# Patient Record
Sex: MALE | Race: WHITE | NOT HISPANIC OR LATINO | Employment: OTHER | URBAN - METROPOLITAN AREA
[De-identification: names, ages, dates, MRNs, and addresses within clinical notes are randomized per-mention and may not be internally consistent; named-entity substitution may affect disease eponyms.]

---

## 2020-02-14 ENCOUNTER — APPOINTMENT (OUTPATIENT)
Dept: GENERAL RADIOLOGY | Facility: HOSPITAL | Age: 45
End: 2020-02-14

## 2020-02-14 ENCOUNTER — HOSPITAL ENCOUNTER (EMERGENCY)
Facility: HOSPITAL | Age: 45
Discharge: HOME | End: 2020-02-14
Admitting: EMERGENCY MEDICINE

## 2020-02-14 ENCOUNTER — APPOINTMENT (OUTPATIENT)
Dept: MRI IMAGING | Facility: HOSPITAL | Age: 45
End: 2020-02-14

## 2020-02-14 VITALS
BODY MASS INDEX: 28.57 KG/M2 | HEIGHT: 68 IN | OXYGEN SATURATION: 95 % | WEIGHT: 188.49 LBS | TEMPERATURE: 98.3 F | DIASTOLIC BLOOD PRESSURE: 90 MMHG | SYSTOLIC BLOOD PRESSURE: 146 MMHG | RESPIRATION RATE: 16 BRPM | HEART RATE: 60 BPM

## 2020-02-14 DIAGNOSIS — S30.0XXA LUMBAR CONTUSION, INITIAL ENCOUNTER: ICD-10-CM
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
## 2020-02-14 PROCEDURE — 72148 MRI LUMBAR SPINE W/O DYE: CPT

## 2020-02-14 PROCEDURE — 25010000002 DEXAMETHASONE SODIUM PHOSPHATE 10 MG/ML SOLUTION: Performed by: NURSE PRACTITIONER

## 2020-02-14 PROCEDURE — 96372 THER/PROPH/DIAG INJ SC/IM: CPT

## 2020-02-14 PROCEDURE — 25010000002 HYDROMORPHONE PER 4 MG: Performed by: NURSE PRACTITIONER

## 2020-02-14 PROCEDURE — 72170 X-RAY EXAM OF PELVIS: CPT

## 2020-02-14 PROCEDURE — 99283 EMERGENCY DEPT VISIT LOW MDM: CPT

## 2020-02-14 PROCEDURE — 72072 X-RAY EXAM THORAC SPINE 3VWS: CPT

## 2020-02-14 PROCEDURE — 72110 X-RAY EXAM L-2 SPINE 4/>VWS: CPT

## 2020-02-14 PROCEDURE — 63710000001 ONDANSETRON ODT 4 MG TABLET DISPERSIBLE: Performed by: NURSE PRACTITIONER

## 2020-02-14 RX ORDER — ONDANSETRON 4 MG/1
4 TABLET, ORALLY DISINTEGRATING ORAL ONCE
Status: COMPLETED | OUTPATIENT
Start: 2020-02-14 | End: 2020-02-14

## 2020-02-14 RX ORDER — IBUPROFEN 800 MG/1
800 TABLET ORAL EVERY 6 HOURS PRN
Qty: 9 TABLET | Refills: 0 | Status: ON HOLD | OUTPATIENT
Start: 2020-02-14 | End: 2022-04-26

## 2020-02-14 RX ORDER — HYDROMORPHONE HCL 110MG/55ML
0.5 PATIENT CONTROLLED ANALGESIA SYRINGE INTRAVENOUS ONCE
Status: COMPLETED | OUTPATIENT
Start: 2020-02-14 | End: 2020-02-14

## 2020-02-14 RX ORDER — CYCLOBENZAPRINE HCL 10 MG
10 TABLET ORAL 3 TIMES DAILY PRN
Qty: 15 TABLET | Refills: 0 | Status: ON HOLD | OUTPATIENT
Start: 2020-02-14 | End: 2021-12-28

## 2020-02-14 RX ORDER — DEXAMETHASONE SODIUM PHOSPHATE 10 MG/ML
10 INJECTION, SOLUTION INTRAMUSCULAR; INTRAVENOUS ONCE
Status: COMPLETED | OUTPATIENT
Start: 2020-02-14 | End: 2020-02-14

## 2020-02-14 RX ADMIN — ONDANSETRON 4 MG: 4 TABLET, ORALLY DISINTEGRATING ORAL at 14:36

## 2020-02-14 RX ADMIN — HYDROMORPHONE HYDROCHLORIDE 0.5 MG: 2 INJECTION, SOLUTION INTRAMUSCULAR; INTRAVENOUS; SUBCUTANEOUS at 14:36

## 2020-02-14 RX ADMIN — DEXAMETHASONE SODIUM PHOSPHATE 10 MG: 10 INJECTION, SOLUTION INTRAMUSCULAR; INTRAVENOUS at 13:11

## 2020-02-14 NOTE — ED PROVIDER NOTES
Subjective   Chief complaint: back pain      Context: Patient is a 55-year-old male who comes in ambulatory by private vehicle with c/o back pain after he fell flat on his back from standing position. Denies any saddle anesthesia, bowel or bladder incontinence or retention weakness parasthesias or numbness.  He states he has had prior back fractures approximately 10 years ago that he wore a turtle shell splint for but did not have surgery.  He does not currently follow with the spine center or pain management.  He states he has not had any problems until he fell approximately 5 days ago.  He has been taking Aleve and Tylenol with little relief    Duration: x5 days    Timing: Waxes and wanes    Severity: Moderate    Associated symptoms: Worse with range of motion          PCP: none          Review of Systems   Constitutional: Negative.    HENT: Negative.    Eyes: Negative.    Respiratory: Negative.    Cardiovascular: Negative.    Gastrointestinal: Negative.    Genitourinary: Negative.    Musculoskeletal: Positive for back pain and myalgias.   Skin: Negative.    Neurological: Negative.    Hematological: Does not bruise/bleed easily.       No past medical history on file.    No Known Allergies    No past surgical history on file.    No family history on file.    Social History     Socioeconomic History   • Marital status: Single     Spouse name: Not on file   • Number of children: Not on file   • Years of education: Not on file   • Highest education level: Not on file           Objective   Physical Exam     Vital signs and traige nurse note reviewed.  Constitutional:  Awake, alert; well developed and well nourished.  Uncomfortable, the patient is examined in hospital gown.  HEENT:  Normocephalic, atraumatic; pupils are PERRL with intact EOM; oropharynx is pink and moist without edema or erythema.  Neck: Supple, full range of motion without pain; no cervical lymphadenopathy.  Cardiovascular: Regular rate and rhythm,  normal S1-S2. .  Pulmonary: Respiratory effort regular, nonlabored; breath sounds CTA without wheezing or rhonchi.  Abdomen: Soft, nontender, nondistended with normoactive bowel sounds; no rebound or guarding.    Musculoskeletal: Independent range of motion of all extremities, no palpable tenderness or edema.   Back:  Spine is midline and without midline tenderness on palpation of cervical; some midline tenderness without bony step-off or crepitus noted over the thoracic, and lumbar spine-there are no obvious signs of trauma; paraspinal musculature is tender over the bilateral lumbar paraspinal musculature and without soft tissue swelling, overlying ecchymosis or erythema. ROM is without pain,  Distal muscle strength is 5/5.  Neuro: Alert oriented x3, speech is clear and appropriate. DTRs are symmetrical bilateral lower extremity, negative Babinski BLE, negative straight leg raise BLE, strong and equal dorsiflexion of bilateral great toes L4, L5, S1 motor sensory intact.        Procedures           ED Course      Labs Reviewed - No data to display  Medications   dexamethasone sodium phosphate injection 10 mg (10 mg Intramuscular Given 2/14/20 1311)   HYDROmorphone (DILAUDID) injection 0.5 mg (0.5 mg Subcutaneous Given 2/14/20 1436)   ondansetron ODT (ZOFRAN-ODT) disintegrating tablet 4 mg (4 mg Oral Given 2/14/20 1436)     Xr Spine Thoracic 3 View    Result Date: 2/14/2020   1. There is no compression fracture or subluxation. 2. There is mild multilevel degenerative disc disease and degenerative changes noted  Electronically Signed By-Dinh Blevins On:2/14/2020 1:36 PM This report was finalized on [id number] by  Dinh Blevins, .    Mri Lumbar Spine Without Contrast    Result Date: 2/14/2020   1. Old compression deformities are noted at multiple levels. No new compression deformity is noted. 2. Mild retrolisthesis of L2 on 3 is noted. 3. Multilevel degenerative disc disease and degenerative changes noted with  varying degrees of neural foraminal encroachment. Otherwise there is no significant focal protruding disc or significant stenosis noted at any level.   Electronically Signed ByLoly Blevins On:2/14/2020 3:16 PM This report was finalized on [id number] by  Dinh Blevins, .    Xr Pelvis 1 Or 2 View    Result Date: 2/14/2020  Negative pelvis radiograph.   Electronically Signed ByLoly Blevins On:2/14/2020 1:37 PM This report was finalized on [id number] by  Dinh Blevins, .    Xr Spine Lumbar Complete 4+vw    Result Date: 2/14/2020   1. Multilevel compression deformities are noted from L2 through L4 which are age indeterminate. Follow-up and correlation with MRI evaluation may be helpful to assess the acuteness of these findings.  Electronically Signed ByLoly Blevins On:2/14/2020 1:37 PM This report was finalized on [id number] by  Dinh Blevins, .                                         MDM  Number of Diagnoses or Management Options  Fall, initial encounter:   Lumbar contusion, initial encounter:   Diagnosis management comments: Medical decision  Comorbidities:  has no past medical history on file.  Differentials: Fracture contusion-: Epidural abscess spinal cord impingement felt unlikely based on HPI and physical exam  Radiology interpretation:  X-rays reviewed by me and interpreted by radiologist,   Xr Spine Thoracic 3 View    Result Date: 2/14/2020   1. There is no compression fracture or subluxation. 2. There is mild multilevel degenerative disc disease and degenerative changes noted  Electronically Signed ByLoly Blevins On:2/14/2020 1:36 PM This report was finalized on [id number] by  Dinh Blevins, .    Mri Lumbar Spine Without Contrast    Result Date: 2/14/2020   1. Old compression deformities are noted at multiple levels. No new compression deformity is noted. 2. Mild retrolisthesis of L2 on 3 is noted. 3. Multilevel degenerative disc disease and degenerative changes noted  with varying degrees of neural foraminal encroachment. Otherwise there is no significant focal protruding disc or significant stenosis noted at any level.   Electronically Signed By-Dinh Blevins On:2/14/2020 3:16 PM This report was finalized on [id number] by  Dinh Blevins, .    Xr Pelvis 1 Or 2 View    Result Date: 2/14/2020  Negative pelvis radiograph.   Electronically Signed By-Christnorman Blevins On:2/14/2020 1:37 PM This report was finalized on [id number] by  Dinh Blevins, .    Xr Spine Lumbar Complete 4+vw    Result Date: 2/14/2020   1. Multilevel compression deformities are noted from L2 through L4 which are age indeterminate. Follow-up and correlation with MRI evaluation may be helpful to assess the acuteness of these findings.  Electronically Signed By-Dinh Blevins On:2/14/2020 1:37 PM This report was finalized on [id number] by  Dinh Blevins, .    Lab interpretation: Agrees not warranted    I discussed findings of x-rays with Dr. ch and MRI was ordered  he was given steroids Zofran and Dilaudid subcu  Patient is ambulatory    i discussed findings with patient who voices understanding of discharge instructions, signs and symptoms requiring return to ED; discharged improved and in stable condition with follow up for re-evaluation.  Inspect queried.  Patient was discharged home with ibuprofen and Flexeril    Patient Progress  Patient progress: stable      Final diagnoses:   Fall, initial encounter   Lumbar contusion, initial encounter            Rashida Rojas, APRN  02/14/20 1559

## 2020-02-14 NOTE — ED NOTES
Pt c/o lower back pain. Had back surgery in '09 fell 20feet and fractured his back. Pt reports he fell Monday and they pain has increased since then. Pt reports he could not even get out of bed this morning     Desmond Alvarado, LPN  02/14/20 5363

## 2020-02-14 NOTE — DISCHARGE INSTRUCTIONS
Medications as directed for pain, inflammation, muscle relaxation; light massage and range of motion exercises and improve the discomfort; back exercises 2-3 times daily; no heavy lifting, repeated bending or stooping for 3-5 days, gradually increase activity as tolerated by pain; return for numbness to the inner thigh, genitals or rectum, loss of control of your bowels or bladder, inability to urinate or worsening pain or symptoms. Follow up with primary care physician if no improvement in 2-3 days

## 2020-06-10 ENCOUNTER — HOSPITAL ENCOUNTER (EMERGENCY)
Facility: HOSPITAL | Age: 45
Discharge: HOME | End: 2020-06-10
Attending: EMERGENCY MEDICINE | Admitting: EMERGENCY MEDICINE

## 2020-06-10 VITALS
DIASTOLIC BLOOD PRESSURE: 130 MMHG | WEIGHT: 197.97 LBS | BODY MASS INDEX: 30 KG/M2 | OXYGEN SATURATION: 96 % | RESPIRATION RATE: 18 BRPM | SYSTOLIC BLOOD PRESSURE: 182 MMHG | HEART RATE: 73 BPM | HEIGHT: 68 IN | TEMPERATURE: 98.4 F

## 2020-06-10 DIAGNOSIS — T15.01XA FOREIGN BODY OF RIGHT CORNEA, INITIAL ENCOUNTER: Primary | ICD-10-CM

## 2020-06-10 PROCEDURE — 99283 EMERGENCY DEPT VISIT LOW MDM: CPT

## 2020-06-10 RX ORDER — PROPARACAINE HYDROCHLORIDE 5 MG/ML
SOLUTION/ DROPS OPHTHALMIC
Status: COMPLETED
Start: 2020-06-10 | End: 2020-06-10

## 2020-06-10 RX ORDER — BACITRACIN ZINC AND POLYMYXIN B SULFATE 500; 10000 [USP'U]/G; [USP'U]/G
OINTMENT OPHTHALMIC EVERY 6 HOURS SCHEDULED
Status: DISCONTINUED | OUTPATIENT
Start: 2020-06-10 | End: 2020-06-10 | Stop reason: HOSPADM

## 2020-06-10 RX ADMIN — PROPARACAINE HYDROCHLORIDE: 5 SOLUTION/ DROPS OPHTHALMIC at 08:15

## 2020-06-10 RX ADMIN — FLUORESCEIN SODIUM: 1 STRIP OPHTHALMIC at 08:15

## 2020-06-10 NOTE — ED NOTES
Pt was not Polysporin gtts at the time of d/c.  Contact was attempted, and ointment will be called to pharmacy.      Lona Gonzalez RN  06/10/20 0834

## 2020-06-10 NOTE — ED PROVIDER NOTES
Subjective   History of Present Illness  45-year-old male presents with complaints of right eye pain.  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  He states he was driving and felt like something blew into his eye.  He is not having any visual difficulty at this time.  He has no complaints of other injury.  Review of Systems  Negative for visual complaints  No past medical history on file.  Negative  No Known Allergies    No past surgical history on file.    No family history on file.    Social History     Socioeconomic History   • Marital status: Single     Spouse name: Not on file   • Number of children: Not on file   • Years of education: Not on file   • Highest education level: Not on file     No routine medications      Objective   Physical Exam  45-year-old male awake alert.  He is noted to have some mild edema to the lids of right eye.  Slit-lamp exam was performed he is noted to have foreign body at 3:00 on cornea.  Cornea anterior chamber are otherwise clear.  Visual acuity 20/30 right eye 20/40 left eye  Procedures     Patient eye was anesthetized with proparacaine.  Following this a non-rotating ophthalmic ora was used to lift foreign body and this was removed with a cotton swab using slit-lamp.  Patient does have small area of rust but he cannot cooperate well enough to remove this.  Fluorescein was placed which confirms abrasion at site of foreign body but no other uptake is noted.  Benny's test negative      ED Course                                           MDM  Patient findings were discussed with him.  He was discharged placed on Polysporin ophthalmic ointment.  Advised to follow-up with Dr. De La Rosa for repeat evaluation return new or worsening symptoms.  Patient's blood pressure was still elevated on repeat although he reports being very anxious.  He was advised of the importance of having this rechecked in the next few days.  He was given  follow-up for this.  Final diagnoses:   Foreign body of right cornea, initial encounter            Luis Angel Hylton MD  06/10/20 0808

## 2020-06-29 ENCOUNTER — APPOINTMENT (OUTPATIENT)
Dept: GENERAL RADIOLOGY | Facility: HOSPITAL | Age: 45
End: 2020-06-29

## 2020-06-29 ENCOUNTER — HOSPITAL ENCOUNTER (EMERGENCY)
Facility: HOSPITAL | Age: 45
Discharge: HOME | End: 2020-06-29
Attending: EMERGENCY MEDICINE | Admitting: EMERGENCY MEDICINE

## 2020-06-29 VITALS
WEIGHT: 193.78 LBS | OXYGEN SATURATION: 96 % | TEMPERATURE: 98.1 F | HEART RATE: 88 BPM | RESPIRATION RATE: 18 BRPM | DIASTOLIC BLOOD PRESSURE: 98 MMHG | HEIGHT: 68 IN | BODY MASS INDEX: 29.37 KG/M2 | SYSTOLIC BLOOD PRESSURE: 142 MMHG

## 2020-06-29 DIAGNOSIS — R06.00 DYSPNEA, UNSPECIFIED TYPE: Primary | ICD-10-CM

## 2020-06-29 DIAGNOSIS — J40 BRONCHITIS: ICD-10-CM

## 2020-06-29 DIAGNOSIS — J98.01 BRONCHOSPASM: ICD-10-CM

## 2020-06-29 LAB — SARS-COV-2 RNA PNL SPEC NAA+PROBE: NOT DETECTED

## 2020-06-29 PROCEDURE — 71045 X-RAY EXAM CHEST 1 VIEW: CPT

## 2020-06-29 PROCEDURE — 87635 SARS-COV-2 COVID-19 AMP PRB: CPT | Performed by: EMERGENCY MEDICINE

## 2020-06-29 PROCEDURE — 25010000002 METHYLPREDNISOLONE PER 125 MG: Performed by: EMERGENCY MEDICINE

## 2020-06-29 PROCEDURE — 94640 AIRWAY INHALATION TREATMENT: CPT

## 2020-06-29 PROCEDURE — 99283 EMERGENCY DEPT VISIT LOW MDM: CPT

## 2020-06-29 PROCEDURE — 96372 THER/PROPH/DIAG INJ SC/IM: CPT

## 2020-06-29 RX ORDER — METHYLPREDNISOLONE SODIUM SUCCINATE 125 MG/2ML
80 INJECTION, POWDER, LYOPHILIZED, FOR SOLUTION INTRAMUSCULAR; INTRAVENOUS ONCE
Status: COMPLETED | OUTPATIENT
Start: 2020-06-29 | End: 2020-06-29

## 2020-06-29 RX ORDER — IPRATROPIUM BROMIDE AND ALBUTEROL SULFATE 2.5; .5 MG/3ML; MG/3ML
3 SOLUTION RESPIRATORY (INHALATION) ONCE
Status: DISCONTINUED | OUTPATIENT
Start: 2020-06-29 | End: 2020-06-29

## 2020-06-29 RX ORDER — ALBUTEROL SULFATE 90 UG/1
2 AEROSOL, METERED RESPIRATORY (INHALATION) ONCE
Status: COMPLETED | OUTPATIENT
Start: 2020-06-29 | End: 2020-06-29

## 2020-06-29 RX ORDER — ALBUTEROL SULFATE 90 UG/1
2 AEROSOL, METERED RESPIRATORY (INHALATION)
Qty: 8 G | Refills: 0 | Status: ON HOLD | OUTPATIENT
Start: 2020-06-29 | End: 2022-04-26

## 2020-06-29 RX ORDER — PREDNISONE 20 MG/1
20 TABLET ORAL DAILY
Qty: 5 TABLET | Refills: 0 | Status: ON HOLD | OUTPATIENT
Start: 2020-06-29 | End: 2021-12-28

## 2020-06-29 RX ORDER — AZITHROMYCIN 250 MG/1
TABLET, FILM COATED ORAL
Qty: 6 TABLET | Refills: 0 | Status: ON HOLD | OUTPATIENT
Start: 2020-06-29 | End: 2021-12-28

## 2020-06-29 RX ADMIN — ALBUTEROL SULFATE 2 PUFF: 90 AEROSOL, METERED RESPIRATORY (INHALATION) at 12:43

## 2020-06-29 RX ADMIN — METHYLPREDNISOLONE SODIUM SUCCINATE 80 MG: 125 INJECTION, POWDER, FOR SOLUTION INTRAMUSCULAR; INTRAVENOUS at 12:42

## 2020-06-30 ENCOUNTER — EPISODE CHANGES (OUTPATIENT)
Dept: CASE MANAGEMENT | Facility: OTHER | Age: 45
End: 2020-06-30

## 2020-07-01 ENCOUNTER — PATIENT OUTREACH (OUTPATIENT)
Dept: CASE MANAGEMENT | Facility: OTHER | Age: 45
End: 2020-07-01

## 2020-07-01 NOTE — OUTREACH NOTE
"ED Potential Covid Discharge Follow-u  RN-ACM outreach call made to pt, who was discharged [facility] ED on 6/26/20 w/ DX dyspnea, bronchospasm, bronchitis.  COVID 19 testing performed, NEG result recorded.  Pt states has been notified re neg test result.  Pt reports sx started azithromax and prednison - sx of cough and congestion a little improved today: Denies new sx. Denies F/C, worsening SOA, N/V, D/C, change in taste perception. Confirms he is eating/drinking well.  Good hydration and no smoking encouraged. Patient states lives alone. but does not have support from family/friends if needed.  Does/Does not verb need for a work excuse.  Reviewed ED DC recommendations and ED AVS with pt - pt verb understanding. He denies food, medication or transportation insecurities.  He also however states he did not fill albuterol RX due to cost.  Discussed Medicaid insurance coverage status. States he needs to renew his insurance.  RN-ACM gave pt Medicaid IN general #[phone] and the member services # on his card [phone], advised him call ASAP to ensure continued coverage - he verb understanding and intent to comply.  Discussed importance of close PCP f/u, telehealth/video appts. Offered to assist pt with scheduling a new PCP appointment.  Pt declined scheduling assistance & [facility] number, states he has a family doctor, states \"I don't go to the doctor unless I'm dying\" and couldn't recall her name, but states she is in [place].  Patient voiced understanding & compliance re social distancing requirements. Provided pt with 24/7 Nurse Triage Line, [phone]; Covid-19 Hotline#, [phone]. Pt voices no other questions or concerns at this time.    Advised pt to call PCP or RN-ACM with any needs & pt verb understanding.      Esther Goodson RN  Ambulatory     7/1/2020, 15:17      "

## 2021-12-27 ENCOUNTER — APPOINTMENT (OUTPATIENT)
Dept: GENERAL RADIOLOGY | Facility: HOSPITAL | Age: 46
End: 2021-12-27

## 2021-12-27 ENCOUNTER — HOSPITAL ENCOUNTER (OUTPATIENT)
Facility: HOSPITAL | Age: 46
Setting detail: OBSERVATION
Discharge: HOME | End: 2021-12-29
Attending: EMERGENCY MEDICINE | Admitting: INTERNAL MEDICINE

## 2021-12-27 ENCOUNTER — APPOINTMENT (OUTPATIENT)
Dept: CT IMAGING | Facility: HOSPITAL | Age: 46
End: 2021-12-27

## 2021-12-27 DIAGNOSIS — R07.9 CHEST PAIN, UNSPECIFIED TYPE: Primary | ICD-10-CM

## 2021-12-27 DIAGNOSIS — B34.8 RHINOVIRUS: ICD-10-CM

## 2021-12-27 DIAGNOSIS — U07.1 COVID-19: ICD-10-CM

## 2021-12-27 LAB
ALBUMIN SERPL-MCNC: 3.8 G/DL (ref 3.5–5.2)
ALBUMIN/GLOB SERPL: 1 G/DL
ALP SERPL-CCNC: 47 U/L (ref 39–117)
ALT SERPL W P-5'-P-CCNC: 24 U/L (ref 1–41)
ANION GAP SERPL CALCULATED.3IONS-SCNC: 12 MMOL/L (ref 5–15)
APTT PPP: 30.8 SECONDS (ref 24–31)
AST SERPL-CCNC: 26 U/L (ref 1–40)
B PARAPERT DNA SPEC QL NAA+PROBE: NOT DETECTED
B PERT DNA SPEC QL NAA+PROBE: NOT DETECTED
BACTERIA UR QL AUTO: ABNORMAL /HPF
BASOPHILS # BLD AUTO: 0.1 10*3/MM3 (ref 0–0.2)
BASOPHILS NFR BLD AUTO: 1.2 % (ref 0–1.5)
BILIRUB SERPL-MCNC: 0.4 MG/DL (ref 0–1.2)
BILIRUB UR QL STRIP: ABNORMAL
BUN SERPL-MCNC: 20 MG/DL (ref 6–20)
BUN/CREAT SERPL: 16.4 (ref 7–25)
C PNEUM DNA NPH QL NAA+NON-PROBE: NOT DETECTED
CALCIUM SPEC-SCNC: 9.1 MG/DL (ref 8.6–10.5)
CHLORIDE SERPL-SCNC: 100 MMOL/L (ref 98–107)
CLARITY UR: CLEAR
CO2 SERPL-SCNC: 25 MMOL/L (ref 22–29)
COLOR UR: YELLOW
CREAT SERPL-MCNC: 1.22 MG/DL (ref 0.76–1.27)
D DIMER PPP FEU-MCNC: 0.54 MG/L (FEU) (ref 0–0.59)
DEPRECATED RDW RBC AUTO: 40.7 FL (ref 37–54)
EOSINOPHIL # BLD AUTO: 0 10*3/MM3 (ref 0–0.4)
EOSINOPHIL NFR BLD AUTO: 0.4 % (ref 0.3–6.2)
ERYTHROCYTE [DISTWIDTH] IN BLOOD BY AUTOMATED COUNT: 13.2 % (ref 12.3–15.4)
FLUAV SUBTYP SPEC NAA+PROBE: NOT DETECTED
FLUBV RNA ISLT QL NAA+PROBE: NOT DETECTED
GFR SERPL CREATININE-BSD FRML MDRD: 65 ML/MIN/1.73
GLOBULIN UR ELPH-MCNC: 3.8 GM/DL
GLUCOSE SERPL-MCNC: 82 MG/DL (ref 65–99)
GLUCOSE UR STRIP-MCNC: NEGATIVE MG/DL
HADV DNA SPEC NAA+PROBE: NOT DETECTED
HCOV 229E RNA SPEC QL NAA+PROBE: NOT DETECTED
HCOV HKU1 RNA SPEC QL NAA+PROBE: NOT DETECTED
HCOV NL63 RNA SPEC QL NAA+PROBE: NOT DETECTED
HCOV OC43 RNA SPEC QL NAA+PROBE: NOT DETECTED
HCT VFR BLD AUTO: 43.6 % (ref 37.5–51)
HGB BLD-MCNC: 15 G/DL (ref 13–17.7)
HGB UR QL STRIP.AUTO: ABNORMAL
HMPV RNA NPH QL NAA+NON-PROBE: NOT DETECTED
HPIV1 RNA ISLT QL NAA+PROBE: NOT DETECTED
HPIV2 RNA SPEC QL NAA+PROBE: NOT DETECTED
HPIV3 RNA NPH QL NAA+PROBE: NOT DETECTED
HPIV4 P GENE NPH QL NAA+PROBE: NOT DETECTED
HYALINE CASTS UR QL AUTO: ABNORMAL /LPF
INR PPP: 0.96 (ref 0.93–1.1)
KETONES UR QL STRIP: NEGATIVE
LEUKOCYTE ESTERASE UR QL STRIP.AUTO: NEGATIVE
LYMPHOCYTES # BLD AUTO: 1.7 10*3/MM3 (ref 0.7–3.1)
LYMPHOCYTES NFR BLD AUTO: 26.1 % (ref 19.6–45.3)
M PNEUMO IGG SER IA-ACNC: NOT DETECTED
MCH RBC QN AUTO: 30.2 PG (ref 26.6–33)
MCHC RBC AUTO-ENTMCNC: 34.5 G/DL (ref 31.5–35.7)
MCV RBC AUTO: 87.7 FL (ref 79–97)
MONOCYTES # BLD AUTO: 1 10*3/MM3 (ref 0.1–0.9)
MONOCYTES NFR BLD AUTO: 16.3 % (ref 5–12)
NEUTROPHILS NFR BLD AUTO: 3.6 10*3/MM3 (ref 1.7–7)
NEUTROPHILS NFR BLD AUTO: 56 % (ref 42.7–76)
NITRITE UR QL STRIP: NEGATIVE
NRBC BLD AUTO-RTO: 0.2 /100 WBC (ref 0–0.2)
NT-PROBNP SERPL-MCNC: 597.7 PG/ML (ref 0–450)
PH UR STRIP.AUTO: 5.5 [PH] (ref 5–8)
PLATELET # BLD AUTO: 191 10*3/MM3 (ref 140–450)
PMV BLD AUTO: 8.4 FL (ref 6–12)
POTASSIUM SERPL-SCNC: 3.9 MMOL/L (ref 3.5–5.2)
PROT SERPL-MCNC: 7.6 G/DL (ref 6–8.5)
PROT UR QL STRIP: ABNORMAL
PROTHROMBIN TIME: 10.7 SECONDS (ref 9.6–11.7)
RBC # BLD AUTO: 4.98 10*6/MM3 (ref 4.14–5.8)
RBC # UR STRIP: ABNORMAL /HPF
REF LAB TEST METHOD: ABNORMAL
RHINOVIRUS RNA SPEC NAA+PROBE: DETECTED
RSV RNA NPH QL NAA+NON-PROBE: NOT DETECTED
SARS-COV-2 RNA NPH QL NAA+NON-PROBE: DETECTED
SODIUM SERPL-SCNC: 137 MMOL/L (ref 136–145)
SP GR UR STRIP: 1.03 (ref 1–1.03)
SQUAMOUS #/AREA URNS HPF: ABNORMAL /HPF
TROPONIN T SERPL-MCNC: <0.01 NG/ML (ref 0–0.03)
UROBILINOGEN UR QL STRIP: ABNORMAL
WAXY CASTS #/AREA URNS LPF: ABNORMAL /LPF
WBC # UR STRIP: ABNORMAL /HPF
WBC NRBC COR # BLD: 6.3 10*3/MM3 (ref 3.4–10.8)

## 2021-12-27 PROCEDURE — 71275 CT ANGIOGRAPHY CHEST: CPT

## 2021-12-27 PROCEDURE — G0378 HOSPITAL OBSERVATION PER HR: HCPCS

## 2021-12-27 PROCEDURE — 85379 FIBRIN DEGRADATION QUANT: CPT | Performed by: EMERGENCY MEDICINE

## 2021-12-27 PROCEDURE — 0 IOPAMIDOL PER 1 ML: Performed by: EMERGENCY MEDICINE

## 2021-12-27 PROCEDURE — 85610 PROTHROMBIN TIME: CPT | Performed by: EMERGENCY MEDICINE

## 2021-12-27 PROCEDURE — 81001 URINALYSIS AUTO W/SCOPE: CPT | Performed by: EMERGENCY MEDICINE

## 2021-12-27 PROCEDURE — 99285 EMERGENCY DEPT VISIT HI MDM: CPT

## 2021-12-27 PROCEDURE — 99219 PR INITIAL OBSERVATION CARE/DAY 50 MINUTES: CPT | Performed by: NURSE PRACTITIONER

## 2021-12-27 PROCEDURE — 71045 X-RAY EXAM CHEST 1 VIEW: CPT

## 2021-12-27 PROCEDURE — 96374 THER/PROPH/DIAG INJ IV PUSH: CPT

## 2021-12-27 PROCEDURE — 80053 COMPREHEN METABOLIC PANEL: CPT | Performed by: EMERGENCY MEDICINE

## 2021-12-27 PROCEDURE — 85730 THROMBOPLASTIN TIME PARTIAL: CPT | Performed by: EMERGENCY MEDICINE

## 2021-12-27 PROCEDURE — 84484 ASSAY OF TROPONIN QUANT: CPT | Performed by: EMERGENCY MEDICINE

## 2021-12-27 PROCEDURE — 93005 ELECTROCARDIOGRAM TRACING: CPT | Performed by: EMERGENCY MEDICINE

## 2021-12-27 PROCEDURE — 0202U NFCT DS 22 TRGT SARS-COV-2: CPT | Performed by: EMERGENCY MEDICINE

## 2021-12-27 PROCEDURE — 25010000002 KETOROLAC TROMETHAMINE PER 15 MG: Performed by: EMERGENCY MEDICINE

## 2021-12-27 PROCEDURE — 83880 ASSAY OF NATRIURETIC PEPTIDE: CPT | Performed by: EMERGENCY MEDICINE

## 2021-12-27 PROCEDURE — 73502 X-RAY EXAM HIP UNI 2-3 VIEWS: CPT

## 2021-12-27 PROCEDURE — 74176 CT ABD & PELVIS W/O CONTRAST: CPT

## 2021-12-27 PROCEDURE — 85025 COMPLETE CBC W/AUTO DIFF WBC: CPT | Performed by: EMERGENCY MEDICINE

## 2021-12-27 PROCEDURE — 36415 COLL VENOUS BLD VENIPUNCTURE: CPT

## 2021-12-27 RX ORDER — SODIUM CHLORIDE 0.9 % (FLUSH) 0.9 %
10 SYRINGE (ML) INJECTION AS NEEDED
Status: DISCONTINUED | OUTPATIENT
Start: 2021-12-27 | End: 2021-12-29 | Stop reason: HOSPADM

## 2021-12-27 RX ORDER — ACETAMINOPHEN 500 MG
1000 TABLET ORAL ONCE
Status: COMPLETED | OUTPATIENT
Start: 2021-12-27 | End: 2021-12-27

## 2021-12-27 RX ORDER — KETOROLAC TROMETHAMINE 15 MG/ML
15 INJECTION, SOLUTION INTRAMUSCULAR; INTRAVENOUS ONCE
Status: COMPLETED | OUTPATIENT
Start: 2021-12-27 | End: 2021-12-27

## 2021-12-27 RX ORDER — ASPIRIN 81 MG/1
324 TABLET, CHEWABLE ORAL ONCE
Status: COMPLETED | OUTPATIENT
Start: 2021-12-27 | End: 2021-12-27

## 2021-12-27 RX ADMIN — ACETAMINOPHEN 1000 MG: 500 TABLET, FILM COATED ORAL at 23:17

## 2021-12-27 RX ADMIN — IOPAMIDOL 100 ML: 755 INJECTION, SOLUTION INTRAVENOUS at 21:25

## 2021-12-27 RX ADMIN — KETOROLAC TROMETHAMINE 15 MG: 15 INJECTION, SOLUTION INTRAMUSCULAR; INTRAVENOUS at 20:09

## 2021-12-27 RX ADMIN — ASPIRIN 81 MG CHEWABLE TABLET 324 MG: 81 TABLET CHEWABLE at 23:19

## 2021-12-28 PROBLEM — R07.9 CHEST PAIN: Status: ACTIVE | Noted: 2021-12-28

## 2021-12-28 PROBLEM — B34.8 RHINOVIRUS: Status: ACTIVE | Noted: 2021-12-28

## 2021-12-28 PROBLEM — U07.1 COVID-19 VIRUS DETECTED: Status: ACTIVE | Noted: 2021-12-28

## 2021-12-28 LAB
CHOLEST SERPL-MCNC: 173 MG/DL (ref 0–200)
CRP SERPL-MCNC: 2.42 MG/DL (ref 0–0.5)
FERRITIN SERPL-MCNC: 192.2 NG/ML (ref 30–400)
HBA1C MFR BLD: 5.4 % (ref 3.5–5.6)
HDLC SERPL-MCNC: 52 MG/DL (ref 40–60)
LDH SERPL-CCNC: 218 U/L (ref 135–225)
LDLC SERPL CALC-MCNC: 109 MG/DL (ref 0–100)
LDLC/HDLC SERPL: 2.09 {RATIO}
PROCALCITONIN SERPL-MCNC: 0.05 NG/ML (ref 0–0.25)
TRIGL SERPL-MCNC: 62 MG/DL (ref 0–150)
TROPONIN T SERPL-MCNC: <0.01 NG/ML (ref 0–0.03)
VLDLC SERPL-MCNC: 12 MG/DL (ref 5–40)

## 2021-12-28 PROCEDURE — 83036 HEMOGLOBIN GLYCOSYLATED A1C: CPT | Performed by: NURSE PRACTITIONER

## 2021-12-28 PROCEDURE — G0378 HOSPITAL OBSERVATION PER HR: HCPCS

## 2021-12-28 PROCEDURE — 94799 UNLISTED PULMONARY SVC/PX: CPT

## 2021-12-28 PROCEDURE — 25010000002 DEXAMETHASONE PER 1 MG: Performed by: NURSE PRACTITIONER

## 2021-12-28 PROCEDURE — 94640 AIRWAY INHALATION TREATMENT: CPT

## 2021-12-28 PROCEDURE — 96372 THER/PROPH/DIAG INJ SC/IM: CPT

## 2021-12-28 PROCEDURE — 99225 PR SBSQ OBSERVATION CARE/DAY 25 MINUTES: CPT | Performed by: INTERNAL MEDICINE

## 2021-12-28 PROCEDURE — 82728 ASSAY OF FERRITIN: CPT | Performed by: NURSE PRACTITIONER

## 2021-12-28 PROCEDURE — 36415 COLL VENOUS BLD VENIPUNCTURE: CPT | Performed by: NURSE PRACTITIONER

## 2021-12-28 PROCEDURE — 25010000002 ENOXAPARIN PER 10 MG: Performed by: NURSE PRACTITIONER

## 2021-12-28 PROCEDURE — 86140 C-REACTIVE PROTEIN: CPT | Performed by: NURSE PRACTITIONER

## 2021-12-28 PROCEDURE — 96375 TX/PRO/DX INJ NEW DRUG ADDON: CPT

## 2021-12-28 PROCEDURE — 84145 PROCALCITONIN (PCT): CPT | Performed by: NURSE PRACTITIONER

## 2021-12-28 PROCEDURE — 83615 LACTATE (LD) (LDH) ENZYME: CPT | Performed by: NURSE PRACTITIONER

## 2021-12-28 PROCEDURE — 84484 ASSAY OF TROPONIN QUANT: CPT | Performed by: NURSE PRACTITIONER

## 2021-12-28 PROCEDURE — 80061 LIPID PANEL: CPT | Performed by: NURSE PRACTITIONER

## 2021-12-28 RX ORDER — NICOTINE 21 MG/24HR
1 PATCH, TRANSDERMAL 24 HOURS TRANSDERMAL
Status: DISCONTINUED | OUTPATIENT
Start: 2021-12-28 | End: 2021-12-29 | Stop reason: HOSPADM

## 2021-12-28 RX ORDER — MAGNESIUM SULFATE HEPTAHYDRATE 40 MG/ML
2 INJECTION, SOLUTION INTRAVENOUS AS NEEDED
Status: DISCONTINUED | OUTPATIENT
Start: 2021-12-28 | End: 2021-12-29 | Stop reason: HOSPADM

## 2021-12-28 RX ORDER — BENZONATATE 100 MG/1
100 CAPSULE ORAL 3 TIMES DAILY PRN
Status: DISCONTINUED | OUTPATIENT
Start: 2021-12-28 | End: 2021-12-29 | Stop reason: HOSPADM

## 2021-12-28 RX ORDER — CHOLECALCIFEROL (VITAMIN D3) 125 MCG
5 CAPSULE ORAL NIGHTLY PRN
Status: DISCONTINUED | OUTPATIENT
Start: 2021-12-28 | End: 2021-12-29 | Stop reason: HOSPADM

## 2021-12-28 RX ORDER — POTASSIUM CHLORIDE 1.5 G/1.77G
40 POWDER, FOR SOLUTION ORAL AS NEEDED
Status: DISCONTINUED | OUTPATIENT
Start: 2021-12-28 | End: 2021-12-29 | Stop reason: HOSPADM

## 2021-12-28 RX ORDER — SODIUM CHLORIDE 0.9 % (FLUSH) 0.9 %
10 SYRINGE (ML) INJECTION EVERY 12 HOURS SCHEDULED
Status: DISCONTINUED | OUTPATIENT
Start: 2021-12-28 | End: 2021-12-29 | Stop reason: HOSPADM

## 2021-12-28 RX ORDER — ALBUTEROL SULFATE 90 UG/1
2 AEROSOL, METERED RESPIRATORY (INHALATION) EVERY 6 HOURS PRN
Status: DISCONTINUED | OUTPATIENT
Start: 2021-12-28 | End: 2021-12-29 | Stop reason: HOSPADM

## 2021-12-28 RX ORDER — POTASSIUM CHLORIDE 20 MEQ/1
40 TABLET, EXTENDED RELEASE ORAL AS NEEDED
Status: DISCONTINUED | OUTPATIENT
Start: 2021-12-28 | End: 2021-12-29 | Stop reason: HOSPADM

## 2021-12-28 RX ORDER — ONDANSETRON 2 MG/ML
4 INJECTION INTRAMUSCULAR; INTRAVENOUS EVERY 6 HOURS PRN
Status: DISCONTINUED | OUTPATIENT
Start: 2021-12-28 | End: 2021-12-29 | Stop reason: HOSPADM

## 2021-12-28 RX ORDER — CALCIUM CARBONATE 200(500)MG
2 TABLET,CHEWABLE ORAL 2 TIMES DAILY PRN
Status: DISCONTINUED | OUTPATIENT
Start: 2021-12-28 | End: 2021-12-29 | Stop reason: HOSPADM

## 2021-12-28 RX ORDER — IBUPROFEN 400 MG/1
800 TABLET ORAL EVERY 6 HOURS PRN
Status: DISCONTINUED | OUTPATIENT
Start: 2021-12-28 | End: 2021-12-29 | Stop reason: HOSPADM

## 2021-12-28 RX ORDER — SODIUM CHLORIDE 0.9 % (FLUSH) 0.9 %
10 SYRINGE (ML) INJECTION AS NEEDED
Status: DISCONTINUED | OUTPATIENT
Start: 2021-12-28 | End: 2021-12-29 | Stop reason: HOSPADM

## 2021-12-28 RX ORDER — DEXAMETHASONE 6 MG/1
6 TABLET ORAL DAILY
Status: DISCONTINUED | OUTPATIENT
Start: 2021-12-28 | End: 2021-12-29 | Stop reason: HOSPADM

## 2021-12-28 RX ORDER — ONDANSETRON 4 MG/1
4 TABLET, FILM COATED ORAL EVERY 6 HOURS PRN
Status: DISCONTINUED | OUTPATIENT
Start: 2021-12-28 | End: 2021-12-29 | Stop reason: HOSPADM

## 2021-12-28 RX ORDER — NITROGLYCERIN 0.4 MG/1
0.4 TABLET SUBLINGUAL
Status: DISCONTINUED | OUTPATIENT
Start: 2021-12-28 | End: 2021-12-29 | Stop reason: HOSPADM

## 2021-12-28 RX ORDER — MAGNESIUM SULFATE 1 G/100ML
1 INJECTION INTRAVENOUS AS NEEDED
Status: DISCONTINUED | OUTPATIENT
Start: 2021-12-28 | End: 2021-12-29 | Stop reason: HOSPADM

## 2021-12-28 RX ORDER — ALUMINA, MAGNESIA, AND SIMETHICONE 2400; 2400; 240 MG/30ML; MG/30ML; MG/30ML
15 SUSPENSION ORAL EVERY 6 HOURS PRN
Status: DISCONTINUED | OUTPATIENT
Start: 2021-12-28 | End: 2021-12-29 | Stop reason: HOSPADM

## 2021-12-28 RX ORDER — DEXAMETHASONE SODIUM PHOSPHATE 4 MG/ML
6 INJECTION, SOLUTION INTRA-ARTICULAR; INTRALESIONAL; INTRAMUSCULAR; INTRAVENOUS; SOFT TISSUE DAILY
Status: DISCONTINUED | OUTPATIENT
Start: 2021-12-28 | End: 2021-12-29

## 2021-12-28 RX ORDER — CLONIDINE HYDROCHLORIDE 0.1 MG/1
0.1 TABLET ORAL EVERY 6 HOURS PRN
Status: DISCONTINUED | OUTPATIENT
Start: 2021-12-28 | End: 2021-12-29 | Stop reason: HOSPADM

## 2021-12-28 RX ORDER — ALBUTEROL SULFATE 90 UG/1
2 AEROSOL, METERED RESPIRATORY (INHALATION)
Status: DISCONTINUED | OUTPATIENT
Start: 2021-12-28 | End: 2021-12-29 | Stop reason: HOSPADM

## 2021-12-28 RX ORDER — LISINOPRIL 5 MG/1
5 TABLET ORAL
Status: DISCONTINUED | OUTPATIENT
Start: 2021-12-28 | End: 2021-12-29 | Stop reason: HOSPADM

## 2021-12-28 RX ORDER — ACETAMINOPHEN 160 MG/5ML
650 SOLUTION ORAL EVERY 4 HOURS PRN
Status: DISCONTINUED | OUTPATIENT
Start: 2021-12-28 | End: 2021-12-29 | Stop reason: HOSPADM

## 2021-12-28 RX ORDER — ACETAMINOPHEN 325 MG/1
650 TABLET ORAL EVERY 4 HOURS PRN
Status: DISCONTINUED | OUTPATIENT
Start: 2021-12-28 | End: 2021-12-29 | Stop reason: HOSPADM

## 2021-12-28 RX ORDER — ACETAMINOPHEN 650 MG/1
650 SUPPOSITORY RECTAL EVERY 4 HOURS PRN
Status: DISCONTINUED | OUTPATIENT
Start: 2021-12-28 | End: 2021-12-29 | Stop reason: HOSPADM

## 2021-12-28 RX ORDER — CYCLOBENZAPRINE HCL 10 MG
10 TABLET ORAL 3 TIMES DAILY PRN
Status: DISCONTINUED | OUTPATIENT
Start: 2021-12-28 | End: 2021-12-29 | Stop reason: HOSPADM

## 2021-12-28 RX ORDER — GUAIFENESIN/DEXTROMETHORPHAN 100-10MG/5
10 SYRUP ORAL EVERY 4 HOURS PRN
Status: DISCONTINUED | OUTPATIENT
Start: 2021-12-28 | End: 2021-12-29 | Stop reason: HOSPADM

## 2021-12-28 RX ADMIN — NICOTINE 1 PATCH: 14 PATCH, EXTENDED RELEASE TRANSDERMAL at 19:52

## 2021-12-28 RX ADMIN — ALBUTEROL SULFATE 2 PUFF: 108 INHALANT RESPIRATORY (INHALATION) at 15:09

## 2021-12-28 RX ADMIN — ENOXAPARIN SODIUM 40 MG: 40 INJECTION SUBCUTANEOUS at 16:54

## 2021-12-28 RX ADMIN — ALBUTEROL SULFATE 2 PUFF: 108 INHALANT RESPIRATORY (INHALATION) at 21:30

## 2021-12-28 RX ADMIN — SODIUM CHLORIDE, PRESERVATIVE FREE 10 ML: 5 INJECTION INTRAVENOUS at 19:52

## 2021-12-28 RX ADMIN — DEXAMETHASONE SODIUM PHOSPHATE 6 MG: 4 INJECTION, SOLUTION INTRAMUSCULAR; INTRAVENOUS at 08:27

## 2021-12-28 RX ADMIN — SODIUM CHLORIDE, PRESERVATIVE FREE 10 ML: 5 INJECTION INTRAVENOUS at 08:27

## 2021-12-28 RX ADMIN — CLONIDINE HYDROCHLORIDE 0.1 MG: 0.1 TABLET ORAL at 16:55

## 2021-12-28 RX ADMIN — LISINOPRIL 5 MG: 5 TABLET ORAL at 11:09

## 2021-12-29 ENCOUNTER — READMISSION MANAGEMENT (OUTPATIENT)
Dept: CALL CENTER | Facility: HOSPITAL | Age: 46
End: 2021-12-29

## 2021-12-29 VITALS
HEART RATE: 77 BPM | BODY MASS INDEX: 26.4 KG/M2 | DIASTOLIC BLOOD PRESSURE: 90 MMHG | OXYGEN SATURATION: 94 % | TEMPERATURE: 98.4 F | SYSTOLIC BLOOD PRESSURE: 145 MMHG | RESPIRATION RATE: 16 BRPM | WEIGHT: 174.16 LBS | HEIGHT: 68 IN

## 2021-12-29 PROBLEM — D89.831 CYTOKINE RELEASE SYNDROME, GRADE 1: Status: ACTIVE | Noted: 2021-12-29

## 2021-12-29 LAB
ALBUMIN SERPL-MCNC: 3.5 G/DL (ref 3.5–5.2)
ALBUMIN/GLOB SERPL: 1 G/DL
ALP SERPL-CCNC: 41 U/L (ref 39–117)
ALT SERPL W P-5'-P-CCNC: 19 U/L (ref 1–41)
ANION GAP SERPL CALCULATED.3IONS-SCNC: 11 MMOL/L (ref 5–15)
AST SERPL-CCNC: 21 U/L (ref 1–40)
BASOPHILS # BLD AUTO: 0 10*3/MM3 (ref 0–0.2)
BASOPHILS NFR BLD AUTO: 0.2 % (ref 0–1.5)
BILIRUB SERPL-MCNC: 0.2 MG/DL (ref 0–1.2)
BUN SERPL-MCNC: 23 MG/DL (ref 6–20)
BUN/CREAT SERPL: 23.5 (ref 7–25)
CALCIUM SPEC-SCNC: 9 MG/DL (ref 8.6–10.5)
CHLORIDE SERPL-SCNC: 103 MMOL/L (ref 98–107)
CO2 SERPL-SCNC: 26 MMOL/L (ref 22–29)
CREAT SERPL-MCNC: 0.98 MG/DL (ref 0.76–1.27)
CRP SERPL-MCNC: 1.21 MG/DL (ref 0–0.5)
DEPRECATED RDW RBC AUTO: 38.9 FL (ref 37–54)
EOSINOPHIL # BLD AUTO: 0 10*3/MM3 (ref 0–0.4)
EOSINOPHIL NFR BLD AUTO: 0 % (ref 0.3–6.2)
ERYTHROCYTE [DISTWIDTH] IN BLOOD BY AUTOMATED COUNT: 12.9 % (ref 12.3–15.4)
ERYTHROCYTE [SEDIMENTATION RATE] IN BLOOD: 36 MM/HR (ref 0–15)
FERRITIN SERPL-MCNC: 200.7 NG/ML (ref 30–400)
GFR SERPL CREATININE-BSD FRML MDRD: 83 ML/MIN/1.73
GLOBULIN UR ELPH-MCNC: 3.5 GM/DL
GLUCOSE SERPL-MCNC: 105 MG/DL (ref 65–99)
HCT VFR BLD AUTO: 40.8 % (ref 37.5–51)
HGB BLD-MCNC: 14.4 G/DL (ref 13–17.7)
LYMPHOCYTES # BLD AUTO: 2 10*3/MM3 (ref 0.7–3.1)
LYMPHOCYTES NFR BLD AUTO: 29.8 % (ref 19.6–45.3)
MAGNESIUM SERPL-MCNC: 2 MG/DL (ref 1.6–2.6)
MCH RBC QN AUTO: 30.7 PG (ref 26.6–33)
MCHC RBC AUTO-ENTMCNC: 35.2 G/DL (ref 31.5–35.7)
MCV RBC AUTO: 87.3 FL (ref 79–97)
MONOCYTES # BLD AUTO: 1.1 10*3/MM3 (ref 0.1–0.9)
MONOCYTES NFR BLD AUTO: 15.8 % (ref 5–12)
NEUTROPHILS NFR BLD AUTO: 3.7 10*3/MM3 (ref 1.7–7)
NEUTROPHILS NFR BLD AUTO: 54.2 % (ref 42.7–76)
NRBC BLD AUTO-RTO: 0 /100 WBC (ref 0–0.2)
NT-PROBNP SERPL-MCNC: 213 PG/ML (ref 0–450)
PLATELET # BLD AUTO: 181 10*3/MM3 (ref 140–450)
PMV BLD AUTO: 9.1 FL (ref 6–12)
POTASSIUM SERPL-SCNC: 3.8 MMOL/L (ref 3.5–5.2)
PROT SERPL-MCNC: 7 G/DL (ref 6–8.5)
QT INTERVAL: 438 MS
RBC # BLD AUTO: 4.67 10*6/MM3 (ref 4.14–5.8)
SODIUM SERPL-SCNC: 140 MMOL/L (ref 136–145)
WBC NRBC COR # BLD: 6.8 10*3/MM3 (ref 3.4–10.8)

## 2021-12-29 PROCEDURE — 36415 COLL VENOUS BLD VENIPUNCTURE: CPT | Performed by: INTERNAL MEDICINE

## 2021-12-29 PROCEDURE — 99217 PR OBSERVATION CARE DISCHARGE MANAGEMENT: CPT | Performed by: INTERNAL MEDICINE

## 2021-12-29 PROCEDURE — G0378 HOSPITAL OBSERVATION PER HR: HCPCS

## 2021-12-29 PROCEDURE — 86140 C-REACTIVE PROTEIN: CPT | Performed by: INTERNAL MEDICINE

## 2021-12-29 PROCEDURE — 82728 ASSAY OF FERRITIN: CPT | Performed by: INTERNAL MEDICINE

## 2021-12-29 PROCEDURE — 94799 UNLISTED PULMONARY SVC/PX: CPT

## 2021-12-29 PROCEDURE — 83735 ASSAY OF MAGNESIUM: CPT | Performed by: NURSE PRACTITIONER

## 2021-12-29 PROCEDURE — 83880 ASSAY OF NATRIURETIC PEPTIDE: CPT | Performed by: NURSE PRACTITIONER

## 2021-12-29 PROCEDURE — 80053 COMPREHEN METABOLIC PANEL: CPT | Performed by: NURSE PRACTITIONER

## 2021-12-29 PROCEDURE — 85652 RBC SED RATE AUTOMATED: CPT | Performed by: INTERNAL MEDICINE

## 2021-12-29 PROCEDURE — 85025 COMPLETE CBC W/AUTO DIFF WBC: CPT | Performed by: NURSE PRACTITIONER

## 2021-12-29 RX ORDER — NICOTINE 21 MG/24HR
1 PATCH, TRANSDERMAL 24 HOURS TRANSDERMAL
Qty: 30 PATCH | Refills: 0 | Status: ON HOLD | OUTPATIENT
Start: 2021-12-29 | End: 2022-04-26

## 2021-12-29 RX ORDER — DEXAMETHASONE 6 MG/1
6 TABLET ORAL DAILY
Qty: 8 TABLET | Refills: 0 | Status: SHIPPED | OUTPATIENT
Start: 2021-12-30 | End: 2022-01-07

## 2021-12-29 RX ORDER — NITROGLYCERIN 0.4 MG/1
0.4 TABLET SUBLINGUAL
Qty: 25 TABLET | Refills: 0 | Status: ON HOLD | OUTPATIENT
Start: 2021-12-29 | End: 2022-04-26

## 2021-12-29 RX ORDER — LISINOPRIL 5 MG/1
5 TABLET ORAL
Qty: 30 TABLET | Refills: 0 | Status: ON HOLD | OUTPATIENT
Start: 2021-12-30 | End: 2022-04-26

## 2021-12-29 RX ORDER — CLONIDINE 0.1 MG/24H
1 PATCH, EXTENDED RELEASE TRANSDERMAL WEEKLY
Qty: 4 PATCH | Refills: 0 | Status: ON HOLD | OUTPATIENT
Start: 2021-12-29 | End: 2022-04-26

## 2021-12-29 RX ORDER — BENZONATATE 100 MG/1
100 CAPSULE ORAL 3 TIMES DAILY PRN
Qty: 30 CAPSULE | Refills: 0 | Status: ON HOLD | OUTPATIENT
Start: 2021-12-29 | End: 2022-04-26

## 2021-12-29 RX ORDER — GUAIFENESIN/DEXTROMETHORPHAN 100-10MG/5
10 SYRUP ORAL EVERY 4 HOURS PRN
Qty: 150 ML | Refills: 0 | Status: ON HOLD | OUTPATIENT
Start: 2021-12-29 | End: 2022-04-26

## 2021-12-29 RX ADMIN — SODIUM CHLORIDE, PRESERVATIVE FREE 10 ML: 5 INJECTION INTRAVENOUS at 08:50

## 2021-12-29 RX ADMIN — ALBUTEROL SULFATE 2 PUFF: 108 INHALANT RESPIRATORY (INHALATION) at 07:38

## 2021-12-29 RX ADMIN — LISINOPRIL 5 MG: 5 TABLET ORAL at 08:50

## 2021-12-29 RX ADMIN — ALBUTEROL SULFATE 2 PUFF: 108 INHALANT RESPIRATORY (INHALATION) at 11:20

## 2021-12-29 RX ADMIN — DEXAMETHASONE 6 MG: 6 TABLET ORAL at 08:50

## 2021-12-30 ENCOUNTER — READMISSION MANAGEMENT (OUTPATIENT)
Dept: CALL CENTER | Facility: HOSPITAL | Age: 46
End: 2021-12-30

## 2021-12-30 NOTE — OUTREACH NOTE
Prep Survey      Responses   [facility] patient discharged from? [facility]   Is LACE score < 7 ? Yes   Emergency Room discharge w/ pulse ox? No   Eligibility Readm Mgmt   Discharge diagnosis COVID-19 virus detected   Does the patient have one of the following disease processes/diagnoses(primary or secondary)? COVID-19   Does the patient have Home health ordered? No   Is there a DME ordered? No   Prep survey completed? Yes          Deidra Jimenez RN

## 2021-12-30 NOTE — OUTREACH NOTE
COVID-19 Week 1 Survey      Responses   [facility] patient discharged from? [facility]   Does the patient have one of the following disease processes/diagnoses(primary or secondary)? COVID-19   COVID-19 underlying condition? None   Call Number Call 1   Week 1 Call successful? Yes   Call start time [phone]   Call end time 0902   Discharge diagnosis COVID-19 virus detected   Person spoke with today (if not patient) and relationship Ann Marie-sister   Meds reviewed with patient/caregiver? Yes   Is the patient having any side effects they believe may be caused by any medication additions or changes? No   Does the patient have all medications ordered at discharge? No   What is keeping the patient from filling the prescriptions? --  [Sister stated pt wanted to change where is RX's went r/t financial]   Nursing Interventions Nurse advised patient to call provider  [Enc sister/pt to call pharmacy]   Is the patient taking all medications as directed (includes completed medication regime)? Yes   Does the patient have a primary care provider?  No   PCP Nursing Intervention Provided number to obtain PCP   Does the patient have an appointment with their PCP or specialist within 7 days of discharge? No   What is preventing the patient from scheduling follow up appointments within 7 days of discharge? Unsure of when or with whom   Nursing Interventions Educated patient on importance of making appointment   Has the patient kept scheduled appointments due by today? N/A   Has home health visited the patient within 72 hours of discharge? N/A   Psychosocial issues? No   Did the patient receive a copy of their discharge instructions? Yes   Did the patient receive a copy of COVID-19 specific instructions? Yes   Nursing interventions Reviewed instructions with patient   What is the patient's perception of their health status since discharge? Same   Pulse Ox monitoring None   COVID-19 call completed? Yes   Wrap up additional comments Sister  stated pt is non compliant. Sister stated pt is going to do what he wants to do. No questions at this time          YASHIRA BUSTILLO RN

## 2021-12-31 ENCOUNTER — READMISSION MANAGEMENT (OUTPATIENT)
Dept: CALL CENTER | Facility: HOSPITAL | Age: 46
End: 2021-12-31

## 2021-12-31 ENCOUNTER — HOSPITAL ENCOUNTER (EMERGENCY)
Facility: HOSPITAL | Age: 46
Discharge: HOME | End: 2021-12-31

## 2021-12-31 PROCEDURE — 99211 OFF/OP EST MAY X REQ PHY/QHP: CPT

## 2021-12-31 NOTE — OUTREACH NOTE
COVID-19 Week 1 Survey      Responses   [facility] patient discharged from? [facility]   Does the patient have one of the following disease processes/diagnoses(primary or secondary)? COVID-19   COVID-19 underlying condition? None   Call Number Call 2   Week 1 Call successful? Yes   Call start time [phone]   Call end time 0850   Discharge diagnosis COVID-19 virus detected   Is patient permission given to speak with other caregiver? Yes   List who call center can speak with Ann Marie reyna   Person spoke with today (if not patient) and relationship sisterAnn Marie   What is the patient's perception of their health status since discharge? Worsening  [Sister reports that patient texted her this am if she could take him back to the hospital and she is trying to reach him back. She does not know where patient is. Patient can only be reached on his phone by text or messenger per sister. ]   COVID-19 call completed? Yes   Wrap up additional comments Brief call with sister. She reports that if patient recontacts her, she will return him to hospital with worsening. I informed sister that patient should be able to dial 911 out of cell phone even without service.           Claudia Soler RN

## 2022-01-01 ENCOUNTER — READMISSION MANAGEMENT (OUTPATIENT)
Dept: CALL CENTER | Facility: HOSPITAL | Age: 47
End: 2022-01-01

## 2022-01-01 NOTE — OUTREACH NOTE
COVID-19 Week 1 Survey      Responses   [facility] patient discharged from? [facility]   Does the patient have one of the following disease processes/diagnoses(primary or secondary)? COVID-19   COVID-19 underlying condition? None   Call Number Call 3   Week 1 Call successful? No   Discharge diagnosis COVID-19 virus detected          Abi Osullivan RN

## 2022-01-03 ENCOUNTER — READMISSION MANAGEMENT (OUTPATIENT)
Dept: CALL CENTER | Facility: HOSPITAL | Age: 47
End: 2022-01-03

## 2022-01-03 NOTE — OUTREACH NOTE
COVID-19 Week 2 Survey      Responses   [facility] patient discharged from? [facility]   Does the patient have one of the following disease processes/diagnoses(primary or secondary)? COVID-19   COVID-19 underlying condition? None   Call Number Call 1   COVID-19 Week 2: Call 1 attempt successful? No   Discharge diagnosis COVID-19 virus detected          Ann Pillai RN

## 2022-01-06 ENCOUNTER — READMISSION MANAGEMENT (OUTPATIENT)
Dept: CALL CENTER | Facility: HOSPITAL | Age: 47
End: 2022-01-06

## 2022-01-06 NOTE — OUTREACH NOTE
COVID-19 Week 2 Survey      Responses   [facility] patient discharged from? [facility]   Does the patient have one of the following disease processes/diagnoses(primary or secondary)? COVID-19   COVID-19 underlying condition? None   Call Number Call 2   COVID-19 Week 2: Call 1 attempt successful? No   Revoke Phone number issues  [Phone number disconnected]   Discharge diagnosis COVID-19 virus detected          Ousmane Bennett RN

## 2022-01-06 NOTE — DISCHARGE SUMMARY
[facility] Medicine Services  DISCHARGE SUMMARY    Patient Name: Scott Gaytan  : 1975  MRN: [id number]    Date of Admission: 2021  Date of Discharge: 2021  Primary Care Physician: Provider, No Known      Presenting Problem:   Rhinovirus [B34.8]  Chest pain, unspecified type [R07.9]  COVID-19 [U07.1]    Active and Resolved Hospital Problems:  Active Hospital Problems    Diagnosis POA   • **COVID-19 virus detected [U07.1] Yes     Priority: High   • Hypertension [I10] Unknown     Priority: High   • Chest pain [R07.9] Yes     Priority: Medium   • Rhinovirus [B34.8] Yes     Priority: Medium   • Cytokine release syndrome, grade 1 [D89.831] No      Resolved Hospital Problems   No resolved problems to display.         Hospital Course     Hospital Course:  Scott Gaytan is a 46 y.o. male who presented with rhinovirus and COVID-19.  The patient had atypical chest pain described as sharp awakening him from sleep and bilateral.  The patient had also some right hip pain nausea and intermittent atypical chest pain with some shortness of breath.  The patient smoked approximately 1 pack/day.  The patient was admitted and ruled out for myocardial injury.  Patient was not requiring oxygen and therefore did not qualify for remdesivir treatment.  The patient had some hypertension and was supposed to be on lisinopril but had not taken it for 6 months prior to presentation.  The patient was encouraged to be more compliant with medication and to discontinue his smoking addiction.  The patient continued to have some myalgias that were improving by the time of discharge.    The patient was a full code at the time of discharge.  The patient was discharged on a cardiac diet.  The patient had no pending studies at the time of discharge.  The patient should follow-up with his primary care provider in 1 to 2 weeks.  The patient's medications were as listed in that section of this report.   The patient was discharged in satisfactory condition.        DISCHARGE Follow Up Recommendations for labs and diagnostics:       Reasons For Change In Medications and Indications for New Medications:      Day of Discharge     Vital Signs:       Physical Exam:  Physical Exam  Vitals and nursing note reviewed.   Constitutional:       General: He is not in acute distress.     Appearance: Normal appearance. He is well-developed. He is not ill-appearing, toxic-appearing or diaphoretic.   HENT:      Head: Normocephalic and atraumatic.      Right Ear: Ear canal and external ear normal.      Left Ear: Ear canal and external ear normal.      Nose: Nose normal. No congestion or rhinorrhea.      Mouth/Throat:      Mouth: Mucous membranes are moist.      Pharynx: No oropharyngeal exudate.   Eyes:      General: No scleral icterus.        Right eye: No discharge.         Left eye: No discharge.      Extraocular Movements: Extraocular movements intact.      Conjunctiva/sclera: Conjunctivae normal.      Pupils: Pupils are equal, round, and reactive to light.   Neck:      Thyroid: No thyromegaly.      Vascular: No carotid bruit or JVD.      Trachea: No tracheal deviation.   Cardiovascular:      Rate and Rhythm: Normal rate and regular rhythm.      Pulses: Normal pulses.      Heart sounds: Normal heart sounds. No murmur heard.  No friction rub. No gallop.    Pulmonary:      Effort: Pulmonary effort is normal. No respiratory distress.      Breath sounds: Normal breath sounds. No stridor. No wheezing, rhonchi or rales.   Chest:      Chest wall: No tenderness.   Abdominal:      General: Bowel sounds are normal. There is no distension.      Palpations: Abdomen is soft. There is no mass.      Tenderness: There is no abdominal tenderness. There is no guarding or rebound.      Hernia: No hernia is present.   Musculoskeletal:         General: No swelling, tenderness, deformity or signs of injury. Normal range of motion.      Cervical back:  Normal range of motion and neck supple. No rigidity. No muscular tenderness.      Right lower leg: No edema.      Left lower leg: No edema.   Lymphadenopathy:      Cervical: No cervical adenopathy.   Skin:     General: Skin is warm and dry.      Coloration: Skin is not jaundiced or pale.      Findings: No bruising, erythema or rash.   Neurological:      General: No focal deficit present.      Mental Status: He is alert and oriented to person, place, and time. Mental status is at baseline.      Cranial Nerves: No cranial nerve deficit.      Sensory: No sensory deficit.      Motor: No weakness or abnormal muscle tone.      Coordination: Coordination normal.   Psychiatric:         Mood and Affect: Mood normal.         Behavior: Behavior normal.         Thought Content: Thought content normal.         Judgment: Judgment normal.            Pertinent  and/or Most Recent Results     LAB RESULTS:                              Brief Urine Lab Results  (Last result in the past 365 days)      Color   Clarity   Blood   Leuk Est   Nitrite   Protein   CREAT   Urine HCG        12/27/21 1908 Yellow   Clear   Trace   Negative   Negative   >=300 mg/dL (3+)               Microbiology Results (last 10 days)     Procedure Component Value - Date/Time    Respiratory Panel PCR w/COVID-19(SARS-CoV-2) RAMILA/ALONSO/GM/PAD/COR/MAD/WILLI In-House, NP Swab in UTM/VTM, 3-4 HR TAT - Swab, Nasopharynx [[id number]]  (Abnormal) Collected: 12/27/21 1857    Lab Status: Final result Specimen: Swab from Nasopharynx Updated: 12/27/21 1952     ADENOVIRUS, PCR Not Detected     Coronavirus 229E Not Detected     Coronavirus HKU1 Not Detected     Coronavirus NL63 Not Detected     Coronavirus OC43 Not Detected     COVID19 Detected     Human Metapneumovirus Not Detected     Human Rhinovirus/Enterovirus Detected     Influenza A PCR Not Detected     Influenza B PCR Not Detected     Parainfluenza Virus 1 Not Detected     Parainfluenza Virus 2 Not Detected     Parainfluenza  Virus 3 Not Detected     Parainfluenza Virus 4 Not Detected     RSV, PCR Not Detected     Bordetella pertussis pcr Not Detected     Bordetella parapertussis PCR Not Detected     Chlamydophila pneumoniae PCR Not Detected     Mycoplasma pneumo by PCR Not Detected    Narrative:      In the setting of a positive respiratory panel with a viral infection PLUS a negative procalcitonin without other underlying concern for bacterial infection, consider observing off antibiotics or discontinuation of antibiotics and continue supportive care. If the respiratory panel is positive for atypical bacterial infection (Bordetella pertussis, Chlamydophila pneumoniae, or Mycoplasma pneumoniae), consider antibiotic de-escalation to target atypical bacterial infection.          CT Abdomen Pelvis Without Contrast    Result Date: 12/27/2021  Impression:  1. No acute disease in the abdomen or pelvis  Electronically Signed By-Miller Ryder MD On:12/27/2021 9:38 PM This report was finalized on [id number] by  Miller Ryder MD.    XR Chest 1 View    Result Date: 12/27/2021  Impression: 1 no acute disease in the chest and no significant change since previous study  Electronically Signed By-Miller Ryder MD On:12/27/2021 7:25 PM This report was finalized on [id number] by  Miller Ryder MD.    CT Chest Pulmonary Embolism    Result Date: 12/27/2021  Impression:  1. No acute disease in the chest. 2. No evidence of thrombus or embolus in the right or left pulmonary artery branches  Electronically Signed By-Miller Ryder MD On:12/27/2021 9:41 PM This report was finalized on [id number] by  Miller Ryder MD.    XR Hip With or Without Pelvis 2 - 3 View Right    Result Date: 12/27/2021  Impression:  1. Moderate scoliosis and arthritis in the inferior lumbar spine causes the right hip joint to be slightly larger the left hip joint. 2. The right left hip joints are otherwise normal  Electronically Signed  By-Miller Ryder MD On:12/27/2021 6:09 PM This report was finalized on [id number] by  Miller Ryder MD.                  Labs Pending at Discharge:      Procedures Performed           Consults:   Consults     No orders found from 11/28/2021 to 12/28/2021.            Discharge Details        Discharge Medications      New Medications      Instructions Start Date   benzonatate 100 MG capsule  Commonly known as: TESSALON   100 mg, Oral, 3 Times Daily PRN      cloNIDine 0.1 MG/24HR patch  Commonly known as: Catapres-TTS-1   1 patch, Transdermal, Weekly      dexamethasone 6 MG tablet  Commonly known as: DECADRON   6 mg, Oral, Daily      guaiFENesin-dextromethorphan 100-10 MG/5ML syrup  Commonly known as: ROBITUSSIN DM   10 mL, Oral, Every 4 Hours PRN      nicotine 14 MG/24HR patch  Commonly known as: NICODERM CQ   1 patch, Transdermal, Every 24 Hours Scheduled      nitroglycerin 0.4 MG SL tablet  Commonly known as: NITROSTAT   0.4 mg, Sublingual, Every 5 Minutes PRN, Take no more than 3 doses in 15 minutes.         Changes to Medications      Instructions Start Date   lisinopril 5 MG tablet  Commonly known as: PRINIVIL,ZESTRIL  What changed:   · medication strength  · how much to take  · when to take this  · additional instructions   5 mg, Oral, Every 24 Hours Scheduled         Continue These Medications      Instructions Start Date   albuterol sulfate  (90 Base) MCG/ACT inhaler  Commonly known as: PROVENTIL HFA;VENTOLIN HFA;PROAIR HFA   2 puffs, Inhalation, 4 Times Daily - RT      ibuprofen 800 MG tablet  Commonly known as: ADVIL,MOTRIN   800 mg, Oral, Every 6 Hours PRN             No Known Allergies      Discharge Disposition:   Home or Self Care    Diet:  Hospital:No active diet order        Discharge Activity:   Activity Instructions     Activity as Tolerated              CODE STATUS:  Code Status and Medical Interventions:   Ordered at: 12/28/21 0011     Code Status (Patient has no pulse and  is not breathing):    CPR (Attempt to Resuscitate)     Medical Interventions (Patient has pulse or is breathing):    Full Support         No future appointments.    Additional Instructions for the Follow-ups that You Need to Schedule     Discharge Follow-up with PCP   As directed       Currently Documented PCP:    Provider, No Known    PCP Phone Number:    None     Follow Up Details: in one week               Time spent on Discharge including face to face service:  20 minutes    This patient has been examined wearing appropriate Personal Protective Equipment and discussed with hospital infection control department. 01/06/22      Signature: Electronically signed by Cass Castro MD, 01/06/22, 6:40 PM EST.

## 2022-04-24 ENCOUNTER — HOSPITAL ENCOUNTER (EMERGENCY)
Facility: HOSPITAL | Age: 47
Discharge: LEFT BEFORE DISCHARGE | End: 2022-04-24

## 2022-04-24 VITALS
OXYGEN SATURATION: 95 % | HEART RATE: 86 BPM | WEIGHT: 194.22 LBS | SYSTOLIC BLOOD PRESSURE: 169 MMHG | TEMPERATURE: 98.6 F | RESPIRATION RATE: 18 BRPM | BODY MASS INDEX: 29.44 KG/M2 | DIASTOLIC BLOOD PRESSURE: 122 MMHG | HEIGHT: 68 IN

## 2022-04-24 PROCEDURE — 99211 OFF/OP EST MAY X REQ PHY/QHP: CPT

## 2022-04-25 ENCOUNTER — APPOINTMENT (OUTPATIENT)
Dept: GENERAL RADIOLOGY | Facility: HOSPITAL | Age: 47
End: 2022-04-25

## 2022-04-25 ENCOUNTER — HOSPITAL ENCOUNTER (INPATIENT)
Facility: HOSPITAL | Age: 47
LOS: 2 days | Discharge: LEFT BEFORE DISCHARGE | End: 2022-04-28
Attending: EMERGENCY MEDICINE | Admitting: STUDENT IN AN ORGANIZED HEALTH CARE EDUCATION/TRAINING PROGRAM

## 2022-04-25 DIAGNOSIS — I16.1 HYPERTENSIVE EMERGENCY: Primary | ICD-10-CM

## 2022-04-25 DIAGNOSIS — I20.0 UNSTABLE ANGINA PECTORIS: ICD-10-CM

## 2022-04-25 DIAGNOSIS — E87.6 HYPOKALEMIA: ICD-10-CM

## 2022-04-25 DIAGNOSIS — I50.9 ACUTE CONGESTIVE HEART FAILURE, UNSPECIFIED HEART FAILURE TYPE: ICD-10-CM
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
## 2022-04-25 DIAGNOSIS — R77.8 ELEVATED TROPONIN: ICD-10-CM

## 2022-04-25 LAB
ALBUMIN SERPL-MCNC: 3.6 G/DL (ref 3.5–5.2)
ALBUMIN/GLOB SERPL: 1.1 G/DL
ALP SERPL-CCNC: 53 U/L (ref 39–117)
ALT SERPL W P-5'-P-CCNC: 24 U/L (ref 1–41)
ANION GAP SERPL CALCULATED.3IONS-SCNC: 12 MMOL/L (ref 5–15)
AST SERPL-CCNC: 30 U/L (ref 1–40)
BASOPHILS # BLD AUTO: 0.1 10*3/MM3 (ref 0–0.2)
BASOPHILS NFR BLD AUTO: 0.7 % (ref 0–1.5)
BILIRUB SERPL-MCNC: 0.3 MG/DL (ref 0–1.2)
BUN SERPL-MCNC: 27 MG/DL (ref 6–20)
BUN/CREAT SERPL: 20.9 (ref 7–25)
CALCIUM SPEC-SCNC: 8.8 MG/DL (ref 8.6–10.5)
CHLORIDE SERPL-SCNC: 102 MMOL/L (ref 98–107)
CO2 SERPL-SCNC: 25 MMOL/L (ref 22–29)
CREAT SERPL-MCNC: 1.29 MG/DL (ref 0.76–1.27)
DEPRECATED RDW RBC AUTO: 41.1 FL (ref 37–54)
EGFRCR SERPLBLD CKD-EPI 2021: 69.7 ML/MIN/1.73
EOSINOPHIL # BLD AUTO: 0.2 10*3/MM3 (ref 0–0.4)
EOSINOPHIL NFR BLD AUTO: 2.2 % (ref 0.3–6.2)
ERYTHROCYTE [DISTWIDTH] IN BLOOD BY AUTOMATED COUNT: 13.5 % (ref 12.3–15.4)
GLOBULIN UR ELPH-MCNC: 3.3 GM/DL
GLUCOSE SERPL-MCNC: 118 MG/DL (ref 65–99)
HCT VFR BLD AUTO: 39.1 % (ref 37.5–51)
HGB BLD-MCNC: 13.2 G/DL (ref 13–17.7)
LYMPHOCYTES # BLD AUTO: 2.6 10*3/MM3 (ref 0.7–3.1)
LYMPHOCYTES NFR BLD AUTO: 27.4 % (ref 19.6–45.3)
MAGNESIUM SERPL-MCNC: 2.1 MG/DL (ref 1.6–2.6)
MCH RBC QN AUTO: 29.3 PG (ref 26.6–33)
MCHC RBC AUTO-ENTMCNC: 33.8 G/DL (ref 31.5–35.7)
MCV RBC AUTO: 86.6 FL (ref 79–97)
MONOCYTES # BLD AUTO: 0.7 10*3/MM3 (ref 0.1–0.9)
MONOCYTES NFR BLD AUTO: 7.9 % (ref 5–12)
NEUTROPHILS NFR BLD AUTO: 5.8 10*3/MM3 (ref 1.7–7)
NEUTROPHILS NFR BLD AUTO: 61.8 % (ref 42.7–76)
NRBC BLD AUTO-RTO: 0.1 /100 WBC (ref 0–0.2)
NT-PROBNP SERPL-MCNC: 5838 PG/ML (ref 0–450)
PLATELET # BLD AUTO: 234 10*3/MM3 (ref 140–450)
PMV BLD AUTO: 8.9 FL (ref 6–12)
POTASSIUM SERPL-SCNC: 3 MMOL/L (ref 3.5–5.2)
PROT SERPL-MCNC: 6.9 G/DL (ref 6–8.5)
RBC # BLD AUTO: 4.52 10*6/MM3 (ref 4.14–5.8)
SODIUM SERPL-SCNC: 139 MMOL/L (ref 136–145)
TROPONIN T SERPL-MCNC: 0.02 NG/ML (ref 0–0.03)
WBC NRBC COR # BLD: 9.4 10*3/MM3 (ref 3.4–10.8)

## 2022-04-25 PROCEDURE — 71045 X-RAY EXAM CHEST 1 VIEW: CPT

## 2022-04-25 PROCEDURE — 80307 DRUG TEST PRSMV CHEM ANLYZR: CPT | Performed by: EMERGENCY MEDICINE

## 2022-04-25 PROCEDURE — 86666 EHRLICHIA ANTIBODY: CPT | Performed by: EMERGENCY MEDICINE

## 2022-04-25 PROCEDURE — 84100 ASSAY OF PHOSPHORUS: CPT | Performed by: NURSE PRACTITIONER

## 2022-04-25 PROCEDURE — 80050 GENERAL HEALTH PANEL: CPT | Performed by: EMERGENCY MEDICINE

## 2022-04-25 PROCEDURE — 99284 EMERGENCY DEPT VISIT MOD MDM: CPT

## 2022-04-25 PROCEDURE — 83735 ASSAY OF MAGNESIUM: CPT | Performed by: EMERGENCY MEDICINE

## 2022-04-25 PROCEDURE — 86622 BRUCELLA ANTIBODY: CPT | Performed by: EMERGENCY MEDICINE

## 2022-04-25 PROCEDURE — 81001 URINALYSIS AUTO W/SCOPE: CPT | Performed by: NURSE PRACTITIONER

## 2022-04-25 PROCEDURE — 84484 ASSAY OF TROPONIN QUANT: CPT | Performed by: EMERGENCY MEDICINE

## 2022-04-25 PROCEDURE — 84300 ASSAY OF URINE SODIUM: CPT | Performed by: NURSE PRACTITIONER

## 2022-04-25 PROCEDURE — 83880 ASSAY OF NATRIURETIC PEPTIDE: CPT | Performed by: EMERGENCY MEDICINE

## 2022-04-25 PROCEDURE — 86757 RICKETTSIA ANTIBODY: CPT | Performed by: EMERGENCY MEDICINE

## 2022-04-25 PROCEDURE — 93005 ELECTROCARDIOGRAM TRACING: CPT

## 2022-04-25 PROCEDURE — 93005 ELECTROCARDIOGRAM TRACING: CPT | Performed by: EMERGENCY MEDICINE

## 2022-04-25 PROCEDURE — 86618 LYME DISEASE ANTIBODY: CPT | Performed by: EMERGENCY MEDICINE

## 2022-04-25 RX ORDER — NITROGLYCERIN 20 MG/100ML
5-200 INJECTION INTRAVENOUS
Status: DISCONTINUED | OUTPATIENT
Start: 2022-04-25 | End: 2022-04-27

## 2022-04-25 RX ORDER — POTASSIUM CHLORIDE 20 MEQ/1
40 TABLET, EXTENDED RELEASE ORAL ONCE
Status: COMPLETED | OUTPATIENT
Start: 2022-04-25 | End: 2022-04-25

## 2022-04-25 RX ORDER — ASPIRIN 325 MG
325 TABLET ORAL ONCE
Status: COMPLETED | OUTPATIENT
Start: 2022-04-25 | End: 2022-04-25

## 2022-04-25 RX ORDER — SODIUM CHLORIDE 0.9 % (FLUSH) 0.9 %
10 SYRINGE (ML) INJECTION AS NEEDED
Status: DISCONTINUED | OUTPATIENT
Start: 2022-04-25 | End: 2022-04-28 | Stop reason: HOSPADM

## 2022-04-25 RX ORDER — LABETALOL HYDROCHLORIDE 5 MG/ML
10 INJECTION, SOLUTION INTRAVENOUS ONCE
Status: COMPLETED | OUTPATIENT
Start: 2022-04-25 | End: 2022-04-25

## 2022-04-25 RX ADMIN — NITROGLYCERIN 5 MCG/MIN: 20 INJECTION INTRAVENOUS at 23:39

## 2022-04-25 RX ADMIN — POTASSIUM CHLORIDE 40 MEQ: 1500 TABLET, EXTENDED RELEASE ORAL at 23:39

## 2022-04-25 RX ADMIN — LABETALOL HYDROCHLORIDE 10 MG: 5 INJECTION INTRAVENOUS at 22:58

## 2022-04-25 RX ADMIN — Medication 10 ML: at 22:58

## 2022-04-25 RX ADMIN — ASPIRIN 325 MG ORAL TABLET 325 MG: 325 PILL ORAL at 23:59

## 2022-04-26 ENCOUNTER — APPOINTMENT (OUTPATIENT)
Dept: CARDIOLOGY | Facility: HOSPITAL | Age: 47
End: 2022-04-26

## 2022-04-26 PROBLEM — Z72.0 TOBACCO USE: Chronic | Status: ACTIVE | Noted: 2022-04-26

## 2022-04-26 PROBLEM — E87.6 HYPOKALEMIA: Status: ACTIVE | Noted: 2022-04-26

## 2022-04-26 PROBLEM — I16.1 HYPERTENSIVE EMERGENCY: Status: ACTIVE | Noted: 2022-04-26

## 2022-04-26 PROBLEM — S30.861A TICK BITE OF ABDOMEN: Status: ACTIVE | Noted: 2022-04-26

## 2022-04-26 PROBLEM — I50.9 NEW ONSET OF CONGESTIVE HEART FAILURE (HCC): Status: ACTIVE | Noted: 2022-04-26

## 2022-04-26 PROBLEM — N17.9 AKI (ACUTE KIDNEY INJURY): Status: ACTIVE | Noted: 2022-04-26
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
## 2022-04-26 PROBLEM — F15.10 METHAMPHETAMINE ABUSE: Chronic | Status: ACTIVE | Noted: 2022-04-26

## 2022-04-26 PROBLEM — R79.89 ELEVATED TROPONIN: Status: ACTIVE | Noted: 2022-04-26

## 2022-04-26 PROBLEM — R77.8 ELEVATED TROPONIN: Status: ACTIVE | Noted: 2022-04-26

## 2022-04-26 LAB
AMPHET+METHAMPHET UR QL: POSITIVE
ANION GAP SERPL CALCULATED.3IONS-SCNC: 11 MMOL/L (ref 5–15)
ANION GAP SERPL CALCULATED.3IONS-SCNC: 13 MMOL/L (ref 5–15)
APTT PPP: 27.2 SECONDS (ref 24–31)
BACTERIA UR QL AUTO: ABNORMAL /HPF
BARBITURATES UR QL SCN: NEGATIVE
BASOPHILS # BLD AUTO: 0.1 10*3/MM3 (ref 0–0.2)
BASOPHILS NFR BLD AUTO: 0.7 % (ref 0–1.5)
BENZODIAZ UR QL SCN: NEGATIVE
BH CV ECHO MEAS - ACS: 2.5 CM
BH CV ECHO MEAS - AO MAX PG: 6 MMHG
BH CV ECHO MEAS - AO MEAN PG: 3.2 MMHG
BH CV ECHO MEAS - AO ROOT DIAM: 3.4 CM
BH CV ECHO MEAS - AO V2 MAX: 122.9 CM/SEC
BH CV ECHO MEAS - AO V2 VTI: 20.3 CM
BH CV ECHO MEAS - AVA(I,D): 2.06 CM2
BH CV ECHO MEAS - EDV(CUBED): 117.8 ML
BH CV ECHO MEAS - EDV(MOD-SP4): 112.1 ML
BH CV ECHO MEAS - EF(MOD-BP): 49 %
BH CV ECHO MEAS - EF(MOD-SP4): 43.1 %
BH CV ECHO MEAS - ESV(CUBED): 62.8 ML
BH CV ECHO MEAS - ESV(MOD-SP4): 63.7 ML
BH CV ECHO MEAS - FS: 18.9 %
BH CV ECHO MEAS - IVS/LVPW: 1 CM
BH CV ECHO MEAS - IVSD: 1.22 CM
BH CV ECHO MEAS - LA DIMENSION(2D): 4.3 CM
BH CV ECHO MEAS - LV DIASTOLIC VOL/BSA (35-75): 58.3 CM2
BH CV ECHO MEAS - LV MASS(C)D: 231.3 GRAMS
BH CV ECHO MEAS - LV MAX PG: 2.11 MMHG
BH CV ECHO MEAS - LV MEAN PG: 1.19 MMHG
BH CV ECHO MEAS - LV SYSTOLIC VOL/BSA (12-30): 33.2 CM2
BH CV ECHO MEAS - LV V1 MAX: 72.7 CM/SEC
BH CV ECHO MEAS - LV V1 VTI: 12.1 CM
BH CV ECHO MEAS - LVIDD: 4.9 CM
BH CV ECHO MEAS - LVIDS: 4 CM
BH CV ECHO MEAS - LVOT AREA: 3.5 CM2
BH CV ECHO MEAS - LVOT DIAM: 2.1 CM
BH CV ECHO MEAS - LVPWD: 1.22 CM
BH CV ECHO MEAS - MR MAX PG: 135.9 MMHG
BH CV ECHO MEAS - MR MAX VEL: 582.7 CM/SEC
BH CV ECHO MEAS - MV A MAX VEL: 48.7 CM/SEC
BH CV ECHO MEAS - MV DEC SLOPE: 1194 CM/SEC2
BH CV ECHO MEAS - MV DEC TIME: 0.11 MSEC
BH CV ECHO MEAS - MV E MAX VEL: 130.8 CM/SEC
BH CV ECHO MEAS - MV E/A: 2.7
BH CV ECHO MEAS - MV MAX PG: 8.3 MMHG
BH CV ECHO MEAS - MV MEAN PG: 2.7 MMHG
BH CV ECHO MEAS - MV V2 VTI: 21.3 CM
BH CV ECHO MEAS - MVA(VTI): 1.97 CM2
BH CV ECHO MEAS - PA V2 MAX: 80.1 CM/SEC
BH CV ECHO MEAS - RAP SYSTOLE: 3 MMHG
BH CV ECHO MEAS - RV MAX PG: 1.07 MMHG
BH CV ECHO MEAS - RV V1 MAX: 51.8 CM/SEC
BH CV ECHO MEAS - RV V1 VTI: 10.6 CM
BH CV ECHO MEAS - RVDD: 2.8 CM
BH CV ECHO MEAS - RVSP: 25.8 MMHG
BH CV ECHO MEAS - SI(MOD-SP4): 25.2 ML/M2
BH CV ECHO MEAS - SV(LVOT): 42 ML
BH CV ECHO MEAS - SV(MOD-SP4): 48.3 ML
BH CV ECHO MEAS - TR MAX PG: 22.8 MMHG
BH CV ECHO MEAS - TR MAX VEL: 236.3 CM/SEC
BILIRUB UR QL STRIP: NEGATIVE
BUN SERPL-MCNC: 22 MG/DL (ref 6–20)
BUN SERPL-MCNC: 25 MG/DL (ref 6–20)
BUN/CREAT SERPL: 18 (ref 7–25)
BUN/CREAT SERPL: 19.2 (ref 7–25)
CALCIUM SPEC-SCNC: 8.5 MG/DL (ref 8.6–10.5)
CALCIUM SPEC-SCNC: 8.6 MG/DL (ref 8.6–10.5)
CANNABINOIDS SERPL QL: NEGATIVE
CHLORIDE SERPL-SCNC: 102 MMOL/L (ref 98–107)
CHLORIDE SERPL-SCNC: 103 MMOL/L (ref 98–107)
CLARITY UR: CLEAR
CO2 SERPL-SCNC: 25 MMOL/L (ref 22–29)
CO2 SERPL-SCNC: 26 MMOL/L (ref 22–29)
COCAINE UR QL: NEGATIVE
COLOR UR: YELLOW
CREAT SERPL-MCNC: 1.22 MG/DL (ref 0.76–1.27)
CREAT SERPL-MCNC: 1.3 MG/DL (ref 0.76–1.27)
D DIMER PPP FEU-MCNC: 1.07 MG/L (FEU) (ref 0–0.59)
DEPRECATED RDW RBC AUTO: 38.9 FL (ref 37–54)
EGFRCR SERPLBLD CKD-EPI 2021: 69 ML/MIN/1.73
EGFRCR SERPLBLD CKD-EPI 2021: 74.5 ML/MIN/1.73
EOSINOPHIL # BLD AUTO: 0.3 10*3/MM3 (ref 0–0.4)
EOSINOPHIL NFR BLD AUTO: 3.5 % (ref 0.3–6.2)
ERYTHROCYTE [DISTWIDTH] IN BLOOD BY AUTOMATED COUNT: 13.1 % (ref 12.3–15.4)
GLUCOSE SERPL-MCNC: 100 MG/DL (ref 65–99)
GLUCOSE SERPL-MCNC: 114 MG/DL (ref 65–99)
GLUCOSE UR STRIP-MCNC: NEGATIVE MG/DL
HCT VFR BLD AUTO: 36.8 % (ref 37.5–51)
HGB BLD-MCNC: 12.9 G/DL (ref 13–17.7)
HGB UR QL STRIP.AUTO: NEGATIVE
HYALINE CASTS UR QL AUTO: ABNORMAL /LPF
INR PPP: 1 (ref 0.93–1.1)
KETONES UR QL STRIP: NEGATIVE
LEUKOCYTE ESTERASE UR QL STRIP.AUTO: NEGATIVE
LV EF 2D ECHO EST: 50 %
LYMPHOCYTES # BLD AUTO: 2.8 10*3/MM3 (ref 0.7–3.1)
LYMPHOCYTES NFR BLD AUTO: 32 % (ref 19.6–45.3)
MAGNESIUM SERPL-MCNC: 2 MG/DL (ref 1.6–2.6)
MAXIMAL PREDICTED HEART RATE: 175 BPM
MCH RBC QN AUTO: 29.8 PG (ref 26.6–33)
MCHC RBC AUTO-ENTMCNC: 35.2 G/DL (ref 31.5–35.7)
MCV RBC AUTO: 84.8 FL (ref 79–97)
METHADONE UR QL SCN: NEGATIVE
MONOCYTES # BLD AUTO: 0.8 10*3/MM3 (ref 0.1–0.9)
MONOCYTES NFR BLD AUTO: 9.6 % (ref 5–12)
NEUTROPHILS NFR BLD AUTO: 4.7 10*3/MM3 (ref 1.7–7)
NEUTROPHILS NFR BLD AUTO: 54.2 % (ref 42.7–76)
NITRITE UR QL STRIP: NEGATIVE
NRBC BLD AUTO-RTO: 0 /100 WBC (ref 0–0.2)
OPIATES UR QL: NEGATIVE
OXYCODONE UR QL SCN: NEGATIVE
PH UR STRIP.AUTO: 6.5 [PH] (ref 5–8)
PHOSPHATE SERPL-MCNC: 4 MG/DL (ref 2.5–4.5)
PLATELET # BLD AUTO: 234 10*3/MM3 (ref 140–450)
PMV BLD AUTO: 9 FL (ref 6–12)
POTASSIUM SERPL-SCNC: 3.1 MMOL/L (ref 3.5–5.2)
POTASSIUM SERPL-SCNC: 3.3 MMOL/L (ref 3.5–5.2)
PROT UR QL STRIP: ABNORMAL
PROTHROMBIN TIME: 10.3 SECONDS (ref 9.6–11.7)
RBC # BLD AUTO: 4.34 10*6/MM3 (ref 4.14–5.8)
RBC # UR STRIP: ABNORMAL /HPF
REF LAB TEST METHOD: ABNORMAL
SARS-COV-2 RNA RESP QL NAA+PROBE: NOT DETECTED
SODIUM SERPL-SCNC: 139 MMOL/L (ref 136–145)
SODIUM SERPL-SCNC: 141 MMOL/L (ref 136–145)
SODIUM UR-SCNC: 91 MMOL/L
SP GR UR STRIP: 1.01 (ref 1–1.03)
SQUAMOUS #/AREA URNS HPF: ABNORMAL /HPF
STRESS TARGET HR: 149 BPM
TROPONIN T SERPL-MCNC: 0.02 NG/ML (ref 0–0.03)
TROPONIN T SERPL-MCNC: 0.02 NG/ML (ref 0–0.03)
TROPONIN T SERPL-MCNC: 0.03 NG/ML (ref 0–0.03)
TSH SERPL DL<=0.05 MIU/L-ACNC: 1.38 UIU/ML (ref 0.27–4.2)
UROBILINOGEN UR QL STRIP: ABNORMAL
WBC # UR STRIP: ABNORMAL /HPF
WBC NRBC COR # BLD: 8.7 10*3/MM3 (ref 3.4–10.8)

## 2022-04-26 PROCEDURE — 83735 ASSAY OF MAGNESIUM: CPT | Performed by: NURSE PRACTITIONER

## 2022-04-26 PROCEDURE — 93306 TTE W/DOPPLER COMPLETE: CPT | Performed by: INTERNAL MEDICINE

## 2022-04-26 PROCEDURE — 94799 UNLISTED PULMONARY SVC/PX: CPT

## 2022-04-26 PROCEDURE — U0003 INFECTIOUS AGENT DETECTION BY NUCLEIC ACID (DNA OR RNA); SEVERE ACUTE RESPIRATORY SYNDROME CORONAVIRUS 2 (SARS-COV-2) (CORONAVIRUS DISEASE [COVID-19]), AMPLIFIED PROBE TECHNIQUE, MAKING USE OF HIGH THROUGHPUT TECHNOLOGIES AS DESCRIBED BY CMS-2020-01-R: HCPCS | Performed by: INTERNAL MEDICINE

## 2022-04-26 PROCEDURE — 85730 THROMBOPLASTIN TIME PARTIAL: CPT | Performed by: NURSE PRACTITIONER

## 2022-04-26 PROCEDURE — 85379 FIBRIN DEGRADATION QUANT: CPT | Performed by: NURSE PRACTITIONER

## 2022-04-26 PROCEDURE — 25010000002 KETOROLAC TROMETHAMINE PER 15 MG: Performed by: NURSE PRACTITIONER

## 2022-04-26 PROCEDURE — 36415 COLL VENOUS BLD VENIPUNCTURE: CPT | Performed by: NURSE PRACTITIONER

## 2022-04-26 PROCEDURE — 94640 AIRWAY INHALATION TREATMENT: CPT

## 2022-04-26 PROCEDURE — 25010000002 FUROSEMIDE PER 20 MG: Performed by: EMERGENCY MEDICINE

## 2022-04-26 PROCEDURE — 99233 SBSQ HOSP IP/OBS HIGH 50: CPT | Performed by: STUDENT IN AN ORGANIZED HEALTH CARE EDUCATION/TRAINING PROGRAM

## 2022-04-26 PROCEDURE — 80048 BASIC METABOLIC PNL TOTAL CA: CPT | Performed by: NURSE PRACTITIONER

## 2022-04-26 PROCEDURE — 25010000002 FUROSEMIDE PER 20 MG: Performed by: NURSE PRACTITIONER

## 2022-04-26 PROCEDURE — 25010000002 FUROSEMIDE PER 20 MG: Performed by: INTERNAL MEDICINE

## 2022-04-26 PROCEDURE — 84484 ASSAY OF TROPONIN QUANT: CPT | Performed by: NURSE PRACTITIONER

## 2022-04-26 PROCEDURE — 93306 TTE W/DOPPLER COMPLETE: CPT

## 2022-04-26 PROCEDURE — 99222 1ST HOSP IP/OBS MODERATE 55: CPT | Performed by: INTERNAL MEDICINE

## 2022-04-26 PROCEDURE — 25010000002 LORAZEPAM PER 2 MG: Performed by: NURSE PRACTITIONER

## 2022-04-26 PROCEDURE — 85025 COMPLETE CBC W/AUTO DIFF WBC: CPT | Performed by: NURSE PRACTITIONER

## 2022-04-26 PROCEDURE — 85610 PROTHROMBIN TIME: CPT | Performed by: NURSE PRACTITIONER

## 2022-04-26 RX ORDER — FUROSEMIDE 10 MG/ML
40 INJECTION INTRAMUSCULAR; INTRAVENOUS
Status: DISCONTINUED | OUTPATIENT
Start: 2022-04-26 | End: 2022-04-27

## 2022-04-26 RX ORDER — MAGNESIUM SULFATE HEPTAHYDRATE 40 MG/ML
2 INJECTION, SOLUTION INTRAVENOUS AS NEEDED
Status: DISCONTINUED | OUTPATIENT
Start: 2022-04-26 | End: 2022-04-28 | Stop reason: HOSPADM

## 2022-04-26 RX ORDER — FUROSEMIDE 10 MG/ML
40 INJECTION INTRAMUSCULAR; INTRAVENOUS EVERY 8 HOURS
Status: DISCONTINUED | OUTPATIENT
Start: 2022-04-26 | End: 2022-04-26

## 2022-04-26 RX ORDER — CALCIUM GLUCONATE 20 MG/ML
2 INJECTION, SOLUTION INTRAVENOUS AS NEEDED
Status: DISCONTINUED | OUTPATIENT
Start: 2022-04-26 | End: 2022-04-28 | Stop reason: HOSPADM

## 2022-04-26 RX ORDER — SODIUM CHLORIDE 0.9 % (FLUSH) 0.9 %
10 SYRINGE (ML) INJECTION EVERY 12 HOURS SCHEDULED
Status: DISCONTINUED | OUTPATIENT
Start: 2022-04-26 | End: 2022-04-28 | Stop reason: HOSPADM

## 2022-04-26 RX ORDER — KETOROLAC TROMETHAMINE 15 MG/ML
15 INJECTION, SOLUTION INTRAMUSCULAR; INTRAVENOUS EVERY 6 HOURS PRN
Status: DISPENSED | OUTPATIENT
Start: 2022-04-26 | End: 2022-04-28

## 2022-04-26 RX ORDER — ATORVASTATIN CALCIUM 40 MG/1
80 TABLET, FILM COATED ORAL NIGHTLY
Status: DISCONTINUED | OUTPATIENT
Start: 2022-04-26 | End: 2022-04-28 | Stop reason: HOSPADM

## 2022-04-26 RX ORDER — ASPIRIN 81 MG/1
81 TABLET ORAL DAILY
Status: DISCONTINUED | OUTPATIENT
Start: 2022-04-26 | End: 2022-04-28 | Stop reason: HOSPADM

## 2022-04-26 RX ORDER — DIPHENOXYLATE HYDROCHLORIDE AND ATROPINE SULFATE 2.5; .025 MG/1; MG/1
1 TABLET ORAL DAILY
Status: DISCONTINUED | OUTPATIENT
Start: 2022-04-26 | End: 2022-04-28 | Stop reason: HOSPADM

## 2022-04-26 RX ORDER — SODIUM CHLORIDE 0.9 % (FLUSH) 0.9 %
10 SYRINGE (ML) INJECTION EVERY 12 HOURS SCHEDULED
Status: DISCONTINUED | OUTPATIENT
Start: 2022-04-27 | End: 2022-04-27

## 2022-04-26 RX ORDER — NITROGLYCERIN 0.4 MG/1
0.4 TABLET SUBLINGUAL
Status: DISCONTINUED | OUTPATIENT
Start: 2022-04-26 | End: 2022-04-28 | Stop reason: HOSPADM

## 2022-04-26 RX ORDER — CHOLECALCIFEROL (VITAMIN D3) 125 MCG
5 CAPSULE ORAL NIGHTLY PRN
Status: DISCONTINUED | OUTPATIENT
Start: 2022-04-26 | End: 2022-04-28 | Stop reason: HOSPADM

## 2022-04-26 RX ORDER — POTASSIUM CHLORIDE 20 MEQ/1
40 TABLET, EXTENDED RELEASE ORAL AS NEEDED
Status: DISCONTINUED | OUTPATIENT
Start: 2022-04-26 | End: 2022-04-28 | Stop reason: HOSPADM

## 2022-04-26 RX ORDER — POTASSIUM CHLORIDE 1.5 G/1.77G
40 POWDER, FOR SOLUTION ORAL AS NEEDED
Status: DISCONTINUED | OUTPATIENT
Start: 2022-04-26 | End: 2022-04-28 | Stop reason: HOSPADM

## 2022-04-26 RX ORDER — ACETAMINOPHEN 650 MG/1
650 SUPPOSITORY RECTAL EVERY 4 HOURS PRN
Status: DISCONTINUED | OUTPATIENT
Start: 2022-04-26 | End: 2022-04-28 | Stop reason: HOSPADM

## 2022-04-26 RX ORDER — CARVEDILOL 6.25 MG/1
6.25 TABLET ORAL 2 TIMES DAILY WITH MEALS
Status: DISCONTINUED | OUTPATIENT
Start: 2022-04-26 | End: 2022-04-28 | Stop reason: HOSPADM

## 2022-04-26 RX ORDER — SODIUM CHLORIDE 0.9 % (FLUSH) 0.9 %
10 SYRINGE (ML) INJECTION AS NEEDED
Status: DISCONTINUED | OUTPATIENT
Start: 2022-04-26 | End: 2022-04-28 | Stop reason: HOSPADM

## 2022-04-26 RX ORDER — AMLODIPINE BESYLATE 5 MG/1
5 TABLET ORAL
Status: DISCONTINUED | OUTPATIENT
Start: 2022-04-26 | End: 2022-04-28

## 2022-04-26 RX ORDER — ONDANSETRON 2 MG/ML
4 INJECTION INTRAMUSCULAR; INTRAVENOUS EVERY 6 HOURS PRN
Status: DISCONTINUED | OUTPATIENT
Start: 2022-04-26 | End: 2022-04-28 | Stop reason: HOSPADM

## 2022-04-26 RX ORDER — ONDANSETRON 4 MG/1
4 TABLET, FILM COATED ORAL EVERY 6 HOURS PRN
Status: DISCONTINUED | OUTPATIENT
Start: 2022-04-26 | End: 2022-04-28 | Stop reason: HOSPADM

## 2022-04-26 RX ORDER — LORAZEPAM 2 MG/ML
1 INJECTION INTRAMUSCULAR ONCE
Status: COMPLETED | OUTPATIENT
Start: 2022-04-26 | End: 2022-04-26

## 2022-04-26 RX ORDER — ACETAMINOPHEN 160 MG/5ML
650 SOLUTION ORAL EVERY 4 HOURS PRN
Status: DISCONTINUED | OUTPATIENT
Start: 2022-04-26 | End: 2022-04-28 | Stop reason: HOSPADM

## 2022-04-26 RX ORDER — POTASSIUM CHLORIDE 20 MEQ/1
40 TABLET, EXTENDED RELEASE ORAL ONCE
Status: COMPLETED | OUTPATIENT
Start: 2022-04-26 | End: 2022-04-26

## 2022-04-26 RX ORDER — IPRATROPIUM BROMIDE AND ALBUTEROL SULFATE 2.5; .5 MG/3ML; MG/3ML
3 SOLUTION RESPIRATORY (INHALATION)
Status: DISCONTINUED | OUTPATIENT
Start: 2022-04-26 | End: 2022-04-27

## 2022-04-26 RX ORDER — KETOROLAC TROMETHAMINE 30 MG/ML
30 INJECTION, SOLUTION INTRAMUSCULAR; INTRAVENOUS EVERY 6 HOURS PRN
Status: CANCELLED | OUTPATIENT
Start: 2022-04-26 | End: 2022-05-01

## 2022-04-26 RX ORDER — ALUMINA, MAGNESIA, AND SIMETHICONE 2400; 2400; 240 MG/30ML; MG/30ML; MG/30ML
15 SUSPENSION ORAL EVERY 6 HOURS PRN
Status: DISCONTINUED | OUTPATIENT
Start: 2022-04-26 | End: 2022-04-28 | Stop reason: HOSPADM

## 2022-04-26 RX ORDER — SODIUM CHLORIDE 0.9 % (FLUSH) 0.9 %
10 SYRINGE (ML) INJECTION AS NEEDED
Status: DISCONTINUED | OUTPATIENT
Start: 2022-04-26 | End: 2022-04-27

## 2022-04-26 RX ORDER — ACETAMINOPHEN 325 MG/1
650 TABLET ORAL EVERY 4 HOURS PRN
Status: DISCONTINUED | OUTPATIENT
Start: 2022-04-26 | End: 2022-04-28 | Stop reason: HOSPADM

## 2022-04-26 RX ORDER — POTASSIUM CHLORIDE 20 MEQ/1
40 TABLET, EXTENDED RELEASE ORAL ONCE
Status: DISCONTINUED | OUTPATIENT
Start: 2022-04-26 | End: 2022-04-28 | Stop reason: HOSPADM

## 2022-04-26 RX ORDER — FUROSEMIDE 10 MG/ML
80 INJECTION INTRAMUSCULAR; INTRAVENOUS ONCE
Status: COMPLETED | OUTPATIENT
Start: 2022-04-26 | End: 2022-04-26

## 2022-04-26 RX ORDER — CALCIUM GLUCONATE 20 MG/ML
1 INJECTION, SOLUTION INTRAVENOUS AS NEEDED
Status: DISCONTINUED | OUTPATIENT
Start: 2022-04-26 | End: 2022-04-28 | Stop reason: HOSPADM

## 2022-04-26 RX ORDER — MAGNESIUM SULFATE HEPTAHYDRATE 40 MG/ML
4 INJECTION, SOLUTION INTRAVENOUS AS NEEDED
Status: DISCONTINUED | OUTPATIENT
Start: 2022-04-26 | End: 2022-04-28 | Stop reason: HOSPADM

## 2022-04-26 RX ADMIN — POTASSIUM CHLORIDE 40 MEQ: 1500 TABLET, EXTENDED RELEASE ORAL at 18:25

## 2022-04-26 RX ADMIN — Medication 10 ML: at 05:04

## 2022-04-26 RX ADMIN — KETOROLAC TROMETHAMINE 15 MG: 15 INJECTION, SOLUTION INTRAMUSCULAR; INTRAVENOUS at 06:02

## 2022-04-26 RX ADMIN — ASPIRIN 81 MG: 81 TABLET, COATED ORAL at 08:42

## 2022-04-26 RX ADMIN — ATORVASTATIN CALCIUM 80 MG: 40 TABLET, FILM COATED ORAL at 20:49

## 2022-04-26 RX ADMIN — POTASSIUM CHLORIDE 40 MEQ: 1500 TABLET, EXTENDED RELEASE ORAL at 08:42

## 2022-04-26 RX ADMIN — IPRATROPIUM BROMIDE AND ALBUTEROL SULFATE 3 ML: .5; 3 SOLUTION RESPIRATORY (INHALATION) at 11:01

## 2022-04-26 RX ADMIN — Medication 10 ML: at 20:52

## 2022-04-26 RX ADMIN — LORAZEPAM 1 MG: 2 INJECTION INTRAMUSCULAR; INTRAVENOUS at 06:01

## 2022-04-26 RX ADMIN — CARVEDILOL 6.25 MG: 6.25 TABLET, FILM COATED ORAL at 09:22

## 2022-04-26 RX ADMIN — POTASSIUM CHLORIDE 40 MEQ: 1500 TABLET, EXTENDED RELEASE ORAL at 14:28

## 2022-04-26 RX ADMIN — KETOROLAC TROMETHAMINE 15 MG: 15 INJECTION, SOLUTION INTRAMUSCULAR; INTRAVENOUS at 20:50

## 2022-04-26 RX ADMIN — Medication 10 ML: at 08:42

## 2022-04-26 RX ADMIN — CARVEDILOL 6.25 MG: 6.25 TABLET, FILM COATED ORAL at 18:08

## 2022-04-26 RX ADMIN — ACETAMINOPHEN 650 MG: 325 TABLET ORAL at 14:34

## 2022-04-26 RX ADMIN — NITROGLYCERIN 35 MCG/MIN: 20 INJECTION INTRAVENOUS at 18:12

## 2022-04-26 RX ADMIN — AMLODIPINE BESYLATE 5 MG: 5 TABLET ORAL at 09:22

## 2022-04-26 RX ADMIN — FUROSEMIDE 40 MG: 10 INJECTION INTRAMUSCULAR; INTRAVENOUS at 08:42

## 2022-04-26 RX ADMIN — FUROSEMIDE 40 MG: 10 INJECTION, SOLUTION INTRAMUSCULAR; INTRAVENOUS at 18:08

## 2022-04-26 RX ADMIN — FUROSEMIDE 80 MG: 10 INJECTION INTRAMUSCULAR; INTRAVENOUS at 00:12

## 2022-04-26 RX ADMIN — THERA TABS 1 TABLET: TAB at 08:42

## 2022-04-27 ENCOUNTER — APPOINTMENT (OUTPATIENT)
Dept: ULTRASOUND IMAGING | Facility: HOSPITAL | Age: 47
End: 2022-04-27

## 2022-04-27 LAB
ANION GAP SERPL CALCULATED.3IONS-SCNC: 11 MMOL/L (ref 5–15)
ANION GAP SERPL CALCULATED.3IONS-SCNC: 12 MMOL/L (ref 5–15)
B BURGDOR IGG SER QL: NEGATIVE
BUN SERPL-MCNC: 24 MG/DL (ref 6–20)
BUN SERPL-MCNC: 28 MG/DL (ref 6–20)
BUN/CREAT SERPL: 18.3 (ref 7–25)
BUN/CREAT SERPL: 20.3 (ref 7–25)
CALCIUM SPEC-SCNC: 8.5 MG/DL (ref 8.6–10.5)
CALCIUM SPEC-SCNC: 8.6 MG/DL (ref 8.6–10.5)
CHLORIDE SERPL-SCNC: 100 MMOL/L (ref 98–107)
CHLORIDE SERPL-SCNC: 100 MMOL/L (ref 98–107)
CO2 SERPL-SCNC: 24 MMOL/L (ref 22–29)
CO2 SERPL-SCNC: 25 MMOL/L (ref 22–29)
CREAT SERPL-MCNC: 1.31 MG/DL (ref 0.76–1.27)
CREAT SERPL-MCNC: 1.38 MG/DL (ref 0.76–1.27)
DEPRECATED RDW RBC AUTO: 39.4 FL (ref 37–54)
DEPRECATED RDW RBC AUTO: 40.3 FL (ref 37–54)
EGFRCR SERPLBLD CKD-EPI 2021: 64.3 ML/MIN/1.73
EGFRCR SERPLBLD CKD-EPI 2021: 68.4 ML/MIN/1.73
ERYTHROCYTE [DISTWIDTH] IN BLOOD BY AUTOMATED COUNT: 13.1 % (ref 12.3–15.4)
ERYTHROCYTE [DISTWIDTH] IN BLOOD BY AUTOMATED COUNT: 13.3 % (ref 12.3–15.4)
GLUCOSE SERPL-MCNC: 106 MG/DL (ref 65–99)
GLUCOSE SERPL-MCNC: 109 MG/DL (ref 65–99)
HCT VFR BLD AUTO: 38.1 % (ref 37.5–51)
HCT VFR BLD AUTO: 38.5 % (ref 37.5–51)
HGB BLD-MCNC: 12.7 G/DL (ref 13–17.7)
HGB BLD-MCNC: 13 G/DL (ref 13–17.7)
MAGNESIUM SERPL-MCNC: 2 MG/DL (ref 1.6–2.6)
MCH RBC QN AUTO: 28.8 PG (ref 26.6–33)
MCH RBC QN AUTO: 29.4 PG (ref 26.6–33)
MCHC RBC AUTO-ENTMCNC: 32.9 G/DL (ref 31.5–35.7)
MCHC RBC AUTO-ENTMCNC: 34.2 G/DL (ref 31.5–35.7)
MCV RBC AUTO: 86 FL (ref 79–97)
MCV RBC AUTO: 87.4 FL (ref 79–97)
PLATELET # BLD AUTO: 228 10*3/MM3 (ref 140–450)
PLATELET # BLD AUTO: 239 10*3/MM3 (ref 140–450)
PMV BLD AUTO: 8.9 FL (ref 6–12)
PMV BLD AUTO: 9 FL (ref 6–12)
POTASSIUM SERPL-SCNC: 3.6 MMOL/L (ref 3.5–5.2)
POTASSIUM SERPL-SCNC: 4 MMOL/L (ref 3.5–5.2)
QT INTERVAL: 398 MS
RBC # BLD AUTO: 4.4 10*6/MM3 (ref 4.14–5.8)
RBC # BLD AUTO: 4.42 10*6/MM3 (ref 4.14–5.8)
SODIUM SERPL-SCNC: 135 MMOL/L (ref 136–145)
SODIUM SERPL-SCNC: 137 MMOL/L (ref 136–145)
WBC NRBC COR # BLD: 10.7 10*3/MM3 (ref 3.4–10.8)
WBC NRBC COR # BLD: 11.5 10*3/MM3 (ref 3.4–10.8)

## 2022-04-27 PROCEDURE — B2111ZZ FLUOROSCOPY OF MULTIPLE CORONARY ARTERIES USING LOW OSMOLAR CONTRAST: ICD-10-PCS | Performed by: INTERNAL MEDICINE

## 2022-04-27 PROCEDURE — 0 IOPAMIDOL PER 1 ML: Performed by: INTERNAL MEDICINE

## 2022-04-27 PROCEDURE — C1769 GUIDE WIRE: HCPCS | Performed by: INTERNAL MEDICINE

## 2022-04-27 PROCEDURE — 80048 BASIC METABOLIC PNL TOTAL CA: CPT | Performed by: STUDENT IN AN ORGANIZED HEALTH CARE EDUCATION/TRAINING PROGRAM

## 2022-04-27 PROCEDURE — 25010000002 KETOROLAC TROMETHAMINE PER 15 MG: Performed by: INTERNAL MEDICINE

## 2022-04-27 PROCEDURE — 76775 US EXAM ABDO BACK WALL LIM: CPT

## 2022-04-27 PROCEDURE — 85027 COMPLETE CBC AUTOMATED: CPT | Performed by: NURSE PRACTITIONER

## 2022-04-27 PROCEDURE — 85027 COMPLETE CBC AUTOMATED: CPT | Performed by: STUDENT IN AN ORGANIZED HEALTH CARE EDUCATION/TRAINING PROGRAM

## 2022-04-27 PROCEDURE — 94799 UNLISTED PULMONARY SVC/PX: CPT

## 2022-04-27 PROCEDURE — 80048 BASIC METABOLIC PNL TOTAL CA: CPT | Performed by: NURSE PRACTITIONER

## 2022-04-27 PROCEDURE — 36415 COLL VENOUS BLD VENIPUNCTURE: CPT | Performed by: STUDENT IN AN ORGANIZED HEALTH CARE EDUCATION/TRAINING PROGRAM

## 2022-04-27 PROCEDURE — 99232 SBSQ HOSP IP/OBS MODERATE 35: CPT | Performed by: STUDENT IN AN ORGANIZED HEALTH CARE EDUCATION/TRAINING PROGRAM

## 2022-04-27 PROCEDURE — C1894 INTRO/SHEATH, NON-LASER: HCPCS | Performed by: INTERNAL MEDICINE

## 2022-04-27 PROCEDURE — B2151ZZ FLUOROSCOPY OF LEFT HEART USING LOW OSMOLAR CONTRAST: ICD-10-PCS | Performed by: INTERNAL MEDICINE

## 2022-04-27 PROCEDURE — 25010000002 FENTANYL CITRATE (PF) 100 MCG/2ML SOLUTION: Performed by: INTERNAL MEDICINE

## 2022-04-27 PROCEDURE — 93458 L HRT ARTERY/VENTRICLE ANGIO: CPT | Performed by: INTERNAL MEDICINE

## 2022-04-27 PROCEDURE — 4A023N7 MEASUREMENT OF CARDIAC SAMPLING AND PRESSURE, LEFT HEART, PERCUTANEOUS APPROACH: ICD-10-PCS | Performed by: INTERNAL MEDICINE

## 2022-04-27 PROCEDURE — 25010000002 MIDAZOLAM PER 1 MG: Performed by: INTERNAL MEDICINE

## 2022-04-27 PROCEDURE — 99152 MOD SED SAME PHYS/QHP 5/>YRS: CPT | Performed by: INTERNAL MEDICINE

## 2022-04-27 PROCEDURE — 83735 ASSAY OF MAGNESIUM: CPT | Performed by: NURSE PRACTITIONER

## 2022-04-27 PROCEDURE — 94618 PULMONARY STRESS TESTING: CPT

## 2022-04-27 PROCEDURE — 25010000002 KETOROLAC TROMETHAMINE PER 15 MG: Performed by: NURSE PRACTITIONER

## 2022-04-27 PROCEDURE — 99233 SBSQ HOSP IP/OBS HIGH 50: CPT | Performed by: INTERNAL MEDICINE

## 2022-04-27 PROCEDURE — C1760 CLOSURE DEV, VASC: HCPCS | Performed by: INTERNAL MEDICINE

## 2022-04-27 RX ORDER — ACETAMINOPHEN 325 MG/1
650 TABLET ORAL EVERY 4 HOURS PRN
Status: DISCONTINUED | OUTPATIENT
Start: 2022-04-27 | End: 2022-04-27

## 2022-04-27 RX ORDER — FUROSEMIDE 40 MG/1
40 TABLET ORAL DAILY
Status: DISCONTINUED | OUTPATIENT
Start: 2022-04-28 | End: 2022-04-27

## 2022-04-27 RX ORDER — SODIUM CHLORIDE 9 MG/ML
INJECTION, SOLUTION INTRAVENOUS CONTINUOUS PRN
Status: COMPLETED | OUTPATIENT
Start: 2022-04-27 | End: 2022-04-27

## 2022-04-27 RX ORDER — FUROSEMIDE 20 MG/1
20 TABLET ORAL DAILY
Status: DISCONTINUED | OUTPATIENT
Start: 2022-04-28 | End: 2022-04-27

## 2022-04-27 RX ORDER — HYDRALAZINE HYDROCHLORIDE 25 MG/1
50 TABLET, FILM COATED ORAL EVERY 8 HOURS SCHEDULED
Status: DISCONTINUED | OUTPATIENT
Start: 2022-04-27 | End: 2022-04-27

## 2022-04-27 RX ORDER — IPRATROPIUM BROMIDE AND ALBUTEROL SULFATE 2.5; .5 MG/3ML; MG/3ML
3 SOLUTION RESPIRATORY (INHALATION) EVERY 4 HOURS PRN
Status: DISCONTINUED | OUTPATIENT
Start: 2022-04-27 | End: 2022-04-28

## 2022-04-27 RX ORDER — HYDRALAZINE HYDROCHLORIDE 25 MG/1
25 TABLET, FILM COATED ORAL EVERY 8 HOURS SCHEDULED
Status: DISCONTINUED | OUTPATIENT
Start: 2022-04-27 | End: 2022-04-27

## 2022-04-27 RX ORDER — MIDAZOLAM HYDROCHLORIDE 1 MG/ML
INJECTION INTRAMUSCULAR; INTRAVENOUS AS NEEDED
Status: DISCONTINUED | OUTPATIENT
Start: 2022-04-27 | End: 2022-04-27 | Stop reason: HOSPADM

## 2022-04-27 RX ORDER — LIDOCAINE HYDROCHLORIDE 20 MG/ML
INJECTION, SOLUTION INFILTRATION; PERINEURAL AS NEEDED
Status: DISCONTINUED | OUTPATIENT
Start: 2022-04-27 | End: 2022-04-27 | Stop reason: HOSPADM

## 2022-04-27 RX ORDER — POTASSIUM CHLORIDE 20 MEQ/1
40 TABLET, EXTENDED RELEASE ORAL ONCE
Status: COMPLETED | OUTPATIENT
Start: 2022-04-27 | End: 2022-04-27

## 2022-04-27 RX ORDER — HYDRALAZINE HYDROCHLORIDE 25 MG/1
50 TABLET, FILM COATED ORAL EVERY 8 HOURS SCHEDULED
Status: DISCONTINUED | OUTPATIENT
Start: 2022-04-27 | End: 2022-04-28

## 2022-04-27 RX ORDER — LOSARTAN POTASSIUM 25 MG/1
25 TABLET ORAL
Status: DISCONTINUED | OUTPATIENT
Start: 2022-04-28 | End: 2022-04-27

## 2022-04-27 RX ORDER — LOSARTAN POTASSIUM 25 MG/1
25 TABLET ORAL
Status: DISCONTINUED | OUTPATIENT
Start: 2022-04-27 | End: 2022-04-28

## 2022-04-27 RX ORDER — HYDRALAZINE HYDROCHLORIDE 25 MG/1
25 TABLET, FILM COATED ORAL ONCE
Status: COMPLETED | OUTPATIENT
Start: 2022-04-27 | End: 2022-04-27

## 2022-04-27 RX ORDER — FENTANYL CITRATE 50 UG/ML
INJECTION, SOLUTION INTRAMUSCULAR; INTRAVENOUS AS NEEDED
Status: DISCONTINUED | OUTPATIENT
Start: 2022-04-27 | End: 2022-04-27 | Stop reason: HOSPADM

## 2022-04-27 RX ADMIN — HYDRALAZINE HYDROCHLORIDE 50 MG: 25 TABLET, FILM COATED ORAL at 21:04

## 2022-04-27 RX ADMIN — Medication 10 ML: at 21:05

## 2022-04-27 RX ADMIN — Medication 600 MG: at 21:04

## 2022-04-27 RX ADMIN — KETOROLAC TROMETHAMINE 15 MG: 15 INJECTION, SOLUTION INTRAMUSCULAR; INTRAVENOUS at 04:26

## 2022-04-27 RX ADMIN — ATORVASTATIN CALCIUM 80 MG: 40 TABLET, FILM COATED ORAL at 21:04

## 2022-04-27 RX ADMIN — KETOROLAC TROMETHAMINE 15 MG: 15 INJECTION, SOLUTION INTRAMUSCULAR; INTRAVENOUS at 10:32

## 2022-04-27 RX ADMIN — KETOROLAC TROMETHAMINE 15 MG: 15 INJECTION, SOLUTION INTRAMUSCULAR; INTRAVENOUS at 21:17

## 2022-04-27 RX ADMIN — Medication 600 MG: at 11:56

## 2022-04-27 RX ADMIN — ACETAMINOPHEN 650 MG: 325 TABLET ORAL at 10:32

## 2022-04-27 RX ADMIN — CARVEDILOL 6.25 MG: 6.25 TABLET, FILM COATED ORAL at 07:39

## 2022-04-27 RX ADMIN — ASPIRIN 81 MG: 81 TABLET, COATED ORAL at 07:39

## 2022-04-27 RX ADMIN — CARVEDILOL 6.25 MG: 6.25 TABLET, FILM COATED ORAL at 17:51

## 2022-04-27 RX ADMIN — THERA TABS 1 TABLET: TAB at 10:18

## 2022-04-27 RX ADMIN — AMLODIPINE BESYLATE 5 MG: 5 TABLET ORAL at 07:40

## 2022-04-27 RX ADMIN — HYDRALAZINE HYDROCHLORIDE 25 MG: 25 TABLET, FILM COATED ORAL at 15:40

## 2022-04-27 RX ADMIN — Medication 10 ML: at 10:25

## 2022-04-27 RX ADMIN — POTASSIUM CHLORIDE 40 MEQ: 1500 TABLET, EXTENDED RELEASE ORAL at 10:18

## 2022-04-27 RX ADMIN — LOSARTAN POTASSIUM 25 MG: 25 TABLET, FILM COATED ORAL at 10:18

## 2022-04-27 RX ADMIN — IPRATROPIUM BROMIDE AND ALBUTEROL SULFATE 3 ML: .5; 3 SOLUTION RESPIRATORY (INHALATION) at 11:30

## 2022-04-27 RX ADMIN — HYDRALAZINE HYDROCHLORIDE 25 MG: 25 TABLET, FILM COATED ORAL at 11:56

## 2022-04-28 VITALS
TEMPERATURE: 97.5 F | HEIGHT: 68 IN | OXYGEN SATURATION: 98 % | WEIGHT: 179.01 LBS | HEART RATE: 83 BPM | DIASTOLIC BLOOD PRESSURE: 105 MMHG | RESPIRATION RATE: 18 BRPM | SYSTOLIC BLOOD PRESSURE: 157 MMHG | BODY MASS INDEX: 27.13 KG/M2

## 2022-04-28 LAB
A PHAGOCYTOPH IGG TITR SER IF: NEGATIVE {TITER}
A PHAGOCYTOPH IGM TITR SER IF: NEGATIVE {TITER}
ANION GAP SERPL CALCULATED.3IONS-SCNC: 10 MMOL/L (ref 5–15)
BRUCELLA IGG SER QL IA: NEGATIVE
BRUCELLA IGM SER QL IA: NEGATIVE
BUN SERPL-MCNC: 24 MG/DL (ref 6–20)
BUN/CREAT SERPL: 16.9 (ref 7–25)
CALCIUM SPEC-SCNC: 8.7 MG/DL (ref 8.6–10.5)
CHLORIDE SERPL-SCNC: 105 MMOL/L (ref 98–107)
CO2 SERPL-SCNC: 23 MMOL/L (ref 22–29)
CREAT SERPL-MCNC: 1.42 MG/DL (ref 0.76–1.27)
DEPRECATED RDW RBC AUTO: 41.6 FL (ref 37–54)
E CHAFFEENSIS IGG TITR SER IF: NEGATIVE {TITER}
E CHAFFEENSIS IGM TITR SER IF: NEGATIVE {TITER}
EGFRCR SERPLBLD CKD-EPI 2021: 62.1 ML/MIN/1.73
ERYTHROCYTE [DISTWIDTH] IN BLOOD BY AUTOMATED COUNT: 13.6 % (ref 12.3–15.4)
GLUCOSE SERPL-MCNC: 122 MG/DL (ref 65–99)
HCT VFR BLD AUTO: 40.2 % (ref 37.5–51)
HGB BLD-MCNC: 13.2 G/DL (ref 13–17.7)
MAGNESIUM SERPL-MCNC: 2 MG/DL (ref 1.6–2.6)
MCH RBC QN AUTO: 28.7 PG (ref 26.6–33)
MCHC RBC AUTO-ENTMCNC: 32.9 G/DL (ref 31.5–35.7)
MCV RBC AUTO: 87.2 FL (ref 79–97)
PLATELET # BLD AUTO: 245 10*3/MM3 (ref 140–450)
PMV BLD AUTO: 9.4 FL (ref 6–12)
POTASSIUM SERPL-SCNC: 4 MMOL/L (ref 3.5–5.2)
RBC # BLD AUTO: 4.61 10*6/MM3 (ref 4.14–5.8)
RICK SF IGG TITR SER IF: NORMAL {TITER}
RICK SF IGM TITR SER IF: NORMAL {TITER}
RICK TYPHUS IGG TITR SER IF: NORMAL {TITER}
RICK TYPHUS IGM TITR SER IF: NORMAL {TITER}
SODIUM SERPL-SCNC: 138 MMOL/L (ref 136–145)
SODIUM UR-SCNC: 33 MMOL/L
WBC NRBC COR # BLD: 10.7 10*3/MM3 (ref 3.4–10.8)

## 2022-04-28 PROCEDURE — 80048 BASIC METABOLIC PNL TOTAL CA: CPT | Performed by: INTERNAL MEDICINE

## 2022-04-28 PROCEDURE — 84300 ASSAY OF URINE SODIUM: CPT | Performed by: INTERNAL MEDICINE

## 2022-04-28 PROCEDURE — 83735 ASSAY OF MAGNESIUM: CPT | Performed by: INTERNAL MEDICINE

## 2022-04-28 PROCEDURE — 99239 HOSP IP/OBS DSCHRG MGMT >30: CPT | Performed by: STUDENT IN AN ORGANIZED HEALTH CARE EDUCATION/TRAINING PROGRAM

## 2022-04-28 PROCEDURE — 85027 COMPLETE CBC AUTOMATED: CPT | Performed by: INTERNAL MEDICINE

## 2022-04-28 PROCEDURE — 99233 SBSQ HOSP IP/OBS HIGH 50: CPT | Performed by: INTERNAL MEDICINE

## 2022-04-28 RX ORDER — AMLODIPINE BESYLATE 5 MG/1
10 TABLET ORAL
Status: DISCONTINUED | OUTPATIENT
Start: 2022-04-29 | End: 2022-04-28 | Stop reason: HOSPADM

## 2022-04-28 RX ORDER — ISOSORBIDE MONONITRATE 30 MG/1
30 TABLET, EXTENDED RELEASE ORAL
Status: DISCONTINUED | OUTPATIENT
Start: 2022-04-28 | End: 2022-04-28 | Stop reason: HOSPADM

## 2022-04-28 RX ORDER — LOSARTAN POTASSIUM 25 MG/1
25 TABLET ORAL ONCE
Status: COMPLETED | OUTPATIENT
Start: 2022-04-28 | End: 2022-04-28

## 2022-04-28 RX ORDER — AMLODIPINE BESYLATE 5 MG/1
5 TABLET ORAL ONCE
Status: COMPLETED | OUTPATIENT
Start: 2022-04-28 | End: 2022-04-28

## 2022-04-28 RX ORDER — HYDROXYZINE HYDROCHLORIDE 25 MG/1
50 TABLET, FILM COATED ORAL 3 TIMES DAILY PRN
Status: DISCONTINUED | OUTPATIENT
Start: 2022-04-28 | End: 2022-04-28 | Stop reason: HOSPADM

## 2022-04-28 RX ORDER — LOSARTAN POTASSIUM 50 MG/1
50 TABLET ORAL
Status: DISCONTINUED | OUTPATIENT
Start: 2022-04-29 | End: 2022-04-28 | Stop reason: HOSPADM

## 2022-04-28 RX ORDER — HYDRALAZINE HYDROCHLORIDE 25 MG/1
50 TABLET, FILM COATED ORAL EVERY 12 HOURS SCHEDULED
Status: DISCONTINUED | OUTPATIENT
Start: 2022-04-28 | End: 2022-04-28 | Stop reason: HOSPADM

## 2022-04-28 RX ORDER — IPRATROPIUM BROMIDE AND ALBUTEROL SULFATE 2.5; .5 MG/3ML; MG/3ML
3 SOLUTION RESPIRATORY (INHALATION)
Status: DISCONTINUED | OUTPATIENT
Start: 2022-04-28 | End: 2022-04-28 | Stop reason: HOSPADM

## 2022-04-28 RX ADMIN — AMLODIPINE BESYLATE 5 MG: 5 TABLET ORAL at 10:19

## 2022-04-28 RX ADMIN — LOSARTAN POTASSIUM 25 MG: 25 TABLET, FILM COATED ORAL at 08:15

## 2022-04-28 RX ADMIN — LOSARTAN POTASSIUM 25 MG: 25 TABLET, FILM COATED ORAL at 10:20

## 2022-04-28 RX ADMIN — HYDROXYZINE HYDROCHLORIDE 50 MG: 25 TABLET ORAL at 10:20

## 2022-04-28 RX ADMIN — AMLODIPINE BESYLATE 5 MG: 5 TABLET ORAL at 08:15

## 2022-04-28 RX ADMIN — Medication 600 MG: at 08:15

## 2022-04-28 RX ADMIN — HYDRALAZINE HYDROCHLORIDE 50 MG: 25 TABLET, FILM COATED ORAL at 05:25

## 2022-04-28 RX ADMIN — CARVEDILOL 6.25 MG: 6.25 TABLET, FILM COATED ORAL at 08:15

## 2022-04-28 RX ADMIN — ASPIRIN 81 MG: 81 TABLET, COATED ORAL at 08:15

## 2022-04-28 RX ADMIN — THERA TABS 1 TABLET: TAB at 08:15

## 2022-04-28 RX ADMIN — Medication 10 ML: at 08:16

## 2022-12-06 ENCOUNTER — APPOINTMENT (OUTPATIENT)
Dept: CARDIOLOGY | Facility: HOSPITAL | Age: 47
End: 2022-12-06

## 2022-12-06 ENCOUNTER — HOSPITAL ENCOUNTER (OUTPATIENT)
Facility: HOSPITAL | Age: 47
Setting detail: OBSERVATION
Discharge: HOME | End: 2022-12-07
Attending: EMERGENCY MEDICINE | Admitting: INTERNAL MEDICINE

## 2022-12-06 ENCOUNTER — APPOINTMENT (OUTPATIENT)
Dept: GENERAL RADIOLOGY | Facility: HOSPITAL | Age: 47
End: 2022-12-06

## 2022-12-06 DIAGNOSIS — R42 DIZZINESS: Primary | ICD-10-CM

## 2022-12-06 DIAGNOSIS — I10 HYPERTENSION, UNSPECIFIED TYPE: ICD-10-CM

## 2022-12-06 DIAGNOSIS — F15.10 METHAMPHETAMINE ABUSE: ICD-10-CM

## 2022-12-06 LAB
AMPHET+METHAMPHET UR QL: POSITIVE
ANION GAP SERPL CALCULATED.3IONS-SCNC: 13 MMOL/L (ref 5–15)
BACTERIA UR QL AUTO: ABNORMAL /HPF
BARBITURATES UR QL SCN: NEGATIVE
BASOPHILS # BLD AUTO: 0.1 10*3/MM3 (ref 0–0.2)
BASOPHILS NFR BLD AUTO: 1 % (ref 0–1.5)
BENZODIAZ UR QL SCN: NEGATIVE
BH CV ECHO MEAS - ACS: 2.6 CM
BH CV ECHO MEAS - AI P1/2T: 1415 MSEC
BH CV ECHO MEAS - AO MAX PG: 9.5 MMHG
BH CV ECHO MEAS - AO MEAN PG: 5.5 MMHG
BH CV ECHO MEAS - AO ROOT DIAM: 3.7 CM
BH CV ECHO MEAS - AO V2 MAX: 153.9 CM/SEC
BH CV ECHO MEAS - AO V2 VTI: 30 CM
BH CV ECHO MEAS - AVA(I,D): 2.5 CM2
BH CV ECHO MEAS - EDV(CUBED): 122.6 ML
BH CV ECHO MEAS - EDV(MOD-SP2): 106.9 ML
BH CV ECHO MEAS - EDV(MOD-SP4): 105.7 ML
BH CV ECHO MEAS - EF(MOD-BP): 61 %
BH CV ECHO MEAS - EF(MOD-SP2): 66 %
BH CV ECHO MEAS - EF(MOD-SP4): 55.8 %
BH CV ECHO MEAS - ESV(CUBED): 35.4 ML
BH CV ECHO MEAS - ESV(MOD-SP2): 36.3 ML
BH CV ECHO MEAS - ESV(MOD-SP4): 46.7 ML
BH CV ECHO MEAS - FS: 33.9 %
BH CV ECHO MEAS - IVS/LVPW: 0.98 CM
BH CV ECHO MEAS - IVSD: 1.1 CM
BH CV ECHO MEAS - LA DIMENSION: 3 CM
BH CV ECHO MEAS - LV DIASTOLIC VOL/BSA (35-75): 54.5 CM2
BH CV ECHO MEAS - LV MASS(C)D: 208.4 GRAMS
BH CV ECHO MEAS - LV MAX PG: 5 MMHG
BH CV ECHO MEAS - LV MEAN PG: 2.7 MMHG
BH CV ECHO MEAS - LV SYSTOLIC VOL/BSA (12-30): 24.1 CM2
BH CV ECHO MEAS - LV V1 MAX: 111.9 CM/SEC
BH CV ECHO MEAS - LV V1 VTI: 23.2 CM
BH CV ECHO MEAS - LVIDD: 5 CM
BH CV ECHO MEAS - LVIDS: 3.3 CM
BH CV ECHO MEAS - LVOT AREA: 3.3 CM2
BH CV ECHO MEAS - LVOT DIAM: 2.04 CM
BH CV ECHO MEAS - LVPWD: 1.13 CM
BH CV ECHO MEAS - MR MAX PG: 47.1 MMHG
BH CV ECHO MEAS - MR MAX VEL: 335.6 CM/SEC
BH CV ECHO MEAS - MV A MAX VEL: 81.5 CM/SEC
BH CV ECHO MEAS - MV DEC TIME: 0.18 MSEC
BH CV ECHO MEAS - MV E MAX VEL: 48 CM/SEC
BH CV ECHO MEAS - MV E/A: 0.59
BH CV ECHO MEAS - MV MAX PG: 2.5 MMHG
BH CV ECHO MEAS - MV MEAN PG: 0.94 MMHG
BH CV ECHO MEAS - MV V2 VTI: 22.6 CM
BH CV ECHO MEAS - MVA(VTI): 3.4 CM2
BH CV ECHO MEAS - PA V2 MAX: 100.7 CM/SEC
BH CV ECHO MEAS - RAP SYSTOLE: 3 MMHG
BH CV ECHO MEAS - RV MAX PG: 3.1 MMHG
BH CV ECHO MEAS - RV V1 MAX: 87.8 CM/SEC
BH CV ECHO MEAS - RV V1 VTI: 20.8 CM
BH CV ECHO MEAS - RVSP: 14 MMHG
BH CV ECHO MEAS - SI(MOD-SP2): 36.4 ML/M2
BH CV ECHO MEAS - SI(MOD-SP4): 30.4 ML/M2
BH CV ECHO MEAS - SV(LVOT): 75.9 ML
BH CV ECHO MEAS - SV(MOD-SP2): 70.5 ML
BH CV ECHO MEAS - SV(MOD-SP4): 59 ML
BH CV ECHO MEAS - TR MAX PG: 11.4 MMHG
BH CV ECHO MEAS - TR MAX VEL: 164.9 CM/SEC
BILIRUB UR QL STRIP: NEGATIVE
BUN SERPL-MCNC: 20 MG/DL (ref 6–20)
BUN/CREAT SERPL: 16.5 (ref 7–25)
CALCIUM SPEC-SCNC: 8.6 MG/DL (ref 8.6–10.5)
CANNABINOIDS SERPL QL: NEGATIVE
CHLORIDE SERPL-SCNC: 104 MMOL/L (ref 98–107)
CLARITY UR: CLEAR
CO2 SERPL-SCNC: 20 MMOL/L (ref 22–29)
COCAINE UR QL: NEGATIVE
COLOR UR: YELLOW
CREAT SERPL-MCNC: 1.21 MG/DL (ref 0.76–1.27)
DEPRECATED RDW RBC AUTO: 42.9 FL (ref 37–54)
EGFRCR SERPLBLD CKD-EPI 2021: 75.2 ML/MIN/1.73
EOSINOPHIL # BLD AUTO: 0.2 10*3/MM3 (ref 0–0.4)
EOSINOPHIL NFR BLD AUTO: 2.9 % (ref 0.3–6.2)
ERYTHROCYTE [DISTWIDTH] IN BLOOD BY AUTOMATED COUNT: 13.6 % (ref 12.3–15.4)
GLUCOSE SERPL-MCNC: 111 MG/DL (ref 65–99)
GLUCOSE UR STRIP-MCNC: NEGATIVE MG/DL
HBA1C MFR BLD: 5 % (ref 3.5–5.6)
HCT VFR BLD AUTO: 41.1 % (ref 37.5–51)
HGB BLD-MCNC: 13.7 G/DL (ref 13–17.7)
HGB UR QL STRIP.AUTO: NEGATIVE
HOLD SPECIMEN: NORMAL
HYALINE CASTS UR QL AUTO: ABNORMAL /LPF
KETONES UR QL STRIP: NEGATIVE
LEUKOCYTE ESTERASE UR QL STRIP.AUTO: NEGATIVE
LYMPHOCYTES # BLD AUTO: 1.8 10*3/MM3 (ref 0.7–3.1)
LYMPHOCYTES NFR BLD AUTO: 23.6 % (ref 19.6–45.3)
MAXIMAL PREDICTED HEART RATE: 175 BPM
MCH RBC QN AUTO: 30.1 PG (ref 26.6–33)
MCHC RBC AUTO-ENTMCNC: 33.4 G/DL (ref 31.5–35.7)
MCV RBC AUTO: 90 FL (ref 79–97)
METHADONE UR QL SCN: NEGATIVE
MONOCYTES # BLD AUTO: 1.1 10*3/MM3 (ref 0.1–0.9)
MONOCYTES NFR BLD AUTO: 14 % (ref 5–12)
NEUTROPHILS NFR BLD AUTO: 4.6 10*3/MM3 (ref 1.7–7)
NEUTROPHILS NFR BLD AUTO: 58.5 % (ref 42.7–76)
NITRITE UR QL STRIP: NEGATIVE
NRBC BLD AUTO-RTO: 0 /100 WBC (ref 0–0.2)
OPIATES UR QL: NEGATIVE
OXYCODONE UR QL SCN: NEGATIVE
PH UR STRIP.AUTO: 7 [PH] (ref 5–8)
PLATELET # BLD AUTO: 203 10*3/MM3 (ref 140–450)
PMV BLD AUTO: 9 FL (ref 6–12)
POTASSIUM SERPL-SCNC: 3.8 MMOL/L (ref 3.5–5.2)
PROT UR QL STRIP: ABNORMAL
RBC # BLD AUTO: 4.57 10*6/MM3 (ref 4.14–5.8)
RBC # UR STRIP: ABNORMAL /HPF
REF LAB TEST METHOD: ABNORMAL
SODIUM SERPL-SCNC: 137 MMOL/L (ref 136–145)
SP GR UR STRIP: 1.01 (ref 1–1.03)
SQUAMOUS #/AREA URNS HPF: ABNORMAL /HPF
STRESS TARGET HR: 149 BPM
TROPONIN T SERPL-MCNC: <0.01 NG/ML (ref 0–0.03)
TSH SERPL DL<=0.05 MIU/L-ACNC: 0.83 UIU/ML (ref 0.27–4.2)
UROBILINOGEN UR QL STRIP: ABNORMAL
WBC # UR STRIP: ABNORMAL /HPF
WBC NRBC COR # BLD: 7.8 10*3/MM3 (ref 3.4–10.8)
WHOLE BLOOD HOLD COAG: NORMAL

## 2022-12-06 PROCEDURE — G0378 HOSPITAL OBSERVATION PER HR: HCPCS

## 2022-12-06 PROCEDURE — 71046 X-RAY EXAM CHEST 2 VIEWS: CPT

## 2022-12-06 PROCEDURE — 80307 DRUG TEST PRSMV CHEM ANLYZR: CPT | Performed by: EMERGENCY MEDICINE

## 2022-12-06 PROCEDURE — 83036 HEMOGLOBIN GLYCOSYLATED A1C: CPT | Performed by: PHYSICIAN ASSISTANT

## 2022-12-06 PROCEDURE — 25010000002 HYDRALAZINE PER 20 MG: Performed by: EMERGENCY MEDICINE

## 2022-12-06 PROCEDURE — 80048 BASIC METABOLIC PNL TOTAL CA: CPT | Performed by: EMERGENCY MEDICINE

## 2022-12-06 PROCEDURE — 96374 THER/PROPH/DIAG INJ IV PUSH: CPT

## 2022-12-06 PROCEDURE — 99203 OFFICE O/P NEW LOW 30 MIN: CPT | Performed by: PSYCHIATRY & NEUROLOGY

## 2022-12-06 PROCEDURE — 93005 ELECTROCARDIOGRAM TRACING: CPT | Performed by: EMERGENCY MEDICINE

## 2022-12-06 PROCEDURE — 25010000002 ENOXAPARIN PER 10 MG: Performed by: PHYSICIAN ASSISTANT

## 2022-12-06 PROCEDURE — 93306 TTE W/DOPPLER COMPLETE: CPT

## 2022-12-06 PROCEDURE — 93306 TTE W/DOPPLER COMPLETE: CPT | Performed by: INTERNAL MEDICINE

## 2022-12-06 PROCEDURE — 96375 TX/PRO/DX INJ NEW DRUG ADDON: CPT

## 2022-12-06 PROCEDURE — 81001 URINALYSIS AUTO W/SCOPE: CPT | Performed by: EMERGENCY MEDICINE

## 2022-12-06 PROCEDURE — 99285 EMERGENCY DEPT VISIT HI MDM: CPT

## 2022-12-06 PROCEDURE — 96372 THER/PROPH/DIAG INJ SC/IM: CPT

## 2022-12-06 PROCEDURE — 84484 ASSAY OF TROPONIN QUANT: CPT | Performed by: EMERGENCY MEDICINE

## 2022-12-06 PROCEDURE — 85025 COMPLETE CBC W/AUTO DIFF WBC: CPT | Performed by: EMERGENCY MEDICINE

## 2022-12-06 PROCEDURE — 84443 ASSAY THYROID STIM HORMONE: CPT | Performed by: PHYSICIAN ASSISTANT

## 2022-12-06 RX ORDER — ACETAMINOPHEN 325 MG/1
650 TABLET ORAL EVERY 4 HOURS PRN
Status: DISCONTINUED | OUTPATIENT
Start: 2022-12-06 | End: 2022-12-07 | Stop reason: HOSPADM

## 2022-12-06 RX ORDER — HYDRALAZINE HYDROCHLORIDE 20 MG/ML
10 INJECTION INTRAMUSCULAR; INTRAVENOUS EVERY 6 HOURS PRN
Status: DISCONTINUED | OUTPATIENT
Start: 2022-12-06 | End: 2022-12-07 | Stop reason: HOSPADM

## 2022-12-06 RX ORDER — BISACODYL 10 MG
10 SUPPOSITORY, RECTAL RECTAL DAILY PRN
Status: DISCONTINUED | OUTPATIENT
Start: 2022-12-06 | End: 2022-12-07 | Stop reason: HOSPADM

## 2022-12-06 RX ORDER — CLONIDINE HYDROCHLORIDE 0.1 MG/1
0.1 TABLET ORAL 4 TIMES DAILY PRN
Status: DISCONTINUED | OUTPATIENT
Start: 2022-12-07 | End: 2022-12-07 | Stop reason: HOSPADM

## 2022-12-06 RX ORDER — ALUMINA, MAGNESIA, AND SIMETHICONE 2400; 2400; 240 MG/30ML; MG/30ML; MG/30ML
15 SUSPENSION ORAL EVERY 6 HOURS PRN
Status: DISCONTINUED | OUTPATIENT
Start: 2022-12-06 | End: 2022-12-07 | Stop reason: HOSPADM

## 2022-12-06 RX ORDER — NITROGLYCERIN 0.4 MG/1
0.4 TABLET SUBLINGUAL
Status: DISCONTINUED | OUTPATIENT
Start: 2022-12-06 | End: 2022-12-07 | Stop reason: HOSPADM

## 2022-12-06 RX ORDER — POTASSIUM CHLORIDE 1.5 G/1.77G
40 POWDER, FOR SOLUTION ORAL AS NEEDED
Status: DISCONTINUED | OUTPATIENT
Start: 2022-12-06 | End: 2022-12-07 | Stop reason: HOSPADM

## 2022-12-06 RX ORDER — ACETAMINOPHEN 650 MG/1
650 SUPPOSITORY RECTAL EVERY 4 HOURS PRN
Status: DISCONTINUED | OUTPATIENT
Start: 2022-12-06 | End: 2022-12-07 | Stop reason: HOSPADM

## 2022-12-06 RX ORDER — CLONIDINE HYDROCHLORIDE 0.1 MG/1
0.1 TABLET ORAL 2 TIMES DAILY PRN
Status: DISCONTINUED | OUTPATIENT
Start: 2022-12-09 | End: 2022-12-07 | Stop reason: HOSPADM

## 2022-12-06 RX ORDER — LABETALOL HYDROCHLORIDE 5 MG/ML
10 INJECTION, SOLUTION INTRAVENOUS EVERY 4 HOURS PRN
Status: DISCONTINUED | OUTPATIENT
Start: 2022-12-06 | End: 2022-12-07 | Stop reason: HOSPADM

## 2022-12-06 RX ORDER — ONDANSETRON 4 MG/1
4 TABLET, FILM COATED ORAL EVERY 6 HOURS PRN
Status: DISCONTINUED | OUTPATIENT
Start: 2022-12-06 | End: 2022-12-07 | Stop reason: HOSPADM

## 2022-12-06 RX ORDER — AMLODIPINE BESYLATE 5 MG/1
5 TABLET ORAL
Status: DISCONTINUED | OUTPATIENT
Start: 2022-12-06 | End: 2022-12-07 | Stop reason: HOSPADM

## 2022-12-06 RX ORDER — CLONIDINE HYDROCHLORIDE 0.1 MG/1
0.1 TABLET ORAL 3 TIMES DAILY PRN
Status: DISCONTINUED | OUTPATIENT
Start: 2022-12-08 | End: 2022-12-07 | Stop reason: HOSPADM

## 2022-12-06 RX ORDER — CLONIDINE HYDROCHLORIDE 0.1 MG/1
0.1 TABLET ORAL ONCE AS NEEDED
Status: DISCONTINUED | OUTPATIENT
Start: 2022-12-10 | End: 2022-12-07 | Stop reason: HOSPADM

## 2022-12-06 RX ORDER — MAGNESIUM SULFATE 1 G/100ML
1 INJECTION INTRAVENOUS AS NEEDED
Status: DISCONTINUED | OUTPATIENT
Start: 2022-12-06 | End: 2022-12-07 | Stop reason: HOSPADM

## 2022-12-06 RX ORDER — HYDRALAZINE HYDROCHLORIDE 20 MG/ML
10 INJECTION INTRAMUSCULAR; INTRAVENOUS ONCE
Status: COMPLETED | OUTPATIENT
Start: 2022-12-06 | End: 2022-12-06

## 2022-12-06 RX ORDER — SODIUM CHLORIDE 9 MG/ML
40 INJECTION, SOLUTION INTRAVENOUS AS NEEDED
Status: DISCONTINUED | OUTPATIENT
Start: 2022-12-06 | End: 2022-12-07 | Stop reason: HOSPADM

## 2022-12-06 RX ORDER — BISACODYL 5 MG/1
5 TABLET, DELAYED RELEASE ORAL DAILY PRN
Status: DISCONTINUED | OUTPATIENT
Start: 2022-12-06 | End: 2022-12-07 | Stop reason: HOSPADM

## 2022-12-06 RX ORDER — CHOLECALCIFEROL (VITAMIN D3) 125 MCG
5 CAPSULE ORAL NIGHTLY PRN
Status: DISCONTINUED | OUTPATIENT
Start: 2022-12-06 | End: 2022-12-07 | Stop reason: HOSPADM

## 2022-12-06 RX ORDER — MAGNESIUM SULFATE HEPTAHYDRATE 40 MG/ML
2 INJECTION, SOLUTION INTRAVENOUS AS NEEDED
Status: DISCONTINUED | OUTPATIENT
Start: 2022-12-06 | End: 2022-12-07 | Stop reason: HOSPADM

## 2022-12-06 RX ORDER — POLYETHYLENE GLYCOL 3350 17 G/17G
17 POWDER, FOR SOLUTION ORAL DAILY PRN
Status: DISCONTINUED | OUTPATIENT
Start: 2022-12-06 | End: 2022-12-07 | Stop reason: HOSPADM

## 2022-12-06 RX ORDER — SODIUM CHLORIDE 0.9 % (FLUSH) 0.9 %
10 SYRINGE (ML) INJECTION AS NEEDED
Status: DISCONTINUED | OUTPATIENT
Start: 2022-12-06 | End: 2022-12-07 | Stop reason: HOSPADM

## 2022-12-06 RX ORDER — CLONIDINE HYDROCHLORIDE 0.1 MG/1
0.1 TABLET ORAL ONCE
Status: COMPLETED | OUTPATIENT
Start: 2022-12-06 | End: 2022-12-06

## 2022-12-06 RX ORDER — POTASSIUM CHLORIDE 20 MEQ/1
40 TABLET, EXTENDED RELEASE ORAL AS NEEDED
Status: DISCONTINUED | OUTPATIENT
Start: 2022-12-06 | End: 2022-12-07 | Stop reason: HOSPADM

## 2022-12-06 RX ORDER — CLONIDINE HYDROCHLORIDE 0.1 MG/1
0.1 TABLET ORAL 4 TIMES DAILY PRN
Status: DISPENSED | OUTPATIENT
Start: 2022-12-06 | End: 2022-12-07

## 2022-12-06 RX ORDER — ENOXAPARIN SODIUM 100 MG/ML
40 INJECTION SUBCUTANEOUS DAILY
Status: DISCONTINUED | OUTPATIENT
Start: 2022-12-06 | End: 2022-12-07 | Stop reason: HOSPADM

## 2022-12-06 RX ORDER — ACETAMINOPHEN 160 MG/5ML
650 SOLUTION ORAL EVERY 4 HOURS PRN
Status: DISCONTINUED | OUTPATIENT
Start: 2022-12-06 | End: 2022-12-07 | Stop reason: HOSPADM

## 2022-12-06 RX ORDER — AMOXICILLIN 250 MG
2 CAPSULE ORAL 2 TIMES DAILY
Status: DISCONTINUED | OUTPATIENT
Start: 2022-12-06 | End: 2022-12-07 | Stop reason: HOSPADM

## 2022-12-06 RX ORDER — ONDANSETRON 2 MG/ML
4 INJECTION INTRAMUSCULAR; INTRAVENOUS EVERY 6 HOURS PRN
Status: DISCONTINUED | OUTPATIENT
Start: 2022-12-06 | End: 2022-12-07 | Stop reason: HOSPADM

## 2022-12-06 RX ORDER — SODIUM CHLORIDE 0.9 % (FLUSH) 0.9 %
10 SYRINGE (ML) INJECTION EVERY 12 HOURS SCHEDULED
Status: DISCONTINUED | OUTPATIENT
Start: 2022-12-06 | End: 2022-12-07 | Stop reason: HOSPADM

## 2022-12-06 RX ADMIN — CLONIDINE HYDROCHLORIDE 0.1 MG: 0.1 TABLET ORAL at 06:14

## 2022-12-06 RX ADMIN — ENOXAPARIN SODIUM 40 MG: 100 INJECTION SUBCUTANEOUS at 15:38

## 2022-12-06 RX ADMIN — LABETALOL HYDROCHLORIDE 10 MG: 5 INJECTION, SOLUTION INTRAVENOUS at 09:41

## 2022-12-06 RX ADMIN — Medication 10 ML: at 21:00

## 2022-12-06 RX ADMIN — HYDRALAZINE HYDROCHLORIDE 10 MG: 20 INJECTION INTRAMUSCULAR; INTRAVENOUS at 05:07

## 2022-12-06 RX ADMIN — CLONIDINE HYDROCHLORIDE 0.1 MG: 0.1 TABLET ORAL at 15:38

## 2022-12-06 RX ADMIN — Medication 10 ML: at 09:42

## 2022-12-06 RX ADMIN — AMLODIPINE BESYLATE 5 MG: 5 TABLET ORAL at 09:41

## 2022-12-06 NOTE — PLAN OF CARE
Patient's blood pressure WNL and continuing to trend down. Norvasc, clonidine, and IV labetalol given to control B/P. Patient reports using meth within the last 16 hours and as of now has no signs of withdrawal. Patient has been sleeping for most of the day. Sister at bedside at the beginning of the day. No distress at this time, will continue to monitor.

## 2022-12-06 NOTE — H&P
[facility] Medicine Services      Patient Name: Scott Gaytan  : 1975  MRN: [id number]  Primary Care Physician:  Provider, No Known  Date of admission: 2022      Subjective      Chief Complaint: dizziness    History of Present Illness: Scott Gaytan is a 47 y.o. male with a past medical history to include hypertension, methamphetamine abuse, tobacco abuse, congestive heart failure who presented to [facility] on 2022 complaining of dizziness.  He reports he had 2 episodes this morning 1 at home and 1 while at the gas station where he felt dizzy.  He reports history of hypertension but does not take any medications for it.  He also reports he snorted methamphetamine yesterday.  He states that over the last week he is kind of been feeling off and has been taking his blood pressure at home with systolic readings over 200.  He denies having any syncopal episodes.  He denies any chest pains or palpitations.  He denies any focal neurologic symptoms.  He denies any visual changes.  He states occasionally he will have blurred vision but is unsure if this is related to his high blood pressure.  He denies having any nausea or vomiting.  He denies any fever or chills.  He denies having any abdominal pain, constipation or diarrhea.  Denies lower extremity symptoms.  He states that he was seen in the past for high blood pressure but has not followed up.  We have been asked to admit this patient for further work-up and evaluation.    Emergency department work-up afebrile.  Elevated BP.  98% on room air.  Glucose 111.  Creatinine 1.21.  Potassium 3.8.  Troponin <0.010.  White blood count 7.8.  Hemoglobin 13.7.  Platelets 203.     EKG Sinus rhythm. Probable left atrial enlargement. Left ventricular hypertrophy.Anterior Q waves, possibly due to LVH    In the emergency department he received hydralazine and clonidine.    Review of Systems   Constitutional: Negative.   HENT:  Negative.    Eyes: Negative for blurred vision and visual disturbance.   Cardiovascular: Negative for chest pain, dyspnea on exertion, leg swelling and syncope.   Respiratory: Negative for cough and shortness of breath.    Skin: Negative.    Musculoskeletal: Negative.    Gastrointestinal: Negative.    Genitourinary: Negative.    Neurological: Positive for light-headedness. Negative for brief paralysis, focal weakness and weakness.   Psychiatric/Behavioral: Positive for substance abuse.          Personal History     Past Medical History:   Diagnosis Date   • Hypertension    • Substance abuse (HCC)        Past Surgical History:   Procedure Laterality Date   • CARDIAC CATHETERIZATION N/A 4/27/2022    Procedure: Left Heart Cath possible PCI, atherectomy, hemodynamic support;  Surgeon: Malik Devlin MD;  Location: [facility] CATH INVASIVE LOCATION;  Service: Cardiology;  Laterality: N/A;   • LEG SURGERY Right        Family History: family history is not on file. Otherwise pertinent FHx was reviewed and not pertinent to current issue.    Social History:  reports that he has been smoking. He has been smoking an average of 1 pack per day. He has never used smokeless tobacco. He reports that he does not currently use alcohol. He reports current drug use. Drug: Methamphetamines.    Home Medications:  Prior to Admission Medications     None            Allergies:  No Known Allergies    Objective      Vitals:   Temp:  [98 °F (36.7 °C)-99.8 °F (37.7 °C)] 99.8 °F (37.7 °C)  Heart Rate:  [] 89  Resp:  [20-22] 20  BP: (148-198)/() 177/111    Physical Exam  Constitutional:       General: He is not in acute distress.  HENT:      Head: Normocephalic and atraumatic.   Cardiovascular:      Rate and Rhythm: Normal rate and regular rhythm.      Pulses: Normal pulses.      Heart sounds: Normal heart sounds.   Pulmonary:      Effort: Pulmonary effort is normal. No respiratory distress.   Abdominal:      General: Bowel  sounds are normal.      Palpations: Abdomen is soft.      Tenderness: There is no abdominal tenderness.   Musculoskeletal:         General: Normal range of motion.      Cervical back: Normal range of motion and neck supple.   Skin:     General: Skin is warm and dry.   Neurological:      General: No focal deficit present.      Mental Status: He is alert and oriented to person, place, and time.   Psychiatric:         Mood and Affect: Mood normal.         Result Review    Result Review:  I have personally reviewed the results from the time of this admission to 12/6/2022 07:48 EST and agree with these findings:  [x]  Laboratory  [x]  Microbiology  [x]  Radiology  [x]  EKG/Telemetry   []  Cardiology/Vascular   []  Pathology  []  Old records  []  Other:        Assessment & Plan        Active Hospital Problems:  Active Hospital Problems    Diagnosis    • **Dizziness      Plan:      Hypertension urgency/dizziness  -Current /121 upon arrival  -Patient given hydralazine and clonidine in the emergency department  -EKG Sinus rhythm. Probable left atrial enlargement. Left ventricular hypertrophy.Anterior Q waves, possibly due to LVH  -ECHO 4/26/22 EF=50%  -Echo, pending  -Troponin <0.010  -Continuous cardiac monitoring  -Hydralazine and labetalol as needed  -Will add amlodipine  -Consider cardiology consult after work-up complete      Substance abuse, methamphetamine/Tobacco abuse  -UA drug screen, pending  -Encourage cessation  -Will add withdrawal protocol    CHFrEF  -ECHO 4/26/22 EF=50%  -probnp  -troponin <0.010  -Monitor daily weight, I's and O's  -Chest x-ray    Elevated glucose  -Check A1c    DVT prophylaxis:  No DVT prophylaxis order currently exists.    CODE STATUS:       Admission Status:  I believe this patient meets observation status.    I discussed the patient's findings and my recommendations with patient and family.      Signature: Electronically signed by Carmen Lombardi PA-C, 12/06/22, 9:02 AM EST.

## 2022-12-06 NOTE — CONSULTS
"  Referring Provider: Dr Salmeron   Reason for Consultation: substance abuse       Chief complaint \"I was using methamphetamine  And BP was elevated\"    Subjective .     History of present illness:  The patient is a 47 y.o. male who was admitted secondary to dizziness.  Past medical history significant for hypertension and substance abuse.  Psychiatric consult was requested by Dr. Samleron secondary to substance abuse.  Patient denied any mood problems in the past, alcohol or drug use daily basis, he acknowledged \"occasionally, once or twice per month in order to get more energy \".  The patient was working up to 80 hours and he has 2  Jobs.  The patient denied feeling depressed, hopeless or helpless, the patient denied any perceptual disturbances, he denied suicidal and homicidal ideations.  The patient reported ongoing issues with hypertension, he claims to be compliant with medications.  The patient stated his last use of methamphetamine was 3 or 4 days ago.  The patient denied any withdrawal symptoms, denied having any cravings, he did not have drug-related legal problems.   Past psychiatric history: The patient denied inpatient psychiatric admissions, denied any history of suicide attempts, never been prescribed psychotropic medications    Review of Systems   All systems were reviewed and negative except for:  Constitution:  positive for fatigue    History    Past Medical History:   Diagnosis Date   • Hypertension    • Substance abuse (HCC)         No family history on file.  Family history: Denied history of mental illness in family members  Social History     Tobacco Use   • Smoking status: Every Day     Packs/day: 1.00     Types: Cigarettes   • Smokeless tobacco: Never   Vaping Use   • Vaping Use: Never used   Substance Use Topics   • Alcohol use: Not Currently   • Drug use: Yes     Types: Methamphetamines   The patient stated he unemployed, , described his family as supportive     (Not in a hospital " "admission)       Scheduled Meds:  amLODIPine, 5 mg, Oral, Q24H  enoxaparin, 40 mg, Subcutaneous, Daily  senna-docusate sodium, 2 tablet, Oral, BID  sodium chloride, 10 mL, Intravenous, Q12H         Continuous Infusions:       PRN Meds:  •  acetaminophen **OR** acetaminophen **OR** acetaminophen  •  aluminum-magnesium hydroxide-simethicone  •  senna-docusate sodium **AND** polyethylene glycol **AND** bisacodyl **AND** bisacodyl  •  cloNIDine **FOLLOWED BY** [START ON 12/7/2022] cloNIDine **FOLLOWED BY** [START ON 12/8/2022] cloNIDine **FOLLOWED BY** [START ON 12/9/2022] cloNIDine **FOLLOWED BY** [START ON 12/10/2022] cloNIDine  •  hydrALAZINE  •  labetalol  •  magnesium sulfate **OR** magnesium sulfate in D5W 1g/100mL (PREMIX)  •  melatonin  •  nitroglycerin  •  ondansetron **OR** ondansetron  •  potassium chloride  •  potassium chloride  •  sodium chloride  •  sodium chloride      Allergies:  Patient has no known allergies.      Objective     Vital Signs   /89   Pulse 75   Temp 98.2 °F (36.8 °C) (Oral)   Resp 14   Ht 172.7 cm (68\")   Wt 80.3 kg (177 lb 0.5 oz)   SpO2 97%   BMI 26.92 kg/m²     Physical Exam:    Musculoskeletal:   Muscle strength and tone: Within normal limits  Abnormal Movements: None  Gait: Unable to assess, the patient was in bed      General Appearance:    In NAD       Mental Status Exam:   Hygiene:   good  Cooperation:  Cooperative  Eye Contact:  Good  Behavior and Psychomotor Activity: Appropriate  Speech:  Normal  Mood: tired   Affect:  Appropriate and Blunted  Thought Process:  Goal directed, Linear and Organized  Associations: Intact   Thought Content:  Normal  Language: appropriate   Suicidal Ideations:  None  Homicidal:  None  Hallucinations:  None  Delusion:  None  Orientation:  To person, Place, Time and Situation  Memory:  Intact  Concentration and computation: fair   Attention span: fair   Fund of knowledge: fair  Reliability:  fair  Insight:  Poor  Judgement:  " Impaired  Impulse Control:  Poor      Medications and allergies reviewed     Lab Results   Component Value Date    GLUCOSE 111 (H) 12/06/2022    CALCIUM 8.6 12/06/2022     12/06/2022    K 3.8 12/06/2022    CO2 20.0 (L) 12/06/2022     12/06/2022    BUN 20 12/06/2022    CREATININE 1.21 12/06/2022    EGFRIFNONA 83 12/29/2021    BCR 16.5 12/06/2022    ANIONGAP 13.0 12/06/2022       Last Urine Toxicity     LAST URINE TOXICITY RESULTS Latest Ref Rng & Units 12/6/2022 4/25/2022    BARBITURATES SCREEN Negative Negative Negative    BENZODIAZEPINE SCREEN, URINE Negative Negative Negative    COCAINE SCREEN, URINE Negative Negative Negative    METHADONE SCREEN, URINE Negative Negative Negative          No results found for: PHENYTOIN, PHENOBARB, VALPROATE, CBMZ    Lab Results   Component Value Date     12/06/2022    BUN 20 12/06/2022    CREATININE 1.21 12/06/2022    TSH 0.832 12/06/2022    WBC 7.80 12/06/2022       Brief Urine Lab Results  (Last result in the past 365 days)      Color   Clarity   Blood   Leuk Est   Nitrite   Protein   CREAT   Urine HCG        12/06/22 0817 Yellow   Clear   Negative   Negative   Negative   100 mg/dL (2+)             12/6/22 UDS + methampheatmine/amphetamine     Assessment & Plan       Dizziness            Assessment: Methamphetamine abuse   Treatment Plan: the pt presented with the sxs of methamphetamine abuse, he denied increased tolerance , denied having craving about drug use, denied withdrawal sxs  He described his episodic use as performance enhancement   Referred to CD IOP was offered, he declined, the pt stated he would be able to quit on his own   The patient can be discharged when medically stable  Will follow  as needed  Treatment Plan discussed with: Patient and nursing     I discussed the patients findings and my recommendations with patient and nursing staff    I have reviewed and approved the behavioral health treatment plans and problem list. Yes  Thank you for  the consult   Referring MD has access to consult report and progress notes in EMR     Padma Aguilar MD  12/06/22  15:08 EST

## 2022-12-06 NOTE — ED PROVIDER NOTES
Subjective   History of Present Illness  47-year-old male with history of hypertension and methamphetamine abuse.  Patient states he snorts methamphetamine once a day and last used 12 hours ago.  Patient states he had 2 episodes where he felt lightheaded and thought he may have blacked out.  Patient states he was standing during both episodes and did not fall.  There was no associated headache/chest pain/shortness of air.  Patient dates his blood pressure normally runs 150/100 and he has some left over lisinopril but he will take periodically but has not seen a doctor since he was in the hospital in April at which time he left AMA.        Review of Systems   Neurological: Positive for light-headedness.   Psychiatric/Behavioral:        Methamphetamine abuse   All other systems reviewed and are negative.      Past Medical History:   Diagnosis Date   • Hypertension    • Substance abuse (HCC)        No Known Allergies    Past Surgical History:   Procedure Laterality Date   • CARDIAC CATHETERIZATION N/A 4/27/2022    Procedure: Left Heart Cath possible PCI, atherectomy, hemodynamic support;  Surgeon: Malik Devlin MD;  Location: [facility] CATH INVASIVE LOCATION;  Service: Cardiology;  Laterality: N/A;   • LEG SURGERY Right        No family history on file.    Social History     Socioeconomic History   • Marital status: Single   Tobacco Use   • Smoking status: Every Day     Packs/day: 1.00     Types: Cigarettes   • Smokeless tobacco: Never   Vaping Use   • Vaping Use: Never used   Substance and Sexual Activity   • Alcohol use: Not Currently   • Drug use: Yes     Types: Methamphetamines   • Sexual activity: Defer           Objective   Physical Exam  Constitutional:       Appearance: Normal appearance.   HENT:      Head: Normocephalic and atraumatic.      Mouth/Throat:      Mouth: Mucous membranes are moist.      Pharynx: Oropharynx is clear.   Eyes:      Extraocular Movements: Extraocular movements intact.       Conjunctiva/sclera: Conjunctivae normal.      Pupils: Pupils are equal, round, and reactive to light.   Cardiovascular:      Rate and Rhythm: Normal rate and regular rhythm.   Pulmonary:      Effort: Pulmonary effort is normal.      Breath sounds: Normal breath sounds.   Abdominal:      General: Bowel sounds are normal. There is no distension.      Palpations: Abdomen is soft.      Tenderness: There is no abdominal tenderness.   Musculoskeletal:         General: No swelling or tenderness. Normal range of motion.      Cervical back: Normal range of motion and neck supple.   Skin:     General: Skin is warm and dry.      Capillary Refill: Capillary refill takes less than 2 seconds.   Neurological:      Mental Status: He is alert and oriented to person, place, and time.      Cranial Nerves: No cranial nerve deficit.      Sensory: No sensory deficit.      Motor: No weakness.   Psychiatric:         Mood and Affect: Mood normal.         Behavior: Behavior normal.         Procedures           ED Course  ED Course as of 12/06/22 0720   Tue Dec 06, 2022   0446 EKG interpretation: Normal sinus rhythm, rate 89, LVH,  [JR]      ED Course User Index  [JR] Carlos Manuel Sotomayor MD                                           [facility]  Number of Diagnoses or Management Options  Dizziness  Hypertension, unspecified type  Methamphetamine abuse (HCC)  Diagnosis management comments: Results for orders placed or performed during the hospital encounter of 12/06/22  -Basic Metabolic Panel:   Specimen: Blood       Result                      Value             Ref Range           Glucose                     111 (H)           65 - 99 mg/dL       BUN                         20                6 - 20 mg/dL        Creatinine                  1.21              0.76 - 1.27 *       Sodium                      137               136 - 145 mm*       Potassium                   3.8               3.5 - 5.2 mm*       Chloride                    104                98 - 107 mmo*       CO2                         20.0 (L)          22.0 - 29.0 *       Calcium                     8.6               8.6 - 10.5 m*       BUN/Creatinine Ratio        16.5              7.0 - 25.0          Anion Gap                   13.0              5.0 - 15.0 m*       eGFR                        75.2              >60.0 mL/min*  -Troponin:   Specimen: Blood       Result                      Value             Ref Range           Troponin T                  <0.010            0.000 - 0.03*  -CBC Auto Differential:   Specimen: Blood       Result                      Value             Ref Range           WBC                         7.80              3.40 - 10.80*       RBC                         4.57              4.14 - 5.80 *       Hemoglobin                  13.7              13.0 - 17.7 *       Hematocrit                  41.1              37.5 - 51.0 %       MCV                         90.0              79.0 - 97.0 *       MCH                         30.1              26.6 - 33.0 *       MCHC                        33.4              31.5 - 35.7 *       RDW                         13.6              12.3 - 15.4 %       RDW-SD                      42.9              37.0 - 54.0 *       MPV                         9.0               6.0 - 12.0 fL       Platelets                   203               140 - 450 10*       Neutrophil %                58.5              42.7 - 76.0 %       Lymphocyte %                23.6              19.6 - 45.3 %       Monocyte %                  14.0 (H)          5.0 - 12.0 %        Eosinophil %                2.9               0.3 - 6.2 %         Basophil %                  1.0               0.0 - 1.5 %         Neutrophils, Absolute       4.60              1.70 - 7.00 *       Lymphocytes, Absolute       1.80              0.70 - 3.10 *       Monocytes, Absolute         1.10 (H)          0.10 - 0.90 *       Eosinophils, Absolute       0.20              0.00 - 0.40 *        Basophils, Absolute         0.10              0.00 - 0.20 *       nRBC                        0.0               0.0 - 0.2 /1*  -ECG 12 Lead Syncope:        Result                      Value             Ref Range           QT Interval                 376               ms             -Gold Top - SST:        Result                      Value             Ref Range           Extra Tube                                                    Hold for add-ons.  -Light Blue Top:        Result                      Value             Ref Range           Extra Tube                                                    Hold for add-ons.    Blood pressure improved briefly to 148/92 but has increased again to 162/116.  Will observe in the hospital.  No headache, no dizziness in the emergency department.  Patient understands the critical need to stop using methamphetamine.       Amount and/or Complexity of Data Reviewed  Clinical lab tests: ordered and reviewed  Tests in the medicine section of CPT®: reviewed and ordered  Decide to obtain previous medical records or to obtain history from someone other than the patient: yes  Discuss the patient with other providers: yes        Final diagnoses:   Dizziness   Hypertension, unspecified type   Methamphetamine abuse (HCC)       ED Disposition  ED Disposition     ED Disposition   Decision to Admit    Condition   --    Comment   --             No follow-up provider specified.       Medication List      No changes were made to your prescriptions during this visit.          Carlos Manuel Sotomayor MD  12/06/22 0651       Carlos Manuel Sotomayor MD  12/06/22 0783

## 2022-12-06 NOTE — CASE MANAGEMENT/SOCIAL WORK
Discharge Planning Assessment   Bhavik     Patient Name: Scott Gaytan  MRN: [id number]  Today's Date: 12/6/2022    Admit Date: 12/6/2022    Plan: Return Home with sister   Discharge Needs Assessment     Row Name 12/06/22 1331       Living Environment    People in Home sibling(s)    Name(s) of People in Home SisterYolanda    Current Living Arrangements home    Primary Care Provided by self    Provides Primary Care For no one    Family Caregiver if Needed sibling(s)    Quality of Family Relationships helpful    Able to Return to Prior Arrangements yes       Resource/Environmental Concerns    Transportation Concerns none       Transition Planning    Patient/Family Anticipates Transition to home with family    Transportation Anticipated family or friend will provide  sister       Discharge Needs Assessment    Readmission Within the Last 30 Days no previous admission in last 30 days    Equipment Currently Used at Home none    Concerns to be Addressed discharge planning               Discharge Plan     Row Name 12/06/22 9309       Plan    Plan Return Home with sister    Plan Comments Spoke with patient at bedside, IADL's, Lives with sister who assist with transportation.Confirmed PCP and Pharmacy. Denies needs    Row Name 12/06/22 4044       Plan    Plan Return home with sister.    Plan Comments Spoke with patient at bedside,IADL's,Lives with sister who assist with transportation. Confirmed PCP and currenty not                 Demographic Summary     Row Name 12/06/22 133       General Information    Admission Type observation    Arrived From emergency department    Referral Source emergency department    Reason for Consult discharge planning    Preferred Language English               Functional Status     Row Name 12/06/22 1332       Functional Status    Usual Activity Tolerance good    Current Activity Tolerance good       Functional Status, IADL    Medications independent    Meal Preparation independent     Laundry independent    Shopping assistive person       Mental Status    General Appearance WDL WDL            Met with patient in room wearing PPE: mask, face shield/goggles, gloves, gown.      Maintained distance greater than six feet and spent less than 15 minutes in the room.          Jessica Caal RN

## 2022-12-06 NOTE — ED NOTES
Pt c/o having syncopal episodes x 2 but does not fall per pt he was walking and suddenly he feels like he woke up

## 2022-12-07 ENCOUNTER — READMISSION MANAGEMENT (OUTPATIENT)
Dept: CALL CENTER | Facility: HOSPITAL | Age: 47
End: 2022-12-07

## 2022-12-07 VITALS
BODY MASS INDEX: 26.83 KG/M2 | SYSTOLIC BLOOD PRESSURE: 151 MMHG | DIASTOLIC BLOOD PRESSURE: 97 MMHG | RESPIRATION RATE: 18 BRPM | HEART RATE: 95 BPM | WEIGHT: 177 LBS | HEIGHT: 68 IN | OXYGEN SATURATION: 98 % | TEMPERATURE: 97.9 F

## 2022-12-07 PROBLEM — I10 HYPERTENSION: Status: ACTIVE | Noted: 2022-12-07

## 2022-12-07 LAB
ANION GAP SERPL CALCULATED.3IONS-SCNC: 10 MMOL/L (ref 5–15)
BASOPHILS # BLD AUTO: 0 10*3/MM3 (ref 0–0.2)
BASOPHILS NFR BLD AUTO: 0.5 % (ref 0–1.5)
BUN SERPL-MCNC: 21 MG/DL (ref 6–20)
BUN/CREAT SERPL: 13.8 (ref 7–25)
CALCIUM SPEC-SCNC: 8.6 MG/DL (ref 8.6–10.5)
CHLORIDE SERPL-SCNC: 102 MMOL/L (ref 98–107)
CO2 SERPL-SCNC: 23 MMOL/L (ref 22–29)
CREAT SERPL-MCNC: 1.52 MG/DL (ref 0.76–1.27)
DEPRECATED RDW RBC AUTO: 45.1 FL (ref 37–54)
EGFRCR SERPLBLD CKD-EPI 2021: 57.2 ML/MIN/1.73
EOSINOPHIL # BLD AUTO: 0.4 10*3/MM3 (ref 0–0.4)
EOSINOPHIL NFR BLD AUTO: 4.1 % (ref 0.3–6.2)
ERYTHROCYTE [DISTWIDTH] IN BLOOD BY AUTOMATED COUNT: 13.8 % (ref 12.3–15.4)
GLUCOSE SERPL-MCNC: 106 MG/DL (ref 65–99)
HCT VFR BLD AUTO: 42.5 % (ref 37.5–51)
HGB BLD-MCNC: 14.8 G/DL (ref 13–17.7)
LYMPHOCYTES # BLD AUTO: 2.9 10*3/MM3 (ref 0.7–3.1)
LYMPHOCYTES NFR BLD AUTO: 33.6 % (ref 19.6–45.3)
MAGNESIUM SERPL-MCNC: 2 MG/DL (ref 1.6–2.6)
MCH RBC QN AUTO: 30.7 PG (ref 26.6–33)
MCHC RBC AUTO-ENTMCNC: 34.8 G/DL (ref 31.5–35.7)
MCV RBC AUTO: 88.3 FL (ref 79–97)
MONOCYTES # BLD AUTO: 1.1 10*3/MM3 (ref 0.1–0.9)
MONOCYTES NFR BLD AUTO: 12.5 % (ref 5–12)
NEUTROPHILS NFR BLD AUTO: 4.3 10*3/MM3 (ref 1.7–7)
NEUTROPHILS NFR BLD AUTO: 49.3 % (ref 42.7–76)
NRBC BLD AUTO-RTO: 0.1 /100 WBC (ref 0–0.2)
PLATELET # BLD AUTO: 217 10*3/MM3 (ref 140–450)
PMV BLD AUTO: 9.2 FL (ref 6–12)
POTASSIUM SERPL-SCNC: 4.1 MMOL/L (ref 3.5–5.2)
RBC # BLD AUTO: 4.82 10*6/MM3 (ref 4.14–5.8)
SODIUM SERPL-SCNC: 135 MMOL/L (ref 136–145)
WBC NRBC COR # BLD: 8.7 10*3/MM3 (ref 3.4–10.8)

## 2022-12-07 PROCEDURE — 96376 TX/PRO/DX INJ SAME DRUG ADON: CPT

## 2022-12-07 PROCEDURE — 80048 BASIC METABOLIC PNL TOTAL CA: CPT | Performed by: PHYSICIAN ASSISTANT

## 2022-12-07 PROCEDURE — 36415 COLL VENOUS BLD VENIPUNCTURE: CPT | Performed by: PHYSICIAN ASSISTANT

## 2022-12-07 PROCEDURE — G0378 HOSPITAL OBSERVATION PER HR: HCPCS

## 2022-12-07 PROCEDURE — 85025 COMPLETE CBC W/AUTO DIFF WBC: CPT | Performed by: PHYSICIAN ASSISTANT

## 2022-12-07 PROCEDURE — 83735 ASSAY OF MAGNESIUM: CPT | Performed by: PHYSICIAN ASSISTANT

## 2022-12-07 PROCEDURE — 25010000002 HYDRALAZINE PER 20 MG: Performed by: PHYSICIAN ASSISTANT

## 2022-12-07 RX ORDER — CARVEDILOL 6.25 MG/1
6.25 TABLET ORAL 2 TIMES DAILY WITH MEALS
Qty: 60 TABLET | Refills: 1 | Status: SHIPPED | OUTPATIENT
Start: 2022-12-07 | End: 2023-01-16 | Stop reason: SDUPTHER

## 2022-12-07 RX ORDER — CLONIDINE HYDROCHLORIDE 0.1 MG/1
0.1 TABLET ORAL 2 TIMES DAILY
Qty: 60 TABLET | Refills: 0 | Status: SHIPPED | OUTPATIENT
Start: 2022-12-07 | End: 2023-01-16

## 2022-12-07 RX ORDER — CLONIDINE HYDROCHLORIDE 0.1 MG/1
0.1 TABLET ORAL 2 TIMES DAILY PRN
Qty: 60 TABLET | Refills: 0 | Status: SHIPPED | OUTPATIENT
Start: 2022-12-09 | End: 2022-12-07 | Stop reason: SDUPTHER

## 2022-12-07 RX ORDER — AMLODIPINE BESYLATE 10 MG/1
10 TABLET ORAL
Qty: 30 TABLET | Refills: 1 | Status: SHIPPED | OUTPATIENT
Start: 2022-12-08 | End: 2023-01-16 | Stop reason: SDUPTHER

## 2022-12-07 RX ADMIN — HYDRALAZINE HYDROCHLORIDE 10 MG: 20 INJECTION INTRAMUSCULAR; INTRAVENOUS at 05:39

## 2022-12-07 RX ADMIN — AMLODIPINE BESYLATE 5 MG: 5 TABLET ORAL at 08:12

## 2022-12-07 RX ADMIN — CLONIDINE HYDROCHLORIDE 0.1 MG: 0.1 TABLET ORAL at 05:39

## 2022-12-07 RX ADMIN — ACETAMINOPHEN 650 MG: 325 TABLET, FILM COATED ORAL at 08:12

## 2022-12-07 RX ADMIN — Medication 10 ML: at 08:12

## 2022-12-07 NOTE — PLAN OF CARE
Problem: Adult Inpatient Plan of Care  Goal: Absence of Hospital-Acquired Illness or Injury  Intervention: Identify and Manage Fall Risk  Description: Perform standard risk assessment on admission using a validated tool or comprehensive approach appropriate to the patient; reassess fall risk frequently, with change in status or transfer to another level of care.  Communicate fall injury risk to interprofessional healthcare team.  Determine need for increased observation, equipment and environmental modification, such as low bed, signage and supportive, nonskid footwear.  Adjust safety measures to individual developmental age, stage and identified risk factors.  Reinforce the importance of safety and physical activity with patient and family.  Perform regular intentional rounding to assess need for position change, pain assessment and personal needs, including assistance with toileting.  Recent Flowsheet Documentation  Taken 12/7/2022 0200 by Jose Gamez LPN  Safety Promotion/Fall Prevention:   clutter free environment maintained   fall prevention program maintained   safety round/check completed   room organization consistent   nonskid shoes/slippers when out of bed   muscle strengthening facilitated   mobility aid in reach   lighting adjusted  Taken 12/7/2022 0020 by Jose Gamez LPN  Safety Promotion/Fall Prevention:   safety round/check completed   room organization consistent   nonskid shoes/slippers when out of bed   muscle strengthening facilitated   mobility aid in reach   lighting adjusted   assistive device/personal items within reach   clutter free environment maintained  Taken 12/6/2022 2200 by Jose Gamez LPN  Safety Promotion/Fall Prevention:   safety round/check completed   room organization consistent   nonskid shoes/slippers when out of bed   muscle strengthening facilitated   mobility aid in reach   lighting adjusted   clutter free environment maintained   assistive  device/personal items within reach  Taken 12/6/2022 2015 by Jose Gamez LPN  Safety Promotion/Fall Prevention:   safety round/check completed   room organization consistent   nonskid shoes/slippers when out of bed   mobility aid in reach   muscle strengthening facilitated   lighting adjusted   clutter free environment maintained   assistive device/personal items within reach  Intervention: Prevent Skin Injury  Description: Perform a screening for skin injury risk, such as pressure or moisture associated skin damage on admission and at regular intervals throughout hospital stay.  Keep all areas of skin (especially folds) clean and dry.  Maintain adequate skin hydration.  Relieve and redistribute pressure and protect bony prominences; implement measures based on patient-specific risk factors.  Match turning and repositioning schedule to clinical condition.  Encourage weight shift frequently; assist with reposition if unable to complete independently.  Float heels off bed; avoid pressure on the Achilles tendon.  Keep skin free from extended contact with medical devices.  Encourage functional activity and mobility, as early as tolerated.  Use aids (e.g., slide boards, mechanical lift) during transfer.  Recent Flowsheet Documentation  Taken 12/7/2022 0200 by Jose Gamez LPN  Body Position: position changed independently  Taken 12/7/2022 0020 by Jose Gamze LPN  Body Position: position changed independently  Taken 12/6/2022 2200 by Jose Gamez LPN  Body Position: position changed independently  Taken 12/6/2022 2015 by Jose Gamez LPN  Body Position: position changed independently  Intervention: Prevent and Manage VTE (Venous Thromboembolism) Risk  Description: Assess for VTE (venous thromboembolism) risk.  Encourage and assist with early ambulation.  Initiate and maintain compression or other therapy, as indicated, based on identified risk in accordance with organizational  protocol and provider order.  Encourage both active and passive leg exercises while in bed, if unable to ambulate.  Recent Flowsheet Documentation  Taken 12/7/2022 0200 by Jose Gamez LPN  Activity Management: activity encouraged  Taken 12/7/2022 0020 by Jose Gamez LPN  Activity Management: activity encouraged  Taken 12/6/2022 2200 by Jose Gamez LPN  Activity Management:   activity encouraged   up ad jaison  Taken 12/6/2022 2015 by Jose Gamez LPN  Activity Management:   activity encouraged   up ad jaison  Intervention: Prevent Infection  Description: Maintain skin and mucous membrane integrity; promote hand, oral and pulmonary hygiene.  Optimize fluid balance, nutrition, sleep and glycemic control to maximize infection resistance.  Identify potential sources of infection early to prevent or mitigate progression of infection (e.g., wound, lines, devices).  Evaluate ongoing need for invasive devices; remove promptly when no longer indicated.  Recent Flowsheet Documentation  Taken 12/7/2022 0200 by Jose Gamez LPN  Infection Prevention:   visitors restricted/screened   single patient room provided   rest/sleep promoted   hand hygiene promoted   personal protective equipment utilized  Taken 12/7/2022 0020 by Jose Gamez LPN  Infection Prevention:   visitors restricted/screened   single patient room provided  Taken 12/6/2022 2200 by Jose Gamez LPN  Infection Prevention:   visitors restricted/screened   single patient room provided   rest/sleep promoted   personal protective equipment utilized   hand hygiene promoted  Taken 12/6/2022 2015 by Jose Gamez LPN  Infection Prevention:   visitors restricted/screened   single patient room provided   rest/sleep promoted   personal protective equipment utilized   hand hygiene promoted  Goal: Optimal Comfort and Wellbeing  Intervention: Monitor Pain and Promote Comfort  Description: Assess pain level, treatment  efficacy and patient response at regular intervals using a consistent pain scale.  Consider the presence and impact of preexisting chronic pain.  Encourage patient and caregiver involvement in pain assessment, interventions and safety measures.  Recent Flowsheet Documentation  Taken 12/6/2022 2015 by Jose Gamez LPN  Pain Management Interventions:   see MAR   quiet environment facilitated   position adjusted     Problem: Asthma Comorbidity  Goal: Maintenance of Asthma Control  Intervention: Maintain Asthma Symptom Control  Description: Evaluate adherence to self-management (asthma action plan), such as medication, symptom-control, trigger-avoidance and self-monitoring.  Advocate for continuation of home regimen, including medication, method of delivery, schedule and symptom monitoring; acknowledge preferred modality and routine.  Minimize exposure to potential triggers, such as perfume, cleaning chemicals and all types of smoke.  Assess for proper use of inhaled medication and delivery technique; assist or reinstruct if needed.  Evaluate effectiveness of coping skills; encourage expression of feelings, expectations and concerns related to disease management and quality of life; reinforce education to enhance management plan and wellbeing.  Recent Flowsheet Documentation  Taken 12/6/2022 2200 by Jose Gamez LPN  Medication Review/Management: medications reviewed  Taken 12/6/2022 2015 by Jose Gamez LPN  Medication Review/Management: medications reviewed     Problem: Behavioral Health Comorbidity  Goal: Maintenance of Behavioral Health Symptom Control  Intervention: Maintain Behavioral Health Symptom Control  Description: Confirm mental health diagnosis and current treatment.  Evaluate adherence to previously identified self-management plan.  Advocate continuation of home strategies, including medication.  Evaluate effectiveness of self-management strategies and coping skills.  Communicate  with providers to ensure continuity and follow-up at transition.  Recent Flowsheet Documentation  Taken 12/6/2022 2200 by Jose Gamez LPN  Medication Review/Management: medications reviewed  Taken 12/6/2022 2015 by Jose Gamez LPN  Medication Review/Management: medications reviewed     Problem: COPD (Chronic Obstructive Pulmonary Disease) Comorbidity  Goal: Maintenance of COPD Symptom Control  Intervention: Maintain COPD-Symptom Control  Description: Evaluate adherence to management plan (e.g., medication, trigger avoidance, infection prevention, self-monitoring).  Advocate for continuation of home regimen, including medication, method of delivery, schedule and symptom monitoring.  Anticipate the need for breathing techniques and activity pacing to minimize fatigue and breathlessness.  Assess for proper use of inhaled medication and delivery technique; assist or reinstruct if needed.  Evaluate effectiveness of coping skills; encourage expression of feelings, expectations and concerns related to disease management and quality of life; reinforce education to enhance management plan and wellbeing.  Recent Flowsheet Documentation  Taken 12/6/2022 2200 by Jose Gamez LPN  Medication Review/Management: medications reviewed  Taken 12/6/2022 2015 by Jose Gamez LPN  Medication Review/Management: medications reviewed     Problem: Heart Failure Comorbidity  Goal: Maintenance of Heart Failure Symptom Control  Intervention: Maintain Heart Failure-Management  Description: Evaluate adherence to home heart failure self-care regimen (e.g., medication, fluid balance, sodium intake, daily weight, physical activity, telemonitoring, support).  Advocate continuation of home medication and schedule.  Consider pharmacologic therapy administration time and effects (e.g., avoid giving diuretic prior to bedtime or nitrates on empty stomach).  Monitor response to pharmacologic therapy, including weight  fluctuations, blood pressure and electrolyte levels.  Monitor for signs and symptoms of anxiety and depression, including severity and duration; if present, provide psychosocial support.  Consider need for heart failure clinic or palliative care consult.  Recent Flowsheet Documentation  Taken 12/6/2022 2200 by Jose Gamez LPN  Medication Review/Management: medications reviewed  Taken 12/6/2022 2015 by Jose Gamez LPN  Medication Review/Management: medications reviewed     Problem: Hypertension Comorbidity  Goal: Blood Pressure in Desired Range  Intervention: Maintain Blood Pressure Management  Description: Evaluate adherence to home antihypertensive regimen (e.g., exercise and activity, diet modification, medication).  Provide scheduled antihypertensive medication; consider administration time and effects (e.g., avoid giving diuretic prior to bedtime).  Monitor response to antihypertensive medication therapy (e.g., blood pressure, electrolyte levels, medication effects).  Minimize risk of orthostatic hypotension; encourage caution with position changes, particularly if elderly.  Recent Flowsheet Documentation  Taken 12/6/2022 2200 by Jose Gamez LPN  Medication Review/Management: medications reviewed  Taken 12/6/2022 2015 by Jose Gamez LPN  Medication Review/Management: medications reviewed     Problem: Osteoarthritis Comorbidity  Goal: Maintenance of Osteoarthritis Symptom Control  Intervention: Maintain Osteoarthritis Symptom Control  Description: Evaluate adherence to self-management plan, such as medication, exercise and weight management.  Advocate for continuation of home regimen, such as medication, physical activity and thermal agents; monitor response.  Encourage participation in functional activities, such as mobility and ADLs (activities of daily living) to minimize decline associated with inactivity.  Facilitate use of patient-specific assistive devices, equipment or  orthoses.  Evaluate effectiveness of coping skills; encourage expression of feelings, expectations and concerns related to disease management and quality of life; reinforce education to enhance management plan and wellbeing.  Recent Flowsheet Documentation  Taken 12/7/2022 0200 by Jose Gamez LPN  Activity Management: activity encouraged  Taken 12/7/2022 0020 by Jose Gamez LPN  Activity Management: activity encouraged  Taken 12/6/2022 2200 by Jose Gamez LPN  Activity Management:   activity encouraged   up ad jaison  Medication Review/Management: medications reviewed  Taken 12/6/2022 2015 by Jose Gamez LPN  Activity Management:   activity encouraged   up ad jaison  Medication Review/Management: medications reviewed     Problem: Pain Chronic (Persistent) (Comorbidity Management)  Goal: Acceptable Pain Control and Functional Ability  Intervention: Manage Persistent Pain  Description: Evaluate pain level, effect of treatment and patient response at regular intervals.  Minimize pain stimuli; coordinate care and adjust environment (e.g., light, noise, unnecessary movement); promote sleep/rest.  Match pharmacologic analgesia to severity and type of pain mechanism (e.g., neuropathic, muscle, inflammatory); consider multimodal approach (e.g., nonopioid, opioid, adjuvant).  Provide medication at regular intervals; titrate to patient response.  Manage breakthrough pain with additional doses; consider rotation or switching medication.  Monitor for signs of substance tolerance (increased dose to reach desired effect, decreased effect with same dose).  Avoid abrupt withdrawal of medication, especially agents capable of causing physical dependence.  Manage medication-induced effects, such as constipation, nausea, pruritus, urinary retention, somnolence and dizziness.  Provide multimodal treatment interventions, such as physical activity, therapeutic exercise, yoga, TENS (transcutaneous electrical  nerve stimulation) and manual therapy.  Train in functional activity modifications, such as body mechanics, posture, ergonomics, energy conservation and activity pacing.  Consider addition of complementary or alternative therapy, such as acupuncture, hypnosis or therapeutic touch.  Recent Flowsheet Documentation  Taken 12/6/2022 2200 by Jose Gamez LPN  Medication Review/Management: medications reviewed  Taken 12/6/2022 2015 by Jose Gamez LPN  Medication Review/Management: medications reviewed  Intervention: Develop Pain Management Plan  Description: Acknowledge patient as the expert in pain self-management.  Use a consistent, validated tool for pain assessment; include function and quality of life.  Evaluate risk for opioid use and dependence.  Set pain management goals; determine acceptable level of discomfort to allow for maximal functioning and quality of life.  Determine isqqaxqg-jbmoyq-[id number] pain management plan, including both pharmacologic and nonpharmacologic measures.  Identify and integrate past successful treatment measures, if able.  Encourage patient and caregiver involvement in pain assessment, interventions and safety measures.  Re-evaluate plan regularly.  Recent Flowsheet Documentation  Taken 12/6/2022 2015 by Jose Gamez LPN  Pain Management Interventions:   see MAR   quiet environment facilitated   position adjusted   Goal Outcome Evaluation:

## 2022-12-07 NOTE — DISCHARGE SUMMARY
[facility] Medicine Services  DISCHARGE SUMMARY    Patient Name: Scott Gaytan  : 1975  MRN: [id number]    Date of Admission: 2022  Discharge Diagnosis: HTN urgency  Date of Discharge:  22  Primary Care Physician: Sidra Hand DO      Presenting Problem:   Dizziness [R42]  Methamphetamine abuse (HCC) [F15.10]  Hypertension, unspecified type [I10]    Active and Resolved Hospital Problems:  Active Hospital Problems    Diagnosis POA   • **Dizziness [R42] Yes   • Hypertension [I10] Unknown   • Hypertensive emergency [I16.1] Yes   • Methamphetamine abuse (HCC) [F15.10] Yes   • Tobacco use [Z72.0] Yes      Resolved Hospital Problems   No resolved problems to display.      Hypertension urgency/dizziness  -Current /121 upon arrival  -Patient given hydralazine and clonidine in the emergency department  -EKG Sinus rhythm. Probable left atrial enlargement. Left ventricular hypertrophy.Anterior Q waves, possibly due to LVH  -ECHO 22 EF=50%  -Echo, pending  -Troponin <0.010  -Continuous cardiac monitoring  -Hydralazine and labetalol as needed  -Will add amlodipine  -Consider cardiology consult after work-up complete        Substance abuse, methamphetamine/Tobacco abuse  -UA drug screen, pending  -Encourage cessation  -Will add withdrawal protocol     CHFrEF  -ECHO 22 EF=50%  -probnp  -troponin <0.010  -Monitor daily weight, I's and O's  -Chest x-ray     Elevated glucose  -Check A1c       Hospital Course     History of Present Illness: Scott Gaytan is a 47 y.o. male with a past medical history to include hypertension, methamphetamine abuse, tobacco abuse, congestive heart failure who presented to [facility] on 2022 complaining of dizziness.  He reports he had 2 episodes this morning 1 at home and 1 while at the gas station where he felt dizzy.  He reports history of hypertension but does not take any medications for it.  He also reports he  snorted methamphetamine yesterday.  He states that over the last week he is kind of been feeling off and has been taking his blood pressure at home with systolic readings over 200.  He denies having any syncopal episodes.  He denies any chest pains or palpitations.  He denies any focal neurologic symptoms.  He denies any visual changes.  He states occasionally he will have blurred vision but is unsure if this is related to his high blood pressure.  He denies having any nausea or vomiting.  He denies any fever or chills.  He denies having any abdominal pain, constipation or diarrhea.  Denies lower extremity symptoms.  He states that he was seen in the past for high blood pressure but has not followed up.  We have been asked to admit this patient for further work-up and evaluation.     Emergency department work-up afebrile.  Elevated BP.  98% on room air.  Glucose 111.  Creatinine 1.21.  Potassium 3.8.  Troponin <0.010.  White blood count 7.8.  Hemoglobin 13.7.  Platelets 203.      EKG Sinus rhythm. Probable left atrial enlargement. Left ventricular hypertrophy.Anterior Q waves, possibly due to LVH     In the emergency department he received hydralazine and clonidine.    DISCHARGE Follow Up Recommendations for labs and diagnostics: follow with pcp in one week  Follow with renal in one week      Reasons For Change In Medications and Indications for New Medications:  Coreg  Amlodipine  cloninine     Day of Discharge     Vital Signs:  Temp:  [97.4 °F (36.3 °C)-98.7 °F (37.1 °C)] 97.9 °F (36.6 °C)  Heart Rate:  [68-95] 95  Resp:  [14-20] 18  BP: (125-176)/() 151/97      Physical Exam   Constitutional:       General: He is not in acute distress.  HENT:      Head: Normocephalic and atraumatic.   Cardiovascular:      Rate and Rhythm: Normal rate and regular rhythm.      Pulses: Normal pulses.      Heart sounds: Normal heart sounds.   Pulmonary:      Effort: Pulmonary effort is normal. No respiratory distress.    Abdominal:      General: Bowel sounds are normal.      Palpations: Abdomen is soft.      Tenderness: There is no abdominal tenderness.   Musculoskeletal:         General: Normal range of motion.      Cervical back: Normal range of motion and neck supple.   Skin:     General: Skin is warm and dry.   Neurological:      General: No focal deficit present.      Mental Status: He is alert and oriented to person, place, and time.   Psychiatric:         Mood and Affect: Mood normal.       Pertinent  and/or Most Recent Results     LAB RESULTS:      Lab 12/07/22  0434 12/06/22 0452   WBC 8.70 7.80   HEMOGLOBIN 14.8 13.7   HEMATOCRIT 42.5 41.1   PLATELETS 217 203   NEUTROS ABS 4.30 4.60   LYMPHS ABS 2.90 1.80   MONOS ABS 1.10* 1.10*   EOS ABS 0.40 0.20   MCV 88.3 90.0         Lab 12/07/22  0434 12/06/22 0452   SODIUM 135* 137   POTASSIUM 4.1 3.8   CHLORIDE 102 104   CO2 23.0 20.0*   ANION GAP 10.0 13.0   BUN 21* 20   CREATININE 1.52* 1.21   EGFR 57.2* 75.2   GLUCOSE 106* 111*   CALCIUM 8.6 8.6   MAGNESIUM 2.0  --    HEMOGLOBIN A1C  --  5.0   TSH  --  0.832             Lab 12/06/22 0452   TROPONIN T <0.010                 Brief Urine Lab Results  (Last result in the past 365 days)      Color   Clarity   Blood   Leuk Est   Nitrite   Protein   CREAT   Urine HCG        12/06/22 0817 Yellow   Clear   Negative   Negative   Negative   100 mg/dL (2+)               Microbiology Results (last 10 days)     ** No results found for the last 240 hours. **          XR Chest PA & Lateral    Result Date: 12/6/2022  Impression: No acute cardiopulmonary abnormality.  Electronically Signed By-Philippe Islas MD On:12/6/2022 10:03 AM This report was finalized on [id number] by  Philippe Islas MD.              Results for orders placed during the hospital encounter of 12/06/22    Adult Transthoracic Echo Complete w/ Color, Spectral and Contrast if Necessary Per Protocol    Interpretation Summary  •  Left ventricular systolic function is  normal. Left ventricular ejection fraction appears to be 61 - 65%.  •  Left ventricular diastolic function was normal.  •  Estimated right ventricular systolic pressure from tricuspid regurgitation is normal (<35 mmHg).      Labs Pending at Discharge:      Procedures Performed           Consults:   Consults     Date and Time Order Name Status Description    12/6/2022  1:02 PM Inpatient Psychiatrist Consult Completed     12/6/2022  6:50 AM Inpatient Hospitalist Consult              Discharge Details        Discharge Medications      New Medications      Instructions Start Date   amLODIPine 5 MG tablet  Commonly known as: NORVASC   10 mg, Oral, Every 24 Hours Scheduled   Start Date: December 8, 2022     carvedilol 6.25 MG tablet  Commonly known as: Coreg   6.25 mg, Oral, 2 Times Daily With Meals      cloNIDine 0.1 MG tablet  Commonly known as: CATAPRES   0.1 mg, Oral, 2 Times Daily PRN   Start Date: December 9, 2022            No Known Allergies      Discharge Disposition:   Home or Self Care    Diet:  Hospital:  Diet Order   Procedures   • Diet: Cardiac Diets; Healthy Heart (2-3 Na+); Texture: Regular Texture (IDDSI 7); Fluid Consistency: Thin (IDDSI 0)         Discharge Activity:   Activity Instructions     Gradually Increase Activity Until at Pre-Hospitalization Level              CODE STATUS:  Code Status and Medical Interventions:   Ordered at: 12/06/22 0904     Level Of Support Discussed With:    Patient     Code Status (Patient has no pulse and is not breathing):    CPR (Attempt to Resuscitate)     Medical Interventions (Patient has pulse or is breathing):    Full Support         No future appointments.    Additional Instructions for the Follow-ups that You Need to Schedule     Discharge Follow-up with PCP   As directed       Currently Documented PCP:    Sidra Hand DO    PCP Phone Number:    [phone]     Follow Up Details: 1 week         Discharge Follow-up with Specified Provider: nephrology; 1 Week    As directed      To: nephrology    Follow Up: 1 Week               Time spent on Discharge including face to face service:  35 minutes    This patient has been examined wearing appropriate Personal Protective Equipment and discussed with rn. 12/07/22      Signature: Electronically signed by Orlando Salmeron MD, 12/07/22, 8:32 AM EST.

## 2022-12-07 NOTE — CASE MANAGEMENT/SOCIAL WORK
Social Work Assessment   Bhavik     Patient Name: Scott Gaytan  MRN: [id number]  Today's Date: 12/7/2022    Admit Date: 12/6/2022     Substance Abuse     Row Name 12/07/22 1414       Substance Use    Substance Use Status current street drug/inhalant/medication abuse    Substance Use Comment SW was consulted to provide CD resources. SW attempted to meet with pt at 1054, and pt was already d/c’d, room stripped and EVS finishing mopping. Per psych note, pt declined substance use treatment.              No physical contact with patient on this date.    EMERSON Aburto, [facility]  Medical Social Worker  Ph [phone]  Fax [phone]  [email or web address]

## 2022-12-08 ENCOUNTER — TRANSITIONAL CARE MANAGEMENT TELEPHONE ENCOUNTER (OUTPATIENT)
Dept: CALL CENTER | Facility: HOSPITAL | Age: 47
End: 2022-12-08

## 2022-12-08 NOTE — OUTREACH NOTE
Call Center TCM Note    Flowsheet Row Responses   [facility] patient discharged from? [facility]   Does the patient have one of the following disease processes/diagnoses(primary or secondary)? Other   TCM attempt successful? Yes   Call start time 1409   Call end time 1411   Discharge diagnosis Dizziness, hypertension   Meds reviewed with patient/caregiver? Yes   Is the patient having any side effects they believe may be caused by any medication additions or changes? No   Does the patient have all medications ordered at discharge? Yes   Is the patient taking all medications as directed (includes completed medication regime)? Yes   Does the patient have an appointment with their PCP within 7 days of discharge? No   Nursing Interventions Assisted patient with making appointment per protocol   Has home health visited the patient within 72 hours of discharge? N/A   Psychosocial issues? Yes   Psychosocial comments METHAMPHETAMINE ABUSE. PATIENT WAS SEEN BY DR. DAIGLE   Did the patient receive a copy of their discharge instructions? Yes   Nursing interventions Reviewed instructions with patient   What is the patient's perception of their health status since discharge? Improving   Is the patient/caregiver able to teach back signs and symptoms related to disease process for when to call PCP? Yes   Is the patient/caregiver able to teach back signs and symptoms related to disease process for when to call 911? Yes   Is the patient/caregiver able to teach back the hierarchy of who to call/visit for symptoms/problems? PCP, Specialist, Home health nurse, Urgent Care, ED, 911 Yes   If the patient is a current smoker, are they able to teach back resources for cessation? [id number]   TCM call completed? Yes   Call end time 1411   Would this patient benefit from a Referral to Amb Social Work? No   Is the patient interested in additional calls from an ambulatory ?  NOTE:  applies to high risk patients requiring  additional follow-up. Yeni Nascimento LPN    12/8/2022, 14:12 EST

## 2022-12-08 NOTE — OUTREACH NOTE
Prep Survey    Flowsheet Row Responses   [facility] patient discharged from? [facility]   Is LACE score < 7 ? Yes   Emergency Room discharge w/ pulse ox? No   Eligibility [facility]   Date of Admission 12/06/22   Date of Discharge 12/07/22   Discharge Disposition Home or Self Care   Discharge diagnosis Dizziness, hypertension   Does the patient have one of the following disease processes/diagnoses(primary or secondary)? Other   Does the patient have Home health ordered? No   Is there a DME ordered? No   Prep survey completed? Yes          CASSY FLYNN - Registered Nurse

## 2022-12-13 LAB — QT INTERVAL: 376 MS

## 2023-01-16 ENCOUNTER — OFFICE VISIT (OUTPATIENT)
Dept: FAMILY MEDICINE CLINIC | Facility: CLINIC | Age: 48
End: 2023-01-16
Payer: MEDICAID

## 2023-01-16 VITALS
HEART RATE: 82 BPM | HEIGHT: 68 IN | OXYGEN SATURATION: 100 % | RESPIRATION RATE: 18 BRPM | SYSTOLIC BLOOD PRESSURE: 211 MMHG | DIASTOLIC BLOOD PRESSURE: 144 MMHG | BODY MASS INDEX: 25.76 KG/M2 | TEMPERATURE: 98.2 F | WEIGHT: 170 LBS

## 2023-01-16 DIAGNOSIS — Z72.0 TOBACCO USE: Chronic | ICD-10-CM

## 2023-01-16 DIAGNOSIS — R06.02 EXERTIONAL SHORTNESS OF BREATH: ICD-10-CM

## 2023-01-16 DIAGNOSIS — F19.11 HISTORY OF SUBSTANCE ABUSE: ICD-10-CM

## 2023-01-16 DIAGNOSIS — N28.9 RENAL INSUFFICIENCY: ICD-10-CM

## 2023-01-16 DIAGNOSIS — I16.1 HYPERTENSIVE EMERGENCY: Primary | ICD-10-CM

## 2023-01-16 PROBLEM — B34.8 RHINOVIRUS: Status: RESOLVED | Noted: 2021-12-28 | Resolved: 2023-01-16

## 2023-01-16 PROCEDURE — 99214 OFFICE O/P EST MOD 30 MIN: CPT | Performed by: FAMILY MEDICINE

## 2023-01-16 RX ORDER — HYDRALAZINE HYDROCHLORIDE 10 MG/1
10 TABLET, FILM COATED ORAL 3 TIMES DAILY
Qty: 90 TABLET | Refills: 1 | Status: SHIPPED | OUTPATIENT
Start: 2023-01-16 | End: 2023-03-14 | Stop reason: SDUPTHER

## 2023-01-16 RX ORDER — CARVEDILOL 6.25 MG/1
6.25 TABLET ORAL 2 TIMES DAILY WITH MEALS
Qty: 60 TABLET | Refills: 1 | Status: SHIPPED | OUTPATIENT
Start: 2023-01-16 | End: 2023-03-14 | Stop reason: SDUPTHER

## 2023-01-16 RX ORDER — UMECLIDINIUM BROMIDE AND VILANTEROL TRIFENATATE 62.5; 25 UG/1; UG/1
1 POWDER RESPIRATORY (INHALATION)
Qty: 60 EACH | Refills: 1 | Status: SHIPPED | OUTPATIENT
Start: 2023-01-16 | End: 2023-04-03 | Stop reason: SDUPTHER

## 2023-01-16 RX ORDER — AMLODIPINE BESYLATE 10 MG/1
10 TABLET ORAL
Qty: 30 TABLET | Refills: 1 | Status: SHIPPED | OUTPATIENT
Start: 2023-01-16 | End: 2023-03-14 | Stop reason: SDUPTHER

## 2023-01-16 NOTE — PROGRESS NOTES
Subjective   Scott Gaytan is a 47 y.o. male.     Chief Complaint   Patient presents with   • Establish Care   • Hypertension     Patient hasn't been taking the BP medications he was given because it make him feel drained.    • Transitional Care Management     Hospital follow up on Dizziness,meth use,HTN.         Current Outpatient Medications:   •  amLODIPine (NORVASC) 10 MG tablet, Take 1 tablet by mouth Daily., Disp: 30 tablet, Rfl: 1  •  carvedilol (Coreg) 6.25 MG tablet, Take 1 tablet by mouth 2 (Two) Times a Day With Meals., Disp: 60 tablet, Rfl: 1  •  hydrALAZINE (APRESOLINE) 10 MG tablet, Take 1 tablet by mouth 3 (Three) Times a Day., Disp: 90 tablet, Rfl: 1  •  Umeclidinium-Vilanterol (Anoro Ellipta) 62.5-25 MCG/ACT aerosol powder  inhaler, Inhale 1 puff Daily., Disp: 60 each, Rfl: 1    Past Medical History:   Diagnosis Date   • Hypertension    • Substance abuse Hx     Meth/ MJ----QUIT 2022       Past Surgical History:   Procedure Laterality Date   • CARDIAC CATHETERIZATION N/A 04/27/2022   • ORIF TIBIA/FIBULA FRACTURES Right        Family History   Problem Relation Age of Onset   • Diabetes Mother    • Arthritis Mother    • Hypertension Father    • Heart attack Father    • Seizures Sister    • Heart attack Paternal Grandfather        Social History     Socioeconomic History   • Marital status: Single   Tobacco Use   • Smoking status: Every Day     Packs/day: 1.00     Years: 15.00     Pack years: 15.00     Types: Cigarettes     Start date: 1994     Passive exposure: Never   • Smokeless tobacco: Never   Vaping Use   • Vaping Use: Never used   Substance and Sexual Activity   • Alcohol use: Not Currently   • Drug use: Not Currently     Types: Methamphetamines, Marijuana     Comment: quit 2022   • Sexual activity: Defer       History of Present Illness  46 y/o C male here for f/u from hosp stay in Dec for HTN Emergency    Pt states he was admitted in April for SOB and diag w/ CHF/ HTN.........Pt got tx at the  hosp then but noticed his BP was high again in Dec and went to ER for eval  PD seen by cardio and his BP meds were changed but pt states they are too sedating so he stopped them all about 2 weeks ago....... 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 but his BP is very bad  Pt states his dad  in his 40's from heart/ HTN;     pt also smokes but trying to cut back---pt admits he has some exertional SOB and open to trying an inhaler         The following portions of the patient's history were reviewed and updated as appropriate: allergies, current medications, past family history, past medical history, past social history, past surgical history and problem list.    Review of Systems   Constitutional: Negative for activity change, appetite change, fatigue, unexpected weight gain and unexpected weight loss.   Respiratory: Positive for shortness of breath. Negative for cough, chest tightness and wheezing.    Cardiovascular: Negative for chest pain, palpitations and leg swelling.   Genitourinary: Negative for frequency, urgency and urinary incontinence.   Musculoskeletal: Negative for arthralgias and myalgias.   Neurological: Negative for dizziness, facial asymmetry, speech difficulty, weakness, light-headedness, headache, memory problem and confusion.   Psychiatric/Behavioral: Positive for stress.       Vitals:    23 1039   BP: (!) 211/144   Pulse: 82   Resp: 18   Temp: 98.2 °F (36.8 °C)   SpO2: 100%       Objective   Physical Exam  Vitals and nursing note reviewed.   Constitutional:       General: He is not in acute distress.     Appearance: He is well-developed. He is not ill-appearing or toxic-appearing.   HENT:      Head: Normocephalic and atraumatic.   Cardiovascular:      Rate and Rhythm: Normal rate and regular rhythm.      Heart sounds: Normal heart sounds. No murmur  heard.  Pulmonary:      Effort: Pulmonary effort is normal. No respiratory distress.      Breath sounds: Normal breath sounds. No stridor. No wheezing, rhonchi or rales.   Musculoskeletal:      Right lower leg: No edema.      Left lower leg: No edema.   Skin:     General: Skin is warm and dry.      Findings: No rash.   Neurological:      Mental Status: He is alert and oriented to person, place, and time.      Cranial Nerves: No cranial nerve deficit.      Gait: Gait normal.   Psychiatric:         Attention and Perception: Attention and perception normal.         Mood and Affect: Mood and affect normal.         Speech: Speech normal.         Behavior: Behavior normal. Behavior is cooperative.         Thought Content: Thought content normal.         Cognition and Memory: Cognition and memory normal.         Judgment: Judgment normal.           Assessment & Plan   Diagnoses and all orders for this visit:    1. Hypertensive emergency (Primary)  -     Blood Pressure Device    2. Renal insufficiency    3. Exertional shortness of breath    4. Tobacco use    5. History of substance abuse     Other orders  -     hydrALAZINE (APRESOLINE) 10 MG tablet; Take 1 tablet by mouth 3 (Three) Times a Day.  Dispense: 90 tablet; Refill: 1  -     carvedilol (Coreg) 6.25 MG tablet; Take 1 tablet by mouth 2 (Two) Times a Day With Meals.  Dispense: 60 tablet; Refill: 1  -     amLODIPine (NORVASC) 10 MG tablet; Take 1 tablet by mouth Daily.  Dispense: 30 tablet; Refill: 1  -     Umeclidinium-Vilanterol (Anoro Ellipta) 62.5-25 MCG/ACT aerosol powder  inhaler; Inhale 1 puff Daily.  Dispense: 60 each; Refill: 1    Retry Rx--- Norvasc/ Coreg  Start Rx----Hydralazine 10mg TID  Rx---Anoro 1 puff qday  RTC 6weeks

## 2023-01-20 ENCOUNTER — PATIENT ROUNDING (BHMG ONLY) (OUTPATIENT)
Dept: FAMILY MEDICINE CLINIC | Facility: CLINIC | Age: 48
End: 2023-01-20
Payer: MEDICAID

## 2023-03-14 RX ORDER — HYDRALAZINE HYDROCHLORIDE 10 MG/1
10 TABLET, FILM COATED ORAL 3 TIMES DAILY
Qty: 90 TABLET | Refills: 1 | Status: SHIPPED | OUTPATIENT
Start: 2023-03-14

## 2023-03-14 RX ORDER — CARVEDILOL 6.25 MG/1
6.25 TABLET ORAL 2 TIMES DAILY WITH MEALS
Qty: 60 TABLET | Refills: 2 | Status: SHIPPED | OUTPATIENT
Start: 2023-03-14 | End: 2023-04-03 | Stop reason: SDUPTHER

## 2023-03-14 RX ORDER — AMLODIPINE BESYLATE 10 MG/1
10 TABLET ORAL
Qty: 90 TABLET | Refills: 1 | Status: SHIPPED | OUTPATIENT
Start: 2023-03-14 | End: 2023-04-03

## 2023-03-14 NOTE — TELEPHONE ENCOUNTER
Caller: Scott Gaytan    Relationship: Self    Best call back number: [phone]    Requested Prescriptions:   Requested Prescriptions     Pending Prescriptions Disp Refills   • hydrALAZINE (APRESOLINE) 10 MG tablet 90 tablet 1     Sig: Take 1 tablet by mouth 3 (Three) Times a Day.   • amLODIPine (NORVASC) 10 MG tablet 30 tablet 1     Sig: Take 1 tablet by mouth Daily.   • carvedilol (Coreg) 6.25 MG tablet 60 tablet 1     Sig: Take 1 tablet by mouth 2 (Two) Times a Day With Meals.        Pharmacy where request should be sent: [facility]ACY #[id number] [facility] [place] [place] [address] [phone] [facility] [phone] FX     Additional details provided by patient: PATIENT IS COMPLETELY OUT     Does the patient have less than a 3 day supply:  [x] Yes  [] No    Would you like a call back once the refill request has been completed: [x] Yes [] No    If the office needs to give you a call back, can they leave a voicemail: [x] Yes [] No    Yasmani Frausto Rep   03/14/23 09:13 EDT

## 2023-04-03 ENCOUNTER — OFFICE VISIT (OUTPATIENT)
Dept: FAMILY MEDICINE CLINIC | Facility: CLINIC | Age: 48
End: 2023-04-03
Payer: MEDICAID

## 2023-04-03 VITALS
SYSTOLIC BLOOD PRESSURE: 165 MMHG | HEART RATE: 78 BPM | HEIGHT: 68 IN | TEMPERATURE: 97.9 F | OXYGEN SATURATION: 97 % | DIASTOLIC BLOOD PRESSURE: 99 MMHG | WEIGHT: 182.8 LBS | BODY MASS INDEX: 27.71 KG/M2

## 2023-04-03 DIAGNOSIS — Z13.220 ENCOUNTER FOR SCREENING FOR LIPID DISORDER: ICD-10-CM

## 2023-04-03 DIAGNOSIS — Z71.6 ENCOUNTER FOR TOBACCO USE CESSATION COUNSELING: ICD-10-CM

## 2023-04-03 DIAGNOSIS — N28.9 RENAL INSUFFICIENCY: ICD-10-CM

## 2023-04-03 DIAGNOSIS — I10 ELEVATED BLOOD PRESSURE READING IN OFFICE WITH DIAGNOSIS OF HYPERTENSION: Primary | ICD-10-CM

## 2023-04-03 PROCEDURE — 99213 OFFICE O/P EST LOW 20 MIN: CPT | Performed by: FAMILY MEDICINE

## 2023-04-03 PROCEDURE — 3077F SYST BP >= 140 MM HG: CPT | Performed by: FAMILY MEDICINE

## 2023-04-03 PROCEDURE — 3080F DIAST BP >= 90 MM HG: CPT | Performed by: FAMILY MEDICINE

## 2023-04-03 RX ORDER — VARENICLINE TARTRATE 1 MG/1
TABLET, FILM COATED ORAL
Qty: 56 TABLET | Refills: 1 | Status: SHIPPED | OUTPATIENT
Start: 2023-04-03

## 2023-04-03 RX ORDER — CARVEDILOL 6.25 MG/1
6.25 TABLET ORAL 2 TIMES DAILY WITH MEALS
Qty: 180 TABLET | Refills: 0 | Status: SHIPPED | OUTPATIENT
Start: 2023-04-03

## 2023-04-03 RX ORDER — ALBUTEROL SULFATE 90 UG/1
2 AEROSOL, METERED RESPIRATORY (INHALATION) EVERY 6 HOURS PRN
Qty: 18 G | Refills: 0 | Status: SHIPPED | OUTPATIENT
Start: 2023-04-03

## 2023-04-03 RX ORDER — UMECLIDINIUM BROMIDE AND VILANTEROL TRIFENATATE 62.5; 25 UG/1; UG/1
1 POWDER RESPIRATORY (INHALATION)
Qty: 60 EACH | Refills: 3 | Status: SHIPPED | OUTPATIENT
Start: 2023-04-03

## 2023-04-03 NOTE — PROGRESS NOTES
Subjective   Scott Gaytan is a 48 y.o. male.     Chief Complaint   Patient presents with   • Hypertension     6 week follow up         Current Outpatient Medications:   •  carvedilol (Coreg) 6.25 MG tablet, Take 1 tablet by mouth 2 (Two) Times a Day With Meals., Disp: 180 tablet, Rfl: 0  •  hydrALAZINE (APRESOLINE) 10 MG tablet, Take 1 tablet by mouth 3 (Three) Times a Day., Disp: 90 tablet, Rfl: 1  •  Umeclidinium-Vilanterol (Anoro Ellipta) 62.5-25 MCG/ACT aerosol powder  inhaler, Inhale 1 puff Daily., Disp: 60 each, Rfl: 3  •  albuterol sulfate  (90 Base) MCG/ACT inhaler, Inhale 2 puffs Every 6 (Six) Hours As Needed for Wheezing or Shortness of Air., Disp: 18 g, Rfl: 0  •  varenicline (Chantix Continuing Month Luis) 1 MG tablet, 1/2 tab po qhs x 3 days then 1/2 tab po bid x 3 days then 1 po bid, Disp: 56 tablet, Rfl: 1  •  verapamil SR (CALAN-SR) 180 MG CR tablet, Take 1 tablet by mouth Every Night., Disp: 30 tablet, Rfl: 1    Past Medical History:   Diagnosis Date   • Hypertension    • Substance abuse Hx     Meth/ MJ----QUIT 2022       Past Surgical History:   Procedure Laterality Date   • CARDIAC CATHETERIZATION N/A 04/27/2022   • ORIF TIBIA/FIBULA FRACTURES Right        Family History   Problem Relation Age of Onset   • Diabetes Mother    • Arthritis Mother    • Hypertension Father    • Heart attack Father    • Seizures Sister    • Heart attack Paternal Grandfather        Social History     Socioeconomic History   • Marital status: Single   Tobacco Use   • Smoking status: Every Day     Packs/day: 1.00     Years: 15.00     Pack years: 15.00     Types: Cigarettes     Start date: 1994     Passive exposure: Never   • Smokeless tobacco: Never   Vaping Use   • Vaping Use: Never used   Substance and Sexual Activity   • Alcohol use: Not Currently   • Drug use: Not Currently     Types: Methamphetamines, Marijuana     Comment: quit 2022   • Sexual activity: Defer       History of Present Illness  49 y/o C male  here for f/u on HTN    Pt is tolerating his current meds and doses- for his BP but still high-----  Home BP=160/110    He takes amlodipine 10, carvedilol 625 twice a day and hydralazine 10 three times a day    He checks his blood pressure at home and the last check was 160/110 mmHg a week. He still smokes.  He has not seen cardiology.  He is also experiencing bilateral leg edema.    The patient tries not to drink a lot of caffeine. He smokes 1 pack a day. He quit drinking 7 years ago.     The following portions of the patient's history were reviewed and updated as appropriate: allergies, current medications, past family history, past medical history, past social history, past surgical history and problem list.    Review of Systems    Vitals:    04/03/23 1328   BP: 165/99   Pulse: 78   Temp: 97.9 °F (36.6 °C)   SpO2: 97%       Objective   Physical Exam  Vitals and nursing note reviewed.   Constitutional:       General: He is not in acute distress.     Appearance: He is well-developed. He is not ill-appearing or toxic-appearing.   HENT:      Head: Normocephalic and atraumatic.   Cardiovascular:      Rate and Rhythm: Normal rate and regular rhythm.      Heart sounds: Normal heart sounds. No murmur heard.  Pulmonary:      Effort: Pulmonary effort is normal. No respiratory distress.      Breath sounds: No stridor. Examination of the right-middle field reveals wheezing. Decreased breath sounds and wheezing present. No rhonchi or rales.   Skin:     General: Skin is warm and dry.      Findings: No rash.   Neurological:      Mental Status: He is alert and oriented to person, place, and time.      Cranial Nerves: No cranial nerve deficit.   Psychiatric:         Mood and Affect: Mood normal.         Behavior: Behavior normal.         Thought Content: Thought content normal.         Judgment: Judgment normal.           Assessment & Plan   Diagnoses and all orders for this visit:    1. Elevated blood pressure reading in office  with diagnosis of hypertension (Primary)  -     Comprehensive Metabolic Panel; Future    2. Encounter for tobacco use cessation counseling    3. Renal insufficiency    4. Encounter for screening for lipid disorder  -     Lipid Panel; Future    Other orders  -     verapamil SR (CALAN-SR) 180 MG CR tablet; Take 1 tablet by mouth Every Night.  Dispense: 30 tablet; Refill: 1  -     carvedilol (Coreg) 6.25 MG tablet; Take 1 tablet by mouth 2 (Two) Times a Day With Meals.  Dispense: 180 tablet; Refill: 0  -     albuterol sulfate  (90 Base) MCG/ACT inhaler; Inhale 2 puffs Every 6 (Six) Hours As Needed for Wheezing or Shortness of Air.  Dispense: 18 g; Refill: 0  -     varenicline (Chantix Continuing Month Luis) 1 MG tablet; 1/2 tab po qhs x 3 days then 1/2 tab po bid x 3 days then 1 po bid  Dispense: 56 tablet; Refill: 1  -     Umeclidinium-Vilanterol (Anoro Ellipta) 62.5-25 MCG/ACT aerosol powder  inhaler; Inhale 1 puff Daily.  Dispense: 60 each; Refill: 3      - Rx--- Chantix and disc'd w/ pt  - Fasting Blood work to be done soon  - Medications prescribed, reviewed and sent off.           Transcribed from ambient dictation for Sidra Hand DO by Chayo Lcuas.  04/03/23   15:02 EDT    Patient or patient representative verbalized consent to the visit recording.  I have personally performed the services described in this document as transcribed by the above individual, and it is both accurate and complete.

## 2023-04-13 ENCOUNTER — TELEPHONE (OUTPATIENT)
Dept: FAMILY MEDICINE CLINIC | Facility: CLINIC | Age: 48
End: 2023-04-13

## 2023-04-13 NOTE — TELEPHONE ENCOUNTER
Caller: Scott Gaytan    Relationship: Self    Best call back number: [phone]    What medications are you currently taking:   Current Outpatient Medications on File Prior to Visit   Medication Sig Dispense Refill   • albuterol sulfate  (90 Base) MCG/ACT inhaler Inhale 2 puffs Every 6 (Six) Hours As Needed for Wheezing or Shortness of Air. 18 g 0   • carvedilol (Coreg) 6.25 MG tablet Take 1 tablet by mouth 2 (Two) Times a Day With Meals. 180 tablet 0   • hydrALAZINE (APRESOLINE) 10 MG tablet Take 1 tablet by mouth 3 (Three) Times a Day. 90 tablet 1   • Umeclidinium-Vilanterol (Anoro Ellipta) 62.5-25 MCG/ACT aerosol powder  inhaler Inhale 1 puff Daily. 60 each 3   • varenicline (Chantix Continuing Month Luis) 1 MG tablet 1/2 tab po qhs x 3 days then 1/2 tab po bid x 3 days then 1 po bid 56 tablet 1   • verapamil SR (CALAN-SR) 180 MG CR tablet Take 1 tablet by mouth Every Night. 30 tablet 1     No current facility-administered medications on file prior to visit.      When did you start taking these medications: N/A    Which medication are you concerned about: PT CALLED TO REPORT THAT HE WAS EXPERIENCING LEG AND ANKLE SWELLING FROM A NEW MEDICATION THAT PCP PRESCRIBED AND WHEN I ASKED THE MEDICATION NAME HE SAID HE COULDN'T REMEMBER AND IMMEDIATELY  STARTING CURSING AT ME AND HUNG UP BEFORE I COULD EVEN RESPOND.     Who prescribed you this medication: PCP    What are your concerns: N/A    How long have you had these concerns: N/A

## 2023-04-13 NOTE — TELEPHONE ENCOUNTER
Received a direct call from patient.  When I answered phone patient immediately starts telling me that he was recently seen in our office and used explicit language expressing his dissatisfaction with our office.  Patient hung up immediately before I ever had a chance to respond.

## 2023-04-13 NOTE — TELEPHONE ENCOUNTER
There is another note regarding this patient.  Pt's sister called stating that the pt was having difficulty calling the office.    Sp w/pt sister Betsy.  Verified betsy is on  Verbal.  Informed her that pt has not had trouble getting throug to office and that he has spoken to 2 people, cursed at them both (using f-words multiple times) and hung up on them both.  I told her that pt may be dismissed do to this.  We cannot tolerate patients cursing out staff members. She v/u.     I advised her to inform the pt that he needed to go to the ER for his medical issue.  She stated she will notify pt, and apologized for his behavior.

## 2023-04-13 NOTE — TELEPHONE ENCOUNTER
Caller: KARMA JASSO     Relationship: SISTER    Best call back number: [phone]    What is your medical concern?   BOTH LEGS SWOLLEN  INTERMITTENT STOMACH PAIN    How long has this issue been going on? 1 WEEK    Is your provider already aware of this issue? NO    Have you been treated for this issue? NO    PHARMACY: [facility]/pharmacy #[id number] - [place], [place] - [address] [facility] - [phone] [facility] [phone] FX    PATIENT IS HAVING DIFFICULTY CALLING THE OFFICE.

## 2023-04-13 NOTE — TELEPHONE ENCOUNTER
Sp w/pt sister Betsy.  Verified betsy is on  Verbal.  Informed her that pt has not had trouble getting throug to office and that he has spoken to 2 people, cursed at them both (using f-words multiple times) and hung up on them both.  I told her that pt may be dismissed do to this.  We cannot tolerate patients cursing out staff members. She v/u.    I advised her to inform the pt that he needed to go to the ER for his medical issue.  She stated she will notify pt, and apologized for his behavior.

## 2023-07-16 ENCOUNTER — APPOINTMENT (OUTPATIENT)
Dept: GENERAL RADIOLOGY | Facility: HOSPITAL | Age: 48
End: 2023-07-16
Payer: MEDICAID

## 2023-07-16 ENCOUNTER — HOSPITAL ENCOUNTER (INPATIENT)
Facility: HOSPITAL | Age: 48
LOS: 2 days | Discharge: LEFT BEFORE DISCHARGE | End: 2023-07-18
Attending: EMERGENCY MEDICINE
Payer: MEDICAID

## 2023-07-16 DIAGNOSIS — R06.00 DYSPNEA, UNSPECIFIED TYPE: Primary | ICD-10-CM

## 2023-07-16 DIAGNOSIS — I16.1 HYPERTENSIVE EMERGENCY: ICD-10-CM

## 2023-07-16 DIAGNOSIS — J81.0 ACUTE PULMONARY EDEMA: ICD-10-CM

## 2023-07-16 LAB
AMPHET+METHAMPHET UR QL: POSITIVE
ANION GAP SERPL CALCULATED.3IONS-SCNC: 12 MMOL/L (ref 5–15)
ANION GAP SERPL CALCULATED.3IONS-SCNC: 14 MMOL/L (ref 5–15)
APTT PPP: 25.9 SECONDS (ref 61–76.5)
BARBITURATES UR QL SCN: NEGATIVE
BASOPHILS # BLD AUTO: 0.1 10*3/MM3 (ref 0–0.2)
BASOPHILS NFR BLD AUTO: 0.7 % (ref 0–1.5)
BENZODIAZ UR QL SCN: NEGATIVE
BUN SERPL-MCNC: 22 MG/DL (ref 6–20)
BUN SERPL-MCNC: 28 MG/DL (ref 6–20)
BUN/CREAT SERPL: 14.6 (ref 7–25)
BUN/CREAT SERPL: 15.5 (ref 7–25)
CALCIUM SPEC-SCNC: 8.6 MG/DL (ref 8.6–10.5)
CALCIUM SPEC-SCNC: 8.8 MG/DL (ref 8.6–10.5)
CANNABINOIDS SERPL QL: NEGATIVE
CHLORIDE SERPL-SCNC: 102 MMOL/L (ref 98–107)
CHLORIDE SERPL-SCNC: 103 MMOL/L (ref 98–107)
CO2 SERPL-SCNC: 23 MMOL/L (ref 22–29)
CO2 SERPL-SCNC: 24 MMOL/L (ref 22–29)
COCAINE UR QL: NEGATIVE
CREAT SERPL-MCNC: 1.42 MG/DL (ref 0.76–1.27)
CREAT SERPL-MCNC: 1.92 MG/DL (ref 0.76–1.27)
DEPRECATED RDW RBC AUTO: 41.1 FL (ref 37–54)
DEPRECATED RDW RBC AUTO: 43.8 FL (ref 37–54)
EGFRCR SERPLBLD CKD-EPI 2021: 43 ML/MIN/1.73
EGFRCR SERPLBLD CKD-EPI 2021: 61.7 ML/MIN/1.73
EOSINOPHIL # BLD AUTO: 0.2 10*3/MM3 (ref 0–0.4)
EOSINOPHIL NFR BLD AUTO: 2 % (ref 0.3–6.2)
ERYTHROCYTE [DISTWIDTH] IN BLOOD BY AUTOMATED COUNT: 13.4 % (ref 12.3–15.4)
ERYTHROCYTE [DISTWIDTH] IN BLOOD BY AUTOMATED COUNT: 13.8 % (ref 12.3–15.4)
GEN 5 2HR TROPONIN T REFLEX: 59 NG/L
GLUCOSE SERPL-MCNC: 102 MG/DL (ref 65–99)
GLUCOSE SERPL-MCNC: 118 MG/DL (ref 65–99)
HCT VFR BLD AUTO: 36.6 % (ref 37.5–51)
HCT VFR BLD AUTO: 40.3 % (ref 37.5–51)
HGB BLD-MCNC: 12.5 G/DL (ref 13–17.7)
HGB BLD-MCNC: 13.5 G/DL (ref 13–17.7)
INR PPP: 1.02 (ref 0.93–1.1)
LYMPHOCYTES # BLD AUTO: 2.9 10*3/MM3 (ref 0.7–3.1)
LYMPHOCYTES NFR BLD AUTO: 24.5 % (ref 19.6–45.3)
MAGNESIUM SERPL-MCNC: 1.9 MG/DL (ref 1.6–2.6)
MCH RBC QN AUTO: 29.6 PG (ref 26.6–33)
MCH RBC QN AUTO: 29.8 PG (ref 26.6–33)
MCHC RBC AUTO-ENTMCNC: 33.4 G/DL (ref 31.5–35.7)
MCHC RBC AUTO-ENTMCNC: 34 G/DL (ref 31.5–35.7)
MCV RBC AUTO: 87 FL (ref 79–97)
MCV RBC AUTO: 89.2 FL (ref 79–97)
METHADONE UR QL SCN: NEGATIVE
MONOCYTES # BLD AUTO: 0.9 10*3/MM3 (ref 0.1–0.9)
MONOCYTES NFR BLD AUTO: 7.9 % (ref 5–12)
NEUTROPHILS NFR BLD AUTO: 64.9 % (ref 42.7–76)
NEUTROPHILS NFR BLD AUTO: 7.7 10*3/MM3 (ref 1.7–7)
NRBC BLD AUTO-RTO: 0.1 /100 WBC (ref 0–0.2)
NT-PROBNP SERPL-MCNC: ABNORMAL PG/ML (ref 0–450)
OPIATES UR QL: NEGATIVE
OXYCODONE UR QL SCN: NEGATIVE
PLATELET # BLD AUTO: 235 10*3/MM3 (ref 140–450)
PLATELET # BLD AUTO: 237 10*3/MM3 (ref 140–450)
PMV BLD AUTO: 10 FL (ref 6–12)
PMV BLD AUTO: 10.2 FL (ref 6–12)
POTASSIUM SERPL-SCNC: 3 MMOL/L (ref 3.5–5.2)
POTASSIUM SERPL-SCNC: 4.1 MMOL/L (ref 3.5–5.2)
PROTHROMBIN TIME: 10.9 SECONDS (ref 9.6–11.7)
RBC # BLD AUTO: 4.21 10*6/MM3 (ref 4.14–5.8)
RBC # BLD AUTO: 4.52 10*6/MM3 (ref 4.14–5.8)
SODIUM SERPL-SCNC: 139 MMOL/L (ref 136–145)
SODIUM SERPL-SCNC: 139 MMOL/L (ref 136–145)
TROPONIN T DELTA: -7 NG/L
TROPONIN T SERPL HS-MCNC: 66 NG/L
WBC NRBC COR # BLD: 10.6 10*3/MM3 (ref 3.4–10.8)
WBC NRBC COR # BLD: 11.9 10*3/MM3 (ref 3.4–10.8)

## 2023-07-16 PROCEDURE — 80048 BASIC METABOLIC PNL TOTAL CA: CPT | Performed by: EMERGENCY MEDICINE

## 2023-07-16 PROCEDURE — 93005 ELECTROCARDIOGRAM TRACING: CPT | Performed by: EMERGENCY MEDICINE

## 2023-07-16 PROCEDURE — 85025 COMPLETE CBC W/AUTO DIFF WBC: CPT | Performed by: EMERGENCY MEDICINE

## 2023-07-16 PROCEDURE — 80307 DRUG TEST PRSMV CHEM ANLYZR: CPT | Performed by: EMERGENCY MEDICINE

## 2023-07-16 PROCEDURE — 36415 COLL VENOUS BLD VENIPUNCTURE: CPT | Performed by: INTERNAL MEDICINE

## 2023-07-16 PROCEDURE — 25010000002 FUROSEMIDE PER 20 MG: Performed by: EMERGENCY MEDICINE

## 2023-07-16 PROCEDURE — 84484 ASSAY OF TROPONIN QUANT: CPT | Performed by: EMERGENCY MEDICINE

## 2023-07-16 PROCEDURE — 0 NITROGLYCERIN 200 MCG/ML SOLUTION: Performed by: EMERGENCY MEDICINE

## 2023-07-16 PROCEDURE — 85730 THROMBOPLASTIN TIME PARTIAL: CPT | Performed by: EMERGENCY MEDICINE

## 2023-07-16 PROCEDURE — 94640 AIRWAY INHALATION TREATMENT: CPT

## 2023-07-16 PROCEDURE — 94799 UNLISTED PULMONARY SVC/PX: CPT

## 2023-07-16 PROCEDURE — 83880 ASSAY OF NATRIURETIC PEPTIDE: CPT | Performed by: EMERGENCY MEDICINE

## 2023-07-16 PROCEDURE — 85027 COMPLETE CBC AUTOMATED: CPT | Performed by: INTERNAL MEDICINE

## 2023-07-16 PROCEDURE — 80048 BASIC METABOLIC PNL TOTAL CA: CPT | Performed by: INTERNAL MEDICINE

## 2023-07-16 PROCEDURE — 85610 PROTHROMBIN TIME: CPT | Performed by: EMERGENCY MEDICINE

## 2023-07-16 PROCEDURE — 83735 ASSAY OF MAGNESIUM: CPT | Performed by: INTERNAL MEDICINE

## 2023-07-16 PROCEDURE — 99285 EMERGENCY DEPT VISIT HI MDM: CPT

## 2023-07-16 PROCEDURE — 71045 X-RAY EXAM CHEST 1 VIEW: CPT

## 2023-07-16 PROCEDURE — 25010000002 ENOXAPARIN PER 10 MG: Performed by: INTERNAL MEDICINE

## 2023-07-16 RX ORDER — ACETAMINOPHEN 325 MG/1
650 TABLET ORAL EVERY 4 HOURS PRN
Status: DISCONTINUED | OUTPATIENT
Start: 2023-07-16 | End: 2023-07-18 | Stop reason: HOSPADM

## 2023-07-16 RX ORDER — FUROSEMIDE 10 MG/ML
80 INJECTION INTRAMUSCULAR; INTRAVENOUS ONCE
Status: COMPLETED | OUTPATIENT
Start: 2023-07-16 | End: 2023-07-16

## 2023-07-16 RX ORDER — SODIUM CHLORIDE 9 MG/ML
40 INJECTION, SOLUTION INTRAVENOUS AS NEEDED
Status: DISCONTINUED | OUTPATIENT
Start: 2023-07-16 | End: 2023-07-18 | Stop reason: HOSPADM

## 2023-07-16 RX ORDER — ALBUTEROL SULFATE 90 UG/1
2 AEROSOL, METERED RESPIRATORY (INHALATION) EVERY 6 HOURS PRN
Status: DISCONTINUED | OUTPATIENT
Start: 2023-07-16 | End: 2023-07-18 | Stop reason: HOSPADM

## 2023-07-16 RX ORDER — CARVEDILOL 6.25 MG/1
6.25 TABLET ORAL 2 TIMES DAILY WITH MEALS
Status: DISCONTINUED | OUTPATIENT
Start: 2023-07-16 | End: 2023-07-18 | Stop reason: HOSPADM

## 2023-07-16 RX ORDER — BISACODYL 5 MG/1
5 TABLET, DELAYED RELEASE ORAL DAILY PRN
Status: DISCONTINUED | OUTPATIENT
Start: 2023-07-16 | End: 2023-07-18 | Stop reason: HOSPADM

## 2023-07-16 RX ORDER — POTASSIUM CHLORIDE 20 MEQ/1
40 TABLET, EXTENDED RELEASE ORAL ONCE
Status: COMPLETED | OUTPATIENT
Start: 2023-07-16 | End: 2023-07-16

## 2023-07-16 RX ORDER — ENOXAPARIN SODIUM 100 MG/ML
40 INJECTION SUBCUTANEOUS DAILY
Status: DISCONTINUED | OUTPATIENT
Start: 2023-07-16 | End: 2023-07-18 | Stop reason: HOSPADM

## 2023-07-16 RX ORDER — BISACODYL 10 MG
10 SUPPOSITORY, RECTAL RECTAL DAILY PRN
Status: DISCONTINUED | OUTPATIENT
Start: 2023-07-16 | End: 2023-07-18 | Stop reason: HOSPADM

## 2023-07-16 RX ORDER — NITROGLYCERIN 0.4 MG/1
0.4 TABLET SUBLINGUAL
Status: DISCONTINUED | OUTPATIENT
Start: 2023-07-16 | End: 2023-07-18 | Stop reason: HOSPADM

## 2023-07-16 RX ORDER — SODIUM CHLORIDE 0.9 % (FLUSH) 0.9 %
10 SYRINGE (ML) INJECTION AS NEEDED
Status: DISCONTINUED | OUTPATIENT
Start: 2023-07-16 | End: 2023-07-18 | Stop reason: HOSPADM

## 2023-07-16 RX ORDER — HYDRALAZINE HYDROCHLORIDE 10 MG/1
10 TABLET, FILM COATED ORAL 3 TIMES DAILY
Status: DISCONTINUED | OUTPATIENT
Start: 2023-07-16 | End: 2023-07-18

## 2023-07-16 RX ORDER — SODIUM CHLORIDE 0.9 % (FLUSH) 0.9 %
10 SYRINGE (ML) INJECTION EVERY 12 HOURS SCHEDULED
Status: DISCONTINUED | OUTPATIENT
Start: 2023-07-16 | End: 2023-07-18 | Stop reason: HOSPADM

## 2023-07-16 RX ORDER — ONDANSETRON 2 MG/ML
4 INJECTION INTRAMUSCULAR; INTRAVENOUS EVERY 6 HOURS PRN
Status: DISCONTINUED | OUTPATIENT
Start: 2023-07-16 | End: 2023-07-18 | Stop reason: HOSPADM

## 2023-07-16 RX ORDER — NITROGLYCERIN 20 MG/100ML
10-50 INJECTION INTRAVENOUS
Status: DISCONTINUED | OUTPATIENT
Start: 2023-07-16 | End: 2023-07-17

## 2023-07-16 RX ORDER — IPRATROPIUM BROMIDE AND ALBUTEROL SULFATE 2.5; .5 MG/3ML; MG/3ML
3 SOLUTION RESPIRATORY (INHALATION)
Status: DISCONTINUED | OUTPATIENT
Start: 2023-07-16 | End: 2023-07-18 | Stop reason: HOSPADM

## 2023-07-16 RX ORDER — POTASSIUM CHLORIDE 20 MEQ/1
40 TABLET, EXTENDED RELEASE ORAL EVERY 4 HOURS
Status: COMPLETED | OUTPATIENT
Start: 2023-07-16 | End: 2023-07-16

## 2023-07-16 RX ORDER — POLYETHYLENE GLYCOL 3350 17 G/17G
17 POWDER, FOR SOLUTION ORAL DAILY PRN
Status: DISCONTINUED | OUTPATIENT
Start: 2023-07-16 | End: 2023-07-18 | Stop reason: HOSPADM

## 2023-07-16 RX ORDER — AMOXICILLIN 250 MG
2 CAPSULE ORAL 2 TIMES DAILY
Status: DISCONTINUED | OUTPATIENT
Start: 2023-07-16 | End: 2023-07-18 | Stop reason: HOSPADM

## 2023-07-16 RX ADMIN — NITROGLYCERIN 75 MCG/MIN: 20 INJECTION INTRAVENOUS at 22:13

## 2023-07-16 RX ADMIN — HYDRALAZINE HYDROCHLORIDE 10 MG: 10 TABLET, FILM COATED ORAL at 20:25

## 2023-07-16 RX ADMIN — NICARDIPINE HYDROCHLORIDE 5 MG/HR: 25 INJECTION, SOLUTION INTRAVENOUS at 23:42

## 2023-07-16 RX ADMIN — NITROGLYCERIN 10 MCG/MIN: 20 INJECTION INTRAVENOUS at 06:12

## 2023-07-16 RX ADMIN — HYDRALAZINE HYDROCHLORIDE 10 MG: 10 TABLET, FILM COATED ORAL at 08:25

## 2023-07-16 RX ADMIN — CARVEDILOL 6.25 MG: 6.25 TABLET, FILM COATED ORAL at 18:25

## 2023-07-16 RX ADMIN — IPRATROPIUM BROMIDE AND ALBUTEROL SULFATE 3 ML: .5; 3 SOLUTION RESPIRATORY (INHALATION) at 22:32

## 2023-07-16 RX ADMIN — POTASSIUM CHLORIDE 40 MEQ: 1500 TABLET, EXTENDED RELEASE ORAL at 08:25

## 2023-07-16 RX ADMIN — ACETAMINOPHEN 650 MG: 325 TABLET, FILM COATED ORAL at 18:27

## 2023-07-16 RX ADMIN — ENOXAPARIN SODIUM 40 MG: 100 INJECTION SUBCUTANEOUS at 16:20

## 2023-07-16 RX ADMIN — ACETAMINOPHEN 650 MG: 325 TABLET, FILM COATED ORAL at 22:19

## 2023-07-16 RX ADMIN — VERAPAMIL HYDROCHLORIDE 180 MG: 180 TABLET, FILM COATED, EXTENDED RELEASE ORAL at 22:13

## 2023-07-16 RX ADMIN — POTASSIUM CHLORIDE 40 MEQ: 1500 TABLET, EXTENDED RELEASE ORAL at 12:25

## 2023-07-16 RX ADMIN — CARVEDILOL 6.25 MG: 6.25 TABLET, FILM COATED ORAL at 08:25

## 2023-07-16 RX ADMIN — ACETAMINOPHEN 650 MG: 325 TABLET, FILM COATED ORAL at 13:52

## 2023-07-16 RX ADMIN — POTASSIUM CHLORIDE 40 MEQ: 1500 TABLET, EXTENDED RELEASE ORAL at 16:20

## 2023-07-16 RX ADMIN — HYDRALAZINE HYDROCHLORIDE 10 MG: 10 TABLET, FILM COATED ORAL at 16:20

## 2023-07-16 RX ADMIN — SENNOSIDES AND DOCUSATE SODIUM 2 TABLET: 50; 8.6 TABLET ORAL at 20:25

## 2023-07-16 RX ADMIN — Medication 10 ML: at 20:26

## 2023-07-16 RX ADMIN — FUROSEMIDE 80 MG: 10 INJECTION, SOLUTION INTRAMUSCULAR; INTRAVENOUS at 06:33

## 2023-07-16 NOTE — Clinical Note
Level of Care: Telemetry [5]   Admitting Physician: SALLY WEST [[id number]]   Attending Physician: SALLY WEST [[id number]]   Bed Request Comments: MARIA R

## 2023-07-16 NOTE — PLAN OF CARE
Goal Outcome Evaluation:  Plan of Care Reviewed With: patient        Progress: no change  Outcome Evaluation: Patient received awake and coherent, AO x4, on room air. Complains of headache PNS:10/10. Admission assessment done. VS taken and recorded. Kept safe comfortable, and needs attended

## 2023-07-16 NOTE — H&P
[facility] Medicine Services  History & Physical    Patient Name: Scott Gaytan  : 1975  MRN: [id number]  Primary Care Physician:  Sidra Hand DO  Date of admission: 2023  Date and Time of Service: 2023 at 8:10 AM    Subjective      Chief Complaint: Shortness of breath for the past 3 to 4 days.    History of Present Illness: Scott Gaytan is a 48 y.o. male past medical history of hypertension, questionable CHF and amphetamine use who presented to [facility] on 2023 complaining of shortness of breath for the past 2 3 days.  Patient denies any significant chest pains nausea vomiting or diaphoresis or leg swelling.  His blood pressure was extremely high in the ER and had to be started on nitroglycerin drip.  At this point he is not talking to me although he is awake and listening to me and does not give me any history or review of systems.  History is mainly taken from the chart.  He appears comfortable though at this point.      ROS   Patient refusing to give me any review of systems.    Personal History     Past Medical History:   Diagnosis Date    Hypertension     Substance abuse Hx     Meth/ MJ----QUIT        Past Surgical History:   Procedure Laterality Date    CARDIAC CATHETERIZATION N/A 2022    ORIF TIBIA/FIBULA FRACTURES Right        Family History: family history includes Arthritis in his mother; Diabetes in his mother; Heart attack in his father and paternal grandfather; Hypertension in his father; Seizures in his sister. Otherwise pertinent FHx was reviewed and not pertinent to current issue.    Social History:  reports that he has been smoking cigarettes. He started smoking about 29 years ago. He has a 15.00 pack-year smoking history. He has never been exposed to tobacco smoke. He has never used smokeless tobacco. He reports that he does not currently use alcohol. He reports that he does not currently use drugs after having used the following  drugs: Methamphetamines and Marijuana.    Home Medications:  Prior to Admission Medications       Prescriptions Last Dose Informant Patient Reported? Taking?    albuterol sulfate  (90 Base) MCG/ACT inhaler   No No    Inhale 2 puffs Every 6 (Six) Hours As Needed for Wheezing or Shortness of Air.    carvedilol (Coreg) 6.25 MG tablet   No No    Take 1 tablet by mouth 2 (Two) Times a Day With Meals.    hydrALAZINE (APRESOLINE) 10 MG tablet   No No    Take 1 tablet by mouth 3 (Three) Times a Day.    Umeclidinium-Vilanterol (Anoro Ellipta) 62.5-25 MCG/ACT aerosol powder  inhaler   No No    Inhale 1 puff Daily.    varenicline (Chantix Continuing Month Luis) 1 MG tablet   No No    1/2 tab po qhs x 3 days then 1/2 tab po bid x 3 days then 1 po bid    verapamil SR (CALAN-SR) 180 MG CR tablet   No No    Take 1 tablet by mouth Every Night.              Allergies:  No Known Allergies    Objective      Vitals:   Temp:  [97.6 °F (36.4 °C)] 97.6 °F (36.4 °C)  Heart Rate:  [80-89] 80  Resp:  [18] 18  BP: (121-204)/() 168/113    Physical Exam  Constitutional:       General: He is not in acute distress.     Comments: But not talking to me and not answering any questions.   HENT:      Head: Normocephalic and atraumatic.   Cardiovascular:      Rate and Rhythm: Normal rate and regular rhythm.      Pulses: Normal pulses.      Heart sounds: Normal heart sounds.   Pulmonary:      Effort: Pulmonary effort is normal.      Comments: Patient has crackles in the lower one third lung fields bilaterally.  Abdominal:      General: There is no distension.      Palpations: Abdomen is soft.      Tenderness: There is no abdominal tenderness.   Musculoskeletal:      Cervical back: Neck supple.      Right lower leg: No edema.      Left lower leg: No edema.   Skin:     General: Skin is warm and dry.   Neurological:      General: No focal deficit present.      Mental Status: He is alert.   Psychiatric:      Comments: Patient is not talking to  me and not responding to any questions.  Although he is awake and listening to me.          Result Review    Result Review:  I have personally reviewed the results from the time of this admission to 7/16/2023 08:08 EDT and agree with these findings:  [x]  Laboratory  []  Microbiology  [x]  Radiology  [x]  EKG/Telemetry   [x]  Cardiology/Vascular   []  Pathology  [x]  Old records  []  Other:  Most notable findings include:   CBC shows WBC of 11.9, H&H of 13.5 and 40.3 and platelets of 237.  BMP shows sodium of 139, potassium 3.0, chloride 102, carbon dioxide 23, BUN 22, creatinine 1.42.  Glucose is 102.  High-sensitivity troponin was 66.  proBNP was 13,526.  His urine drug screen is positive for amphetamines  Chest x-ray showed borderline cardiomegaly and pulmonary vascular congestion with suspected mild interstitial edema or atypical infection in the correct clinical setting.  EKG reviewed by me patient is in normal sinus rhythm with multiple PVCs.  Left anterior fascicular block, LVH with secondary repolarization abnormality.    Assessment & Plan        Active Hospital Problems:  Active Hospital Problems    Diagnosis     **Hypertensive emergency      Plan:   48-year-old male with past medical history of hypertension, questionable CHF and amphetamine use came in probably after taking amphetamine again as his yearly urine drug screen is positive with hypertensive emergency with pulmonary edema.    #Hypertensive emergency:  Patient is started on nitroglycerin drip.  We will restart his home medications.  We will get a cardiology consult.  We will admit the patient to PCU for the nitroglycerin drip.  We will try to wean drip off as tolerated by blood pressure.  We will repeat another troponin in an hour.      #Pulmonary edema:  Questionable acute on chronic congestive heart failure versus flash pulmonary edema secondary to uncontrolled blood pressure.  Patient received a dose of Lasix IV 80 mg in the ER this  morning.  We will check another chest x-ray and BMP in the morning.  Patient had an echo in December 2022 which appeared to be quite normal, will leave at cardiology discretion if they want to repeat an echo at this point.    #Hypokalemia:  Replace and recheck in the morning.    #Renal failure:  Appears to be acute in nature, questionable secondary to hypertensive emergency.  We will repeat BMP in the morning to follow-up on that.    #Amphetamine use:  Urine drug screen is positive.  Drug plan counseling done.  This may be the underlying problem for all his problems at this point.    Patient is full code.    I have utilized all available immediate resources to obtain, update, or review the patient's current medications.       DVT prophylaxis:  Medical DVT prophylaxis orders are present.    CODE STATUS:    Level Of Support Discussed With: Patient  Code Status (Patient has no pulse and is not breathing): CPR (Attempt to Resuscitate)  Medical Interventions (Patient has pulse or is breathing): Full Support    Admission Status:  I believe this patient meets admission status.    I discussed the patient's findings and my recommendations with patient.        Signature: Electronically signed by Eliza Farah MD, 07/16/23, 08:08 EDT.  [facility] Hospitalist Team

## 2023-07-16 NOTE — ED PROVIDER NOTES
"Subjective   History of Present Illness  Chief complaint: Shortness of breath    48-year-old male presents with shortness of breath.  Patient states he has a history of congestive heart failure.  He states symptoms have been getting progressively worse over the past few days.  He has had a mildly productive cough.  He denies fever.  He has had no vomiting or diarrhea.  He reports some mild brief episodes of chest discomfort.  He states shortness of breath is worse with exertion.    History provided by:  Patient    Review of Systems   Constitutional:  Negative for fever.   HENT:  Negative for congestion.    Respiratory:  Positive for cough and shortness of breath.    Cardiovascular:  Positive for chest pain and leg swelling.   Gastrointestinal:  Negative for abdominal pain, diarrhea and vomiting.   Musculoskeletal:  Negative for back pain.   Neurological:  Negative for headaches.     Past Medical History:   Diagnosis Date    Hypertension     Substance abuse Hx     Meth/ MJ----QUIT 2022       No Known Allergies    Past Surgical History:   Procedure Laterality Date    CARDIAC CATHETERIZATION N/A 04/27/2022    ORIF TIBIA/FIBULA FRACTURES Right        Family History   Problem Relation Age of Onset    Diabetes Mother     Arthritis Mother     Hypertension Father     Heart attack Father     Seizures Sister     Heart attack Paternal Grandfather        Social History     Socioeconomic History    Marital status: Single   Tobacco Use    Smoking status: Every Day     Packs/day: 1.00     Years: 15.00     Pack years: 15.00     Types: Cigarettes     Start date: 1994     Passive exposure: Never    Smokeless tobacco: Never   Vaping Use    Vaping Use: Never used   Substance and Sexual Activity    Alcohol use: Not Currently    Drug use: Not Currently     Types: Methamphetamines, Marijuana     Comment: quit 2022    Sexual activity: Defer       BP (!) 203/146   Pulse 84   Temp 97.6 °F (36.4 °C)   Resp 18   Ht 172.7 cm (68\")   Wt " 83.6 kg (184 lb 4.9 oz)   SpO2 (!) 84%   BMI 28.02 kg/m²       Objective   Physical Exam  Vitals and nursing note reviewed.   Constitutional:       Appearance: He is well-developed.   HENT:      Head: Normocephalic and atraumatic.      Mouth/Throat:      Mouth: Mucous membranes are moist.   Cardiovascular:      Rate and Rhythm: Normal rate and regular rhythm.      Heart sounds: Normal heart sounds.   Pulmonary:      Effort: Pulmonary effort is normal. No respiratory distress.      Breath sounds: Normal breath sounds.   Abdominal:      General: Bowel sounds are normal.      Palpations: Abdomen is soft.      Tenderness: There is no abdominal tenderness.   Musculoskeletal:      Right lower leg: No tenderness.      Left lower leg: No tenderness.      Comments: Mild bilateral lower extremity edema   Skin:     General: Skin is warm and dry.   Neurological:      Mental Status: He is alert and oriented to person, place, and time.       Procedures           ED Course      My interpretation of EKG shows sinus rhythm, rate of 82, multiple PVCs, no ST elevation     Results for orders placed or performed during the hospital encounter of 07/16/23   Basic Metabolic Panel    Specimen: Blood   Result Value Ref Range    Glucose 102 (H) 65 - 99 mg/dL    BUN 22 (H) 6 - 20 mg/dL    Creatinine 1.42 (H) 0.76 - 1.27 mg/dL    Sodium 139 136 - 145 mmol/L    Potassium 3.0 (L) 3.5 - 5.2 mmol/L    Chloride 102 98 - 107 mmol/L    CO2 23.0 22.0 - 29.0 mmol/L    Calcium 8.6 8.6 - 10.5 mg/dL    BUN/Creatinine Ratio 15.5 7.0 - 25.0    Anion Gap 14.0 5.0 - 15.0 mmol/L    eGFR 61.7 >60.0 mL/min/1.73   Protime-INR    Specimen: Blood   Result Value Ref Range    Protime 10.9 9.6 - 11.7 Seconds    INR 1.02 0.93 - 1.10   aPTT    Specimen: Blood   Result Value Ref Range    PTT 25.9 (L) 61.0 - 76.5 seconds   Single High Sensitivity Troponin T    Specimen: Blood   Result Value Ref Range    HS Troponin T 66 (C) <15 ng/L   BNP    Specimen: Blood   Result  Value Ref Range    proBNP 13,526.0 (H) 0.0 - 450.0 pg/mL   CBC Auto Differential    Specimen: Blood   Result Value Ref Range    WBC 11.90 (H) 3.40 - 10.80 10*3/mm3    RBC 4.52 4.14 - 5.80 10*6/mm3    Hemoglobin 13.5 13.0 - 17.7 g/dL    Hematocrit 40.3 37.5 - 51.0 %    MCV 89.2 79.0 - 97.0 fL    MCH 29.8 26.6 - 33.0 pg    MCHC 33.4 31.5 - 35.7 g/dL    RDW 13.4 12.3 - 15.4 %    RDW-SD 41.1 37.0 - 54.0 fl    MPV 10.0 6.0 - 12.0 fL    Platelets 237 140 - 450 10*3/mm3    Neutrophil % 64.9 42.7 - 76.0 %    Lymphocyte % 24.5 19.6 - 45.3 %    Monocyte % 7.9 5.0 - 12.0 %    Eosinophil % 2.0 0.3 - 6.2 %    Basophil % 0.7 0.0 - 1.5 %    Neutrophils, Absolute 7.70 (H) 1.70 - 7.00 10*3/mm3    Lymphocytes, Absolute 2.90 0.70 - 3.10 10*3/mm3    Monocytes, Absolute 0.90 0.10 - 0.90 10*3/mm3    Eosinophils, Absolute 0.20 0.00 - 0.40 10*3/mm3    Basophils, Absolute 0.10 0.00 - 0.20 10*3/mm3    nRBC 0.1 0.0 - 0.2 /100 WBC   ECG 12 Lead Dyspnea   Result Value Ref Range    QT Interval 415 ms          My interpretation of chest x-ray shows cardiomegaly and pulmonary edema, pending radiology review.                           Medical Decision Making  Amount and/or Complexity of Data Reviewed  Labs: ordered.  Radiology: ordered.  ECG/medicine tests: ordered.    Risk  Prescription drug management.      Patient had the above evaluation.  Results were discussed with the patient.  White blood cell count was 11.9.  BMP shows BUN of 22 and creatinine 1.42.  Potassium is little low at 3.0.  Troponin is elevated at 66.  BNP is significantly elevated at 13,500.  EKG shows no acute ischemia.  Chest x-ray does show evidence of pulmonary edema.  Patient was quite hypertensive in the emergency room around 200 systolic.  He was started on a Tridil drip.  He was given Lasix.  I discussed with the nurse practitioner on-call for the hospitalist and the patient will be admitted for further evaluation and management.      Final diagnoses:   Dyspnea,  unspecified type   Hypertensive emergency   Acute pulmonary edema       ED Disposition  ED Disposition       ED Disposition   Decision to Admit    Condition   --    Comment   Level of Care: Telemetry [5]   Admitting Physician: SALLY WEST [[id number]]   Attending Physician: SALLY WEST [[id number]]   Bed Request Comments: MARIA R                 No follow-up provider specified.       Medication List      No changes were made to your prescriptions during this visit.            Arun Skaggs MD  07/16/23 0639

## 2023-07-16 NOTE — ED NOTES
Nursing report ED to floor  Scott Gaytan  48 y.o.  male    HPI:   Chief Complaint   Patient presents with    Shortness of Breath       Admitting doctor:   Eliza Farah MD    Admitting diagnosis:   The primary encounter diagnosis was Dyspnea, unspecified type. Diagnoses of Hypertensive emergency and Acute pulmonary edema were also pertinent to this visit.    Code status:   Current Code Status       Date Active Code Status Order ID Comments User Context       7/16/2023 0807 CPR (Attempt to Resuscitate) [id number]  Eliza Farah MD ED        Question Answer    Code Status (Patient has no pulse and is not breathing) CPR (Attempt to Resuscitate)    Medical Interventions (Patient has pulse or is breathing) Full Support    Level Of Support Discussed With Patient                    Allergies:   Patient has no known allergies.    Isolation:  No active isolations     Fall Risk:  Fall Risk Assessment was completed, and patient is at low risk for falls.   Predictive Model Details         3 (Low) Factor Value    Calculated 7/16/2023 14:07 Age 48    Risk of Fall Model Musculoskeletal Assessment WDL     Active Peripheral IV Present     Imaging order in this encounter Present     Number of Distinct Medication Classes administered 6     Skin Assessment WDL     Magnesium 1.9 mg/dL     Drug Use No     Calcium 8.6 mg/dL     Tobacco Use Current     Sy Scale not on file     Peripheral Vascular Assessment WDL     Creatinine 1.42 mg/dL     Financial Class Medicaid     Clinically Relevant Sex Not Female     Gastrointestinal Assessment WDL     Albumin not on file     Days after Admission 0.408     Chloride 102 mmol/L     Cardiac Assessment WDL     Respiratory Rate 16     Number of administrations of Anti-Hypertensives 1     Diastolic      Total Bilirubin not on file     Potassium 3 mmol/L     ALT not on file         Weight:       07/16/23  0416   Weight: 83.6 kg (184 lb 4.9 oz)       Intake and Output    Intake/Output Summary  (Last 24 hours) at 7/16/2023 1422  Last data filed at 7/16/2023 1158  Gross per 24 hour   Intake --   Output 3600 ml   Net -3600 ml       Diet:   Dietary Orders (From admission, onward)       Start     Ordered    07/16/23 0802  Diet: Cardiac Diets; Healthy Heart (2-3 Na+); Texture: Regular Texture (IDDSI 7); Fluid Consistency: Thin (IDDSI 0)  Diet Effective Now        References:    Diet Order [facility]   Question Answer Comment   Diets: Cardiac Diets    Cardiac Diet: Healthy Heart (2-3 Na+)    Texture: Regular Texture (IDDSI 7)    Fluid Consistency: Thin (IDDSI 0)        07/16/23 0807                     Most recent vitals:   Vitals:    07/16/23 1201 07/16/23 1300 07/16/23 1352 07/16/23 1401   BP: 139/98  (!) 146/104 (!) 164/119   Pulse: 64 74 73 77   Resp: 18 18 16 18   Temp:       SpO2: 93% 95% 95% 94%   Weight:       Height:           Active LDAs/IV Access:   Lines, Drains & Airways       Active LDAs       Name Placement date Placement time Site Days    Peripheral IV 07/16/23 0717 Anterior;Left Forearm 07/16/23  0717  Forearm  less than 1                    Skin Condition:   Skin Assessments (last day)       None             Labs (abnormal labs have a star):   Labs Reviewed   BASIC METABOLIC PANEL - Abnormal; Notable for the following components:       Result Value    Glucose 102 (*)     BUN 22 (*)     Creatinine 1.42 (*)     Potassium 3.0 (*)     All other components within normal limits    Narrative:     GFR Normal >60  Chronic Kidney Disease <60  Kidney Failure <15     APTT - Abnormal; Notable for the following components:    PTT 25.9 (*)     All other components within normal limits   SINGLE HSTROPONIN T - Abnormal; Notable for the following components:    HS Troponin T 66 (*)     All other components within normal limits    Narrative:     High Sensitive Troponin T Reference Range:  <10.0 ng/L- Negative Female for AMI  <15.0 ng/L- Negative Male for AMI  >=10 - Abnormal Female indicating possible myocardial  injury.  >=15 - Abnormal Male indicating possible myocardial injury.   Clinicians would have to utilize clinical acumen, EKG, Troponin, and serial changes to determine if it is an Acute Myocardial Infarction or myocardial injury due to an underlying chronic condition.        BNP (IN-HOUSE) - Abnormal; Notable for the following components:    proBNP 13,526.0 (*)     All other components within normal limits    Narrative:     Among patients with dyspnea, NT-proBNP is highly sensitive for the detection of acute congestive heart failure. In addition NT-proBNP of <300 pg/ml effectively rules out acute congestive heart failure with 99% negative predictive value.    Results may be falsely decreased if patient taking Biotin.     CBC WITH AUTO DIFFERENTIAL - Abnormal; Notable for the following components:    WBC 11.90 (*)     Neutrophils, Absolute 7.70 (*)     All other components within normal limits   HIGH SENSITIVITIY TROPONIN T 2HR - Abnormal; Notable for the following components:    HS Troponin T 59 (*)     Troponin T Delta -7 (*)     All other components within normal limits    Narrative:     High Sensitive Troponin T Reference Range:  <10.0 ng/L- Negative Female for AMI  <15.0 ng/L- Negative Male for AMI  >=10 - Abnormal Female indicating possible myocardial injury.  >=15 - Abnormal Male indicating possible myocardial injury.   Clinicians would have to utilize clinical acumen, EKG, Troponin, and serial changes to determine if it is an Acute Myocardial Infarction or myocardial injury due to an underlying chronic condition.        URINE DRUG SCREEN - Abnormal; Notable for the following components:    Amphet/Methamphet, Screen Positive (*)     All other components within normal limits    Narrative:     Negative Thresholds Per Drugs Screened:    Amphetamines                 500 ng/ml  Barbiturates                 200 ng/ml  Benzodiazepines              100 ng/ml  Cocaine                      300 ng/ml  Methadone                     300 ng/ml  Opiates                      300 ng/ml  Oxycodone                    100 ng/ml  THC                           50 ng/ml    The Normal Value for all drugs tested is negative. This report includes final unconfirmed screening results to be used for medical treatment purposes only. Unconfirmed results must not be used for non-medical purposes such as employment or legal testing. Clinical consideration should be applied to any drug of abuse test, particularly when unconfirmed results are used.          All urine drugs of abuse requests without chain of custody are for medical screening purposes only.  False positives are possible.     PROTIME-INR - Normal   MAGNESIUM - Normal   CBC AND DIFFERENTIAL    Narrative:     The following orders were created for panel order CBC & Differential.  Procedure                               Abnormality         Status                     ---------                               -----------         ------                     CBC Auto Differential[[id number]]        Abnormal            Final result                 Please view results for these tests on the individual orders.       LOC: Person, Place, Time, and Situation    Telemetry:  Progressive Care    Cardiac Monitoring Ordered: yes    EKG:   ECG 12 Lead Dyspnea   Preliminary Result   HEART RATE= 82  bpm   RR Interval= 740  ms   NM Interval= 189  ms   P Horizontal Axis= -2  deg   P Front Axis= 30  deg   QRSD Interval= 111  ms   QT Interval= 415  ms   QRS Axis= -40  deg   T Wave Axis= 114  deg   - ABNORMAL ECG -   Sinus rhythm   Multiple ventricular premature complexes   Probable left atrial enlargement   Left anterior fascicular block   LVH with secondary repolarization abnormality   Repolarization abnormalities   When compared with ECG of 06-Dec-2022 4:38:10,   Significant axis, voltage or hypertrophy change   Electronically Signed By:    Date and Time of Study: 2023-07-16 04:23:14          Medications Given in the ED:    Medications   sodium chloride 0.9 % flush 10 mL (has no administration in time range)   nitroglycerin (TRIDIL) 200 mcg/ml infusion (50 mcg/min Intravenous Rate/Dose Change 7/16/23 0643)   albuterol sulfate HFA (PROVENTIL HFA;VENTOLIN HFA;PROAIR HFA) inhaler 2 puff (has no administration in time range)   carvedilol (COREG) tablet 6.25 mg (6.25 mg Oral Given 7/16/23 0825)   hydrALAZINE (APRESOLINE) tablet 10 mg (10 mg Oral Given 7/16/23 0825)   ipratropium-albuterol (DUO-NEB) nebulizer solution 3 mL (has no administration in time range)   verapamil SR (CALAN-SR) CR tablet 180 mg (has no administration in time range)   sodium chloride 0.9 % flush 10 mL (10 mL Intravenous Not Given 7/16/23 0822)   sodium chloride 0.9 % flush 10 mL (has no administration in time range)   sodium chloride 0.9 % infusion 40 mL (has no administration in time range)   sennosides-docusate (PERICOLACE) 8.6-50 MG per tablet 2 tablet (2 tablets Oral Not Given 7/16/23 0826)     And   polyethylene glycol (MIRALAX) packet 17 g (has no administration in time range)     And   bisacodyl (DULCOLAX) EC tablet 5 mg (has no administration in time range)     And   bisacodyl (DULCOLAX) suppository 10 mg (has no administration in time range)   Enoxaparin Sodium (LOVENOX) syringe 40 mg (has no administration in time range)   nitroglycerin (NITROSTAT) SL tablet 0.4 mg (has no administration in time range)   acetaminophen (TYLENOL) tablet 650 mg (650 mg Oral Given 7/16/23 1352)   Potassium Replacement - Follow Nurse / BPA Driven Protocol (has no administration in time range)   Magnesium Standard Dose Replacement - Follow Nurse / BPA Driven Protocol (has no administration in time range)   Phosphorus Replacement - Follow Nurse / BPA Driven Protocol (has no administration in time range)   Calcium Replacement - Follow Nurse / BPA Driven Protocol (has no administration in time range)   ondansetron (ZOFRAN) injection 4 mg (has no administration in time range)    potassium chloride (K-DUR,KLOR-CON) CR tablet 40 mEq (40 mEq Oral Given 7/16/23 1225)   furosemide (LASIX) injection 80 mg (80 mg Intravenous Given 7/16/23 0633)   potassium chloride (K-DUR,KLOR-CON) CR tablet 40 mEq (40 mEq Oral Given 7/16/23 0825)       Imaging results:  XR Chest 1 View    Result Date: 7/16/2023  Impression: 1. Borderline cardiomegaly and pulmonary vascular congestion with suspected mild interstitial edema and/or atypical infection in the correct clinical setting. Electronically Signed: Jason Mckinnon  7/16/2023 7:43 AM EDT  Workstation ID: [id number]     Social issues:   Social History     Socioeconomic History    Marital status: Single   Tobacco Use    Smoking status: Every Day     Packs/day: 1.00     Years: 15.00     Pack years: 15.00     Types: Cigarettes     Start date: 1994     Passive exposure: Never    Smokeless tobacco: Never   Vaping Use    Vaping Use: Never used   Substance and Sexual Activity    Alcohol use: Not Currently    Drug use: Not Currently     Types: Methamphetamines, Marijuana     Comment: quit 2022    Sexual activity: Defer       NIH Stroke Scale:  Interval: (not recorded)  1a. Level of Consciousness: (not recorded)  1b. LOC Questions: (not recorded)  1c. LOC Commands: (not recorded)  2. Best Gaze: (not recorded)  3. Visual: (not recorded)  4. Facial Palsy: (not recorded)  5a. Motor Arm, Left: (not recorded)  5b. Motor Arm, Right: (not recorded)  6a. Motor Leg, Left: (not recorded)  6b. Motor Leg, Right: (not recorded)  7. Limb Ataxia: (not recorded)  8. Sensory: (not recorded)  9. Best Language: (not recorded)  10. Dysarthria: (not recorded)  11. Extinction and Inattention (formerly Neglect): (not recorded)    Total (NIH Stroke Scale): (not recorded)     Additional notable assessment information:     Nursing report ED to floor:  Fran Cooper RN   07/16/23 14:22 EDT

## 2023-07-17 ENCOUNTER — APPOINTMENT (OUTPATIENT)
Dept: GENERAL RADIOLOGY | Facility: HOSPITAL | Age: 48
End: 2023-07-17
Payer: MEDICAID

## 2023-07-17 LAB — QT INTERVAL: 415 MS

## 2023-07-17 PROCEDURE — 71045 X-RAY EXAM CHEST 1 VIEW: CPT

## 2023-07-17 PROCEDURE — 94761 N-INVAS EAR/PLS OXIMETRY MLT: CPT

## 2023-07-17 PROCEDURE — 94799 UNLISTED PULMONARY SVC/PX: CPT

## 2023-07-17 PROCEDURE — 25010000002 ENOXAPARIN PER 10 MG: Performed by: INTERNAL MEDICINE

## 2023-07-17 RX ORDER — AMLODIPINE BESYLATE 5 MG/1
5 TABLET ORAL EVERY 12 HOURS
Status: DISCONTINUED | OUTPATIENT
Start: 2023-07-17 | End: 2023-07-17

## 2023-07-17 RX ORDER — AMLODIPINE BESYLATE 5 MG/1
5 TABLET ORAL EVERY 12 HOURS
Status: DISCONTINUED | OUTPATIENT
Start: 2023-07-17 | End: 2023-07-18

## 2023-07-17 RX ORDER — SODIUM CHLORIDE, SODIUM LACTATE, POTASSIUM CHLORIDE, CALCIUM CHLORIDE 600; 310; 30; 20 MG/100ML; MG/100ML; MG/100ML; MG/100ML
50 INJECTION, SOLUTION INTRAVENOUS CONTINUOUS
Status: DISCONTINUED | OUTPATIENT
Start: 2023-07-17 | End: 2023-07-18 | Stop reason: HOSPADM

## 2023-07-17 RX ORDER — AMLODIPINE BESYLATE 5 MG/1
10 TABLET ORAL
Status: DISCONTINUED | OUTPATIENT
Start: 2023-07-17 | End: 2023-07-17

## 2023-07-17 RX ADMIN — AMLODIPINE BESYLATE 5 MG: 5 TABLET ORAL at 22:20

## 2023-07-17 RX ADMIN — IPRATROPIUM BROMIDE AND ALBUTEROL SULFATE 3 ML: .5; 3 SOLUTION RESPIRATORY (INHALATION) at 15:22

## 2023-07-17 RX ADMIN — HYDRALAZINE HYDROCHLORIDE 10 MG: 10 TABLET, FILM COATED ORAL at 16:52

## 2023-07-17 RX ADMIN — NICARDIPINE HYDROCHLORIDE 7.5 MG/HR: 25 INJECTION, SOLUTION INTRAVENOUS at 03:10

## 2023-07-17 RX ADMIN — ENOXAPARIN SODIUM 40 MG: 100 INJECTION SUBCUTANEOUS at 16:52

## 2023-07-17 RX ADMIN — HYDRALAZINE HYDROCHLORIDE 10 MG: 10 TABLET, FILM COATED ORAL at 20:06

## 2023-07-17 RX ADMIN — CARVEDILOL 6.25 MG: 6.25 TABLET, FILM COATED ORAL at 17:06

## 2023-07-17 RX ADMIN — Medication 10 ML: at 09:19

## 2023-07-17 RX ADMIN — NICARDIPINE HYDROCHLORIDE 5 MG/HR: 25 INJECTION, SOLUTION INTRAVENOUS at 09:18

## 2023-07-17 RX ADMIN — SENNOSIDES AND DOCUSATE SODIUM 2 TABLET: 50; 8.6 TABLET ORAL at 20:06

## 2023-07-17 RX ADMIN — IPRATROPIUM BROMIDE AND ALBUTEROL SULFATE 3 ML: .5; 3 SOLUTION RESPIRATORY (INHALATION) at 07:12

## 2023-07-17 RX ADMIN — AMLODIPINE BESYLATE 5 MG: 5 TABLET ORAL at 11:40

## 2023-07-17 RX ADMIN — SODIUM CHLORIDE, POTASSIUM CHLORIDE, SODIUM LACTATE AND CALCIUM CHLORIDE 50 ML/HR: 600; 310; 30; 20 INJECTION, SOLUTION INTRAVENOUS at 11:45

## 2023-07-17 RX ADMIN — ACETAMINOPHEN 650 MG: 325 TABLET, FILM COATED ORAL at 13:37

## 2023-07-17 RX ADMIN — ACETAMINOPHEN 650 MG: 325 TABLET, FILM COATED ORAL at 23:11

## 2023-07-17 RX ADMIN — CARVEDILOL 6.25 MG: 6.25 TABLET, FILM COATED ORAL at 07:56

## 2023-07-17 RX ADMIN — Medication 10 ML: at 20:06

## 2023-07-17 NOTE — PAYOR COMM NOTE
"07/16/23 0800  Inpatient Admission  Once     Completed     Level of Care: Progressive Care   Diagnosis: Hypertensive emergency [312204]   Admitting Physician: ELIZA FARAH [[id number]]   Attending Physician: ELIZA FARAH [[id number]]   Certification: I Certify That Inpatient Hospital Services Are Medically Necessary For Greater Than 2 Midnights             Scott Gaytan (48 y.o. Male)       Date of Birth   1975    Social Security Number       Address   [address] [place] [address] [place] [address]    Home Phone   [phone]    MRN   [id number]       [employer]   [facility]    Marital Status   Single                            Admission Date   7/16/23    Admission Type   Emergency    Admitting Provider       Attending Provider   Bebe Sparks MD    Department, Room/Bed   [facility] 2D, 252/1       Discharge Date       Discharge Disposition       Discharge Destination                                 Attending Provider: Bebe Sparks MD    Allergies: No Known Allergies    Isolation: None   Infection: None   Code Status: CPR    Ht: 172.7 cm (68\")   Wt: 80.3 kg (177 lb 0.5 oz)    Admission Cmt: None   Principal Problem: Hypertensive emergency [I16.1]                   Active Insurance as of 7/16/2023       Primary Coverage       [place]or Plan Insurance Group Employer/Plan Group    [place] [id number]       Payor Plan Address Payor Plan Phone Number Payor Plan Fax Number Effective Dates    MAIL STOP:   8/1/2021 - None Entered    [address] [address]       [facility] [address]         Subscriber Name Subscriber Birth Date Member ID       SCOTT GAYTAN 1975 [id number]                     Emergency Contacts        (Rel.) Home Phone Work Phone Mobile Phone    KARMA JASSO (Sister) [phone] -- [phone]    JENNYBELINDA (Significant Other) [phone] -- [phone]                 History & Physical        Eliza Farah MD at 07/16/23 0808  "             [facility] Medicine Services  History & Physical    Patient Name: Scott Gaytan  : 1975  MRN: [id number]  Primary Care Physician:  Sidra Hand DO  Date of admission: 2023  Date and Time of Service: 2023 at 8:10 AM    Subjective      Chief Complaint: Shortness of breath for the past 3 to 4 days.    History of Present Illness: Scott Gaytan is a 48 y.o. male past medical history of hypertension, questionable CHF and amphetamine use who presented to [facility] on 2023 complaining of shortness of breath for the past 2 3 days.  Patient denies any significant chest pains nausea vomiting or diaphoresis or leg swelling.  His blood pressure was extremely high in the ER and had to be started on nitroglycerin drip.  At this point he is not talking to me although he is awake and listening to me and does not give me any history or review of systems.  History is mainly taken from the chart.  He appears comfortable though at this point.      ROS   Patient refusing to give me any review of systems.    Personal History     Past Medical History:   Diagnosis Date    Hypertension     Substance abuse Hx     Meth/ MJ----QUIT        Past Surgical History:   Procedure Laterality Date    CARDIAC CATHETERIZATION N/A 2022    ORIF TIBIA/FIBULA FRACTURES Right        Family History: family history includes Arthritis in his mother; Diabetes in his mother; Heart attack in his father and paternal grandfather; Hypertension in his father; Seizures in his sister. Otherwise pertinent FHx was reviewed and not pertinent to current issue.    Social History:  reports that he has been smoking cigarettes. He started smoking about 29 years ago. He has a 15.00 pack-year smoking history. He has never been exposed to tobacco smoke. He has never used smokeless tobacco. He reports that he does not currently use alcohol. He reports that he does not currently use drugs after having used the  following drugs: Methamphetamines and Marijuana.    Home Medications:  Prior to Admission Medications       Prescriptions Last Dose Informant Patient Reported? Taking?    albuterol sulfate  (90 Base) MCG/ACT inhaler   No No    Inhale 2 puffs Every 6 (Six) Hours As Needed for Wheezing or Shortness of Air.    carvedilol (Coreg) 6.25 MG tablet   No No    Take 1 tablet by mouth 2 (Two) Times a Day With Meals.    hydrALAZINE (APRESOLINE) 10 MG tablet   No No    Take 1 tablet by mouth 3 (Three) Times a Day.    Umeclidinium-Vilanterol (Anoro Ellipta) 62.5-25 MCG/ACT aerosol powder  inhaler   No No    Inhale 1 puff Daily.    varenicline (Chantix Continuing Month Luis) 1 MG tablet   No No    1/2 tab po qhs x 3 days then 1/2 tab po bid x 3 days then 1 po bid    verapamil SR (CALAN-SR) 180 MG CR tablet   No No    Take 1 tablet by mouth Every Night.              Allergies:  No Known Allergies    Objective      Vitals:   Temp:  [97.6 °F (36.4 °C)] 97.6 °F (36.4 °C)  Heart Rate:  [80-89] 80  Resp:  [18] 18  BP: (121-204)/() 168/113    Physical Exam  Constitutional:       General: He is not in acute distress.     Comments: But not talking to me and not answering any questions.   HENT:      Head: Normocephalic and atraumatic.   Cardiovascular:      Rate and Rhythm: Normal rate and regular rhythm.      Pulses: Normal pulses.      Heart sounds: Normal heart sounds.   Pulmonary:      Effort: Pulmonary effort is normal.      Comments: Patient has crackles in the lower one third lung fields bilaterally.  Abdominal:      General: There is no distension.      Palpations: Abdomen is soft.      Tenderness: There is no abdominal tenderness.   Musculoskeletal:      Cervical back: Neck supple.      Right lower leg: No edema.      Left lower leg: No edema.   Skin:     General: Skin is warm and dry.   Neurological:      General: No focal deficit present.      Mental Status: He is alert.   Psychiatric:      Comments: Patient is not  talking to me and not responding to any questions.  Although he is awake and listening to me.          Result Review    Result Review:  I have personally reviewed the results from the time of this admission to 7/16/2023 08:08 EDT and agree with these findings:  [x]  Laboratory  []  Microbiology  [x]  Radiology  [x]  EKG/Telemetry   [x]  Cardiology/Vascular   []  Pathology  [x]  Old records  []  Other:  Most notable findings include:   CBC shows WBC of 11.9, H&H of 13.5 and 40.3 and platelets of 237.  BMP shows sodium of 139, potassium 3.0, chloride 102, carbon dioxide 23, BUN 22, creatinine 1.42.  Glucose is 102.  High-sensitivity troponin was 66.  proBNP was 13,526.  His urine drug screen is positive for amphetamines  Chest x-ray showed borderline cardiomegaly and pulmonary vascular congestion with suspected mild interstitial edema or atypical infection in the correct clinical setting.  EKG reviewed by me patient is in normal sinus rhythm with multiple PVCs.  Left anterior fascicular block, LVH with secondary repolarization abnormality.    Assessment & Plan        Active Hospital Problems:  Active Hospital Problems    Diagnosis     **Hypertensive emergency      Plan:   48-year-old male with past medical history of hypertension, questionable CHF and amphetamine use came in probably after taking amphetamine again as his yearly urine drug screen is positive with hypertensive emergency with pulmonary edema.    #Hypertensive emergency:  Patient is started on nitroglycerin drip.  We will restart his home medications.  We will get a cardiology consult.  We will admit the patient to PCU for the nitroglycerin drip.  We will try to wean drip off as tolerated by blood pressure.  We will repeat another troponin in an hour.      #Pulmonary edema:  Questionable acute on chronic congestive heart failure versus flash pulmonary edema secondary to uncontrolled blood pressure.  Patient received a dose of Lasix IV 80 mg in the ER this  morning.  We will check another chest x-ray and BMP in the morning.  Patient had an echo in December 2022 which appeared to be quite normal, will leave at cardiology discretion if they want to repeat an echo at this point.    #Hypokalemia:  Replace and recheck in the morning.    #Renal failure:  Appears to be acute in nature, questionable secondary to hypertensive emergency.  We will repeat BMP in the morning to follow-up on that.    #Amphetamine use:  Urine drug screen is positive.  Drug plan counseling done.  This may be the underlying problem for all his problems at this point.    Patient is full code.    I have utilized all available immediate resources to obtain, update, or review the patient's current medications.       DVT prophylaxis:  Medical DVT prophylaxis orders are present.    CODE STATUS:    Level Of Support Discussed With: Patient  Code Status (Patient has no pulse and is not breathing): CPR (Attempt to Resuscitate)  Medical Interventions (Patient has pulse or is breathing): Full Support    Admission Status:  I believe this patient meets admission status.    I discussed the patient's findings and my recommendations with patient.        Signature: Electronically signed by Eliza Farah MD, 07/16/23, 08:08 EDT.  [facility] Hospitalist Team    Electronically signed by Elzia Farah MD at 07/16/23 0817          Emergency Department Notes        Feng Real, JABIER at 07/16/23 1422          Nursing report ED to floor  Scott Gaytan  48 y.o.  male    HPI:   Chief Complaint   Patient presents with    Shortness of Breath       Admitting doctor:   Eliza Farah MD    Admitting diagnosis:   The primary encounter diagnosis was Dyspnea, unspecified type. Diagnoses of Hypertensive emergency and Acute pulmonary edema were also pertinent to this visit.    Code status:   Current Code Status       Date Active Code Status Order ID Comments User Context       7/16/2023 0807 CPR (Attempt to Resuscitate) [id number]  Gagan  MD Eliza ED        Question Answer    Code Status (Patient has no pulse and is not breathing) CPR (Attempt to Resuscitate)    Medical Interventions (Patient has pulse or is breathing) Full Support    Level Of Support Discussed With Patient                    Allergies:   Patient has no known allergies.    Isolation:  No active isolations     Fall Risk:  Fall Risk Assessment was completed, and patient is at low risk for falls.   Predictive Model Details         3 (Low) Factor Value    Calculated 7/16/2023 14:07 Age 48    Risk of Fall Model Musculoskeletal Assessment WDL     Active Peripheral IV Present     Imaging order in this encounter Present     Number of Distinct Medication Classes administered 6     Skin Assessment WDL     Magnesium 1.9 mg/dL     Drug Use No     Calcium 8.6 mg/dL     Tobacco Use Current     Sy Scale not on file     Peripheral Vascular Assessment WDL     Creatinine 1.42 mg/dL     Financial Class Medicaid     Clinically Relevant Sex Not Female     Gastrointestinal Assessment WDL     Albumin not on file     Days after Admission 0.408     Chloride 102 mmol/L     Cardiac Assessment WDL     Respiratory Rate 16     Number of administrations of Anti-Hypertensives 1     Diastolic      Total Bilirubin not on file     Potassium 3 mmol/L     ALT not on file         Weight:       07/16/23  0416   Weight: 83.6 kg (184 lb 4.9 oz)       Intake and Output    Intake/Output Summary (Last 24 hours) at 7/16/2023 1422  Last data filed at 7/16/2023 1158  Gross per 24 hour   Intake --   Output 3600 ml   Net -3600 ml       Diet:   Dietary Orders (From admission, onward)       Start     Ordered    07/16/23 0802  Diet: Cardiac Diets; Healthy Heart (2-3 Na+); Texture: Regular Texture (IDDSI 7); Fluid Consistency: Thin (IDDSI 0)  Diet Effective Now        References:    Diet Order [place]   Question Answer Comment   Diets: Cardiac Diets    Cardiac Diet: Healthy Heart (2-3 Na+)    Texture: Regular Texture  (IDDSI 7)    Fluid Consistency: Thin (IDDSI 0)        07/16/23 0807                     Most recent vitals:   Vitals:    07/16/23 1201 07/16/23 1300 07/16/23 1352 07/16/23 1401   BP: 139/98  (!) 146/104 (!) 164/119   Pulse: 64 74 73 77   Resp: 18 18 16 18   Temp:       SpO2: 93% 95% 95% 94%   Weight:       Height:           Active LDAs/IV Access:   Lines, Drains & Airways       Active LDAs       Name Placement date Placement time Site Days    Peripheral IV 07/16/23 0717 Anterior;Left Forearm 07/16/23 0717  Forearm  less than 1                    Skin Condition:   Skin Assessments (last day)       None             Labs (abnormal labs have a star):   Labs Reviewed   BASIC METABOLIC PANEL - Abnormal; Notable for the following components:       Result Value    Glucose 102 (*)     BUN 22 (*)     Creatinine 1.42 (*)     Potassium 3.0 (*)     All other components within normal limits    Narrative:     GFR Normal >60  Chronic Kidney Disease <60  Kidney Failure <15     APTT - Abnormal; Notable for the following components:    PTT 25.9 (*)     All other components within normal limits   SINGLE HSTROPONIN T - Abnormal; Notable for the following components:    HS Troponin T 66 (*)     All other components within normal limits    Narrative:     High Sensitive Troponin T Reference Range:  <10.0 ng/L- Negative Female for AMI  <15.0 ng/L- Negative Male for AMI  >=10 - Abnormal Female indicating possible myocardial injury.  >=15 - Abnormal Male indicating possible myocardial injury.   Clinicians would have to utilize clinical acumen, EKG, Troponin, and serial changes to determine if it is an Acute Myocardial Infarction or myocardial injury due to an underlying chronic condition.        BNP (IN-HOUSE) - Abnormal; Notable for the following components:    proBNP 13,526.0 (*)     All other components within normal limits    Narrative:     Among patients with dyspnea, NT-proBNP is highly sensitive for the detection of acute congestive  heart failure. In addition NT-proBNP of <300 pg/ml effectively rules out acute congestive heart failure with 99% negative predictive value.    Results may be falsely decreased if patient taking Biotin.     CBC WITH AUTO DIFFERENTIAL - Abnormal; Notable for the following components:    WBC 11.90 (*)     Neutrophils, Absolute 7.70 (*)     All other components within normal limits   HIGH SENSITIVITIY TROPONIN T 2HR - Abnormal; Notable for the following components:    HS Troponin T 59 (*)     Troponin T Delta -7 (*)     All other components within normal limits    Narrative:     High Sensitive Troponin T Reference Range:  <10.0 ng/L- Negative Female for AMI  <15.0 ng/L- Negative Male for AMI  >=10 - Abnormal Female indicating possible myocardial injury.  >=15 - Abnormal Male indicating possible myocardial injury.   Clinicians would have to utilize clinical acumen, EKG, Troponin, and serial changes to determine if it is an Acute Myocardial Infarction or myocardial injury due to an underlying chronic condition.        URINE DRUG SCREEN - Abnormal; Notable for the following components:    Amphet/Methamphet, Screen Positive (*)     All other components within normal limits    Narrative:     Negative Thresholds Per Drugs Screened:    Amphetamines                 500 ng/ml  Barbiturates                 200 ng/ml  Benzodiazepines              100 ng/ml  Cocaine                      300 ng/ml  Methadone                    300 ng/ml  Opiates                      300 ng/ml  Oxycodone                    100 ng/ml  THC                           50 ng/ml    The Normal Value for all drugs tested is negative. This report includes final unconfirmed screening results to be used for medical treatment purposes only. Unconfirmed results must not be used for non-medical purposes such as employment or legal testing. Clinical consideration should be applied to any drug of abuse test, particularly when unconfirmed results are used.           All urine drugs of abuse requests without chain of custody are for medical screening purposes only.  False positives are possible.     PROTIME-INR - Normal   MAGNESIUM - Normal   CBC AND DIFFERENTIAL    Narrative:     The following orders were created for panel order CBC & Differential.  Procedure                               Abnormality         Status                     ---------                               -----------         ------                     CBC Auto Differential[[id number]]        Abnormal            Final result                 Please view results for these tests on the individual orders.       LOC: Person, Place, Time, and Situation    Telemetry:  Progressive Care    Cardiac Monitoring Ordered: yes    EKG:   ECG 12 Lead Dyspnea   Preliminary Result   HEART RATE= 82  bpm   RR Interval= 740  ms   NJ Interval= 189  ms   P Horizontal Axis= -2  deg   P Front Axis= 30  deg   QRSD Interval= 111  ms   QT Interval= 415  ms   QRS Axis= -40  deg   T Wave Axis= 114  deg   - ABNORMAL ECG -   Sinus rhythm   Multiple ventricular premature complexes   Probable left atrial enlargement   Left anterior fascicular block   LVH with secondary repolarization abnormality   Repolarization abnormalities   When compared with ECG of 06-Dec-2022 4:38:10,   Significant axis, voltage or hypertrophy change   Electronically Signed By:    Date and Time of Study: 2023-07-16 04:23:14          Medications Given in the ED:   Medications   sodium chloride 0.9 % flush 10 mL (has no administration in time range)   nitroglycerin (TRIDIL) 200 mcg/ml infusion (50 mcg/min Intravenous Rate/Dose Change 7/16/23 0643)   albuterol sulfate HFA (PROVENTIL HFA;VENTOLIN HFA;PROAIR HFA) inhaler 2 puff (has no administration in time range)   carvedilol (COREG) tablet 6.25 mg (6.25 mg Oral Given 7/16/23 0825)   hydrALAZINE (APRESOLINE) tablet 10 mg (10 mg Oral Given 7/16/23 0825)   ipratropium-albuterol (DUO-NEB) nebulizer solution 3 mL (has no  administration in time range)   verapamil SR (CALAN-SR) CR tablet 180 mg (has no administration in time range)   sodium chloride 0.9 % flush 10 mL (10 mL Intravenous Not Given 7/16/23 0822)   sodium chloride 0.9 % flush 10 mL (has no administration in time range)   sodium chloride 0.9 % infusion 40 mL (has no administration in time range)   sennosides-docusate (PERICOLACE) 8.6-50 MG per tablet 2 tablet (2 tablets Oral Not Given 7/16/23 0826)     And   polyethylene glycol (MIRALAX) packet 17 g (has no administration in time range)     And   bisacodyl (DULCOLAX) EC tablet 5 mg (has no administration in time range)     And   bisacodyl (DULCOLAX) suppository 10 mg (has no administration in time range)   Enoxaparin Sodium (LOVENOX) syringe 40 mg (has no administration in time range)   nitroglycerin (NITROSTAT) SL tablet 0.4 mg (has no administration in time range)   acetaminophen (TYLENOL) tablet 650 mg (650 mg Oral Given 7/16/23 1352)   Potassium Replacement - Follow Nurse / BPA Driven Protocol (has no administration in time range)   Magnesium Standard Dose Replacement - Follow Nurse / BPA Driven Protocol (has no administration in time range)   Phosphorus Replacement - Follow Nurse / BPA Driven Protocol (has no administration in time range)   Calcium Replacement - Follow Nurse / BPA Driven Protocol (has no administration in time range)   ondansetron (ZOFRAN) injection 4 mg (has no administration in time range)   potassium chloride (K-DUR,KLOR-CON) CR tablet 40 mEq (40 mEq Oral Given 7/16/23 1225)   furosemide (LASIX) injection 80 mg (80 mg Intravenous Given 7/16/23 0633)   potassium chloride (K-DUR,KLOR-CON) CR tablet 40 mEq (40 mEq Oral Given 7/16/23 0825)       Imaging results:  XR Chest 1 View    Result Date: 7/16/2023  Impression: 1. Borderline cardiomegaly and pulmonary vascular congestion with suspected mild interstitial edema and/or atypical infection in the correct clinical setting. Electronically Signed:  Jason Mckinnon  7/16/2023 7:43 AM EDT  Workstation ID: [id number]     Social issues:   Social History     Socioeconomic History    Marital status: Single   Tobacco Use    Smoking status: Every Day     Packs/day: 1.00     Years: 15.00     Pack years: 15.00     Types: Cigarettes     Start date: 1994     Passive exposure: Never    Smokeless tobacco: Never   Vaping Use    Vaping Use: Never used   Substance and Sexual Activity    Alcohol use: Not Currently    Drug use: Not Currently     Types: Methamphetamines, Marijuana     Comment: quit 2022    Sexual activity: Defer       NIH Stroke Scale:  Interval: (not recorded)  1a. Level of Consciousness: (not recorded)  1b. LOC Questions: (not recorded)  1c. LOC Commands: (not recorded)  2. Best Gaze: (not recorded)  3. Visual: (not recorded)  4. Facial Palsy: (not recorded)  5a. Motor Arm, Left: (not recorded)  5b. Motor Arm, Right: (not recorded)  6a. Motor Leg, Left: (not recorded)  6b. Motor Leg, Right: (not recorded)  7. Limb Ataxia: (not recorded)  8. Sensory: (not recorded)  9. Best Language: (not recorded)  10. Dysarthria: (not recorded)  11. Extinction and Inattention (formerly Neglect): (not recorded)    Total (NIH Stroke Scale): (not recorded)     Additional notable assessment information:     Nursing report ED to floor:  Fran Cooper RN   07/16/23 14:22 EDT       Electronically signed by Feng Real RN at 07/16/23 1422       Helen Ordaz RN at 07/16/23 0623          Dr. Skaggs aware of patients refusal to be placed on O2. Sat 88%    Electronically signed by Helen Ordaz RN at 07/16/23 0623       Helen Ordaz RN at 07/16/23 0601          Notified Dr. Skaggs of HTN    Electronically signed by Helen Ordaz RN at 07/16/23 0601       Arun Skaggs MD at 07/16/23 1236          Subjective   History of Present Illness  Chief complaint: Shortness of breath    48-year-old male presents with shortness of breath.  Patient states he has a  "history of congestive heart failure.  He states symptoms have been getting progressively worse over the past few days.  He has had a mildly productive cough.  He denies fever.  He has had no vomiting or diarrhea.  He reports some mild brief episodes of chest discomfort.  He states shortness of breath is worse with exertion.    History provided by:  Patient    Review of Systems   Constitutional:  Negative for fever.   HENT:  Negative for congestion.    Respiratory:  Positive for cough and shortness of breath.    Cardiovascular:  Positive for chest pain and leg swelling.   Gastrointestinal:  Negative for abdominal pain, diarrhea and vomiting.   Musculoskeletal:  Negative for back pain.   Neurological:  Negative for headaches.     Past Medical History:   Diagnosis Date    Hypertension     Substance abuse Hx     Meth/ MJ----QUIT 2022       No Known Allergies    Past Surgical History:   Procedure Laterality Date    CARDIAC CATHETERIZATION N/A 04/27/2022    ORIF TIBIA/FIBULA FRACTURES Right        Family History   Problem Relation Age of Onset    Diabetes Mother     Arthritis Mother     Hypertension Father     Heart attack Father     Seizures Sister     Heart attack Paternal Grandfather        Social History     Socioeconomic History    Marital status: Single   Tobacco Use    Smoking status: Every Day     Packs/day: 1.00     Years: 15.00     Pack years: 15.00     Types: Cigarettes     Start date: 1994     Passive exposure: Never    Smokeless tobacco: Never   Vaping Use    Vaping Use: Never used   Substance and Sexual Activity    Alcohol use: Not Currently    Drug use: Not Currently     Types: Methamphetamines, Marijuana     Comment: quit 2022    Sexual activity: Defer       BP (!) 203/146   Pulse 84   Temp 97.6 °F (36.4 °C)   Resp 18   Ht 172.7 cm (68\")   Wt 83.6 kg (184 lb 4.9 oz)   SpO2 (!) 84%   BMI 28.02 kg/m²       Objective   Physical Exam  Vitals and nursing note reviewed.   Constitutional:       Appearance: " He is well-developed.   HENT:      Head: Normocephalic and atraumatic.      Mouth/Throat:      Mouth: Mucous membranes are moist.   Cardiovascular:      Rate and Rhythm: Normal rate and regular rhythm.      Heart sounds: Normal heart sounds.   Pulmonary:      Effort: Pulmonary effort is normal. No respiratory distress.      Breath sounds: Normal breath sounds.   Abdominal:      General: Bowel sounds are normal.      Palpations: Abdomen is soft.      Tenderness: There is no abdominal tenderness.   Musculoskeletal:      Right lower leg: No tenderness.      Left lower leg: No tenderness.      Comments: Mild bilateral lower extremity edema   Skin:     General: Skin is warm and dry.   Neurological:      Mental Status: He is alert and oriented to person, place, and time.       Procedures          ED Course      My interpretation of EKG shows sinus rhythm, rate of 82, multiple PVCs, no ST elevation     Results for orders placed or performed during the hospital encounter of 07/16/23   Basic Metabolic Panel    Specimen: Blood   Result Value Ref Range    Glucose 102 (H) 65 - 99 mg/dL    BUN 22 (H) 6 - 20 mg/dL    Creatinine 1.42 (H) 0.76 - 1.27 mg/dL    Sodium 139 136 - 145 mmol/L    Potassium 3.0 (L) 3.5 - 5.2 mmol/L    Chloride 102 98 - 107 mmol/L    CO2 23.0 22.0 - 29.0 mmol/L    Calcium 8.6 8.6 - 10.5 mg/dL    BUN/Creatinine Ratio 15.5 7.0 - 25.0    Anion Gap 14.0 5.0 - 15.0 mmol/L    eGFR 61.7 >60.0 mL/min/1.73   Protime-INR    Specimen: Blood   Result Value Ref Range    Protime 10.9 9.6 - 11.7 Seconds    INR 1.02 0.93 - 1.10   aPTT    Specimen: Blood   Result Value Ref Range    PTT 25.9 (L) 61.0 - 76.5 seconds   Single High Sensitivity Troponin T    Specimen: Blood   Result Value Ref Range    HS Troponin T 66 (C) <15 ng/L   BNP    Specimen: Blood   Result Value Ref Range    proBNP 13,526.0 (H) 0.0 - 450.0 pg/mL   CBC Auto Differential    Specimen: Blood   Result Value Ref Range    WBC 11.90 (H) 3.40 - 10.80 10*3/mm3     RBC 4.52 4.14 - 5.80 10*6/mm3    Hemoglobin 13.5 13.0 - 17.7 g/dL    Hematocrit 40.3 37.5 - 51.0 %    MCV 89.2 79.0 - 97.0 fL    MCH 29.8 26.6 - 33.0 pg    MCHC 33.4 31.5 - 35.7 g/dL    RDW 13.4 12.3 - 15.4 %    RDW-SD 41.1 37.0 - 54.0 fl    MPV 10.0 6.0 - 12.0 fL    Platelets 237 140 - 450 10*3/mm3    Neutrophil % 64.9 42.7 - 76.0 %    Lymphocyte % 24.5 19.6 - 45.3 %    Monocyte % 7.9 5.0 - 12.0 %    Eosinophil % 2.0 0.3 - 6.2 %    Basophil % 0.7 0.0 - 1.5 %    Neutrophils, Absolute 7.70 (H) 1.70 - 7.00 10*3/mm3    Lymphocytes, Absolute 2.90 0.70 - 3.10 10*3/mm3    Monocytes, Absolute 0.90 0.10 - 0.90 10*3/mm3    Eosinophils, Absolute 0.20 0.00 - 0.40 10*3/mm3    Basophils, Absolute 0.10 0.00 - 0.20 10*3/mm3    nRBC 0.1 0.0 - 0.2 /100 WBC   ECG 12 Lead Dyspnea   Result Value Ref Range    QT Interval 415 ms          My interpretation of chest x-ray shows cardiomegaly and pulmonary edema, pending radiology review.                           Medical Decision Making  Amount and/or Complexity of Data Reviewed  Labs: ordered.  Radiology: ordered.  ECG/medicine tests: ordered.    Risk  Prescription drug management.      Patient had the above evaluation.  Results were discussed with the patient.  White blood cell count was 11.9.  BMP shows BUN of 22 and creatinine 1.42.  Potassium is little low at 3.0.  Troponin is elevated at 66.  BNP is significantly elevated at 13,500.  EKG shows no acute ischemia.  Chest x-ray does show evidence of pulmonary edema.  Patient was quite hypertensive in the emergency room around 200 systolic.  He was started on a Tridil drip.  He was given Lasix.  I discussed with the nurse practitioner on-call for the hospitalist and the patient will be admitted for further evaluation and management.      Final diagnoses:   Dyspnea, unspecified type   Hypertensive emergency   Acute pulmonary edema       ED Disposition  ED Disposition       ED Disposition   Decision to Admit    Condition   --    Comment    Level of Care: Telemetry [5]   Admitting Physician: SALLY WEST [[id number]]   Attending Physician: SALLY WEST [[id number]]   Bed Request Comments: MARIA R                 No follow-up provider specified.       Medication List      No changes were made to your prescriptions during this visit.            Arun Skaggs MD  07/16/23 0639      Electronically signed by Arun Skaggs MD at 07/16/23 0639       Vital Signs (last day)       Date/Time Temp Temp src Pulse Resp BP Patient Position SpO2    07/17/23 1140 -- -- 64 -- 123/82 -- --    07/17/23 1047 97.6 (36.4) Oral 63 21 115/75 Lying 87    07/17/23 1030 -- -- 68 -- 116/74 -- 97    07/17/23 1000 -- -- 62 -- 101/62 -- 60    07/17/23 0930 -- -- 69 -- 117/74 -- 96    07/17/23 0910 -- -- 68 -- 121/80 -- 90    07/17/23 0830 -- -- 75 -- 109/60 -- --    07/17/23 0800 -- -- 64 -- 104/66 -- 93    07/17/23 0730 -- -- 68 -- 101/75 -- 100    07/17/23 0717 -- -- 69 18 -- -- 98    07/17/23 0712 -- -- 66 18 -- -- 96    07/17/23 0700 -- Oral 69 16 94/69 Lying 94    07/17/23 0630 -- -- 68 -- 100/59 -- --    07/17/23 0600 -- -- 70 -- 120/87 Lying --    07/17/23 0530 -- -- 71 -- 100/70 Lying 92    07/17/23 0500 -- -- 71 -- 133/88 Lying --    07/17/23 0430 -- -- 66 -- 132/88 -- --    07/17/23 0400 98 (36.7) Oral 71 12 127/83 Lying 94    07/17/23 0330 -- -- 68 -- 129/80 -- --    07/17/23 0300 -- -- 76 -- 118/77 Lying --    07/17/23 0230 -- -- 81 -- 140/83 Lying --    07/17/23 0200 -- -- 75 -- 122/80 Lying --    07/17/23 0130 -- -- 73 -- 131/84 Lying --    07/17/23 0120 -- -- 80 -- 131/86 Lying --    07/17/23 0100 -- -- 76 -- -- -- --    07/17/23 0050 98.5 (36.9) Oral 77 19 136/87 Lying 92    07/17/23 0030 -- -- 80 -- 137/91 -- --    07/17/23 0015 -- -- 77 -- 128/89 -- --    07/17/23 0000 -- -- 79 -- 142/101 Lying --    07/16/23 2358 -- -- 69 -- 138/102 Lying --    07/16/23 2342 -- -- 69 -- 148/117 Lying --    07/16/23 2330 -- -- 69 -- 154/106 Lying --    07/16/23 2303 -- -- 69 -- 160/122  Lying --    07/16/23 2236 -- -- 77 16 -- -- 93    07/16/23 2232 -- -- 80 16 -- -- 94    07/16/23 2220 -- -- -- -- 147/110 -- --    07/16/23 2215 -- -- -- -- 156/116 -- --    07/16/23 2200 -- -- 76 -- 140/96 Lying --    07/16/23 2145 -- -- 71 -- 134/95 Lying --    07/16/23 2132 -- -- 76 -- 153/113 Lying --    07/16/23 2115 -- -- 74 -- 144/101 Lying --    07/16/23 2101 -- -- 67 -- 157/124 Lying --    07/16/23 2000 98.3 (36.8) Oral 85 17 152/115 Lying 91    07/16/23 1939 -- -- 77 -- 148/105 -- --    07/16/23 1900 -- -- 88 -- 147/102 -- 90    07/16/23 1815 -- -- 79 -- 145/106 -- --    07/16/23 1813 98 (36.7) Oral -- -- -- -- --    07/16/23 1805 -- -- 79 -- -- -- 95    07/16/23 1800 -- -- 77 -- 145/101 -- 94    07/16/23 1759 98 (36.7) -- 80 20 154/115 Lying 96    07/16/23 1755 -- -- 76 -- 154/115 -- 95    07/16/23 1730 -- -- 77 -- 146/107 -- 94    07/16/23 1720 -- -- 76 -- 144/108 -- 94    07/16/23 1700 -- -- 84 18 148/100 -- 93    07/16/23 1620 -- -- 81 -- 146/107 -- --    07/16/23 1605 -- -- 77 -- 157/107 -- 84    07/16/23 1600 -- -- 80 18 -- -- 93    07/16/23 1501 -- -- 81 16 150/99 -- 93    07/16/23 1401 -- -- 77 18 164/119 -- 94    07/16/23 13:52:37 -- -- 73 16 146/104 -- 95    07/16/23 1300 -- -- 74 18 -- -- 95    07/16/23 1201 -- -- 64 18 139/98 -- 93    07/16/23 1101 -- -- 71 18 137/99 -- 94    07/16/23 1016 -- -- 66 16 148/99 -- 93    07/16/23 1002 -- -- 73 18 153/109 -- 95    07/16/23 0901 -- -- 75 18 160/119 -- 94    07/16/23 0816 -- -- 78 18 169/130 -- 94    07/16/23 0801 -- -- 80 18 168/113 -- 93    07/16/23 0716 -- -- 84 18 191/112 -- 93    07/16/23 0642 -- -- 82 -- 191/127 -- --    07/16/23 0636 -- -- 88 -- 190/143 -- 87    07/16/23 0633 -- -- 84 -- 203/146 -- --    07/16/23 0621 -- -- 89 -- 197/148 -- 84    07/16/23 0612 -- -- 87 -- 204/154 -- --    07/16/23 0446 -- -- 85 -- 196/141 -- 94    07/16/23 0431 -- -- 85 -- 193/142 -- 94    07/16/23 0416 97.6 (36.4) -- 85 18 121/58 -- 96          Oxygen Therapy  (last day)       Date/Time SpO2 Device (Oxygen Therapy) Flow (L/min) Oxygen Concentration (%) ETCO2 (mmHg)    07/17/23 1047 87 room air -- -- --    07/17/23 1030 97 -- -- -- --    07/17/23 1000 60 -- -- -- --    07/17/23 0930 96 -- -- -- --    07/17/23 0910 90 -- -- -- --    07/17/23 0800 93 -- -- -- --    07/17/23 0730 100 -- -- -- --    07/17/23 0717 98 room air -- -- --    07/17/23 0712 96 room air -- -- --    07/17/23 0700 94 room air -- -- --    07/17/23 0530 92 -- -- -- --    07/17/23 0400 94 room air -- -- --    07/17/23 0050 92 -- -- -- --    07/16/23 2236 93 room air -- -- --    07/16/23 2232 94 room air -- -- --    07/16/23 2000 91 room air -- -- --    07/16/23 1941 -- room air -- -- --    07/16/23 1900 90 -- -- -- --    07/16/23 1805 95 -- -- -- --    07/16/23 1801 -- room air -- -- --    07/16/23 1800 94 -- -- -- --    07/16/23 1759 96 room air -- -- --    07/16/23 1755 95 -- -- -- --    07/16/23 1730 94 -- -- -- --    07/16/23 1720 94 -- -- -- --    07/16/23 1700 93 room air -- -- --    07/16/23 1605 84 -- -- -- --    07/16/23 1600 93 room air -- -- --    07/16/23 1501 93 room air -- -- --    07/16/23 1401 94 room air -- -- --    07/16/23 13:52:37 95 room air -- -- --    07/16/23 1300 95 room air -- -- --    07/16/23 1201 93 room air -- -- --    07/16/23 1101 94 room air -- -- --    07/16/23 1016 93 room air -- -- --    07/16/23 1002 95 room air -- -- --    07/16/23 0901 94 room air -- -- --    07/16/23 0816 94 room air -- -- --    07/16/23 0801 93 room air -- -- --    07/16/23 0716 93 room air -- -- --    07/16/23 0636 87 -- -- -- --    07/16/23 0621 84 -- -- -- --    07/16/23 0446 94 -- -- -- --    07/16/23 0431 94 -- -- -- --    07/16/23 0416 96 -- -- -- --          Facility-Administered Medications as of 7/17/2023   Medication Dose Route Frequency Provider Last Rate Last Admin    acetaminophen (TYLENOL) tablet 650 mg  650 mg Oral Q4H Eliza Bates MD   650 mg at 07/16/23 7856    albuterol  sulfate HFA (PROVENTIL HFA;VENTOLIN HFA;PROAIR HFA) inhaler 2 puff  2 puff Inhalation Q6H PRN Eliza Farah MD        amLODIPine (NORVASC) tablet 5 mg  5 mg Oral Q12H NaNelson farmer MD   5 mg at 07/17/23 1140    sennosides-docusate (PERICOLACE) 8.6-50 MG per tablet 2 tablet  2 tablet Oral BID Eliza Farah MD   2 tablet at 07/16/23 2025    And    polyethylene glycol (MIRALAX) packet 17 g  17 g Oral Daily PRN Eliza Farah MD        And    bisacodyl (DULCOLAX) EC tablet 5 mg  5 mg Oral Daily PRN Eliza Farah MD        And    bisacodyl (DULCOLAX) suppository 10 mg  10 mg Rectal Daily PRN Eliza Farah MD        Calcium Replacement - Follow Nurse / BPA Driven Protocol   Does not apply PRN Eliza Farah MD        carvedilol (COREG) tablet 6.25 mg  6.25 mg Oral BID With Meals Eliza Farah MD   6.25 mg at 07/17/23 0756    Enoxaparin Sodium (LOVENOX) syringe 40 mg  40 mg Subcutaneous Daily Eliza Farah MD   40 mg at 07/16/23 1620    [COMPLETED] furosemide (LASIX) injection 80 mg  80 mg Intravenous Once Arun Skaggs MD   80 mg at 07/16/23 0633    hydrALAZINE (APRESOLINE) tablet 10 mg  10 mg Oral TID Eliza Farah MD   10 mg at 07/16/23 2025    ipratropium-albuterol (DUO-NEB) nebulizer solution 3 mL  3 mL Nebulization Q8H - RT Eliza Farah MD   3 mL at 07/17/23 0712    lactated ringers infusion  50 mL/hr Intravenous Continuous Bebe Sparks MD 50 mL/hr at 07/17/23 1145 50 mL/hr at 07/17/23 1145    Magnesium Standard Dose Replacement - Follow Nurse / BPA Driven Protocol   Does not apply PREliza Yang MD        nitroglycerin (NITROSTAT) SL tablet 0.4 mg  0.4 mg Sublingual Q5 Min PRN Eliza Farah MD        ondansetron (ZOFRAN) injection 4 mg  4 mg Intravenous Q6H PRN Eliza Farah MD        Phosphorus Replacement - Follow Nurse / BPA Driven Protocol   Does not apply PRN Eliza Farah MD        [COMPLETED] potassium chloride (K-DUR,KLOR-CON) CR tablet 40 mEq  40 mEq Oral Once Eliza Farah MD   40 mEq at 07/16/23  0825    [COMPLETED] potassium chloride (K-DUR,KLOR-CON) CR tablet 40 mEq  40 mEq Oral Q4H Eliza Farah MD   40 mEq at 23 1620    Potassium Replacement - Follow Nurse / BPA Driven Protocol   Does not apply PRN Eliza Farah MD        sodium chloride 0.9 % flush 10 mL  10 mL Intravenous PRN Arun Skaggs MD        sodium chloride 0.9 % flush 10 mL  10 mL Intravenous Q12H Eliza Farah MD   10 mL at 23 0919    sodium chloride 0.9 % flush 10 mL  10 mL Intravenous PRN Eliza Farah MD        sodium chloride 0.9 % infusion 40 mL  40 mL Intravenous PRN Eliza Farah MD         Operative/Procedure Notes (all)    No notes of this type exist for this encounter.       Physician Progress Notes (all)    No notes of this type exist for this encounter.          Consult Notes (all)        Nelson Das MD at 23 0951              INITIAL CONSULT NOTE      Patient Name: Scott Gaytan  : 1975  MRN: [id number]  Primary Care Physician: Sidra Hand DO  Date of admission: 2023    Patient Care Team:  Sidra Hand DO as PCP - General (Family Medicine)        Reason for Consult:       Acute renal failure  Subjective   History of Present Illness:   Chief Complaint:   Chief Complaint   Patient presents with    Shortness of Breath     HISTORY:  Scott Gaytan is a 48 y.o. male with past medical history of some chronic kidney disease underlying congestive heart failure.  With some diastolic dysfunctions.  History of amphetamine use was recently at [facility] seen by my associate and being followed up by him Dr. ROSALES at that time patient was told that he has significant cardiorenal syndrome that most likely causing JOSE ANGEL in addition there is some evidence of drug abuse.  Who presents with renal failure and shortness of breath again.  No significant swelling of the lower extremities.  Was with significantly high blood pressure systolic blood pressure around was above 200 at the time of admission  now much better.  In fact dropped to 90s for shortperiod of time.  Creatinine that was 1.4 increased to 1.9 at this time.  Potassium is 4.1.  Patient do have history of significant hypokalemia.  Patient has aldosterone level done at the [facility] also work-up done for metanephrine I do not have these results available to me but seems to be unremarkable patient to have a proteinuria 1 g.  RENAL ULTRASOUND UNREMARKABLE, renal Doppler normal.  Recent echocardiogram showed EF is 55% with left ventricular hypertrophy          Review of systems:  All other review of system unremarkable  Constitutional: No fever, no chills, no lethargy, no weakness.  HEENT:  No headache, otalgia, itchy eyes, nasal discharge or sore throat.  Cardiac:  No chest pain, dyspnea, orthopnea or PND.  Chest:              No cough, phlegm or wheezing.  Abdomen:  No abdominal pain, nausea or vomiting.  Neuro:  No focal weakness, abnormal movements orseizure like activity.  :   No hematuria, no pyuria, no dysuria, no flank pain.  ROS was otherwise negative except as mentioned in the [place].       Personal History:     Past Medical History:   Past Medical History:   Diagnosis Date    Hypertension     Substance abuse     Meth/MJ       Surgical History:      Past Surgical History:   Procedure Laterality Date    CARDIAC CATHETERIZATION N/A 04/27/2022    ORIF TIBIA/FIBULA FRACTURES Right        Family History: family history includes Arthritis in his mother; Diabetes in his mother; Heart attack in his father and paternal grandfather; Hypertension in his father; Seizures in his sister. Otherwise pertinent FHx was reviewed and unremarkable.     Social History:  reports that he has been smoking cigarettes. He started smoking about 29 years ago. He has a 15.00 pack-year smoking history. He has never been exposed to tobacco smoke. He has never used smokeless tobacco. He reports that he does not currently use alcohol. He reports that he does not currently  use drugs after having used the following drugs: Methamphetamines and Marijuana.    Medications:  Prior to Admission medications    Not on File     Scheduled Meds:carvedilol, 6.25 mg, Oral, BID With Meals  enoxaparin, 40 mg, Subcutaneous, Daily  hydrALAZINE, 10 mg, Oral, TID  ipratropium-albuterol, 3 mL, Nebulization, Q8H - RT  senna-docusate sodium, 2 tablet, Oral, BID  sodium chloride, 10 mL, Intravenous, Q12H  verapamil SR, 180 mg, Oral, Nightly      Continuous Infusions:lactated ringers, 50 mL/hr  niCARdipine, 5-15 mg/hr, Last Rate: 5 mg/hr (07/17/23 0918)      PRN Meds:  acetaminophen    albuterol sulfate HFA    senna-docusate sodium **AND** polyethylene glycol **AND** bisacodyl **AND** bisacodyl    Calcium Replacement - Follow Nurse / BPA Driven Protocol    Magnesium Standard Dose Replacement - Follow Nurse / BPA Driven Protocol    nitroglycerin    ondansetron    Phosphorus Replacement - Follow Nurse / BPA Driven Protocol    Potassium Replacement - Follow Nurse / BPA Driven Protocol    [COMPLETED] Insert Peripheral IV **AND** sodium chloride    sodium chloride    sodium chloride  Allergies:  No Known Allergies    Objective   Exam:     Vital Signs  Temp:  [98 °F (36.7 °C)-98.5 °F (36.9 °C)] 98 °F (36.7 °C)  Heart Rate:  [64-88] 69  Resp:  [12-20] 18  BP: ()/() 117/74  SpO2:  [84 %-100 %] 96 %  on   ;   Device (Oxygen Therapy): room air  Body mass index is 26.92 kg/m².  EXAM  General: Middle-age male in no acute distress.    Head:      Normocephalic and atraumatic.    Eyes:      PERRL/EOM intact, conjunctivae and sclerae clear without nystagmus.    Neck:      No masses, thyromegaly,  trachea central   Lungs:    Clear bilaterally to auscultation.    Heart:      Regular rate and rhythm, no murmur no gallop  Abd:        Soft, nontender, not distended, bowel sounds positive, no shifting dullness.  Msk:        No deformity or scoliosis noted of thoracic or lumbar spine.    Pulses:   Pulses normal in all 4  extremities.    Extremities:        No cyanosis or clubbing--no significant edema.    Neuro:    No focal deficits.   alert oriented x3  Skin:       Intact without lesions or rashes.    Psych:    Alert and cooperative; normal mood and affect; normal attention span       Results Review:  I have personally reviewed most recent Data :  BMP @LABRCNT(creatinine:10)  CBC    Results from last 7 days   Lab Units 07/16/23  2221 07/16/23  0539   WBC 10*3/mm3 10.60 11.90*   HEMOGLOBIN g/dL 12.5* 13.5   PLATELETS 10*3/mm3 235 237     CMP   Results from last 7 days   Lab Units 07/16/23  2221 07/16/23  0539   SODIUM mmol/L 139 139   POTASSIUM mmol/L 4.1 3.0*   CHLORIDE mmol/L 103 102   CO2 mmol/L 24.0 23.0   BUN mg/dL 28* 22*   CREATININE mg/dL 1.92* 1.42*   GLUCOSE mg/dL 118* 102*     ABG      XR Chest 1 View    Result Date: 7/17/2023  Impression: Near complete resolution of pulmonary edema. Electronically Signed: Jenifer Arteaga MD  7/17/2023 8:56 AM EDT  Workstation ID: [id number]    XR Chest 1 View    Result Date: 7/16/2023  Impression: 1. Borderline cardiomegaly and pulmonary vascular congestion with suspected mild interstitial edema and/or atypical infection in the correct clinical setting. Electronically Signed: Jason Mckinnon  7/16/2023 7:43 AM EDT  Workstation ID: [id number]     Results for orders placed during the hospital encounter of 12/06/22    Adult Transthoracic Echo Complete w/ Color, Spectral and Contrast if Necessary Per Protocol    Interpretation Summary    Left ventricular systolic function is normal. Left ventricular ejection fraction appears to be 61 - 65%.    Left ventricular diastolic function was normal.    Estimated right ventricular systolic pressure from tricuspid regurgitation is normal (<35 mmHg).        Assessment & Plan   Assessment and Plan:         Hypertensive emergency    ASSESSMENT:  Acute kidney injury  Congestive heart failure with preserved ejection fraction  Chronic kidney disease with some  proteinuria  Accelerated/malignant hypertension with pulmonary edema  Mild hypokalemia          PLAN :     I will repeat Renin and aldosterone level specially at the time of presentation patient was hypokalemic  Patient need to be compliant with the medication  Follow-up with Renin and aldosterone level  Follow-up with a fractionated metanephrine level all these labs were done at [facility]  Control blood pressure with beta-blocker, calcium channel blocker, addition of normal heart dihydropyridine and dihydropyridine calcium channel blocker ACE and ARB should be started before discharge.  A low-dose thiazide diuretics will help to improve patient with blood pressure  Follow-up in the renal clinic in 2 to 3 weeks  Wean off nicardipine madelin Das MD  [facility]  7/17/2023  09:51 EDT        Electronically signed by Nelson Das MD at 07/17/23 0943

## 2023-07-17 NOTE — PLAN OF CARE
Goal Outcome Evaluation:  Plan of Care Reviewed With: patient           Outcome Evaluation: Slept at intervals. Nitro drip titrated down and discontinued per Dr Salvador. Cardene drip started and infusing at 5mg/hr. /80. No further c/o HA. No c/o chest pain. Cardiology to see today. Will continue to monitor.

## 2023-07-17 NOTE — CONSULTS
INITIAL CONSULT NOTE      Patient Name: Scott Gaytan  : 1975  MRN: [id number]  Primary Care Physician: Sidra Hand DO  Date of admission: 2023    Patient Care Team:  Sidra Hand DO as PCP - General (Family Medicine)        Reason for Consult:       Acute renal failure  Subjective   History of Present Illness:   Chief Complaint:   Chief Complaint   Patient presents with    Shortness of Breath     HISTORY:  Scott Gaytan is a 48 y.o. male with past medical history of some chronic kidney disease underlying congestive heart failure.  With some diastolic dysfunctions.  History of amphetamine use was recently at [facility] seen by my associate and being followed up by him Dr. ROSALES at that time patient was told that he has significant cardiorenal syndrome that most likely causing JOSE ANGEL in addition there is some evidence of drug abuse.  Who presents with renal failure and shortness of breath again.  No significant swelling of the lower extremities.  Was with significantly high blood pressure systolic blood pressure around was above 200 at the time of admission now much better.  In fact dropped to 90s for shortperiod of time.  Creatinine that was 1.4 increased to 1.9 at this time.  Potassium is 4.1.  Patient do have history of significant hypokalemia.  Patient has aldosterone level done at the [facility] also work-up done for metanephrine I do not have these results available to me but seems to be unremarkable patient to have a proteinuria 1 g.  RENAL ULTRASOUND UNREMARKABLE, renal Doppler normal.  Recent echocardiogram showed EF is 55% with left ventricular hypertrophy          Review of systems:  All other review of system unremarkable  Constitutional: No fever, no chills, no lethargy, no weakness.  HEENT:  No headache, otalgia, itchy eyes, nasal discharge or sore throat.  Cardiac:  No chest pain, dyspnea, orthopnea or PND.  Chest:              No cough, phlegm or  wheezing.  Abdomen:  No abdominal pain, nausea or vomiting.  Neuro:  No focal weakness, abnormal movements orseizure like activity.  :   No hematuria, no pyuria, no dysuria, no flank pain.  ROS was otherwise negative except as mentioned in the [place].       Personal History:     Past Medical History:   Past Medical History:   Diagnosis Date    Hypertension     Substance abuse     Meth/MJ       Surgical History:      Past Surgical History:   Procedure Laterality Date    CARDIAC CATHETERIZATION N/A 04/27/2022    ORIF TIBIA/FIBULA FRACTURES Right        Family History: family history includes Arthritis in his mother; Diabetes in his mother; Heart attack in his father and paternal grandfather; Hypertension in his father; Seizures in his sister. Otherwise pertinent FHx was reviewed and unremarkable.     Social History:  reports that he has been smoking cigarettes. He started smoking about 29 years ago. He has a 15.00 pack-year smoking history. He has never been exposed to tobacco smoke. He has never used smokeless tobacco. He reports that he does not currently use alcohol. He reports that he does not currently use drugs after having used the following drugs: Methamphetamines and Marijuana.    Medications:  Prior to Admission medications    Not on File     Scheduled Meds:carvedilol, 6.25 mg, Oral, BID With Meals  enoxaparin, 40 mg, Subcutaneous, Daily  hydrALAZINE, 10 mg, Oral, TID  ipratropium-albuterol, 3 mL, Nebulization, Q8H - RT  senna-docusate sodium, 2 tablet, Oral, BID  sodium chloride, 10 mL, Intravenous, Q12H  verapamil SR, 180 mg, Oral, Nightly      Continuous Infusions:lactated ringers, 50 mL/hr  niCARdipine, 5-15 mg/hr, Last Rate: 5 mg/hr (07/17/23 0918)      PRN Meds:  acetaminophen    albuterol sulfate HFA    senna-docusate sodium **AND** polyethylene glycol **AND** bisacodyl **AND** bisacodyl    Calcium Replacement - Follow Nurse / BPA Driven Protocol    Magnesium Standard Dose Replacement - Follow Nurse /  BPA Driven Protocol    nitroglycerin    ondansetron    Phosphorus Replacement - Follow Nurse / BPA Driven Protocol    Potassium Replacement - Follow Nurse / BPA Driven Protocol    [COMPLETED] Insert Peripheral IV **AND** sodium chloride    sodium chloride    sodium chloride  Allergies:  No Known Allergies    Objective   Exam:     Vital Signs  Temp:  [98 °F (36.7 °C)-98.5 °F (36.9 °C)] 98 °F (36.7 °C)  Heart Rate:  [64-88] 69  Resp:  [12-20] 18  BP: ()/() 117/74  SpO2:  [84 %-100 %] 96 %  on   ;   Device (Oxygen Therapy): room air  Body mass index is 26.92 kg/m².  EXAM  General: Middle-age male in no acute distress.    Head:      Normocephalic and atraumatic.    Eyes:      PERRL/EOM intact, conjunctivae and sclerae clear without nystagmus.    Neck:      No masses, thyromegaly,  trachea central   Lungs:    Clear bilaterally to auscultation.    Heart:      Regular rate and rhythm, no murmur no gallop  Abd:        Soft, nontender, not distended, bowel sounds positive, no shifting dullness.  Msk:        No deformity or scoliosis noted of thoracic or lumbar spine.    Pulses:   Pulses normal in all 4 extremities.    Extremities:        No cyanosis or clubbing--no significant edema.    Neuro:    No focal deficits.   alert oriented x3  Skin:       Intact without lesions or rashes.    Psych:    Alert and cooperative; normal mood and affect; normal attention span       Results Review:  I have personally reviewed most recent Data :  [facility]  CBC    Results from last 7 days   Lab Units 07/16/23 2221 07/16/23  0539   WBC 10*3/mm3 10.60 11.90*   HEMOGLOBIN g/dL 12.5* 13.5   PLATELETS 10*3/mm3 235 237     CMP   Results from last 7 days   Lab Units 07/16/23 2221 07/16/23  0539   SODIUM mmol/L 139 139   POTASSIUM mmol/L 4.1 3.0*   CHLORIDE mmol/L 103 102   CO2 mmol/L 24.0 23.0   BUN mg/dL 28* 22*   CREATININE mg/dL 1.92* 1.42*   GLUCOSE mg/dL 118* 102*     ABG      XR Chest 1 View    Result Date:  7/17/2023  Impression: Near complete resolution of pulmonary edema. Electronically Signed: Jenifer Arteaga MD  7/17/2023 8:56 AM EDT  Workstation ID: [id number]    XR Chest 1 View    Result Date: 7/16/2023  Impression: 1. Borderline cardiomegaly and pulmonary vascular congestion with suspected mild interstitial edema and/or atypical infection in the correct clinical setting. Electronically Signed: Jason Mckinnon  7/16/2023 7:43 AM EDT  Workstation ID: [id number]     Results for orders placed during the hospital encounter of 12/06/22    Adult Transthoracic Echo Complete w/ Color, Spectral and Contrast if Necessary Per Protocol    Interpretation Summary    Left ventricular systolic function is normal. Left ventricular ejection fraction appears to be 61 - 65%.    Left ventricular diastolic function was normal.    Estimated right ventricular systolic pressure from tricuspid regurgitation is normal (<35 mmHg).        Assessment & Plan   Assessment and Plan:         Hypertensive emergency    ASSESSMENT:  Acute kidney injury  Congestive heart failure with preserved ejection fraction  Chronic kidney disease with some proteinuria  Accelerated/malignant hypertension with pulmonary edema  Mild hypokalemia          PLAN :     I will repeat Renin and aldosterone level specially at the time of presentation patient was hypokalemic  Patient need to be compliant with the medication  Follow-up with Renin and aldosterone level  Follow-up with a fractionated metanephrine level all these labs were done at [facility]  Control blood pressure with beta-blocker, calcium channel blocker, addition of normal heart dihydropyridine and dihydropyridine calcium channel blocker ACE and ARB should be started before discharge.  A low-dose thiazide diuretics will help to improve patient with blood pressure  Follow-up in the renal clinic in 2 to 3 weeks  Wean off nicardipine madelin Das MD  [facility]  7/17/2023  09:51  EDT

## 2023-07-17 NOTE — NURSING NOTE
Pt refusing titration of Nitro drip due to headache. Dr Salvador notified. To start Cardene drip and wean off Nitro drip.

## 2023-07-17 NOTE — NURSING NOTE
SBP 94. Will hold infusion for 30 minutes and recheck BP. If still under paremeters, will contact cardiology.

## 2023-07-17 NOTE — PLAN OF CARE
Goal Outcome Evaluation:   A/o. Room air. Cardiology s/o. Nephrology monitoring for another night for JOSE ANGEL. LR @ 50. Up ad jaison. Cardiac drip d/c'd. No complaints. Call light in reach.

## 2023-07-17 NOTE — CONSULTS
Cardiology consult note  Malik Devlin MD, PhD      Patient Care Team:  Sidra Hand DO as PCP - General (Family Medicine)    CHIEF COMPLAINT: Hypertensive urgency    HISTORY OF PRESENT ILLNESS:    This is a 48-year-old gentleman well-known to me from prior encounters with history of substance abuse active methamphetamine intoxication admitted with hypertensive emergency in the setting of acute methamphetamine intoxication, placed on Cardene drip with good results, blood pressure has been stabilized, he was off of his home medicines including carvedilol amlodipine.  He has been seen in multiple other hospitals recently [facility] in the last couple of days with similar intoxication and treatment for blood pressure.  He has no coronary artery disease from heart cath last year in the setting of elevated troponin with hypertensive emergency again from methamphetamine intoxication.  He was counseled today for cessation.  He is asking for help for assisted living or other programs for addiction.   was asked to help the patient if any programs available and provide any available resources.  He is chest pain-free on room air on my encounter blood pressure 120 systolic heart rates in the 70s.  He had elevated creatinine with JOSE ANGEL.    Review of systems otherwise negative x14 point review of systems except as mentioned above  Historical data copied forward from previous encounters in EMR is unchanged      Past Medical History:   Diagnosis Date    Hypertension     Substance abuse     Meth/MJ     Past Surgical History:   Procedure Laterality Date    CARDIAC CATHETERIZATION N/A 04/27/2022    ORIF TIBIA/FIBULA FRACTURES Right      Family History   Problem Relation Age of Onset    Diabetes Mother     Arthritis Mother     Hypertension Father     Heart attack Father     Seizures Sister     Heart attack Paternal Grandfather      Social History     Tobacco Use    Smoking status: Every Day     Packs/day: 1.00  "    Years: 15.00     Pack years: 15.00     Types: Cigarettes     Start date: 1994     Passive exposure: Never    Smokeless tobacco: Never   Vaping Use    Vaping Use: Never used   Substance Use Topics    Alcohol use: Not Currently    Drug use: Not Currently     Types: Methamphetamines, Marijuana     Comment: quit 2022     No medications prior to admission.     Allergies:  Patient has no known allergies.    REVIEW OF SYSTEMS:  Please see the above history of present illness for pertinent positives and negatives.  The remainder of the patient's systems have been reviewed and are negative.     Vital Signs  Temp:  [97.6 °F (36.4 °C)-98.5 °F (36.9 °C)] 97.9 °F (36.6 °C)  Heart Rate:  [61-88] 71  Resp:  [12-21] 19  BP: ()/() 150/89    Flowsheet Rows      Flowsheet Row First Filed Value   Admission Height 172.7 cm (68\") Documented at 07/16/2023 0416   Admission Weight 83.6 kg (184 lb 4.9 oz) Documented at 07/16/2023 0416             Physical Exam:  Physical Exam   Constitutional: Patient appears well-developed and well-nourished and in no acute distress   HEENT:   Head: Normocephalic and atraumatic.   Eyes:  Pupils are equal, round, and reactive to light. EOM are intact. Sclerae are anicteric and noninjected.  Mouth and Throat: Patient has moist mucous membranes. Oropharynx is clear of any erythema or exudate.     Neck: Neck supple. No JVD present. No thyromegaly present. No lymphadenopathy present.  Cardiovascular: Regular rate, regular rhythm, S1 normal and S2 normal.  Exam reveals no gallop and no friction rub.  No murmur heard.  Pulmonary/Chest: Lungs are clear to auscultation bilaterally. No respiratory distress. No wheezes. No rhonchi. No rales.   Abdominal: Soft. Bowel sounds are normal. No distension and no mass. There is no hepatosplenomegaly. There is no tenderness.   Musculoskeletal: Normal muscle tone  Extremities: No edema. Pulses are palpable in all 4 extremities.  Neurological: Patient is alert " and oriented to person, place, and time. Cranial nerves II-XII are grossly intact with no focal deficits.  Skin: Skin is warm. No rash noted. Nails show no clubbing.  No cyanosis or erythema.     Results Review:    I reviewed the patient's new clinical results.  Lab Results (most recent)       Procedure Component Value Units Date/Time    Basic Metabolic Panel [[id number]]  (Abnormal) Collected: 07/16/23 2221    Specimen: Blood Updated: 07/16/23 2348     Glucose 118 mg/dL      BUN 28 mg/dL      Creatinine 1.92 mg/dL      Sodium 139 mmol/L      Potassium 4.1 mmol/L      Chloride 103 mmol/L      CO2 24.0 mmol/L      Calcium 8.8 mg/dL      BUN/Creatinine Ratio 14.6     Anion Gap 12.0 mmol/L      eGFR 43.0 mL/min/1.73     Narrative:      GFR Normal >60  Chronic Kidney Disease <60  Kidney Failure <15      CBC (No Diff) [[id number]]  (Abnormal) Collected: 07/16/23 2221    Specimen: Blood Updated: 07/16/23 2317     WBC 10.60 10*3/mm3      RBC 4.21 10*6/mm3      Hemoglobin 12.5 g/dL      Hematocrit 36.6 %      MCV 87.0 fL      MCH 29.6 pg      MCHC 34.0 g/dL      RDW 13.8 %      RDW-SD 43.8 fl      MPV 10.2 fL      Platelets 235 10*3/mm3     Magnesium [[id number]]  (Normal) Collected: 07/16/23 0759    Specimen: Blood Updated: 07/16/23 1007     Magnesium 1.9 mg/dL     High Sensitivity Troponin T 2Hr [[id number]]  (Abnormal) Collected: 07/16/23 0759    Specimen: Blood Updated: 07/16/23 0831     HS Troponin T 59 ng/L      Troponin T Delta -7 ng/L     Narrative:      High Sensitive Troponin T Reference Range:  <10.0 ng/L- Negative Female for AMI  <15.0 ng/L- Negative Male for AMI  >=10 - Abnormal Female indicating possible myocardial injury.  >=15 - Abnormal Male indicating possible myocardial injury.   Clinicians would have to utilize clinical acumen, EKG, Troponin, and serial changes to determine if it is an Acute Myocardial Infarction or myocardial injury due to an underlying chronic condition.         Urine Drug Screen - Urine,  Clean Catch [[id number]]  (Abnormal) Collected: 07/16/23 0700    Specimen: Urine, Clean Catch Updated: 07/16/23 0741     Amphet/Methamphet, Screen Positive     Barbiturates Screen, Urine Negative     Benzodiazepine Screen, Urine Negative     Cocaine Screen, Urine Negative     Opiate Screen Negative     THC, Screen, Urine Negative     Methadone Screen, Urine Negative     Oxycodone Screen, Urine Negative    Narrative:      Negative Thresholds Per Drugs Screened:    Amphetamines                 500 ng/ml  Barbiturates                 200 ng/ml  Benzodiazepines              100 ng/ml  Cocaine                      300 ng/ml  Methadone                    300 ng/ml  Opiates                      300 ng/ml  Oxycodone                    100 ng/ml  THC                           50 ng/ml    The Normal Value for all drugs tested is negative. This report includes final unconfirmed screening results to be used for medical treatment purposes only. Unconfirmed results must not be used for non-medical purposes such as employment or legal testing. Clinical consideration should be applied to any drug of abuse test, particularly when unconfirmed results are used.          All urine drugs of abuse requests without chain of custody are for medical screening purposes only.  False positives are possible.      Protime-INR [[id number]]  (Normal) Collected: 07/16/23 0608    Specimen: Blood Updated: 07/16/23 0636     Protime 10.9 Seconds      INR 1.02    aPTT [[id number]]  (Abnormal) Collected: 07/16/23 0608    Specimen: Blood Updated: 07/16/23 0636     PTT 25.9 seconds     Basic Metabolic Panel [[id number]]  (Abnormal) Collected: 07/16/23 0539    Specimen: Blood Updated: 07/16/23 0615     Glucose 102 mg/dL      BUN 22 mg/dL      Creatinine 1.42 mg/dL      Sodium 139 mmol/L      Potassium 3.0 mmol/L      Chloride 102 mmol/L      CO2 23.0 mmol/L      Calcium 8.6 mg/dL      BUN/Creatinine Ratio 15.5     Anion Gap 14.0 mmol/L      eGFR 61.7  mL/min/1.73     Narrative:      GFR Normal >60  Chronic Kidney Disease <60  Kidney Failure <15      BNP [[id number]]  (Abnormal) Collected: 07/16/23 0539    Specimen: Blood Updated: 07/16/23 0613     proBNP 13,526.0 pg/mL     Narrative:      Among patients with dyspnea, NT-proBNP is highly sensitive for the detection of acute congestive heart failure. In addition NT-proBNP of <300 pg/ml effectively rules out acute congestive heart failure with 99% negative predictive value.    Results may be falsely decreased if patient taking Biotin.      Single High Sensitivity Troponin T [[id number]]  (Abnormal) Collected: 07/16/23 0539    Specimen: Blood Updated: 07/16/23 0613     HS Troponin T 66 ng/L     Narrative:      High Sensitive Troponin T Reference Range:  <10.0 ng/L- Negative Female for AMI  <15.0 ng/L- Negative Male for AMI  >=10 - Abnormal Female indicating possible myocardial injury.  >=15 - Abnormal Male indicating possible myocardial injury.   Clinicians would have to utilize clinical acumen, EKG, Troponin, and serial changes to determine if it is an Acute Myocardial Infarction or myocardial injury due to an underlying chronic condition.         CBC & Differential [[id number]]  (Abnormal) Collected: 07/16/23 0539    Specimen: Blood Updated: 07/16/23 0551    Narrative:      The following orders were created for panel order CBC & Differential.  Procedure                               Abnormality         Status                     ---------                               -----------         ------                     CBC Auto Differential[[id number]]        Abnormal            Final result                 Please view results for these tests on the individual orders.    CBC Auto Differential [[id number]]  (Abnormal) Collected: 07/16/23 0539    Specimen: Blood Updated: 07/16/23 0551     WBC 11.90 10*3/mm3      RBC 4.52 10*6/mm3      Hemoglobin 13.5 g/dL      Hematocrit 40.3 %      MCV 89.2 fL      MCH 29.8 pg      MCHC 33.4  g/dL      RDW 13.4 %      RDW-SD 41.1 fl      MPV 10.0 fL      Platelets 237 10*3/mm3      Neutrophil % 64.9 %      Lymphocyte % 24.5 %      Monocyte % 7.9 %      Eosinophil % 2.0 %      Basophil % 0.7 %      Neutrophils, Absolute 7.70 10*3/mm3      Lymphocytes, Absolute 2.90 10*3/mm3      Monocytes, Absolute 0.90 10*3/mm3      Eosinophils, Absolute 0.20 10*3/mm3      Basophils, Absolute 0.10 10*3/mm3      nRBC 0.1 /100 WBC             Imaging Results (Most Recent)       Procedure Component Value Units Date/Time    XR Chest 1 View [[id number]] Collected: 07/17/23 0855     Updated: 07/17/23 0858    Narrative:      XR CHEST 1 VW    Date of Exam: 7/17/2023 8:38 AM EDT    Indication: pulmonary edema.    Comparison: 7/16/2023.    Findings:  Previously seen interstitial lung infiltrates have largely resolved. There is stable borderline cardiomegaly. There are no new infiltrates. There are no effusions.      Impression:      Impression:  Near complete resolution of pulmonary edema.      Electronically Signed: Jenifer Arteaga MD    7/17/2023 8:56 AM EDT    Workstation ID: [id number]    XR Chest 1 View [[id number]] Collected: 07/16/23 0741     Updated: 07/16/23 0745    Narrative:      XR CHEST 1 VW    Date of Exam: 7/16/2023 4:30 AM EDT    Indication: shortness of breath    Comparison: 12/6/2022 and prior    Findings:  Study limited by overlying support and monitoring apparatus. Heart size is mildly enlarged. This is accentuated by portable technique and relatively low lung volumes. Pulmonary vascularity is congested and indistinct. Diffuse increased groundglass and   interstitial opacities noted within the perihilar and bibasilar distribution. Findings are accentuated by lower lung volumes. No pneumothorax or definitive pleural effusion is identified. Osseous structures demonstrate degenerative changes of the spine   and right shoulder.      Impression:      Impression:    1. Borderline cardiomegaly and pulmonary vascular  congestion with suspected mild interstitial edema and/or atypical infection in the correct clinical setting.      Electronically Signed: Jason Mckinnon    7/16/2023 7:43 AM EDT    Workstation ID: [id number]          reviewed    ECG/EMG Results (most recent)       Procedure Component Value Units Date/Time    ECG 12 Lead Dyspnea [[id number]] Collected: 07/16/23 0423     Updated: 07/17/23 0750     QT Interval 415 ms     Narrative:      HEART RATE= 82  bpm  RR Interval= 740  ms  NM Interval= 189  ms  P Horizontal Axis= -2  deg  P Front Axis= 30  deg  QRSD Interval= 111  ms  QT Interval= 415  ms  QRS Axis= -40  deg  T Wave Axis= 114  deg  - ABNORMAL ECG -  Sinus rhythm  Multiple ventricular premature complexes  Probable left atrial enlargement  Left anterior fascicular block  LVH with secondary repolarization abnormality  When compared with ECG of 06-Dec-2022 4:38:10,  Significant axis, voltage or hypertrophy change  Electronically Signed By: Arun Skaggs ([facility]) 17-Jul-2023 07:50:14  Date and Time of Study: 2023-07-16 04:23:14    SCANNED - TELEMETRY   [[id number]] Resulted: 07/16/23     Updated: 07/17/23 1420    SCANNED - TELEMETRY   [[id number]] Resulted: 07/16/23     Updated: 07/17/23 1449    SCANNED - TELEMETRY   [[id number]] Resulted: 07/16/23     Updated: 07/17/23 1622    SCANNED - TELEMETRY   [[id number]] Resulted: 07/16/23     Updated: 07/17/23 1628          reviewed    Assessment & Plan     Hypertensive emergency  JOSE ANGEL, elevated troponin secondary to severely uncontrolled blood pressures in setting of methamphetamine intoxication  No obstructive coronary artery disease no evidence of acute coronary syndrome, EKG is stable without ischemic changes  He is chest pain-free  Left heart cath last year revealed no obstructive coronary artery disease with similar presentation with methamphetamine intoxication, hypertensive emergency, elevated troponin, finding normal EF, diastolic dysfunction, normal coronaries    No plan for  ischemic evaluation at this time  Complete cessation of methamphetamine  Not a candidate for antiplatelets or anticoagulation in the setting of severely uncontrolled blood pressures with continued methamphetamine use  Continue carvedilol amlodipine and afterload reduction for goal blood pressure less than 140 systolic   consult for any resources for addiction or rehab placement if needed  Gentle diuresis  Nephrology management and okay for discharge when okay for from nephrology standpoint    Patient counseled extensively for cessation of all substance use today    Malik Devlin MD, PhD    I discussed the patient's findings and my recommendations with patient and staff    Malik Devlin MD  07/17/23  16:57 EDT

## 2023-07-17 NOTE — PROGRESS NOTES
[facility]     Progress Note    Patient Name: Scott Gaytan  : 1975  MRN: [id number]  Primary Care Physician:  Sidra Hand DO  Date of admission: 2023    Subjective   Subjective     Chief Complaint: Hypertensive emergency    History of Present Illness  Patient Reports feeling better. Still on Cardene drip. Denies any cp, soa, visual changes, headache, or N/V    Review of Systems  All neg except for what mentioned above    Objective   Objective     Vitals:   Temp:  [97.6 °F (36.4 °C)-98.5 °F (36.9 °C)] 97.7 °F (36.5 °C)  Heart Rate:  [61-85] 62  Resp:  [12-21] 16  BP: ()/() 138/100    Physical Exam   Constitutional:       General: He is not in acute distress.       HENT:      Head: Normocephalic and atraumatic.   Cardiovascular:      Rate and Rhythm: Normal rate and regular rhythm.      Pulses: Normal pulses.      Heart sounds: Normal heart sounds.   Pulmonary:      Effort: Pulmonary effort is normal.      Comments: Patient has crackles in the lower one third lung fields bilaterally.  Abdominal:      General: There is no distension.      Palpations: Abdomen is soft.      Tenderness: There is no abdominal tenderness.   Musculoskeletal:      Cervical back: Neck supple.      Right lower leg: No edema.      Left lower leg: No edema.   Skin:     General: Skin is warm and dry.   Neurological:      General: No focal deficit present.      Mental Status: He is alert.   Psychiatric:      Comments: mood appropriate   Result Review    Result Review:  I have personally reviewed the results from the time of this admission to 2023 19:45 EDT and agree with these findings:  [x]  Laboratory list / accordion  [x]  Microbiology  [x]  Radiology  []  EKG/Telemetry   []  Cardiology/Vascular   []  Pathology  []  Old records  []  Other:  Most notable findings include:      Assessment & Plan   Assessment / Plan     Brief Patient Summary:  Scott Gaytan is a 48 y.o. male with past medical history of  hypertension, questionable CHF and amphetamine use came in probably after taking amphetamine again as his yearly urine drug screen is positive with hypertensive emergency with pulmonary edema.     Active Hospital Problems:  Active Hospital Problems    Diagnosis     **Hypertensive emergency      Plan:   #Hypertensive emergency:  Patient is started on nitroglycerin drip.  We will restart his home medications.  We will get a cardiology consult.  We will admit the patient to PCU for the nitroglycerin drip.  We will try to wean drip off as tolerated by blood pressure.  We will repeat another troponin in an hour.        #Pulmonary edema:  Questionable acute on chronic congestive heart failure versus flash pulmonary edema secondary to uncontrolled blood pressure.  Patient received a dose of Lasix IV 80 mg in the ER this morning.  We will check another chest x-ray and BMP in the morning.  Patient had an echo in December 2022 which appeared to be quite normal, will leave at cardiology discretion if they want to repeat an echo at this point.     #Hypokalemia:  Replace and recheck in the morning.     #Renal failure:  Appears to be acute in nature, questionable secondary to hypertensive emergency.  We will repeat BMP in the morning to follow-up on that.     #Amphetamine use:  Urine drug screen is positive.  Drug plan counseling done.  This may be the underlying problem for all his problems at this point.     Patient is full code.     I have utilized all available immediate resources to obtain, update, or review the patient's current medications.     DVT prophylaxis:  Medical DVT prophylaxis orders are present.    7/17 cardiology following, on cardene drip, will be weaned off. No plan for ischemic evaluation at this time. JOSE ANGEL, cr 1.92, nephrology consulted. Control blood pressure with beta-blocker, calcium channel blocker, addition of normal heart dihydropyridine and dihydropyridine calcium channel blocker ACE and ARB should be  started before discharge. A low-dose thiazide diuretics will help to improve patient with blood pressure Labs reviewed. Repeat labs and monitor      CODE STATUS:    Level Of Support Discussed With: Patient  Code Status (Patient has no pulse and is not breathing): CPR (Attempt to Resuscitate)  Medical Interventions (Patient has pulse or is breathing): Full Support    Disposition:  I expect patient to be discharged in 1-2 days. CM consulted for any resources for addiction or rehab placement if needed .    Bebe Sparks MD

## 2023-07-18 ENCOUNTER — APPOINTMENT (OUTPATIENT)
Dept: CT IMAGING | Facility: HOSPITAL | Age: 48
End: 2023-07-18
Payer: MEDICAID

## 2023-07-18 VITALS
SYSTOLIC BLOOD PRESSURE: 176 MMHG | WEIGHT: 181.22 LBS | HEIGHT: 68 IN | BODY MASS INDEX: 27.47 KG/M2 | DIASTOLIC BLOOD PRESSURE: 117 MMHG | TEMPERATURE: 98 F | HEART RATE: 68 BPM | RESPIRATION RATE: 25 BRPM | OXYGEN SATURATION: 95 %

## 2023-07-18 LAB
ANION GAP SERPL CALCULATED.3IONS-SCNC: 13 MMOL/L (ref 5–15)
BUN SERPL-MCNC: 18 MG/DL (ref 6–20)
BUN/CREAT SERPL: 13.5 (ref 7–25)
CALCIUM SPEC-SCNC: 8.8 MG/DL (ref 8.6–10.5)
CHLORIDE SERPL-SCNC: 104 MMOL/L (ref 98–107)
CO2 SERPL-SCNC: 24 MMOL/L (ref 22–29)
CREAT SERPL-MCNC: 1.33 MG/DL (ref 0.76–1.27)
EGFRCR SERPLBLD CKD-EPI 2021: 66.8 ML/MIN/1.73
GLUCOSE SERPL-MCNC: 121 MG/DL (ref 65–99)
POTASSIUM SERPL-SCNC: 3.7 MMOL/L (ref 3.5–5.2)
SODIUM SERPL-SCNC: 141 MMOL/L (ref 136–145)

## 2023-07-18 PROCEDURE — 94664 DEMO&/EVAL PT USE INHALER: CPT

## 2023-07-18 PROCEDURE — 94799 UNLISTED PULMONARY SVC/PX: CPT

## 2023-07-18 PROCEDURE — 94761 N-INVAS EAR/PLS OXIMETRY MLT: CPT

## 2023-07-18 PROCEDURE — 80048 BASIC METABOLIC PNL TOTAL CA: CPT | Performed by: INTERNAL MEDICINE

## 2023-07-18 PROCEDURE — 74176 CT ABD & PELVIS W/O CONTRAST: CPT

## 2023-07-18 RX ORDER — HYDRALAZINE HYDROCHLORIDE 25 MG/1
25 TABLET, FILM COATED ORAL EVERY 12 HOURS SCHEDULED
Status: DISCONTINUED | OUTPATIENT
Start: 2023-07-18 | End: 2023-07-18 | Stop reason: HOSPADM

## 2023-07-18 RX ORDER — AMLODIPINE BESYLATE 5 MG/1
10 TABLET ORAL
Status: DISCONTINUED | OUTPATIENT
Start: 2023-07-18 | End: 2023-07-18 | Stop reason: HOSPADM

## 2023-07-18 RX ORDER — LOSARTAN POTASSIUM 50 MG/1
50 TABLET ORAL
Status: DISCONTINUED | OUTPATIENT
Start: 2023-07-18 | End: 2023-07-18 | Stop reason: HOSPADM

## 2023-07-18 RX ORDER — HYDROCHLOROTHIAZIDE 12.5 MG/1
12.5 TABLET ORAL DAILY
Status: DISCONTINUED | OUTPATIENT
Start: 2023-07-18 | End: 2023-07-18 | Stop reason: HOSPADM

## 2023-07-18 RX ADMIN — HYDRALAZINE HYDROCHLORIDE 25 MG: 25 TABLET, FILM COATED ORAL at 09:51

## 2023-07-18 RX ADMIN — HYDROCHLOROTHIAZIDE 12.5 MG: 12.5 TABLET ORAL at 13:05

## 2023-07-18 RX ADMIN — Medication 10 ML: at 08:14

## 2023-07-18 RX ADMIN — HYDRALAZINE HYDROCHLORIDE 10 MG: 10 TABLET, FILM COATED ORAL at 06:01

## 2023-07-18 RX ADMIN — IPRATROPIUM BROMIDE AND ALBUTEROL SULFATE 3 ML: .5; 3 SOLUTION RESPIRATORY (INHALATION) at 06:05

## 2023-07-18 RX ADMIN — LOSARTAN POTASSIUM 50 MG: 50 TABLET, FILM COATED ORAL at 13:05

## 2023-07-18 RX ADMIN — AMLODIPINE BESYLATE 10 MG: 5 TABLET ORAL at 09:51

## 2023-07-18 RX ADMIN — ACETAMINOPHEN 650 MG: 325 TABLET, FILM COATED ORAL at 13:09

## 2023-07-18 RX ADMIN — CARVEDILOL 6.25 MG: 6.25 TABLET, FILM COATED ORAL at 06:01

## 2023-07-18 RX ADMIN — ACETAMINOPHEN 650 MG: 325 TABLET, FILM COATED ORAL at 08:14

## 2023-07-18 NOTE — PAYOR COMM NOTE
"This is discharge notification for Scott Gaytan   Reference/Auth # [id number]   Pt discharged on 7/18/23    Khloe Soliman, RN, BSN  Utilization Review Nurse  [facility]  Direct & confidential phone # [phone]  Fax # [phone]      Scott Gaytan (48 y.o. Male)       Date of Birth   1975    Social Security Number       Address   [address] [place] [address] [place] [address]    Home Phone   [phone]    MRN   [id number]       Worship   [employer] [facility] [facility]    Marital Status   Single                            Admission Date   7/16/23    Admission Type   Emergency    Admitting Provider       Attending Provider       Department, Room/Bed   [facility] 2D, 252/1       Discharge Date   7/18/2023    Discharge Disposition   Left Against Medical Advice    Discharge Destination                                 Attending Provider: (none)   Allergies: No Known Allergies    Isolation: None   Infection: None   Code Status: CPR    Ht: 172.7 cm (68\")   Wt: 82.2 kg (181 lb 3.5 oz)    Admission Cmt: None   Principal Problem: Hypertensive emergency [I16.1]                   Active Insurance as of 7/16/2023       Primary Coverage       Payor Plan Insurance Group Employer/Plan Group    [id number]       Payor Plan Address Payor Plan Phone Number Payor Plan Fax Number Effective Dates    MAIL STOP: 8/1/2021 - None Entered    [address] [address]       [facility] [address]         Subscriber Name Subscriber Birth Date Member ID       SCOTT GAYTAN 1975 [id number]                     Emergency Contacts        (Rel.) Home Phone Work Phone Mobile Phone    KARMA AJSSO (Sister) [phone] -- [phone]    BELINDA ALVARADO (Significant Other) [phone] -- [phone]              Discharge Summary    No notes of this type exist for this encounter.       "

## 2023-07-18 NOTE — CONSULTS
Cardiology consult note  Malik Devlin MD, PhD      Patient Care Team:  Sidra Hand DO as PCP - General (Family Medicine)    CHIEF COMPLAINT: Hypertensive urgency    HISTORY OF PRESENT ILLNESS:    This is a 48-year-old gentleman well-known to me from prior encounters with history of substance abuse active methamphetamine intoxication admitted with hypertensive emergency in the setting of acute methamphetamine intoxication, placed on Cardene drip with good results, blood pressure has been stabilized, he was off of his home medicines including carvedilol amlodipine.  He has been seen in multiple other hospitals recently [facility] in the last couple of days with similar intoxication and treatment for blood pressure.  He has no coronary artery disease from heart cath last year in the setting of elevated troponin with hypertensive emergency again from methamphetamine intoxication.  He was counseled today for cessation.  He is asking for help for assisted living or other programs for addiction.   was asked to help the patient if any programs available and provide any available resources.  He is chest pain-free on room air on my encounter blood pressure 120 systolic heart rates in the 70s.  He had elevated creatinine with JOSE ANGEL.  ==============================================================  Complains of right flank pain  Chest pain-free  Blood pressures still elevated 170 systolic this morning prior to any medicines  Awaiting BMP results  Gentle IV fluids  Very sleepy today likely with methamphetamine withdrawal    Review of systems otherwise negative x14 point review of systems except as mentioned above  Historical data copied forward from previous encounters in EMR is unchanged      Past Medical History:   Diagnosis Date    Hypertension     Substance abuse     Meth/MJ     Past Surgical History:   Procedure Laterality Date    CARDIAC CATHETERIZATION N/A 04/27/2022    ORIF TIBIA/FIBULA  "FRACTURES Right      Family History   Problem Relation Age of Onset    Diabetes Mother     Arthritis Mother     Hypertension Father     Heart attack Father     Seizures Sister     Heart attack Paternal Grandfather      Social History     Tobacco Use    Smoking status: Every Day     Packs/day: 1.00     Years: 15.00     Pack years: 15.00     Types: Cigarettes     Start date: 1994     Passive exposure: Never    Smokeless tobacco: Never   Vaping Use    Vaping Use: Never used   Substance Use Topics    Alcohol use: Not Currently    Drug use: Not Currently     Types: Methamphetamines, Marijuana     Comment: quit 2022     No medications prior to admission.     Allergies:  Patient has no known allergies.    REVIEW OF SYSTEMS:  Please see the above history of present illness for pertinent positives and negatives.  The remainder of the patient's systems have been reviewed and are negative.     Vital Signs  Temp:  [97.2 °F (36.2 °C)-97.9 °F (36.6 °C)] 97.2 °F (36.2 °C)  Heart Rate:  [61-84] 76  Resp:  [16-21] 20  BP: (101-175)/() 171/117    Flowsheet Rows      Flowsheet Row First Filed Value   Admission Height 172.7 cm (68\") Documented at 07/16/2023 0416   Admission Weight 83.6 kg (184 lb 4.9 oz) Documented at 07/16/2023 0416             Physical Exam:  Physical Exam   Constitutional: Patient appears well-developed and well-nourished and in no acute distress   HEENT:   Head: Normocephalic and atraumatic.   Eyes:  Pupils are equal, round, and reactive to light. EOM are intact. Sclerae are anicteric and noninjected.  Mouth and Throat: Patient has moist mucous membranes. Oropharynx is clear of any erythema or exudate.     Neck: Neck supple. No JVD present. No thyromegaly present. No lymphadenopathy present.  Cardiovascular: Regular rate, regular rhythm, S1 normal and S2 normal.  Exam reveals no gallop and no friction rub.  No murmur heard.  Pulmonary/Chest: Lungs are clear to auscultation bilaterally. No respiratory " distress. No wheezes. No rhonchi. No rales.   Abdominal: Soft. Bowel sounds are normal. No distension and no mass. There is no hepatosplenomegaly. There is no tenderness.   Musculoskeletal: Normal muscle tone  Extremities: No edema. Pulses are palpable in all 4 extremities.  Neurological: Patient is alert and oriented to person, place, and time. Cranial nerves II-XII are grossly intact with no focal deficits.  Skin: Skin is warm. No rash noted. Nails show no clubbing.  No cyanosis or erythema.     Results Review:    I reviewed the patient's new clinical results.  Lab Results (most recent)       Procedure Component Value Units Date/Time    Basic Metabolic Panel [[id number]]  (Abnormal) Collected: 07/16/23 2221    Specimen: Blood Updated: 07/16/23 2348     Glucose 118 mg/dL      BUN 28 mg/dL      Creatinine 1.92 mg/dL      Sodium 139 mmol/L      Potassium 4.1 mmol/L      Chloride 103 mmol/L      CO2 24.0 mmol/L      Calcium 8.8 mg/dL      BUN/Creatinine Ratio 14.6     Anion Gap 12.0 mmol/L      eGFR 43.0 mL/min/1.73     Narrative:      GFR Normal >60  Chronic Kidney Disease <60  Kidney Failure <15      CBC (No Diff) [[id number]]  (Abnormal) Collected: 07/16/23 2221    Specimen: Blood Updated: 07/16/23 2317     WBC 10.60 10*3/mm3      RBC 4.21 10*6/mm3      Hemoglobin 12.5 g/dL      Hematocrit 36.6 %      MCV 87.0 fL      MCH 29.6 pg      MCHC 34.0 g/dL      RDW 13.8 %      RDW-SD 43.8 fl      MPV 10.2 fL      Platelets 235 10*3/mm3     Magnesium [[id number]]  (Normal) Collected: 07/16/23 0759    Specimen: Blood Updated: 07/16/23 1007     Magnesium 1.9 mg/dL     High Sensitivity Troponin T 2Hr [[id number]]  (Abnormal) Collected: 07/16/23 0759    Specimen: Blood Updated: 07/16/23 0831     HS Troponin T 59 ng/L      Troponin T Delta -7 ng/L     Narrative:      High Sensitive Troponin T Reference Range:  <10.0 ng/L- Negative Female for AMI  <15.0 ng/L- Negative Male for AMI  >=10 - Abnormal Female indicating possible  myocardial injury.  >=15 - Abnormal Male indicating possible myocardial injury.   Clinicians would have to utilize clinical acumen, EKG, Troponin, and serial changes to determine if it is an Acute Myocardial Infarction or myocardial injury due to an underlying chronic condition.         Urine Drug Screen - Urine, Clean Catch [[id number]]  (Abnormal) Collected: 07/16/23 0700    Specimen: Urine, Clean Catch Updated: 07/16/23 0741     Amphet/Methamphet, Screen Positive     Barbiturates Screen, Urine Negative     Benzodiazepine Screen, Urine Negative     Cocaine Screen, Urine Negative     Opiate Screen Negative     THC, Screen, Urine Negative     Methadone Screen, Urine Negative     Oxycodone Screen, Urine Negative    Narrative:      Negative Thresholds Per Drugs Screened:    Amphetamines                 500 ng/ml  Barbiturates                 200 ng/ml  Benzodiazepines              100 ng/ml  Cocaine                      300 ng/ml  Methadone                    300 ng/ml  Opiates                      300 ng/ml  Oxycodone                    100 ng/ml  THC                           50 ng/ml    The Normal Value for all drugs tested is negative. This report includes final unconfirmed screening results to be used for medical treatment purposes only. Unconfirmed results must not be used for non-medical purposes such as employment or legal testing. Clinical consideration should be applied to any drug of abuse test, particularly when unconfirmed results are used.          All urine drugs of abuse requests without chain of custody are for medical screening purposes only.  False positives are possible.      Protime-INR [[id number]]  (Normal) Collected: 07/16/23 0608    Specimen: Blood Updated: 07/16/23 0636     Protime 10.9 Seconds      INR 1.02    aPTT [[id number]]  (Abnormal) Collected: 07/16/23 0608    Specimen: Blood Updated: 07/16/23 0636     PTT 25.9 seconds     Basic Metabolic Panel [[id number]]  (Abnormal) Collected: 07/16/23  0539    Specimen: Blood Updated: 07/16/23 0615     Glucose 102 mg/dL      BUN 22 mg/dL      Creatinine 1.42 mg/dL      Sodium 139 mmol/L      Potassium 3.0 mmol/L      Chloride 102 mmol/L      CO2 23.0 mmol/L      Calcium 8.6 mg/dL      BUN/Creatinine Ratio 15.5     Anion Gap 14.0 mmol/L      eGFR 61.7 mL/min/1.73     Narrative:      GFR Normal >60  Chronic Kidney Disease <60  Kidney Failure <15      BNP [[id number]]  (Abnormal) Collected: 07/16/23 0539    Specimen: Blood Updated: 07/16/23 0613     proBNP 13,526.0 pg/mL     Narrative:      Among patients with dyspnea, NT-proBNP is highly sensitive for the detection of acute congestive heart failure. In addition NT-proBNP of <300 pg/ml effectively rules out acute congestive heart failure with 99% negative predictive value.    Results may be falsely decreased if patient taking Biotin.      Single High Sensitivity Troponin T [[id number]]  (Abnormal) Collected: 07/16/23 0539    Specimen: Blood Updated: 07/16/23 0613     HS Troponin T 66 ng/L     Narrative:      High Sensitive Troponin T Reference Range:  <10.0 ng/L- Negative Female for AMI  <15.0 ng/L- Negative Male for AMI  >=10 - Abnormal Female indicating possible myocardial injury.  >=15 - Abnormal Male indicating possible myocardial injury.   Clinicians would have to utilize clinical acumen, EKG, Troponin, and serial changes to determine if it is an Acute Myocardial Infarction or myocardial injury due to an underlying chronic condition.         CBC & Differential [[id number]]  (Abnormal) Collected: 07/16/23 0539    Specimen: Blood Updated: 07/16/23 0551    Narrative:      The following orders were created for panel order CBC & Differential.  Procedure                               Abnormality         Status                     ---------                               -----------         ------                     CBC Auto Differential[[id number]]        Abnormal            Final result                 Please view  results for these tests on the individual orders.    CBC Auto Differential [[id number]]  (Abnormal) Collected: 07/16/23 0539    Specimen: Blood Updated: 07/16/23 0551     WBC 11.90 10*3/mm3      RBC 4.52 10*6/mm3      Hemoglobin 13.5 g/dL      Hematocrit 40.3 %      MCV 89.2 fL      MCH 29.8 pg      MCHC 33.4 g/dL      RDW 13.4 %      RDW-SD 41.1 fl      MPV 10.0 fL      Platelets 237 10*3/mm3      Neutrophil % 64.9 %      Lymphocyte % 24.5 %      Monocyte % 7.9 %      Eosinophil % 2.0 %      Basophil % 0.7 %      Neutrophils, Absolute 7.70 10*3/mm3      Lymphocytes, Absolute 2.90 10*3/mm3      Monocytes, Absolute 0.90 10*3/mm3      Eosinophils, Absolute 0.20 10*3/mm3      Basophils, Absolute 0.10 10*3/mm3      nRBC 0.1 /100 WBC             Imaging Results (Most Recent)       Procedure Component Value Units Date/Time    XR Chest 1 View [[id number]] Collected: 07/17/23 0855     Updated: 07/17/23 0858    Narrative:      XR CHEST 1 VW    Date of Exam: 7/17/2023 8:38 AM EDT    Indication: pulmonary edema.    Comparison: 7/16/2023.    Findings:  Previously seen interstitial lung infiltrates have largely resolved. There is stable borderline cardiomegaly. There are no new infiltrates. There are no effusions.      Impression:      Impression:  Near complete resolution of pulmonary edema.      Electronically Signed: Jenifer Arteaga MD    7/17/2023 8:56 AM EDT    Workstation ID: [id number]    XR Chest 1 View [[id number]] Collected: 07/16/23 0741     Updated: 07/16/23 0745    Narrative:      XR CHEST 1 VW    Date of Exam: 7/16/2023 4:30 AM EDT    Indication: shortness of breath    Comparison: 12/6/2022 and prior    Findings:  Study limited by overlying support and monitoring apparatus. Heart size is mildly enlarged. This is accentuated by portable technique and relatively low lung volumes. Pulmonary vascularity is congested and indistinct. Diffuse increased groundglass and   interstitial opacities noted within the perihilar and  bibasilar distribution. Findings are accentuated by lower lung volumes. No pneumothorax or definitive pleural effusion is identified. Osseous structures demonstrate degenerative changes of the spine   and right shoulder.      Impression:      Impression:    1. Borderline cardiomegaly and pulmonary vascular congestion with suspected mild interstitial edema and/or atypical infection in the correct clinical setting.      Electronically Signed: Jason Mckinnon    7/16/2023 7:43 AM EDT    Workstation ID: [id number]          reviewed    ECG/EMG Results (most recent)       Procedure Component Value Units Date/Time    ECG 12 Lead Dyspnea [[id number]] Collected: 07/16/23 0423     Updated: 07/17/23 0750     QT Interval 415 ms     Narrative:      HEART RATE= 82  bpm  RR Interval= 740  ms  VA Interval= 189  ms  P Horizontal Axis= -2  deg  P Front Axis= 30  deg  QRSD Interval= 111  ms  QT Interval= 415  ms  QRS Axis= -40  deg  T Wave Axis= 114  deg  - ABNORMAL ECG -  Sinus rhythm  Multiple ventricular premature complexes  Probable left atrial enlargement  Left anterior fascicular block  LVH with secondary repolarization abnormality  When compared with ECG of 06-Dec-2022 4:38:10,  Significant axis, voltage or hypertrophy change  Electronically Signed By: Arun Skaggs ([facility]) 17-Jul-2023 07:50:14  Date and Time of Study: 2023-07-16 04:23:14    SCANNED - TELEMETRY   [[id number]] Resulted: 07/16/23     Updated: 07/17/23 1420    SCANNED - TELEMETRY   [[id number]] Resulted: 07/16/23     Updated: 07/17/23 1449    SCANNED - TELEMETRY   [[id number]] Resulted: 07/16/23     Updated: 07/17/23 1622    SCANNED - TELEMETRY   [[id number]] Resulted: 07/16/23     Updated: 07/17/23 1628    SCANNED - TELEMETRY   [[id number]] Resulted: 07/16/23     Updated: 07/17/23 1713    SCANNED - TELEMETRY   [[id number]] Resulted: 07/16/23     Updated: 07/17/23 1755    SCANNED - TELEMETRY   [[id number]] Resulted: 07/16/23     Updated: 07/18/23 0656    SCANNED - TELEMETRY    [[id number]] Resulted: 07/16/23     Updated: 07/18/23 0724    SCANNED - TELEMETRY   [[id number]] Resulted: 07/16/23     Updated: 07/18/23 0731    SCANNED - TELEMETRY   [[id number]] Resulted: 07/16/23     Updated: 07/18/23 0755          reviewed    Assessment & Plan     Hypertensive emergency  JOSE ANGEL, elevated troponin secondary to severely uncontrolled blood pressures in setting of methamphetamine intoxication  No obstructive coronary artery disease no evidence of acute coronary syndrome, EKG is stable without ischemic changes  He is chest pain-free  Left heart cath last year revealed no obstructive coronary artery disease with similar presentation with methamphetamine intoxication, hypertensive emergency, elevated troponin, finding normal EF, diastolic dysfunction, normal coronaries    No plan for ischemic evaluation at this time  Complete cessation of methamphetamine and substance abuse  Not a candidate for antiplatelets or anticoagulation in the setting of severely uncontrolled blood pressures with continued methamphetamine use  Continue carvedilol amlodipine and afterload reduction for goal blood pressure less than 140 systolic   consult for any resources for addiction or rehab placement if needed  Gentle diuresis  Nephrology management and okay for discharge when okay for from nephrology standpoint      Continue gentle IV fluids  Stat BMP  Defer medicine work-up for right-sided flank pain, consider CT noncontrast  Chest pain-free  Patient counseled extensively for cessation of all substance use today again on encounter  Carvedilol twice daily optimized  Amlodipine 10 daily from 5 mg  Twice daily hydralazine 25 mg, previously 10  Goal blood pressure simply less than 140 systolic  Continue to titrate antihypertensives uncontrolled blood pressures    Malik Devlin MD, PhD    I discussed the patient's findings and my recommendations with patient and staff    Malik Devlin MD  07/18/23  09:32  EDT

## 2023-07-18 NOTE — PROGRESS NOTES
PROGRESS NOTE      Patient Name: Scott Gaytan  : 1975  MRN: [id number]  Primary Care Physician: Sidra Hand DO  Date of admission: 2023    Patient Care Team:  Sidra Hand DO as PCP - General (Family Medicine)        Subjective   Subjective:     Patient is feeling better no new complaints  Review of systems:  All review of system unremarkable      Allergies:  No Known Allergies    Objective   Exam:     Vital Signs  Temp:  [97.2 °F (36.2 °C)-98 °F (36.7 °C)] 98 °F (36.7 °C)  Heart Rate:  [61-84] 76  Resp:  [16-25] 25  BP: (136-181)/() 178/131  SpO2:  [94 %-98 %] 95 %  on   ;   Device (Oxygen Therapy): room air  Body mass index is 27.55 kg/m².    General: Middle-age male in no acute distress.    Head:      Normocephalic and atraumatic.    Eyes:      PERRL/EOM intact, conjunctiva and sclera clear with out nystagmus.    Neck:      No masses, thyromegaly,  trachea central with normal respiratory effort   Lungs:    Clear bilaterally to auscultation.    Heart:      Regular rate and rhythm, no murmur no gallop  Abd:        Soft, nontender, not distended, bowel sounds positive, no shifting dullness   Pulses:   Pulses palpable  Extr:        No cyanosis or clubbing--no edema.    Neuro:    No focal deficits.   alert oriented x3  Skin:       Intact without lesions or rashes.    Psych:    Alert and cooperative; normal mood and affect; .      Results Review:  I have personally reviewed most recent Data :  CBC    Results from last 7 days   Lab Units 23  0539   WBC 10*3/mm3 10.60 11.90*   HEMOGLOBIN g/dL 12.5* 13.5   PLATELETS 10*3/mm3 235 237     CMP   Results from last 7 days   Lab Units 23  0915 23  0539   SODIUM mmol/L 141 139 139   POTASSIUM mmol/L 3.7 4.1 3.0*   CHLORIDE mmol/L 104 103 102   CO2 mmol/L 24.0 24.0 23.0   BUN mg/dL 18 28* 22*   CREATININE mg/dL 1.33* 1.92* 1.42*   GLUCOSE mg/dL 121* 118* 102*     ABG      CT Abdomen Pelvis Without  Contrast    Result Date: 7/18/2023  Impression: 1.Examination is limited due to motion artifact and due to lack of IV contrast administration. 2.Given these limitations, no acute abnormality identified within the abdomen or pelvis. 3.Moderate colonic stool. 4.Additional findings as detailed above Electronically Signed: Mateus Lund  7/18/2023 12:28 PM EDT  Workstation ID: [id number]    XR Chest 1 View    Result Date: 7/17/2023  Impression: Near complete resolution of pulmonary edema. Electronically Signed: Jenifer Arteaga MD  7/17/2023 8:56 AM EDT  Workstation ID: [id number]     Results for orders placed during the hospital encounter of 12/06/22    Adult Transthoracic Echo Complete w/ Color, Spectral and Contrast if Necessary Per Protocol    Interpretation Summary    Left ventricular systolic function is normal. Left ventricular ejection fraction appears to be 61 - 65%.    Left ventricular diastolic function was normal.    Estimated right ventricular systolic pressure from tricuspid regurgitation is normal (<35 mmHg).    Scheduled Meds:amLODIPine, 10 mg, Oral, Q24H  carvedilol, 6.25 mg, Oral, BID With Meals  enoxaparin, 40 mg, Subcutaneous, Daily  hydrALAZINE, 25 mg, Oral, Q12H  hydroCHLOROthiazide, 12.5 mg, Oral, Daily  ipratropium-albuterol, 3 mL, Nebulization, Q8H - RT  losartan, 50 mg, Oral, Q24H  senna-docusate sodium, 2 tablet, Oral, BID  sodium chloride, 10 mL, Intravenous, Q12H      Continuous Infusions:lactated ringers, 50 mL/hr, Last Rate: 50 mL/hr (07/17/23 1145)      PRN Meds:  acetaminophen    albuterol sulfate HFA    senna-docusate sodium **AND** polyethylene glycol **AND** bisacodyl **AND** bisacodyl    Calcium Replacement - Follow Nurse / BPA Driven Protocol    Magnesium Standard Dose Replacement - Follow Nurse / BPA Driven Protocol    nitroglycerin    ondansetron    Phosphorus Replacement - Follow Nurse / BPA Driven Protocol    Potassium Replacement - Follow Nurse / BPA Driven Protocol    [COMPLETED]  Insert Peripheral IV **AND** sodium chloride    sodium chloride    sodium chloride    Assessment & Plan   Assessment and Plan:         Hypertensive emergency    ASSESSMENT:  Acute kidney injury  Congestive heart failure with preserved ejection fraction  Chronic kidney disease with some proteinuria  Accelerated/malignant hypertension with pulmonary edema  Mild hypokalemia              PLAN :      Renin and aldosterone level done at [facility] was unremarkable.  Unable to get fractionated metanephrine level  Blood pressure still on the high side  Patient need to be compliant with the medications  We will add low-dose angiotensin receptor blocker and thiazide diuretics.  That may cause some increase in creatinine which will be acceptable as it will help to improve blood pressure and stabilize renal functions  Patient need to be compliant with the medication  Control blood pressure with beta-blocker, calcium channel blocker, addition of normal heart dihydropyridine and dihydropyridine calcium channel blocker continue angiotensin receptor blocker and low-dose thiazide diuretics   follow-up in the renal clinic in 2 to 3 weeks  Wean off nicardipine drip          Electronically signed by Nelson Das MD,   [facility] kidney consultant  [phone]  7/18/2023  12:59 EDT

## 2023-07-18 NOTE — CASE MANAGEMENT/SOCIAL WORK
Case Management Discharge Note      Final Note: Home. Left AMA.         Transportation Services  Private: Car    Final Discharge Disposition Code: 01 - home or self-care    Social Work Assessment   Bhavik     Patient Name: Scott Gaytan  MRN: [id number]  Today's Date: 7/18/2023    Admit Date: 7/16/2023     Discharge Plan       Row Name 07/18/23 1512       Plan    Plan Comments Patient with +UDS on admission, needed SDOH/SA/discharge planning screens, however patient left AMA prior to these being completed.    Final Discharge Disposition Code 01 - home or self-care    Final Note Home. Left AMA.             CHAO Valadez, EVW, [facility]    Phone: [phone]  Cell: [phone]  Fax: [phone]  [email or web address]

## 2024-04-10 ENCOUNTER — HOSPITAL ENCOUNTER (EMERGENCY)
Facility: HOSPITAL | Age: 49
Discharge: LEFT BEFORE DISCHARGE | End: 2024-04-10
Attending: EMERGENCY MEDICINE
Payer: MEDICAID

## 2024-04-10 VITALS
HEART RATE: 83 BPM | RESPIRATION RATE: 20 BRPM | OXYGEN SATURATION: 97 % | SYSTOLIC BLOOD PRESSURE: 164 MMHG | BODY MASS INDEX: 27.28 KG/M2 | DIASTOLIC BLOOD PRESSURE: 91 MMHG | HEIGHT: 68 IN | TEMPERATURE: 97.7 F | WEIGHT: 180 LBS

## 2024-04-10 DIAGNOSIS — Z53.21 ELOPED FROM EMERGENCY DEPARTMENT: ICD-10-CM

## 2024-04-10 DIAGNOSIS — R51.9 NONINTRACTABLE HEADACHE, UNSPECIFIED CHRONICITY PATTERN, UNSPECIFIED HEADACHE TYPE: Primary | ICD-10-CM

## 2024-04-10 PROCEDURE — 99281 EMR DPT VST MAYX REQ PHY/QHP: CPT

## 2024-04-10 RX ORDER — SODIUM CHLORIDE 0.9 % (FLUSH) 0.9 %
10 SYRINGE (ML) INJECTION AS NEEDED
Status: DISCONTINUED | OUTPATIENT
Start: 2024-04-10 | End: 2024-04-10 | Stop reason: HOSPADM

## 2024-04-10 NOTE — ED PROVIDER NOTES
Subjective   Provider in Triage Note  50 yo presents to triage after he was discharged today from [facility] after being admitted for a STEMI with nonobstructive disease.  States he's had a headache for a week and they didn't address it. No uri symptoms. Hx methamphetamine use. States he was seizing but he was noted to be tremoring and not seizing in triage.     Due to significant overcrowding in the emergency department patient was initially seen and evaluated in triage.  Provider in triage recommended patient placement in the treatment area to initiate therapy and movement to an ER bed as soon as possible.   Orders placed; medications will be deferred to main provider per protocol.      Dc note: 1. Patient presented to the ER with chest discomfort   -Chronically elevated troponin, EKG with LBBB, cardiac catheterization 2022 at [facility] with nonobstructive CAD  -ER called STEMI alert   -NO STEMI  -s/p cardiac cath with non obs CAD    2. Elevated troponin (chronicially elevated trop)  - nonischemic myocardial injury in the setting of CKD, methamphetamine abuse, CHF    3. drug use and tobacco abuse   +methamphetamines use    4. Acute on Chronic HFrEF (biventricular CHF), NICM  -EF 20%  -moderate MR/TR with RVSP 50 mmHg  -Noncompliant with medications  -We previously held Entresto and Aldactone secondary to worsening renal function. He was not taking his medications.  -Guideline directed medical therapy difficult with noncompliance.  No ACE/ARB/Entresto/aldactone with renal issue.s     5. JOSE ANGEL/CKD  -Nephrology consulted    6. Drug use and compliance with medications is a real issue.  Patient has had multiple admissions for the same issues.        History of Present Illness    Review of Systems    Past Medical History:   Diagnosis Date    Hypertension     Substance abuse     Meth/MJ       No Known Allergies    Past Surgical History:   Procedure Laterality Date    CARDIAC CATHETERIZATION N/A 04/27/2022    ORIF TIBIA/FIBULA  FRACTURES Right        Family History   Problem Relation Age of Onset    Diabetes Mother     Arthritis Mother     Hypertension Father     Heart attack Father     Seizures Sister     Heart attack Paternal Grandfather        Social History     Socioeconomic History    Marital status: Single   Tobacco Use    Smoking status: Every Day     Current packs/day: 1.00     Average packs/day: 1 pack/day for 30.3 years (30.3 ttl pk-yrs)     Types: Cigarettes     Start date: 1994     Passive exposure: Never    Smokeless tobacco: Never   Vaping Use    Vaping status: Never Used   Substance and Sexual Activity    Alcohol use: Not Currently    Drug use: Not Currently     Types: Methamphetamines, Marijuana     Comment: quit 2022    Sexual activity: Defer           Objective   Physical Exam    Procedures           ED Course      Labs Reviewed   BASIC METABOLIC PANEL   URINALYSIS W/ MICROSCOPIC IF INDICATED (NO CULTURE)   URINE DRUG SCREEN   CBC WITH AUTO DIFFERENTIAL   CBC AND DIFFERENTIAL    Narrative:     The following orders were created for panel order CBC & Differential.  Procedure                               Abnormality         Status                     ---------                               -----------         ------                     CBC Auto Differential[[id number]]                                                         Please view results for these tests on the individual orders.     Medications   sodium chloride 0.9 % flush 10 mL (has no administration in time range)     No radiology results for the last day  Prior to Admission medications    Not on File                                            Medical Decision Making   Patient eloped from the emergency department prior to completion of his evaluation        Problems Addressed:  Eloped from emergency department: complicated acute illness or injury  Nonintractable headache, unspecified chronicity pattern, unspecified headache type: complicated acute illness or  injury    Amount and/or Complexity of Data Reviewed  Labs: ordered.    Risk  Prescription drug management.        Final diagnoses:   None       ED Disposition  ED Disposition       None            No follow-up provider specified.       Medication List      No changes were made to your prescriptions during this visit.            Rashida Rojas, APRN  04/10/24 1556

## 2024-06-16 ENCOUNTER — HOSPITAL ENCOUNTER (INPATIENT)
Facility: HOSPITAL | Age: 49
LOS: 1 days | Discharge: LEFT BEFORE DISCHARGE | End: 2024-06-18
Attending: HOSPITALIST | Admitting: HOSPITALIST
Payer: MEDICAID

## 2024-06-16 ENCOUNTER — APPOINTMENT (OUTPATIENT)
Dept: GENERAL RADIOLOGY | Facility: HOSPITAL | Age: 49
End: 2024-06-16
Payer: MEDICAID

## 2024-06-16 DIAGNOSIS — N18.9 ACUTE KIDNEY INJURY SUPERIMPOSED ON CHRONIC KIDNEY DISEASE: ICD-10-CM

## 2024-06-16 DIAGNOSIS — N17.9 ACUTE KIDNEY INJURY SUPERIMPOSED ON CHRONIC KIDNEY DISEASE: ICD-10-CM

## 2024-06-16 DIAGNOSIS — F15.10 METHAMPHETAMINE ABUSE: ICD-10-CM

## 2024-06-16 DIAGNOSIS — R07.9 CHEST PAIN, UNSPECIFIED TYPE: Primary | ICD-10-CM

## 2024-06-16 DIAGNOSIS — I16.1 HYPERTENSIVE EMERGENCY: ICD-10-CM

## 2024-06-16 PROBLEM — F15.929 METHAMPHETAMINE INTOXICATION: Status: ACTIVE | Noted: 2024-06-16

## 2024-06-16 LAB
ALBUMIN SERPL-MCNC: 3.7 G/DL (ref 3.5–5.2)
ALBUMIN/GLOB SERPL: 1.1 G/DL
ALP SERPL-CCNC: 42 U/L (ref 39–117)
ALT SERPL W P-5'-P-CCNC: 14 U/L (ref 1–41)
AMPHET+METHAMPHET UR QL: POSITIVE
ANION GAP SERPL CALCULATED.3IONS-SCNC: 15.2 MMOL/L (ref 5–15)
AST SERPL-CCNC: 29 U/L (ref 1–40)
BARBITURATES UR QL SCN: NEGATIVE
BASOPHILS # BLD AUTO: 0.05 10*3/MM3 (ref 0–0.2)
BASOPHILS NFR BLD AUTO: 0.4 % (ref 0–1.5)
BENZODIAZ UR QL SCN: NEGATIVE
BILIRUB SERPL-MCNC: 0.6 MG/DL (ref 0–1.2)
BUN SERPL-MCNC: 47 MG/DL (ref 6–20)
BUN/CREAT SERPL: 15.6 (ref 7–25)
CALCIUM SPEC-SCNC: 9.2 MG/DL (ref 8.6–10.5)
CANNABINOIDS SERPL QL: POSITIVE
CHLORIDE SERPL-SCNC: 105 MMOL/L (ref 98–107)
CK SERPL-CCNC: 155 U/L (ref 20–200)
CO2 SERPL-SCNC: 20.8 MMOL/L (ref 22–29)
COCAINE UR QL: NEGATIVE
CREAT SERPL-MCNC: 3.01 MG/DL (ref 0.76–1.27)
DEPRECATED RDW RBC AUTO: 41.5 FL (ref 37–54)
EGFRCR SERPLBLD CKD-EPI 2021: 24.9 ML/MIN/1.73
EOSINOPHIL # BLD AUTO: 0.17 10*3/MM3 (ref 0–0.4)
EOSINOPHIL NFR BLD AUTO: 1.2 % (ref 0.3–6.2)
ERYTHROCYTE [DISTWIDTH] IN BLOOD BY AUTOMATED COUNT: 12.6 % (ref 12.3–15.4)
ETHANOL UR QL: <0.01 %
GEN 5 2HR TROPONIN T REFLEX: 64 NG/L
GLOBULIN UR ELPH-MCNC: 3.4 GM/DL
GLUCOSE SERPL-MCNC: 71 MG/DL (ref 65–99)
HCT VFR BLD AUTO: 38.9 % (ref 37.5–51)
HGB BLD-MCNC: 12.6 G/DL (ref 13–17.7)
HOLD SPECIMEN: NORMAL
HOLD SPECIMEN: NORMAL
IMM GRANULOCYTES # BLD AUTO: 0.04 10*3/MM3 (ref 0–0.05)
IMM GRANULOCYTES NFR BLD AUTO: 0.3 % (ref 0–0.5)
LYMPHOCYTES # BLD AUTO: 1.97 10*3/MM3 (ref 0.7–3.1)
LYMPHOCYTES NFR BLD AUTO: 14.3 % (ref 19.6–45.3)
MAGNESIUM SERPL-MCNC: 2.1 MG/DL (ref 1.6–2.6)
MCH RBC QN AUTO: 29.5 PG (ref 26.6–33)
MCHC RBC AUTO-ENTMCNC: 32.4 G/DL (ref 31.5–35.7)
MCV RBC AUTO: 91.1 FL (ref 79–97)
METHADONE UR QL SCN: NEGATIVE
MONOCYTES # BLD AUTO: 0.74 10*3/MM3 (ref 0.1–0.9)
MONOCYTES NFR BLD AUTO: 5.4 % (ref 5–12)
NEUTROPHILS NFR BLD AUTO: 10.79 10*3/MM3 (ref 1.7–7)
NEUTROPHILS NFR BLD AUTO: 78.4 % (ref 42.7–76)
NRBC BLD AUTO-RTO: 0 /100 WBC (ref 0–0.2)
NT-PROBNP SERPL-MCNC: ABNORMAL PG/ML (ref 0–450)
OPIATES UR QL: NEGATIVE
OXYCODONE UR QL SCN: NEGATIVE
PLATELET # BLD AUTO: 196 10*3/MM3 (ref 140–450)
PMV BLD AUTO: 10.7 FL (ref 6–12)
POTASSIUM SERPL-SCNC: 3.8 MMOL/L (ref 3.5–5.2)
PROT SERPL-MCNC: 7.1 G/DL (ref 6–8.5)
RBC # BLD AUTO: 4.27 10*6/MM3 (ref 4.14–5.8)
SODIUM SERPL-SCNC: 141 MMOL/L (ref 136–145)
TROPONIN T DELTA: -8 NG/L
TROPONIN T SERPL HS-MCNC: 72 NG/L
WBC NRBC COR # BLD AUTO: 13.76 10*3/MM3 (ref 3.4–10.8)
WHOLE BLOOD HOLD COAG: NORMAL
WHOLE BLOOD HOLD SPECIMEN: NORMAL

## 2024-06-16 PROCEDURE — 83735 ASSAY OF MAGNESIUM: CPT | Performed by: PHYSICIAN ASSISTANT

## 2024-06-16 PROCEDURE — 25010000002 HEPARIN (PORCINE) PER 1000 UNITS: Performed by: HOSPITALIST

## 2024-06-16 PROCEDURE — 80307 DRUG TEST PRSMV CHEM ANLYZR: CPT | Performed by: PHYSICIAN ASSISTANT

## 2024-06-16 PROCEDURE — 93005 ELECTROCARDIOGRAM TRACING: CPT

## 2024-06-16 PROCEDURE — 99285 EMERGENCY DEPT VISIT HI MDM: CPT

## 2024-06-16 PROCEDURE — 84484 ASSAY OF TROPONIN QUANT: CPT | Performed by: PHYSICIAN ASSISTANT

## 2024-06-16 PROCEDURE — 82550 ASSAY OF CK (CPK): CPT | Performed by: PHYSICIAN ASSISTANT

## 2024-06-16 PROCEDURE — 80053 COMPREHEN METABOLIC PANEL: CPT | Performed by: PHYSICIAN ASSISTANT

## 2024-06-16 PROCEDURE — 25010000002 ONDANSETRON PER 1 MG: Performed by: HOSPITALIST

## 2024-06-16 PROCEDURE — 83880 ASSAY OF NATRIURETIC PEPTIDE: CPT | Performed by: PHYSICIAN ASSISTANT

## 2024-06-16 PROCEDURE — 85025 COMPLETE CBC W/AUTO DIFF WBC: CPT | Performed by: PHYSICIAN ASSISTANT

## 2024-06-16 PROCEDURE — 82077 ASSAY SPEC XCP UR&BREATH IA: CPT | Performed by: PHYSICIAN ASSISTANT

## 2024-06-16 PROCEDURE — 71045 X-RAY EXAM CHEST 1 VIEW: CPT

## 2024-06-16 PROCEDURE — 84484 ASSAY OF TROPONIN QUANT: CPT | Performed by: HOSPITALIST

## 2024-06-16 PROCEDURE — 36415 COLL VENOUS BLD VENIPUNCTURE: CPT

## 2024-06-16 PROCEDURE — 25010000002 NITROGLYCERIN 200 MCG/ML SOLUTION: Performed by: PHYSICIAN ASSISTANT

## 2024-06-16 PROCEDURE — G0378 HOSPITAL OBSERVATION PER HR: HCPCS

## 2024-06-16 PROCEDURE — 93005 ELECTROCARDIOGRAM TRACING: CPT | Performed by: HOSPITALIST

## 2024-06-16 RX ORDER — SODIUM CHLORIDE 0.9 % (FLUSH) 0.9 %
10 SYRINGE (ML) INJECTION AS NEEDED
Status: DISCONTINUED | OUTPATIENT
Start: 2024-06-16 | End: 2024-06-18 | Stop reason: HOSPADM

## 2024-06-16 RX ORDER — NITROGLYCERIN 0.4 MG/1
0.4 TABLET SUBLINGUAL
Status: DISCONTINUED | OUTPATIENT
Start: 2024-06-16 | End: 2024-06-18 | Stop reason: HOSPADM

## 2024-06-16 RX ORDER — BISACODYL 5 MG/1
5 TABLET, DELAYED RELEASE ORAL DAILY PRN
Status: DISCONTINUED | OUTPATIENT
Start: 2024-06-16 | End: 2024-06-18 | Stop reason: HOSPADM

## 2024-06-16 RX ORDER — HEPARIN SODIUM 5000 [USP'U]/ML
5000 INJECTION, SOLUTION INTRAVENOUS; SUBCUTANEOUS EVERY 8 HOURS SCHEDULED
Status: DISCONTINUED | OUTPATIENT
Start: 2024-06-16 | End: 2024-06-18 | Stop reason: HOSPADM

## 2024-06-16 RX ORDER — ASPIRIN 81 MG/1
81 TABLET ORAL DAILY
COMMUNITY

## 2024-06-16 RX ORDER — POLYETHYLENE GLYCOL 3350 17 G/17G
17 POWDER, FOR SOLUTION ORAL DAILY PRN
Status: DISCONTINUED | OUTPATIENT
Start: 2024-06-16 | End: 2024-06-18 | Stop reason: HOSPADM

## 2024-06-16 RX ORDER — ONDANSETRON 2 MG/ML
4 INJECTION INTRAMUSCULAR; INTRAVENOUS EVERY 6 HOURS PRN
Status: DISCONTINUED | OUTPATIENT
Start: 2024-06-16 | End: 2024-06-18 | Stop reason: HOSPADM

## 2024-06-16 RX ORDER — SODIUM CHLORIDE 9 MG/ML
75 INJECTION, SOLUTION INTRAVENOUS CONTINUOUS
Status: DISCONTINUED | OUTPATIENT
Start: 2024-06-16 | End: 2024-06-16

## 2024-06-16 RX ORDER — AMOXICILLIN 250 MG
2 CAPSULE ORAL 2 TIMES DAILY PRN
Status: DISCONTINUED | OUTPATIENT
Start: 2024-06-16 | End: 2024-06-18 | Stop reason: HOSPADM

## 2024-06-16 RX ORDER — BISACODYL 10 MG
10 SUPPOSITORY, RECTAL RECTAL DAILY PRN
Status: DISCONTINUED | OUTPATIENT
Start: 2024-06-16 | End: 2024-06-18 | Stop reason: HOSPADM

## 2024-06-16 RX ORDER — SODIUM CHLORIDE 9 MG/ML
40 INJECTION, SOLUTION INTRAVENOUS AS NEEDED
Status: DISCONTINUED | OUTPATIENT
Start: 2024-06-16 | End: 2024-06-18 | Stop reason: HOSPADM

## 2024-06-16 RX ORDER — CARVEDILOL 6.25 MG/1
12.5 TABLET ORAL 2 TIMES DAILY WITH MEALS
Status: DISCONTINUED | OUTPATIENT
Start: 2024-06-16 | End: 2024-06-18 | Stop reason: HOSPADM

## 2024-06-16 RX ORDER — NITROGLYCERIN 0.4 MG/1
0.4 TABLET SUBLINGUAL
COMMUNITY

## 2024-06-16 RX ORDER — ASPIRIN 81 MG/1
81 TABLET ORAL DAILY
Status: DISCONTINUED | OUTPATIENT
Start: 2024-06-16 | End: 2024-06-18 | Stop reason: HOSPADM

## 2024-06-16 RX ORDER — SODIUM CHLORIDE 0.9 % (FLUSH) 0.9 %
10 SYRINGE (ML) INJECTION EVERY 12 HOURS SCHEDULED
Status: DISCONTINUED | OUTPATIENT
Start: 2024-06-16 | End: 2024-06-18 | Stop reason: HOSPADM

## 2024-06-16 RX ORDER — CARVEDILOL 12.5 MG/1
12.5 TABLET ORAL 2 TIMES DAILY WITH MEALS
COMMUNITY

## 2024-06-16 RX ORDER — NITROGLYCERIN 20 MG/100ML
5-200 INJECTION INTRAVENOUS
Status: DISCONTINUED | OUTPATIENT
Start: 2024-06-16 | End: 2024-06-17

## 2024-06-16 RX ORDER — ACETAMINOPHEN 325 MG/1
650 TABLET ORAL EVERY 4 HOURS PRN
Status: DISCONTINUED | OUTPATIENT
Start: 2024-06-16 | End: 2024-06-18 | Stop reason: HOSPADM

## 2024-06-16 RX ADMIN — CARVEDILOL 12.5 MG: 6.25 TABLET, FILM COATED ORAL at 14:23

## 2024-06-16 RX ADMIN — HEPARIN SODIUM 5000 UNITS: 5000 INJECTION INTRAVENOUS; SUBCUTANEOUS at 21:04

## 2024-06-16 RX ADMIN — Medication 10 ML: at 14:24

## 2024-06-16 RX ADMIN — Medication 10 ML: at 21:05

## 2024-06-16 RX ADMIN — ONDANSETRON 4 MG: 2 INJECTION INTRAMUSCULAR; INTRAVENOUS at 21:04

## 2024-06-16 RX ADMIN — ACETAMINOPHEN 650 MG: 325 TABLET, FILM COATED ORAL at 21:04

## 2024-06-16 RX ADMIN — NITROGLYCERIN 10 MCG/MIN: 20 INJECTION INTRAVENOUS at 12:03

## 2024-06-16 RX ADMIN — ASPIRIN 81 MG: 81 TABLET, COATED ORAL at 14:23

## 2024-06-16 NOTE — ED NOTES
Nursing report ED to floor  Scott Gaytan  49 y.o.  male    HPI:   Chief Complaint   Patient presents with    Chest Pain       Admitting doctor:   Jamal Vega MD    Admitting diagnosis:   The primary encounter diagnosis was Chest pain, unspecified type. Diagnoses of Methamphetamine abuse, Hypertensive emergency, and Acute kidney injury superimposed on chronic kidney disease were also pertinent to this visit.    Code status:   Current Code Status       Date Active Code Status Order ID Comments User Context       6/16/2024 1322 CPR (Attempt to Resuscitate) [id number]  Jamal Vega MD ED        Question Answer    Code Status (Patient has no pulse and is not breathing) CPR (Attempt to Resuscitate)    Medical Interventions (Patient has pulse or is breathing) Full Support                    Allergies:   Morphine    Isolation:  No active isolations     Fall Risk:  Fall Risk Assessment was completed, and patient is at high risk for falls.   Predictive Model Details         3 (Low) Factor Value    Calculated 6/16/2024 14:12 Age 49    Risk of Fall Model Active Peripheral IV Present     Imaging order in this encounter Present     Respiratory Rate 19     Magnesium 2.1 mg/dL     Creatinine 3.01 mg/dL     Tobacco Use Current     Sy Scale not on file     Albumin 3.7 g/dL     Clinically Relevant Sex Not Female     Number of Distinct Medication Classes administered 1     Cardiac Assessment X     Total Bilirubin 0.6 mg/dL     Calcium 9.2 mg/dL     Days after Admission 0.144     Diastolic      ALT 14 U/L     Chloride 105 mmol/L     Potassium 3.8 mmol/L         Weight:       06/16/24  1051   Weight: 81.6 kg (180 lb)       Intake and Output  No intake or output data in the 24 hours ending 06/16/24 1412    Diet:   Dietary Orders (From admission, onward)       Start     Ordered    06/16/24 1330  Diet: Cardiac; Low Sodium (2g); Fluid Consistency: Thin (IDDSI 0)  Diet Effective Now        References:    Diet Order  [facility]   Question Answer Comment   Diets: Cardiac    Cardiac Diet: Low Sodium (2g)    Fluid Consistency: Thin (IDDSI 0)        06/16/24 1329                     Most recent vitals:   Vitals:    06/16/24 1116 06/16/24 1145 06/16/24 1301 06/16/24 1317   BP: (!) 156/113 (!) 174/121 (!) 154/105 (!) 159/123   BP Location:       Patient Position:       Pulse: 79 87 82 84   Resp:       Temp:       TempSrc:       SpO2: 95% 99% 94% 97%   Weight:       Height:           Active LDAs/IV Access:   Lines, Drains & Airways       Active LDAs       Name Placement date Placement time Site Days    Peripheral IV 06/16/24 1200 Left Antecubital 06/16/24  1200  Antecubital  less than 1    Peripheral IV 06/16/24 1331 Anterior;Right;Medial Forearm 06/16/24  1331  Forearm  less than 1                    Skin Condition:   Skin Assessments (last day)       None             Labs (abnormal labs have a star):   Labs Reviewed   COMPREHENSIVE METABOLIC PANEL - Abnormal; Notable for the following components:       Result Value    BUN 47 (*)     Creatinine 3.01 (*)     CO2 20.8 (*)     Anion Gap 15.2 (*)     eGFR 24.9 (*)     All other components within normal limits    Narrative:     GFR Normal >60  Chronic Kidney Disease <60  Kidney Failure <15     TROPONIN - Abnormal; Notable for the following components:    HS Troponin T 72 (*)     All other components within normal limits    Narrative:     High Sensitive Troponin T Reference Range:  <14.0 ng/L- Negative Female for AMI  <22.0 ng/L- Negative Male for AMI  >=14 - Abnormal Female indicating possible myocardial injury.  >=22 - Abnormal Male indicating possible myocardial injury.   Clinicians would have to utilize clinical acumen, EKG, Troponin, and serial changes to determine if it is an Acute Myocardial Infarction or myocardial injury due to an underlying chronic condition.        BNP (IN-HOUSE) - Abnormal; Notable for the following components:    proBNP 18,249.0 (*)     All other components  within normal limits    Narrative:     This assay is used as an aid in the diagnosis of individuals suspected of having heart failure. It can be used as an aid in the diagnosis of acute decompensated heart failure (ADHF) in patients presenting with signs and symptoms of ADHF to the emergency department (ED). In addition, NT-proBNP of <300 pg/mL indicates ADHF is not likely.    Age Range Result Interpretation  NT-proBNP Concentration (pg/mL:      <50             Positive            >450                   Gray                 300-450                    Negative             <300    50-75           Positive            >900                  Gray                300-900                  Negative            <300      >75             Positive            >1800                  Gray                300-1800                  Negative            <300   URINE DRUG SCREEN - Abnormal; Notable for the following components:    Amphet/Methamphet, Screen Positive (*)     THC, Screen, Urine Positive (*)     All other components within normal limits    Narrative:     Negative Thresholds Per Drugs Screened:    Amphetamines                 500 ng/ml  Barbiturates                 200 ng/ml  Benzodiazepines              100 ng/ml  Cocaine                      300 ng/ml  Methadone                    300 ng/ml  Opiates                      300 ng/ml  Oxycodone                    100 ng/ml  THC                           50 ng/ml    The Normal Value for all drugs tested is negative. This report includes final unconfirmed screening results to be used for medical treatment purposes only. Unconfirmed results must not be used for non-medical purposes such as employment or legal testing. Clinical consideration should be applied to any drug of abuse test, particularly when unconfirmed results are used.          All urine drugs of abuse requests without chain of custody are for medical screening purposes only.  False positives are possible.     CBC WITH  AUTO DIFFERENTIAL - Abnormal; Notable for the following components:    WBC 13.76 (*)     Hemoglobin 12.6 (*)     Neutrophil % 78.4 (*)     Lymphocyte % 14.3 (*)     Neutrophils, Absolute 10.79 (*)     All other components within normal limits   CK - Normal   MAGNESIUM - Normal   RAINBOW DRAW    Narrative:     The following orders were created for panel order [place] Draw.  Procedure                               Abnormality         Status                     ---------                               -----------         ------                     Green Top (Gel)[[id number]]                                  Final result               Lavender Top[[id number]]                                     Final result               Gold Top - SST[[id number]]                                   Final result               Light Blue Top[[id number]]                                   Final result                 Please view results for these tests on the individual orders.   ETHANOL    Narrative:     Plasma Ethanol Clinical Symptoms:    ETOH (%)               Clinical Symptom  .01-.05              No apparent influence  .03-.12              Euphoria, Diminished judgment and attention   .09-.25              Impaired comprehension, Muscle incoordination  .18-.30              Confusion, Staggered gait, Slurred speech  .25-.40              Markedly decreased response to stimuli, unable to stand or                        walk, vomitting, sleep or stupor  .35-.50              Comatose, Anesthesia, Subnormal body temperature       HIGH SENSITIVITIY TROPONIN T 2HR   TROPONIN   GREEN TOP   LAVENDER TOP   GOLD TOP - SST   LIGHT BLUE TOP   CBC AND DIFFERENTIAL    Narrative:     The following orders were created for panel order CBC & Differential.  Procedure                               Abnormality         Status                     ---------                               -----------         ------                     CBC Auto Differential[[id number]]         Abnormal            Final result                 Please view results for these tests on the individual orders.       LOC: Person, Place, Time, and Situation    Telemetry:  Telemetry    Cardiac Monitoring Ordered: yes    EKG:   ECG 12 Lead Chest Pain   Preliminary Result   HEART RATE= 80  bpm   RR Interval= 756  ms   NM Interval= 200  ms   P Horizontal Axis= -2  deg   P Front Axis= 32  deg   QRSD Interval= 115  ms   QT Interval= 453  ms   QTcB= 521  ms   QRS Axis= -44  deg   T Wave Axis= 92  deg   - ABNORMAL ECG -   Sinus rhythm   Probable left atrial enlargement   Left anterior fascicular block   LVH with secondary repolarization abnormality   Anterior Q waves, possibly due to LVH   Prolonged QT interval   Electronically Signed By:    Date and Time of Study: 2024-06-16 10:50:58          Medications Given in the ED:   Medications   sodium chloride 0.9 % flush 10 mL (has no administration in time range)   nitroglycerin (TRIDIL) 200 mcg/ml infusion (30 mcg/min Intravenous Rate/Dose Change 6/16/24 1336)   aspirin EC tablet 81 mg (has no administration in time range)   sodium chloride 0.9 % flush 10 mL (has no administration in time range)   sodium chloride 0.9 % flush 10 mL (has no administration in time range)   sodium chloride 0.9 % infusion 40 mL (has no administration in time range)   heparin (porcine) 5000 UNIT/ML injection 5,000 Units (has no administration in time range)   nitroglycerin (NITROSTAT) SL tablet 0.4 mg (has no administration in time range)   Potassium Replacement - Follow Nurse / BPA Driven Protocol (has no administration in time range)   Magnesium Standard Dose Replacement - Follow Nurse / BPA Driven Protocol (has no administration in time range)   Phosphorus Replacement - Follow Nurse / BPA Driven Protocol (has no administration in time range)   Calcium Replacement - Follow Nurse / BPA Driven Protocol (has no administration in time range)   acetaminophen (TYLENOL) tablet 650 mg (has no  administration in time range)   sennosides-docusate (PERICOLACE) 8.6-50 MG per tablet 2 tablet (has no administration in time range)     And   polyethylene glycol (MIRALAX) packet 17 g (has no administration in time range)     And   bisacodyl (DULCOLAX) EC tablet 5 mg (has no administration in time range)     And   bisacodyl (DULCOLAX) suppository 10 mg (has no administration in time range)   ondansetron (ZOFRAN) injection 4 mg (has no administration in time range)   carvedilol (COREG) tablet 12.5 mg (has no administration in time range)       Imaging results:  XR Chest 1 View    Result Date: 6/16/2024  Impression: No acute cardiopulmonary abnormality is identified. Electronically Signed: Mary Ching  6/16/2024 11:47 AM EDT  Workstation ID: [id number]     Social issues:   Social History     Socioeconomic History    Marital status: Single   Tobacco Use    Smoking status: Every Day     Current packs/day: 1.00     Average packs/day: 1 pack/day for 30.5 years (30.5 ttl pk-yrs)     Types: Cigarettes     Start date: 1994     Passive exposure: Never    Smokeless tobacco: Never   Vaping Use    Vaping status: Never Used   Substance and Sexual Activity    Alcohol use: Not Currently    Drug use: Not Currently     Types: Methamphetamines, Marijuana     Comment: quit 2022    Sexual activity: Defer       NIH Stroke Scale:  Interval: (not recorded)  1a. Level of Consciousness: (not recorded)  1b. LOC Questions: (not recorded)  1c. LOC Commands: (not recorded)  2. Best Gaze: (not recorded)  3. Visual: (not recorded)  4. Facial Palsy: (not recorded)  5a. Motor Arm, Left: (not recorded)  5b. Motor Arm, Right: (not recorded)  6a. Motor Leg, Left: (not recorded)  6b. Motor Leg, Right: (not recorded)  7. Limb Ataxia: (not recorded)  8. Sensory: (not recorded)  9. Best Language: (not recorded)  10. Dysarthria: (not recorded)  11. Extinction and Inattention (formerly Neglect): (not recorded)    Total (NIH Stroke Scale): (not recorded)      Additional notable assessment information:     Nursing report ED to floor:  2D JABIER Dos Santos, RN   06/16/24 14:12 EDT

## 2024-06-16 NOTE — ED PROVIDER NOTES
Subjective   History of Present Illness  Patient is a 49-year-old male PMH significant for hypertension, CHF, methamphetamine abuse presenting to the ED with reports of chest pain and dyspnea started yesterday.  In regards to his chest pain he states it is intermittent and waxes/ wanes in intensity.  He describes as a sharp/squeezing type pain on the left side of his chest that is nonradiating.  Reports associated dyspnea.  He reports chronic lower extremity edema with out any change here recently.  No reports of new cough congestion fever chills.  No abdominal pain nausea or vomiting.  Patient states he is prescribed carvedilol and Entresto and aspirin which he took last night.  He does report smoking about 1/2 pack of cigarettes daily and reports marijuana use but denies any other illicit drug use though history of methamphetamine abuse was noted in his chart.  Patient states when he normally gets his symptoms his congestive heart failure is acting up.  Patient states he is currently homeless and was walking on the side of the road when his pain started this morning.        Review of Systems   Constitutional: Negative.    HENT:  Negative for congestion, ear discharge, ear pain, rhinorrhea, sore throat and voice change.    Respiratory:  Positive for shortness of breath. Negative for apnea, cough, choking, chest tightness, wheezing and stridor.    Cardiovascular:  Positive for chest pain and leg swelling. Negative for palpitations.   Gastrointestinal:  Negative for abdominal distention, abdominal pain, nausea and vomiting.   Neurological:  Negative for dizziness, syncope, weakness, light-headedness and numbness.       Past Medical History:   Diagnosis Date    Hypertension     Substance abuse     Meth/MJ       Allergies   Allergen Reactions    Morphine Other (See Comments)     Makes it feel like my blood is boiling in my veins       Past Surgical History:   Procedure Laterality Date    CARDIAC CATHETERIZATION N/A  04/27/2022    ORIF TIBIA/FIBULA FRACTURES Right        Family History   Problem Relation Age of Onset    Diabetes Mother     Arthritis Mother     Hypertension Father     Heart attack Father     Seizures Sister     Heart attack Paternal Grandfather        Social History     Socioeconomic History    Marital status: Single   Tobacco Use    Smoking status: Every Day     Current packs/day: 1.00     Average packs/day: 1 pack/day for 30.5 years (30.5 ttl pk-yrs)     Types: Cigarettes     Start date: 1994     Passive exposure: Never    Smokeless tobacco: Never   Vaping Use    Vaping status: Never Used   Substance and Sexual Activity    Alcohol use: Not Currently    Drug use: Not Currently     Types: Methamphetamines, Marijuana     Comment: quit 2022    Sexual activity: Defer           Objective   Physical Exam  Vitals and nursing note reviewed.   Constitutional:       General: He is not in acute distress.     Appearance: Normal appearance. He is well-developed. He is not ill-appearing, toxic-appearing or diaphoretic.   HENT:      Head: Normocephalic and atraumatic.      Mouth/Throat:      Mouth: Mucous membranes are moist.      Pharynx: Oropharynx is clear.   Eyes:      Extraocular Movements: Extraocular movements intact.      Pupils: Pupils are equal, round, and reactive to light.   Cardiovascular:      Rate and Rhythm: Normal rate and regular rhythm.      Pulses: Normal pulses.      Heart sounds: No murmur heard.     No friction rub. No gallop.   Pulmonary:      Effort: Pulmonary effort is normal. No tachypnea, accessory muscle usage or respiratory distress.      Breath sounds: Normal breath sounds. No stridor. No decreased breath sounds, wheezing, rhonchi or rales.   Chest:      Chest wall: No mass, deformity, tenderness or crepitus.   Abdominal:      General: Bowel sounds are normal.      Palpations: Abdomen is soft.      Tenderness: There is no abdominal tenderness. There is no guarding or rebound.   Skin:     General:  "Skin is warm.      Capillary Refill: Capillary refill takes less than 2 seconds.      Findings: No rash.   Neurological:      General: No focal deficit present.      Mental Status: He is alert and oriented to person, place, and time.      GCS: GCS eye subscore is 4. GCS verbal subscore is 5. GCS motor subscore is 6.   Psychiatric:         Mood and Affect: Mood normal.         Behavior: Behavior normal.         Procedures           ED Course      BP (!) 174/121   Pulse 87   Temp 97.7 °F (36.5 °C) (Oral)   Resp 19   Ht 170.2 cm (67\")   Wt 81.6 kg (180 lb)   SpO2 99%   BMI 28.19 kg/m²   Medications   sodium chloride 0.9 % flush 10 mL (has no administration in time range)   nitroglycerin (TRIDIL) 200 mcg/ml infusion (10 mcg/min Intravenous New Bag 6/16/24 1203)     Labs Reviewed   COMPREHENSIVE METABOLIC PANEL - Abnormal; Notable for the following components:       Result Value    BUN 47 (*)     Creatinine 3.01 (*)     CO2 20.8 (*)     Anion Gap 15.2 (*)     eGFR 24.9 (*)     All other components within normal limits    Narrative:     GFR Normal >60  Chronic Kidney Disease <60  Kidney Failure <15     TROPONIN - Abnormal; Notable for the following components:    HS Troponin T 72 (*)     All other components within normal limits    Narrative:     High Sensitive Troponin T Reference Range:  <14.0 ng/L- Negative Female for AMI  <22.0 ng/L- Negative Male for AMI  >=14 - Abnormal Female indicating possible myocardial injury.  >=22 - Abnormal Male indicating possible myocardial injury.   Clinicians would have to utilize clinical acumen, EKG, Troponin, and serial changes to determine if it is an Acute Myocardial Infarction or myocardial injury due to an underlying chronic condition.        BNP (IN-HOUSE) - Abnormal; Notable for the following components:    proBNP 18,249.0 (*)     All other components within normal limits    Narrative:     This assay is used as an aid in the diagnosis of individuals suspected of having " heart failure. It can be used as an aid in the diagnosis of acute decompensated heart failure (ADHF) in patients presenting with signs and symptoms of ADHF to the emergency department (ED). In addition, NT-proBNP of <300 pg/mL indicates ADHF is not likely.    Age Range Result Interpretation  NT-proBNP Concentration (pg/mL:      <50             Positive            >450                   Gray                 300-450                    Negative             <300    50-75           Positive            >900                  Gray                300-900                  Negative            <300      >75             Positive            >1800                  Gray                300-1800                  Negative            <300   URINE DRUG SCREEN - Abnormal; Notable for the following components:    Amphet/Methamphet, Screen Positive (*)     THC, Screen, Urine Positive (*)     All other components within normal limits    Narrative:     Negative Thresholds Per Drugs Screened:    Amphetamines                 500 ng/ml  Barbiturates                 200 ng/ml  Benzodiazepines              100 ng/ml  Cocaine                      300 ng/ml  Methadone                    300 ng/ml  Opiates                      300 ng/ml  Oxycodone                    100 ng/ml  THC                           50 ng/ml    The Normal Value for all drugs tested is negative. This report includes final unconfirmed screening results to be used for medical treatment purposes only. Unconfirmed results must not be used for non-medical purposes such as employment or legal testing. Clinical consideration should be applied to any drug of abuse test, particularly when unconfirmed results are used.          All urine drugs of abuse requests without chain of custody are for medical screening purposes only.  False positives are possible.     CBC WITH AUTO DIFFERENTIAL - Abnormal; Notable for the following components:    WBC 13.76 (*)     Hemoglobin 12.6 (*)      Neutrophil % 78.4 (*)     Lymphocyte % 14.3 (*)     Neutrophils, Absolute 10.79 (*)     All other components within normal limits   CK - Normal   MAGNESIUM - Normal   RAINBOW DRAW    Narrative:     The following orders were created for panel order [place] Draw.  Procedure                               Abnormality         Status                     ---------                               -----------         ------                     Green Top (Gel)[[id number]]                                  Final result               Lavender Top[[id number]]                                     Final result               Gold Top - SST[[id number]]                                   Final result               Light Blue Top[[id number]]                                   Final result                 Please view results for these tests on the individual orders.   ETHANOL    Narrative:     Plasma Ethanol Clinical Symptoms:    ETOH (%)               Clinical Symptom  .01-.05              No apparent influence  .03-.12              Euphoria, Diminished judgment and attention   .09-.25              Impaired comprehension, Muscle incoordination  .18-.30              Confusion, Staggered gait, Slurred speech  .25-.40              Markedly decreased response to stimuli, unable to stand or                        walk, vomitting, sleep or stupor  .35-.50              Comatose, Anesthesia, Subnormal body temperature       HIGH SENSITIVITIY TROPONIN T 2HR   GREEN TOP   LAVENDER TOP   GOLD TOP - SST   LIGHT BLUE TOP   CBC AND DIFFERENTIAL    Narrative:     The following orders were created for panel order CBC & Differential.  Procedure                               Abnormality         Status                     ---------                               -----------         ------                     CBC Auto Differential[[id number]]        Abnormal            Final result                 Please view results for these tests on the individual orders.     XR  Chest 1 View   Final Result   Impression:   No acute cardiopulmonary abnormality is identified.         Electronically Signed: Mary Ching     6/16/2024 11:47 AM EDT     Workstation ID: [id number]                                               Medical Decision Making  Chart Review: Cardiology consult reviewed from 7/19/2023 currently well-known to Dr. Devlin is of a history of substance abuse was noted in his consult note that patient had improvement of hypertensive emergency on Cardene drip stabilized and back on home medicines including carvedilol and loaded pain.  Was noted that he had a heart cath a year prior which showed no coronary artery disease.    Echocardiogram from 2/6/2022 reviewed showed an EF of 61 to 65%.     Cardiac catheterization 4/27/2022:  Conclusions recommendations  1 minimal epicardial coronary artery disease with anterior takeoff of the RCA, borderline reduced LV systolic function 45 to 50%, no transaortic valve gradient, high LVEDP at 18-22 with evidence of diastolic dysfunction  2.  Cessation of all substance abuse including methamphetamine clearly taking its toll, counseling provided again today  3.  Hypertension control, Coreg on board along with amlodipine, start losartan ultimately without LV systolic dysfunction in combination with Lasix if needed  4.  Patient can be discharged was discharged criteria met with hypertension controlled and stable oral medications with outpatient follow-up       Differentials: ACS, CHF, electrolyte abnormality, dehydration, polysubstance abuse, hypertensive urgency, hypertensive emergency     ;this list is not all inclusive and does not constitute the entirety of considered causes  EKG: Interpreted by myself and Dr. Chung shows sinus rhythm rate 80 probable suture enlargement LAFB LVH prolonged QT interval of 521  Labs: as above   Radiology:   XR Chest 1 View   Final Result    Impression:    No acute cardiopulmonary abnormality is identified.        Electronically Signed: Mary Ching      6/16/2024 11:47 AM EDT      Workstation ID: [id number]    Disposition/Treatment:  Appropriate PPE was worn during exam and throughout all encounters with the patient.  While in the ED IV was placed and labs were obtained patient was placed on proper monitors presents to the ED with reports of chest pain and dyspnea.  On chart review has a history of methamphetamine abuse patient is somewhat poor historian EKG showed no acute STEMI labs and imaging were obtained.  Did show new prolonged QT interval.    CBC showed a white count 13.76 hemoglobin 12.6 hematocrit 38.9 platelets 196.  Metabolic panel showed BUN 47 creatinine 3.01 potassium normal at 3.8 on chart review from 2 months ago BUN was 30 creatinine 1.92.  Initial troponin 72 on chart review patient has a history of chronically elevated troponin 11 months ago troponin was 59.  he was started on Tridil drip while in the ED.  BNP elevated 18,249 which is increased from 11 months ago when it was a little over 13,000 and prior to that 5,000.  Ethanol level unremarkable.  Urine drug screen positive for methamphetamine and THC.  Chest x-ray showed no acute cardiopulmonary abnormalities.    Findings were discussed with the patient at bedside boisterously of admission.  To Dr. Vega who agreed for admission with hospitalist group.  Cardiology consult was also placed while in the ED.    Problems Addressed:  Acute kidney injury: acute illness or injury  Chest pain, unspecified type: complicated acute illness or injury  Hypertensive emergency: complicated acute illness or injury  Methamphetamine abuse: chronic illness or injury    Amount and/or Complexity of Data Reviewed  Labs: ordered. Decision-making details documented in ED Course.  Radiology: ordered. Decision-making details documented in ED Course.  ECG/medicine tests: ordered.    Risk  Prescription drug management.  Decision regarding hospitalization.        Final diagnoses:    Chest pain, unspecified type   Methamphetamine abuse   Hypertensive emergency   Acute kidney injury superimposed on chronic kidney disease       ED Disposition  ED Disposition       ED Disposition   Decision to Admit    Condition   --    Comment   Level of Care: Telemetry [5]   Admitting Physician: INDU ROBLERO [[id number]]   Attending Physician: INDU ROBLERO [[id number]]                 No follow-up provider specified.       Medication List      No changes were made to your prescriptions during this visit.            Sary Ocampo PA  06/16/24 9143

## 2024-06-16 NOTE — Clinical Note
Level of Care: Telemetry [5]   Admitting Physician: INDU ROBLERO [[id number]]   Attending Physician: INDU ROBLERO [[id number]]

## 2024-06-16 NOTE — CASE MANAGEMENT/SOCIAL WORK
Discharge Planning Assessment   Bhavik     Patient Name: Scott Gaytan  MRN: [id number]  Today's Date: 6/16/2024    Admit Date: 6/16/2024    Plan: D/C Plan: Homeless.  Uses sisters address as home address.  Says he stays with various people. Can get a ride from friends at d/c.   Discharge Needs Assessment       Row Name 06/16/24 1344       Living Environment    People in Home sibling(s)    Name(s) of People in Home Ann Marie-    Unique Family Situation Lives with sister and her     Current Living Arrangements home    Potentially Unsafe Housing Conditions none    In the past 12 months has the electric, gas, oil, or water company threatened to shut off services in your home? No    Primary Care Provided by self    Provides Primary Care For no one    Family Caregiver if Needed sibling(s)    Family Caregiver Names Ann Marie-    Quality of Family Relationships unable to assess    Able to Return to Prior Arrangements yes       Resource/Environmental Concerns    Resource/Environmental Concerns none    Transportation Concerns no car;none       Transportation Needs    In the past 12 months, has lack of transportation kept you from medical appointments or from getting medications? no    In the past 12 months, has lack of transportation kept you from meetings, work, or from getting things needed for daily living? No       Food Insecurity    Within the past 12 months, you worried that your food would run out before you got the money to buy more. Never true    Within the past 12 months, the food you bought just didn't last and you didn't have money to get more. Never true       Transition Planning    Patient/Family Anticipates Transition to home with family    Patient/Family Anticipated Services at Transition none    Transportation Anticipated family or friend will provide       Discharge Needs Assessment    Readmission Within the Last 30 Days no previous admission in last 30 days    Equipment Currently Used at  "Home none    Concerns to be Addressed employment/school;financial/insurance;substance/tobacco abuse/use    Anticipated Changes Related to Illness none    Equipment Needed After Discharge none    Current Discharge Risk substance use/abuse                   Discharge Plan       Row Name 06/16/24 8570       Plan    Plan D/C Plan: Homeless.  Uses sisters address as home address.  Says he stays with various people. Can get a ride from friends at d/c.    Plan Comments Met with pt at bedside. At first interview, he states he lives with his sister, and brother-in-law.  Read that he was homeless, and went back to ask him the queston again, and he stated \"Im basically homeless\". Is Ind. and walks everywhere. Unable to drive due to multiple violations. He would like assistance finding more steady work.   Wants to live in town somewhere that he could easily walk to employer.  Barrier to D/C: JOSE ANGEL; Chest pain; Cardiology consult.                  Continued Care and Services - Admitted Since 6/16/2024    No active coordination exists for this encounter.          Demographic Summary       Row Name 06/16/24 1328       General Information    Admission Type inpatient    Arrived From emergency department    Referral Source admission list    Reason for Consult discharge planning;substance use concerns;community resources;financial concerns;transportation    Preferred Language English                   Functional Status       Row Name 06/16/24 1344       Functional Status    Usual Activity Tolerance good    Current Activity Tolerance good      Row Name 06/16/24 1330       Functional Status    Usual Activity Tolerance good    Current Activity Tolerance good       Physical Activity    On average, how many days per week do you engage in moderate to strenuous exercise (like a brisk walk)? 7 days    On average, how many minutes do you engage in exercise at this level? 120 min  Walks everywhere does not drive.    Number of minutes of exercise " per week 840       Functional Status, IADL    Medications independent    Meal Preparation independent    Housekeeping independent    Laundry independent    Shopping independent       Mental Status    General Appearance WDL WDL       Mental Status Summary    Recent Changes in Mental Status/Cognitive Functioning no changes       Employment/    Employment Status employment seeking;employed part-time    Current or Previous Occupation construction    Employment/ Comments Would like help seeing employment and housing                   Jessica Vásquez RN

## 2024-06-16 NOTE — H&P
[facility]   HISTORY AND PHYSICAL    Patient Name: Scott Gaytan  : 1975  MRN: [id number]  Primary Care Physician:  Provider, No Known  Date of admission: 2024  Service Date and time: 24 14:03 EDT  Subjective   Subjective     Chief Complaint: chest pain    HPI:    Scott Gaytan is a 49 y.o. male with PMHx significant for hypertension, CHF, methamphetamine abuse presenting to the ED with reports of chest pain and dyspnea that started yesterday and has been on and off since. Patient states he is currently homeless and was walking on the side of the road when his pain started this morning which brought him to the hospital. He states his chest pain is intermittent and 4/10 intensity. He describes as a sharp/squeezing type pain on the left side of his chest that is nonradiating. He also states its associated with dyspnea.  Patient states he is prescribed carvedilol and Entresto and aspirin which he took last night. He does report smoking about 1/2 pack of cigarettes daily and reports marijuana use along with active meth use and states he used meth last week but his UDS is + for it here. He is currently on tridil gtt and wants to eat.   He denies abdominal pain, chills/fever, dysuria, syncope, N/V, constipation/diarrhea, or blurry vision.        Review of Systems   As per HPI    Personal History     Past Medical History:   Diagnosis Date    Hypertension     Substance abuse     Meth/MJ       Past Surgical History:   Procedure Laterality Date    CARDIAC CATHETERIZATION N/A 2022    ORIF TIBIA/FIBULA FRACTURES Right        Family History: family history includes Arthritis in his mother; Diabetes in his mother; Heart attack in his father and paternal grandfather; Hypertension in his father; Seizures in his sister. Otherwise pertinent FHx was reviewed and not pertinent to current issue.    Social History:  reports that he has been smoking cigarettes. He started smoking about 30 years ago. He has  a 30.5 pack-year smoking history. He has never been exposed to tobacco smoke. He has never used smokeless tobacco. He reports that he does not currently use alcohol. He reports that he does not currently use drugs after having used the following drugs: Methamphetamines and Marijuana.    Home Medications:  aspirin, carvedilol, nitroglycerin, and sacubitril-valsartan      Allergies:  Allergies   Allergen Reactions    Morphine Other (See Comments)     Makes it feel like my blood is boiling in my veins       Objective   Objective     Vitals:   Temp:  [97.7 °F (36.5 °C)] 97.7 °F (36.5 °C)  Heart Rate:  [79-87] 84  Resp:  [19] 19  BP: (154-174)/(105-123) 159/123  Physical Exam    Constitutional: Awake, alert, disheveled   Eyes: PERRLA, sclerae anicteric, no conjunctival injection   HENT: NCAT, mucous membranes moist   Neck: Supple, no thyromegaly, no lymphadenopathy, trachea midline   Respiratory: Clear to auscultation bilaterally, nonlabored respirations    Cardiovascular: RRR, no murmurs, rubs, or gallops, palpable pedal pulses bilaterally   Gastrointestinal: Positive bowel sounds, soft, nontender, nondistended   Musculoskeletal: No bilateral ankle edema, no clubbing or cyanosis to extremities   Psychiatric: Appropriate affect, cooperative   Neurologic: Oriented x 3, strength symmetric in all extremities, Cranial Nerves grossly intact to confrontation, speech clear   Skin: No rashes     Result Review    Result Review:  I have personally reviewed the results from the time of this admission to 6/16/2024 14:03 EDT and agree with these findings:  [x]  Laboratory list / accordion  [x]  Microbiology  [x]  Radiology  [x]  EKG/Telemetry   [x]  Cardiology/Vascular   []  Pathology  []  Old records  []  Other:        Assessment & Plan   Assessment / Plan       Active Hospital Problems:  Active Hospital Problems    Diagnosis     **Methamphetamine intoxication     Hypertension     Methamphetamine abuse     Tobacco use     Elevated  troponin     JOSE ANGEL (acute kidney injury)     Chest pain      Plan:   - trend trops, ASA, telemetry, tridil gtt  - recommend cessation of Meth, likely all meth related  - cards has been consulted by the ER  - trend labs, avoid nephrotoxic meds, may need some gentle hydration unsure if this is baseline creatinine now  - cont home meds as able    VTE Prophylaxis:  Pharmacologic VTE prophylaxis orders are present.        CODE STATUS:    Code Status (Patient has no pulse and is not breathing): CPR (Attempt to Resuscitate)  Medical Interventions (Patient has pulse or is breathing): Full Support    Admission Status:  I believe this patient meets observation status.    Jamal Vega MD

## 2024-06-17 LAB
ALBUMIN SERPL-MCNC: 3.4 G/DL (ref 3.5–5.2)
ALBUMIN/GLOB SERPL: 1.1 G/DL
ALP SERPL-CCNC: 41 U/L (ref 39–117)
ALT SERPL W P-5'-P-CCNC: 12 U/L (ref 1–41)
ANION GAP SERPL CALCULATED.3IONS-SCNC: 11.2 MMOL/L (ref 5–15)
AST SERPL-CCNC: 26 U/L (ref 1–40)
BASOPHILS # BLD AUTO: 0.06 10*3/MM3 (ref 0–0.2)
BASOPHILS NFR BLD AUTO: 0.6 % (ref 0–1.5)
BILIRUB SERPL-MCNC: 0.3 MG/DL (ref 0–1.2)
BUN SERPL-MCNC: 55 MG/DL (ref 6–20)
BUN/CREAT SERPL: 19 (ref 7–25)
CALCIUM SPEC-SCNC: 8.5 MG/DL (ref 8.6–10.5)
CHLORIDE SERPL-SCNC: 104 MMOL/L (ref 98–107)
CHOLEST SERPL-MCNC: 184 MG/DL (ref 0–200)
CO2 SERPL-SCNC: 22.8 MMOL/L (ref 22–29)
CREAT SERPL-MCNC: 2.89 MG/DL (ref 0.76–1.27)
DEPRECATED RDW RBC AUTO: 41.4 FL (ref 37–54)
EGFRCR SERPLBLD CKD-EPI 2021: 26.1 ML/MIN/1.73
EOSINOPHIL # BLD AUTO: 0.34 10*3/MM3 (ref 0–0.4)
EOSINOPHIL NFR BLD AUTO: 3.5 % (ref 0.3–6.2)
ERYTHROCYTE [DISTWIDTH] IN BLOOD BY AUTOMATED COUNT: 12.6 % (ref 12.3–15.4)
GLOBULIN UR ELPH-MCNC: 3.2 GM/DL
GLUCOSE SERPL-MCNC: 82 MG/DL (ref 65–99)
HCT VFR BLD AUTO: 37.2 % (ref 37.5–51)
HDLC SERPL-MCNC: 55 MG/DL (ref 40–60)
HGB BLD-MCNC: 12.2 G/DL (ref 13–17.7)
IMM GRANULOCYTES # BLD AUTO: 0.02 10*3/MM3 (ref 0–0.05)
IMM GRANULOCYTES NFR BLD AUTO: 0.2 % (ref 0–0.5)
LDLC SERPL CALC-MCNC: 120 MG/DL (ref 0–100)
LDLC/HDLC SERPL: 2.18 {RATIO}
LYMPHOCYTES # BLD AUTO: 3.17 10*3/MM3 (ref 0.7–3.1)
LYMPHOCYTES NFR BLD AUTO: 32.3 % (ref 19.6–45.3)
MCH RBC QN AUTO: 29.8 PG (ref 26.6–33)
MCHC RBC AUTO-ENTMCNC: 32.8 G/DL (ref 31.5–35.7)
MCV RBC AUTO: 90.7 FL (ref 79–97)
MONOCYTES # BLD AUTO: 0.89 10*3/MM3 (ref 0.1–0.9)
MONOCYTES NFR BLD AUTO: 9.1 % (ref 5–12)
NEUTROPHILS NFR BLD AUTO: 5.33 10*3/MM3 (ref 1.7–7)
NEUTROPHILS NFR BLD AUTO: 54.3 % (ref 42.7–76)
NRBC BLD AUTO-RTO: 0 /100 WBC (ref 0–0.2)
PLATELET # BLD AUTO: 199 10*3/MM3 (ref 140–450)
PMV BLD AUTO: 11.4 FL (ref 6–12)
POTASSIUM SERPL-SCNC: 3.6 MMOL/L (ref 3.5–5.2)
POTASSIUM SERPL-SCNC: 4.3 MMOL/L (ref 3.5–5.2)
PROT SERPL-MCNC: 6.6 G/DL (ref 6–8.5)
QT INTERVAL: 453 MS
QTC INTERVAL: 521 MS
RBC # BLD AUTO: 4.1 10*6/MM3 (ref 4.14–5.8)
SODIUM SERPL-SCNC: 138 MMOL/L (ref 136–145)
TRIGL SERPL-MCNC: 45 MG/DL (ref 0–150)
VLDLC SERPL-MCNC: 9 MG/DL (ref 5–40)
WBC NRBC COR # BLD AUTO: 9.81 10*3/MM3 (ref 3.4–10.8)

## 2024-06-17 PROCEDURE — 84132 ASSAY OF SERUM POTASSIUM: CPT | Performed by: HOSPITALIST

## 2024-06-17 PROCEDURE — 99222 1ST HOSP IP/OBS MODERATE 55: CPT | Performed by: INTERNAL MEDICINE

## 2024-06-17 PROCEDURE — 80053 COMPREHEN METABOLIC PANEL: CPT | Performed by: HOSPITALIST

## 2024-06-17 PROCEDURE — 25010000002 HEPARIN (PORCINE) PER 1000 UNITS: Performed by: HOSPITALIST

## 2024-06-17 PROCEDURE — 25010000002 HYDRALAZINE PER 20 MG: Performed by: HOSPITALIST

## 2024-06-17 PROCEDURE — 80061 LIPID PANEL: CPT | Performed by: HOSPITALIST

## 2024-06-17 PROCEDURE — 85025 COMPLETE CBC W/AUTO DIFF WBC: CPT | Performed by: HOSPITALIST

## 2024-06-17 PROCEDURE — 25810000003 SODIUM CHLORIDE 0.9 % SOLUTION: Performed by: HOSPITALIST

## 2024-06-17 RX ORDER — HYDRALAZINE HYDROCHLORIDE 20 MG/ML
10 INJECTION INTRAMUSCULAR; INTRAVENOUS EVERY 4 HOURS PRN
Status: DISCONTINUED | OUTPATIENT
Start: 2024-06-17 | End: 2024-06-18 | Stop reason: HOSPADM

## 2024-06-17 RX ORDER — AMLODIPINE BESYLATE 5 MG/1
5 TABLET ORAL
Status: DISCONTINUED | OUTPATIENT
Start: 2024-06-17 | End: 2024-06-18

## 2024-06-17 RX ORDER — SODIUM CHLORIDE 9 MG/ML
75 INJECTION, SOLUTION INTRAVENOUS CONTINUOUS
Status: DISCONTINUED | OUTPATIENT
Start: 2024-06-17 | End: 2024-06-18 | Stop reason: HOSPADM

## 2024-06-17 RX ORDER — ISOSORBIDE MONONITRATE 30 MG/1
30 TABLET, EXTENDED RELEASE ORAL
Status: DISCONTINUED | OUTPATIENT
Start: 2024-06-17 | End: 2024-06-18 | Stop reason: HOSPADM

## 2024-06-17 RX ORDER — ATORVASTATIN CALCIUM 20 MG/1
20 TABLET, FILM COATED ORAL NIGHTLY
Status: DISCONTINUED | OUTPATIENT
Start: 2024-06-17 | End: 2024-06-18 | Stop reason: HOSPADM

## 2024-06-17 RX ORDER — POTASSIUM CHLORIDE 20 MEQ/1
40 TABLET, EXTENDED RELEASE ORAL EVERY 4 HOURS
Status: COMPLETED | OUTPATIENT
Start: 2024-06-17 | End: 2024-06-17

## 2024-06-17 RX ADMIN — SODIUM CHLORIDE 75 ML/HR: 9 INJECTION, SOLUTION INTRAVENOUS at 07:55

## 2024-06-17 RX ADMIN — HYDRALAZINE HYDROCHLORIDE 10 MG: 20 INJECTION, SOLUTION INTRAMUSCULAR; INTRAVENOUS at 21:19

## 2024-06-17 RX ADMIN — HYDRALAZINE HYDROCHLORIDE 10 MG: 20 INJECTION, SOLUTION INTRAMUSCULAR; INTRAVENOUS at 16:43

## 2024-06-17 RX ADMIN — HYDRALAZINE HYDROCHLORIDE 10 MG: 20 INJECTION, SOLUTION INTRAMUSCULAR; INTRAVENOUS at 07:50

## 2024-06-17 RX ADMIN — ACETAMINOPHEN 650 MG: 325 TABLET, FILM COATED ORAL at 06:56

## 2024-06-17 RX ADMIN — ISOSORBIDE MONONITRATE 30 MG: 30 TABLET, EXTENDED RELEASE ORAL at 14:45

## 2024-06-17 RX ADMIN — CARVEDILOL 12.5 MG: 6.25 TABLET, FILM COATED ORAL at 16:43

## 2024-06-17 RX ADMIN — HEPARIN SODIUM 5000 UNITS: 5000 INJECTION INTRAVENOUS; SUBCUTANEOUS at 05:38

## 2024-06-17 RX ADMIN — ACETAMINOPHEN 650 MG: 325 TABLET, FILM COATED ORAL at 02:58

## 2024-06-17 RX ADMIN — POTASSIUM CHLORIDE 40 MEQ: 1500 TABLET, EXTENDED RELEASE ORAL at 11:39

## 2024-06-17 RX ADMIN — SODIUM CHLORIDE 75 ML/HR: 9 INJECTION, SOLUTION INTRAVENOUS at 20:10

## 2024-06-17 RX ADMIN — ACETAMINOPHEN 650 MG: 325 TABLET, FILM COATED ORAL at 20:09

## 2024-06-17 RX ADMIN — AMLODIPINE BESYLATE 5 MG: 5 TABLET ORAL at 11:39

## 2024-06-17 RX ADMIN — POTASSIUM CHLORIDE 40 MEQ: 1500 TABLET, EXTENDED RELEASE ORAL at 07:44

## 2024-06-17 RX ADMIN — ATORVASTATIN CALCIUM 20 MG: 20 TABLET, FILM COATED ORAL at 20:09

## 2024-06-17 RX ADMIN — Medication 10 ML: at 20:10

## 2024-06-17 RX ADMIN — HYDRALAZINE HYDROCHLORIDE 10 MG: 20 INJECTION, SOLUTION INTRAMUSCULAR; INTRAVENOUS at 12:14

## 2024-06-17 RX ADMIN — HEPARIN SODIUM 5000 UNITS: 5000 INJECTION INTRAVENOUS; SUBCUTANEOUS at 21:23

## 2024-06-17 RX ADMIN — ASPIRIN 81 MG: 81 TABLET, COATED ORAL at 07:44

## 2024-06-17 RX ADMIN — Medication 10 ML: at 07:44

## 2024-06-17 RX ADMIN — HEPARIN SODIUM 5000 UNITS: 5000 INJECTION INTRAVENOUS; SUBCUTANEOUS at 12:14

## 2024-06-17 RX ADMIN — CARVEDILOL 12.5 MG: 6.25 TABLET, FILM COATED ORAL at 07:44

## 2024-06-17 NOTE — CASE MANAGEMENT/SOCIAL WORK
"Social Work Assessment  [facility]     Patient Name: Scott Gaytan  MRN: [id number]  Today's Date: 6/17/2024    Admit Date: 6/16/2024     Discharge Plan       Row Name 06/17/24 7652       Plan    Plan Comments SW met with patient to discuss d/c planning.  Patient is a/o x3.  Patient reported that he lives with different friends and he has never had to sleep \"on the street\" even though he is homeless.  Patient reported he is self-employed but often does not have work.  He has 2 sisters.  One of his sisters resides in an halfway.  He was staying with his other sister but now the patient and his brother in law do not get along and he will not return there.  Patient reported smoking 1/2 pack of cigarettes daily, no alcohol use, used meth 2 weeks ago and used marijuana 2 days ago.  Patient stated he had a PCP in [place] but could not remember his name.  He uses [facility] in [place].  No DME used or needed.  His friends will provide transportation at time of discharge.  Patient is not interested in shelters and declined [facility] resources.                   CHAO Junior MSW    Phone: [phone]  Cell: [phone]  Fax: [phone]  [email or web address]          "

## 2024-06-17 NOTE — PROGRESS NOTES
[facility] MEDICINE SERVICE  DAILY PROGRESS NOTE    NAME: Scott Gaytan  : 1975  MRN: [id number]      LOS: 0 days     PROVIDER OF SERVICE: Robin Mcgovern MD    Chief Complaint: Methamphetamine intoxication    Subjective:     Interval History:  History taken from: patient chart RN  No CP. Chest pressure resolved     Review of Systems:   Review of Systems  All negative except as above   Objective:     Vital Signs  Temp:  [97.5 °F (36.4 °C)-98.2 °F (36.8 °C)] 98 °F (36.7 °C)  Heart Rate:  [54-87] 75  Resp:  [16-22] 18  BP: (120-192)/() 142/94   Body mass index is 28.28 kg/m².    Physical Exam  Physical Exam  AOx3 NAD  RRR S1-S2 audible  Lung CTA  Abdomen soft nontender no nondistended    Scheduled Meds   aspirin, 81 mg, Oral, Daily  carvedilol, 12.5 mg, Oral, BID With Meals  heparin (porcine), 5,000 Units, Subcutaneous, Q8H  potassium chloride ER, 40 mEq, Oral, Q4H  sodium chloride, 10 mL, Intravenous, Q12H       PRN Meds     acetaminophen    senna-docusate sodium **AND** polyethylene glycol **AND** bisacodyl **AND** bisacodyl    Calcium Replacement - Follow Nurse / BPA Driven Protocol    hydrALAZINE    Magnesium Standard Dose Replacement - Follow Nurse / BPA Driven Protocol    nitroglycerin    ondansetron    Phosphorus Replacement - Follow Nurse / BPA Driven Protocol    Potassium Replacement - Follow Nurse / BPA Driven Protocol    sodium chloride    sodium chloride    sodium chloride   Infusions  nitroglycerin, 5-200 mcg/min, Last Rate: Stopped (24 0847)  sodium chloride, 75 mL/hr, Last Rate: 75 mL/hr (24 9465)          Diagnostic Data    Results from last 7 days   Lab Units 24  0305   WBC 10*3/mm3 9.81   HEMOGLOBIN g/dL 12.2*   HEMATOCRIT % 37.2*   PLATELETS 10*3/mm3 199   GLUCOSE mg/dL 82   CREATININE mg/dL 2.89*   BUN mg/dL 55*   SODIUM mmol/L 138   POTASSIUM mmol/L 3.6   AST (SGOT) U/L 26   ALT (SGPT) U/L 12   ALK PHOS U/L 41   BILIRUBIN mg/dL 0.3   ANION GAP mmol/L  11.2       XR Chest 1 View    Result Date: 6/16/2024  Impression: No acute cardiopulmonary abnormality is identified. Electronically Signed: Mary Ching  6/16/2024 11:47 AM EDT  Workstation ID: [id number]       I reviewed the patient's new clinical results.  I reviewed the patient's new imaging results and agree with the interpretation.    Assessment/Plan:     Active and Resolved Problems  Active Hospital Problems    Diagnosis  POA    **Methamphetamine intoxication [F15.929]  Yes    Hypertension [I10]  Yes    Methamphetamine abuse [F15.10]  Yes    Tobacco use [Z72.0]  Yes    Elevated troponin [R79.89]  Yes    JOSE ANGEL (acute kidney injury) [N17.9]  Yes    Chest pain [R07.9]  Yes      Resolved Hospital Problems   No resolved problems to display.       49-year-old male with history of hypertension, methamphetamine abuse, homelessness presented to [facility] 6/16 with chest pain and dyspnea    #Troponin elevation suspect in setting of demand ischemia  Cards consulted on admission will follow  Continue aspirin   start Lipitor  BP control as below    #Hypertensive urgency  At home patient reports taking Entresto and Coreg  started on Tridil drip which has been weaned off   continue home dose of Coreg  Entresto currently on hold given JOSE ANGEL on CKD  Start amlodipine 5    #Methamphetamine abuse  Last reported use 2 weeks ago  Counseled on cessation >10 minutes were spent   consult    #Homelessness  Consult      #OJSE ANGEL on CKD stage II  Baseline creatinine appears to be around 1.3.  On admission 3.01>2.89  Continue to hold Entresto  BP control as above  Continue IV fluids  BMP daily          VTE Prophylaxis:  Pharmacologic VTE prophylaxis orders are present.         Code status is   Code Status and Medical Interventions:   Ordered at: 06/16/24 1322     Code Status (Patient has no pulse and is not breathing):    CPR (Attempt to Resuscitate)     Medical Interventions (Patient has pulse or is  breathing):    Full Support       Plan for disposition:24 hours    Time: 30 minutes    Signature: Electronically signed by Robin Mcgovern MD, 06/17/24, 11:04 EDT.  [facility]

## 2024-06-17 NOTE — PLAN OF CARE
Problem: Adult Inpatient Plan of Care  Goal: Absence of Hospital-Acquired Illness or Injury  Intervention: Identify and Manage Fall Risk  Recent Flowsheet Documentation  Taken 6/17/2024 1410 by Lynda Ocampo RN  Safety Promotion/Fall Prevention:   clutter free environment maintained   assistive device/personal items within reach   fall prevention program maintained   lighting adjusted   nonskid shoes/slippers when out of bed   room organization consistent   safety round/check completed  Taken 6/17/2024 1236 by Lynda Ocampo RN  Safety Promotion/Fall Prevention:   clutter free environment maintained   assistive device/personal items within reach   fall prevention program maintained   lighting adjusted   nonskid shoes/slippers when out of bed   room organization consistent   safety round/check completed  Taken 6/17/2024 1045 by Lynda Ocampo RN  Safety Promotion/Fall Prevention:   assistive device/personal items within reach   clutter free environment maintained   fall prevention program maintained   lighting adjusted   nonskid shoes/slippers when out of bed   room organization consistent   safety round/check completed  Intervention: Prevent Skin Injury  Recent Flowsheet Documentation  Taken 6/17/2024 1410 by Lynda Ocampo RN  Body Position: position changed independently  Taken 6/17/2024 1236 by Lynda Ocampo RN  Body Position: position changed independently  Taken 6/17/2024 1045 by Lynda Ocampo RN  Body Position: position changed independently  Taken 6/17/2024 0744 by Lynda Ocampo RN  Body Position: position changed independently  Intervention: Prevent and Manage VTE (Venous Thromboembolism) Risk  Recent Flowsheet Documentation  Taken 6/17/2024 1410 by Lynda Ocampo RN  Activity Management: activity encouraged  Taken 6/17/2024 1236 by Lynda Ocampo RN  Activity Management: activity encouraged  Taken 6/17/2024 1045 by Lynda Ocampo RN  Activity Management: activity encouraged  Taken 6/17/2024 0744 by Terrence  JABIER Howell  Activity Management: activity encouraged  Intervention: Prevent Infection  Recent Flowsheet Documentation  Taken 6/17/2024 1410 by Lynda Ocampo RN  Infection Prevention:   hand hygiene promoted   personal protective equipment utilized  Taken 6/17/2024 1236 by Lynda Ocampo RN  Infection Prevention:   hand hygiene promoted   personal protective equipment utilized  Taken 6/17/2024 1045 by Lynda Ocampo RN  Infection Prevention: personal protective equipment utilized  Goal: Optimal Comfort and Wellbeing  Intervention: Provide Person-Centered Care  Recent Flowsheet Documentation  Taken 6/17/2024 1236 by Lynda Ocampo RN  Trust Relationship/Rapport: care explained  Taken 6/17/2024 0744 by Lynda Ocampo RN  Trust Relationship/Rapport: care explained   Goal Outcome Evaluation:         Pt with no c/o chest pain this shift. BP intermittently elevated throughout the shift. PRN hydralazine given X2 today. No complaints at this time. Pt walked in room and in the shower. Plan of care to continue.

## 2024-06-17 NOTE — PLAN OF CARE
Goal Outcome Evaluation:         Chest pain is improving.  Nitro gtt weaned down.  BP still elevated at times.  Patient denies any shortness of air.  No new complaints.

## 2024-06-17 NOTE — CONSULTS
[facility]        Subjective:     Encounter Date:06/16/2024      Patient ID: Scott Gaytan is a 49 y.o. male.    Chief Complaint: chest pain, shortness of breath    Referring Physician:    Jamal Vega MD     Seen and examined, agree with narrative as discussed with nurse practitioner after face-to-face counter scribed findings below verified by me for accuracy additional documentation provided, greater than 50% of total medical decision making and encounter time performed by me on date of service    HPI:  Scott Gaytan is a 49 y.o. male who presents with chest pain, shortness of breath. Mr. Gaytan has previously seen Dr. Devlin while hospitalized.  Pmh includes HFpEF, HTN, CKD, substance abuse including methamphetamine use. Prior hospitalizations with HTN urgency in the setting of meth use. He has had prior LHC in 2022 revealing minimal epicardial coronary artery disease with anterior takeoff of the RCA, borderline reduced LV systolic function 45 to 50%.    Mr. Gaytan presented to ER with complaints of chest pain and shortness of breath which have been intermittent. He is homeless and was walking yesterday on side of road when he had worsening shortness of breath and sharp/squeezing chest discomfort. He also mentions intermittent lower extremity swelling. He has not had any additional chest pain while hospitalized. On my encounter his blood pressure is elevated in the 180s. He did test + for methamphetamines on UDS. Troponin 72, 64. ProBNP 18,249, He has CKD With Crt 3.0 on admission. No acute findings on CXR.     Review of systems otherwise negative x 14 point review of systems except as mentioned above  Historical data copied forward from previous encounters in EMR is unchanged    Patient well-known to me from prior encounters              Past Medical History:   Diagnosis Date    CHF (congestive heart failure)     CKD (chronic kidney disease)     Hypertension     Substance abuse     Meth/MJ        Past Surgical History:   Procedure Laterality Date    CARDIAC CATHETERIZATION N/A 04/27/2022    ORIF TIBIA/FIBULA FRACTURES Right        Family History   Problem Relation Age of Onset    Diabetes Mother     Arthritis Mother     Hypertension Father     Heart attack Father     Seizures Sister     Heart attack Paternal Grandfather        Social History     Socioeconomic History    Marital status: Single   Tobacco Use    Smoking status: Every Day     Current packs/day: 0.50     Average packs/day: 1 pack/day for 30.5 years (30.4 ttl pk-yrs)     Types: Cigarettes     Start date: 1994     Passive exposure: Never    Smokeless tobacco: Never   Vaping Use    Vaping status: Never Used   Substance and Sexual Activity    Alcohol use: Not Currently    Drug use: Yes     Types: Methamphetamines, Marijuana, Cocaine(coke)    Sexual activity: Yes         Allergies   Allergen Reactions    Morphine Other (See Comments)     Makes it feel like my blood is boiling in my veins       Current Medications:   Scheduled Meds:amLODIPine, 5 mg, Oral, Q24H  aspirin, 81 mg, Oral, Daily  atorvastatin, 20 mg, Oral, Nightly  carvedilol, 12.5 mg, Oral, BID With Meals  heparin (porcine), 5,000 Units, Subcutaneous, Q8H  sodium chloride, 10 mL, Intravenous, Q12H      Continuous Infusions:sodium chloride, 75 mL/hr, Last Rate: 75 mL/hr (06/17/24 0755)        Review of Systems   Constitutional: Negative for chills, diaphoresis and malaise/fatigue.   Cardiovascular:  Positive for chest pain and dyspnea on exertion. Negative for irregular heartbeat, leg swelling, near-syncope, orthopnea, palpitations, paroxysmal nocturnal dyspnea and syncope.   Respiratory:  Positive for shortness of breath. Negative for cough, sleep disturbances due to breathing and sputum production.    Gastrointestinal:  Negative for change in bowel habit.   Genitourinary:  Negative for urgency.   Neurological:  Negative for dizziness and headaches.   Psychiatric/Behavioral:  Negative  "for altered mental status.             Objective:         BP (!) 173/110   Pulse 77   Temp 98 °F (36.7 °C) (Oral)   Resp 18   Ht 170.2 cm (67\")   Wt 81.9 kg (180 lb 8.9 oz)   SpO2 98%   BMI 28.28 kg/m²     Physical Exam:  General Appearance:    Alert, cooperative, in no acute distress                                Head: Atraumatic, normocephalic, PERRLA               Neck:   supple, trachea midline, no thyromegaly, no carotid bruit, no JVD   Lungs:     Clear to auscultation,respirations regular, even and               unlabored    Heart:    Regular rhythm and normal rate, normal S1 and S2   Abdomen:     Normal bowel sounds, no masses, no organomegaly, soft  nontender, nondistended, no guarding, no rebound  tenderness   Extremities:   Moves all extremities well, no edema, no cyanosis, no  redness   Pulses:   Pulses palpable and equal bilaterally   Skin:   No bleeding, bruising or rash   Neurologic:   Awake, alert, oriented x3       Agree with exam as discussed with nurse practitioner after face-to-face encounter scribe findings above          ASCVD Risk Score::  The 10-year ASCVD risk score (Mee WEIR, et al., 2019) is: 9.7%    Values used to calculate the score:      Age: 49 years      Sex: Male      Is Non- : No      Diabetic: No      Tobacco smoker: Yes      Systolic Blood Pressure: 173 mmHg      Is BP treated: Yes      HDL Cholesterol: 55 mg/dL      Total Cholesterol: 184 mg/dL      Lab Review:     Results from last 7 days   Lab Units 06/17/24  0305 06/16/24  1100   SODIUM mmol/L 138 141   POTASSIUM mmol/L 3.6 3.8   CHLORIDE mmol/L 104 105   CO2 mmol/L 22.8 20.8*   BUN mg/dL 55* 47*   CREATININE mg/dL 2.89* 3.01*   GLUCOSE mg/dL 82 71   CALCIUM mg/dL 8.5* 9.2   AST (SGOT) U/L 26 29   ALT (SGPT) U/L 12 14     Results from last 7 days   Lab Units 06/16/24  1426 06/16/24  1100   CK TOTAL U/L  --  155   HSTROP T ng/L 64* 72*     Results from last 7 days   Lab Units 06/17/24  0305 " 06/16/24  1100   WBC 10*3/mm3 9.81 13.76*   HEMOGLOBIN g/dL 12.2* 12.6*   HEMATOCRIT % 37.2* 38.9   PLATELETS 10*3/mm3 199 196         Results from last 7 days   Lab Units 06/16/24  1100   MAGNESIUM mg/dL 2.1     Results from last 7 days   Lab Units 06/17/24  0305   CHOLESTEROL mg/dL 184   TRIGLYCERIDES mg/dL 45   HDL CHOL mg/dL 55     Results from last 7 days   Lab Units 06/16/24  1100   PROBNP pg/mL 18,249.0*           Recent Radiology:  Imaging Results (Most Recent)       Procedure Component Value Units Date/Time    XR Chest 1 View [[id number]] Collected: 06/16/24 1145     Updated: 06/16/24 1149    Narrative:      XR CHEST 1 VW    Date of Exam: 6/16/2024 11:26 AM EDT    Indication: Chest Pain Protocol    Comparison: [facility] chest x-ray 7/17/2023, 7/16/2023    Findings:  Lungs are adequately expanded and appear clear. No pneumothorax or large pleural effusion is seen. Cardiomediastinal contours appear stable.      Impression:      Impression:  No acute cardiopulmonary abnormality is identified.      Electronically Signed: Mary Ching    6/16/2024 11:47 AM EDT    Workstation ID: [id number]              ECHOCARDIOGRAM:    Results for orders placed during the hospital encounter of 12/06/22    Adult Transthoracic Echo Complete w/ Color, Spectral and Contrast if Necessary Per Protocol    Interpretation Summary    Left ventricular systolic function is normal. Left ventricular ejection fraction appears to be 61 - 65%.    Left ventricular diastolic function was normal.    Estimated right ventricular systolic pressure from tricuspid regurgitation is normal (<35 mmHg).            Assessment:         Active Hospital Problems    Diagnosis  POA    **Methamphetamine intoxication [F15.929]  Yes    Hypertension [I10]  Yes    Methamphetamine abuse [F15.10]  Yes    Tobacco use [Z72.0]  Yes    Elevated troponin [R79.89]  Yes    JOSE ANGEL (acute kidney injury) [N17.9]  Yes    Chest pain [R07.9]  Yes     Chest Pain / elevated troponin in setting  of CKD with Crt 3 and HTN  HTN urgency  Dyspnea on exertion / elevated proBNP  CKD   Methamphetamine use  Non obstructive CAD on [facility] 2022     Plan:   Will repeat 2D ECHO for evaluation of LV filling pressures, LV and RV size and function  Needs optimal blood pressure control in the setting of methamphetamine intoxication  No further chest pain  Troponin likely demand ischemia in setting of HTN urgency and CKD with decreased creatinine and renal clearance, no plan for invasive ischemic evaluation at this time given Crt 3 on admission, chest pain-free no evidence of ACS, no ischemic ECG changes  Optimize medical therapy  Add nitrates      Further recommendation follow findings and clinical course and echo findings    Malik Devlin MD, PhD                   Rocio Maguire, CHANDLER  06/17/24  12:55 EDT

## 2024-06-18 ENCOUNTER — APPOINTMENT (OUTPATIENT)
Dept: CARDIOLOGY | Facility: HOSPITAL | Age: 49
End: 2024-06-18
Payer: MEDICAID

## 2024-06-18 VITALS
TEMPERATURE: 98 F | BODY MASS INDEX: 28.3 KG/M2 | SYSTOLIC BLOOD PRESSURE: 131 MMHG | WEIGHT: 180.34 LBS | RESPIRATION RATE: 18 BRPM | DIASTOLIC BLOOD PRESSURE: 83 MMHG | OXYGEN SATURATION: 98 % | HEART RATE: 91 BPM | HEIGHT: 67 IN

## 2024-06-18 LAB
ANION GAP SERPL CALCULATED.3IONS-SCNC: 9.4 MMOL/L (ref 5–15)
BASOPHILS # BLD AUTO: 0.03 10*3/MM3 (ref 0–0.2)
BASOPHILS NFR BLD AUTO: 0.3 % (ref 0–1.5)
BH CV ECHO LEFT VENTRICLE GLOBAL LONGITUDINAL STRAIN: -17.4 %
BH CV ECHO MEAS - ACS: 2.6 CM
BH CV ECHO MEAS - AO MAX PG: 15.5 MMHG
BH CV ECHO MEAS - AO MEAN PG: 9 MMHG
BH CV ECHO MEAS - AO ROOT DIAM: 4.3 CM
BH CV ECHO MEAS - AO V2 MAX: 197 CM/SEC
BH CV ECHO MEAS - AO V2 VTI: 36.5 CM
BH CV ECHO MEAS - AVA(I,D): 2.6 CM2
BH CV ECHO MEAS - EDV(CUBED): 125 ML
BH CV ECHO MEAS - EDV(MOD-SP4): 184 ML
BH CV ECHO MEAS - EF(MOD-BP): 58 %
BH CV ECHO MEAS - EF(MOD-SP4): 58.4 %
BH CV ECHO MEAS - EF_3D-VOL: 55 %
BH CV ECHO MEAS - ESV(CUBED): 42.9 ML
BH CV ECHO MEAS - ESV(MOD-SP4): 76.6 ML
BH CV ECHO MEAS - FS: 30 %
BH CV ECHO MEAS - IVS/LVPW: 1.15 CM
BH CV ECHO MEAS - IVSD: 1.5 CM
BH CV ECHO MEAS - LA DIMENSION: 4.6 CM
BH CV ECHO MEAS - LAT PEAK E' VEL: 9.7 CM/SEC
BH CV ECHO MEAS - LV MASS(C)D: 291.4 GRAMS
BH CV ECHO MEAS - LV MAX PG: 8.2 MMHG
BH CV ECHO MEAS - LV MEAN PG: 4 MMHG
BH CV ECHO MEAS - LV V1 MAX: 143 CM/SEC
BH CV ECHO MEAS - LV V1 VTI: 24.7 CM
BH CV ECHO MEAS - LVIDD: 5 CM
BH CV ECHO MEAS - LVIDS: 3.5 CM
BH CV ECHO MEAS - LVOT AREA: 3.8 CM2
BH CV ECHO MEAS - LVOT DIAM: 2.2 CM
BH CV ECHO MEAS - LVPWD: 1.3 CM
BH CV ECHO MEAS - MED PEAK E' VEL: 7.6 CM/SEC
BH CV ECHO MEAS - MR MAX PG: 87.6 MMHG
BH CV ECHO MEAS - MR MAX VEL: 468 CM/SEC
BH CV ECHO MEAS - MV A MAX VEL: 56.3 CM/SEC
BH CV ECHO MEAS - MV DEC SLOPE: 669 CM/SEC2
BH CV ECHO MEAS - MV DEC TIME: 0.17 SEC
BH CV ECHO MEAS - MV E MAX VEL: 79.2 CM/SEC
BH CV ECHO MEAS - MV E/A: 1.41
BH CV ECHO MEAS - MV MAX PG: 3.5 MMHG
BH CV ECHO MEAS - MV MEAN PG: 2 MMHG
BH CV ECHO MEAS - MV P1/2T: 44.2 MSEC
BH CV ECHO MEAS - MV V2 VTI: 24.4 CM
BH CV ECHO MEAS - MVA(P1/2T): 5 CM2
BH CV ECHO MEAS - MVA(VTI): 3.8 CM2
BH CV ECHO MEAS - PA V2 MAX: 112 CM/SEC
BH CV ECHO MEAS - PULM A REVS DUR: 0.1 SEC
BH CV ECHO MEAS - PULM A REVS VEL: 35.2 CM/SEC
BH CV ECHO MEAS - PULM DIAS VEL: 51.9 CM/SEC
BH CV ECHO MEAS - PULM S/D: 1.41
BH CV ECHO MEAS - PULM SYS VEL: 73.1 CM/SEC
BH CV ECHO MEAS - RV MAX PG: 1.47 MMHG
BH CV ECHO MEAS - RV V1 MAX: 60.7 CM/SEC
BH CV ECHO MEAS - RV V1 VTI: 13.4 CM
BH CV ECHO MEAS - SV(LVOT): 93.9 ML
BH CV ECHO MEAS - SV(MOD-SP4): 107.4 ML
BH CV ECHO MEAS - TAPSE (>1.6): 3 CM
BH CV ECHO MEAS - TR MAX PG: 21.7 MMHG
BH CV ECHO MEAS - TR MAX VEL: 233 CM/SEC
BH CV ECHO MEASUREMENTS AVERAGE E/E' RATIO: 9.16
BH CV XLRA - TDI S': 16.7 CM/SEC
BUN SERPL-MCNC: 44 MG/DL (ref 6–20)
BUN/CREAT SERPL: 17.2 (ref 7–25)
CALCIUM SPEC-SCNC: 7.9 MG/DL (ref 8.6–10.5)
CHLORIDE SERPL-SCNC: 109 MMOL/L (ref 98–107)
CO2 SERPL-SCNC: 20.6 MMOL/L (ref 22–29)
CREAT SERPL-MCNC: 2.56 MG/DL (ref 0.76–1.27)
DEPRECATED RDW RBC AUTO: 42.6 FL (ref 37–54)
EGFRCR SERPLBLD CKD-EPI 2021: 30.2 ML/MIN/1.73
EOSINOPHIL # BLD AUTO: 0.38 10*3/MM3 (ref 0–0.4)
EOSINOPHIL NFR BLD AUTO: 4.2 % (ref 0.3–6.2)
ERYTHROCYTE [DISTWIDTH] IN BLOOD BY AUTOMATED COUNT: 13 % (ref 12.3–15.4)
GLUCOSE SERPL-MCNC: 103 MG/DL (ref 65–99)
HCT VFR BLD AUTO: 33.2 % (ref 37.5–51)
HGB BLD-MCNC: 11 G/DL (ref 13–17.7)
IMM GRANULOCYTES # BLD AUTO: 0.02 10*3/MM3 (ref 0–0.05)
IMM GRANULOCYTES NFR BLD AUTO: 0.2 % (ref 0–0.5)
LEFT ATRIUM VOLUME INDEX: 51.8 ML/M2
LYMPHOCYTES # BLD AUTO: 2.46 10*3/MM3 (ref 0.7–3.1)
LYMPHOCYTES NFR BLD AUTO: 27.2 % (ref 19.6–45.3)
MAGNESIUM SERPL-MCNC: 1.9 MG/DL (ref 1.6–2.6)
MCH RBC QN AUTO: 30.1 PG (ref 26.6–33)
MCHC RBC AUTO-ENTMCNC: 33.1 G/DL (ref 31.5–35.7)
MCV RBC AUTO: 90.7 FL (ref 79–97)
MONOCYTES # BLD AUTO: 0.75 10*3/MM3 (ref 0.1–0.9)
MONOCYTES NFR BLD AUTO: 8.3 % (ref 5–12)
NEUTROPHILS NFR BLD AUTO: 5.42 10*3/MM3 (ref 1.7–7)
NEUTROPHILS NFR BLD AUTO: 59.8 % (ref 42.7–76)
NRBC BLD AUTO-RTO: 0 /100 WBC (ref 0–0.2)
PHOSPHATE SERPL-MCNC: 3.6 MG/DL (ref 2.5–4.5)
PLATELET # BLD AUTO: 198 10*3/MM3 (ref 140–450)
PMV BLD AUTO: 11.2 FL (ref 6–12)
POTASSIUM SERPL-SCNC: 3.8 MMOL/L (ref 3.5–5.2)
RBC # BLD AUTO: 3.66 10*6/MM3 (ref 4.14–5.8)
SINUS: 3.7 CM
SODIUM SERPL-SCNC: 139 MMOL/L (ref 136–145)
WBC NRBC COR # BLD AUTO: 9.06 10*3/MM3 (ref 3.4–10.8)

## 2024-06-18 PROCEDURE — 93306 TTE W/DOPPLER COMPLETE: CPT | Performed by: INTERNAL MEDICINE

## 2024-06-18 PROCEDURE — 85025 COMPLETE CBC W/AUTO DIFF WBC: CPT | Performed by: HOSPITALIST

## 2024-06-18 PROCEDURE — 84100 ASSAY OF PHOSPHORUS: CPT | Performed by: HOSPITALIST

## 2024-06-18 PROCEDURE — 25010000002 HEPARIN (PORCINE) PER 1000 UNITS: Performed by: HOSPITALIST

## 2024-06-18 PROCEDURE — 80048 BASIC METABOLIC PNL TOTAL CA: CPT | Performed by: HOSPITALIST

## 2024-06-18 PROCEDURE — 99232 SBSQ HOSP IP/OBS MODERATE 35: CPT | Performed by: INTERNAL MEDICINE

## 2024-06-18 PROCEDURE — 93356 MYOCRD STRAIN IMG SPCKL TRCK: CPT | Performed by: INTERNAL MEDICINE

## 2024-06-18 PROCEDURE — 93306 TTE W/DOPPLER COMPLETE: CPT

## 2024-06-18 PROCEDURE — 93356 MYOCRD STRAIN IMG SPCKL TRCK: CPT

## 2024-06-18 PROCEDURE — 83735 ASSAY OF MAGNESIUM: CPT | Performed by: HOSPITALIST

## 2024-06-18 PROCEDURE — 25810000003 SODIUM CHLORIDE 0.9 % SOLUTION: Performed by: HOSPITALIST

## 2024-06-18 PROCEDURE — 25010000002 HYDRALAZINE PER 20 MG: Performed by: HOSPITALIST

## 2024-06-18 RX ORDER — AMLODIPINE BESYLATE 5 MG/1
5 TABLET ORAL ONCE
Status: COMPLETED | OUTPATIENT
Start: 2024-06-18 | End: 2024-06-18

## 2024-06-18 RX ORDER — BUTALBITAL, ACETAMINOPHEN AND CAFFEINE 50; 325; 40 MG/1; MG/1; MG/1
2 TABLET ORAL EVERY 6 HOURS PRN
Status: DISCONTINUED | OUTPATIENT
Start: 2024-06-18 | End: 2024-06-18 | Stop reason: HOSPADM

## 2024-06-18 RX ORDER — HYDRALAZINE HYDROCHLORIDE 25 MG/1
25 TABLET, FILM COATED ORAL EVERY 12 HOURS SCHEDULED
Status: DISCONTINUED | OUTPATIENT
Start: 2024-06-18 | End: 2024-06-18 | Stop reason: HOSPADM

## 2024-06-18 RX ORDER — AMLODIPINE BESYLATE 5 MG/1
10 TABLET ORAL
Status: DISCONTINUED | OUTPATIENT
Start: 2024-06-19 | End: 2024-06-18 | Stop reason: HOSPADM

## 2024-06-18 RX ORDER — UREA 10 %
5 LOTION (ML) TOPICAL NIGHTLY PRN
Status: DISCONTINUED | OUTPATIENT
Start: 2024-06-18 | End: 2024-06-18 | Stop reason: HOSPADM

## 2024-06-18 RX ORDER — BUTALBITAL, ACETAMINOPHEN AND CAFFEINE 50; 325; 40 MG/1; MG/1; MG/1
2 TABLET ORAL ONCE
Status: COMPLETED | OUTPATIENT
Start: 2024-06-18 | End: 2024-06-18

## 2024-06-18 RX ADMIN — AMLODIPINE BESYLATE 5 MG: 5 TABLET ORAL at 09:56

## 2024-06-18 RX ADMIN — ACETAMINOPHEN 650 MG: 325 TABLET, FILM COATED ORAL at 07:01

## 2024-06-18 RX ADMIN — BUTALBITAL, ACETAMINOPHEN, AND CAFFEINE 2 TABLET: 50; 325; 40 TABLET ORAL at 00:52

## 2024-06-18 RX ADMIN — Medication 10 ML: at 08:45

## 2024-06-18 RX ADMIN — AMLODIPINE BESYLATE 5 MG: 5 TABLET ORAL at 07:01

## 2024-06-18 RX ADMIN — HYDRALAZINE HYDROCHLORIDE 25 MG: 25 TABLET ORAL at 13:24

## 2024-06-18 RX ADMIN — BUTALBITAL, ACETAMINOPHEN, AND CAFFEINE 2 TABLET: 50; 325; 40 TABLET ORAL at 16:22

## 2024-06-18 RX ADMIN — HEPARIN SODIUM 5000 UNITS: 5000 INJECTION INTRAVENOUS; SUBCUTANEOUS at 13:23

## 2024-06-18 RX ADMIN — ISOSORBIDE MONONITRATE 30 MG: 30 TABLET, EXTENDED RELEASE ORAL at 07:01

## 2024-06-18 RX ADMIN — ACETAMINOPHEN 650 MG: 325 TABLET, FILM COATED ORAL at 13:26

## 2024-06-18 RX ADMIN — HEPARIN SODIUM 5000 UNITS: 5000 INJECTION INTRAVENOUS; SUBCUTANEOUS at 05:01

## 2024-06-18 RX ADMIN — CARVEDILOL 12.5 MG: 6.25 TABLET, FILM COATED ORAL at 07:01

## 2024-06-18 RX ADMIN — SODIUM CHLORIDE 75 ML/HR: 9 INJECTION, SOLUTION INTRAVENOUS at 08:44

## 2024-06-18 RX ADMIN — HYDRALAZINE HYDROCHLORIDE 10 MG: 20 INJECTION, SOLUTION INTRAMUSCULAR; INTRAVENOUS at 05:01

## 2024-06-18 RX ADMIN — CARVEDILOL 12.5 MG: 6.25 TABLET, FILM COATED ORAL at 17:35

## 2024-06-18 RX ADMIN — ASPIRIN 81 MG: 81 TABLET, COATED ORAL at 07:01

## 2024-06-18 RX ADMIN — BUTALBITAL, ACETAMINOPHEN, AND CAFFEINE 2 TABLET: 50; 325; 40 TABLET ORAL at 09:56

## 2024-06-18 RX ADMIN — Medication 5 MG: at 00:52

## 2024-06-18 NOTE — NURSING NOTE
Pt angry about dinner. Pt stated that he was going to leave the floor. Pt educated that he would not be able to leave the floor. Pt became angry, went back into room and started slamming into things. Security called. Pt stated that he wanted to leave AMA Pt educated about benefits of remaining in the hospital and risks of leaving AMA. PT still wants to leave. MD notified.

## 2024-06-18 NOTE — PROGRESS NOTES
Cardiology [place]        LOS:  LOS: 1 day   Patient Name: Scott Gaytan  Age/Sex: 49 y.o. male  : 1975  MRN: [id number]    Day of Service: 24   Length of Stay: 1  Encounter Provider: CHANDLER Gilbert  Place of Service: [facility] CARDIOLOGY  Patient Care Team:  Provider, No Known as PCP - General    Subjective:     Chief Complaint: f/u elevated troponin, chest pain    Subjective: No acute events overnight  Chest pain-free  Blood pressure remains elevated  No evidence of ACS    2D echo reviewed and interpreted by me demonstrates an EF of 55 to 60% with moderate concentric LVH, left atrial enlargement, mild valvular insufficiency as documented, no mass or effusion      Current Medications:   Scheduled Meds:[START ON 2024] amLODIPine, 10 mg, Oral, Q24H  aspirin, 81 mg, Oral, Daily  atorvastatin, 20 mg, Oral, Nightly  carvedilol, 12.5 mg, Oral, BID With Meals  heparin (porcine), 5,000 Units, Subcutaneous, Q8H  hydrALAZINE, 25 mg, Oral, Q12H  isosorbide mononitrate, 30 mg, Oral, Q24H  sodium chloride, 10 mL, Intravenous, Q12H      Continuous Infusions:sodium chloride, 75 mL/hr, Last Rate: 75 mL/hr (24 0844)        Allergies:  Allergies   Allergen Reactions    Morphine Other (See Comments)     Makes it feel like my blood is boiling in my veins       Review of Systems   Constitutional: Negative for chills, diaphoresis and malaise/fatigue.   Cardiovascular:  Negative for chest pain, dyspnea on exertion, irregular heartbeat, leg swelling, near-syncope, orthopnea, palpitations, paroxysmal nocturnal dyspnea and syncope.   Respiratory:  Negative for cough, shortness of breath, sleep disturbances due to breathing and sputum production.    Gastrointestinal:  Negative for change in bowel habit.   Genitourinary:  Negative for urgency.   Neurological:  Negative for dizziness and headaches.   Psychiatric/Behavioral:  Negative for altered mental status.          Objective:      Temp:  [98.2 °F (36.8 °C)-98.6 °F (37 °C)] 98.6 °F (37 °C)  Heart Rate:  [63-86] 67  Resp:  [13-20] 18  BP: (130-180)/() 150/101     Intake/Output Summary (Last 24 hours) at 6/18/2024 1203  Last data filed at 6/18/2024 0701  Gross per 24 hour   Intake 2370 ml   Output --   Net 2370 ml     Body mass index is 28.24 kg/m².      06/16/24  1051 06/17/24  0300 06/18/24  0305   Weight: 81.6 kg (180 lb) 81.9 kg (180 lb 8.9 oz) 81.8 kg (180 lb 5.4 oz)         General Appearance:    Alert, cooperative, in no acute distress                                Head: Atraumatic, normocephalic, PERRLA               Neck:   supple, trachea midline, no thyromegaly, no carotid bruit, no JVD   Lungs:     Clear to auscultation, respirations regular, even and               unlabored    Heart:    Regular rhythm and normal rate, normal S1 and S2, no       murmur, no gallop, no rub, no click   Abdomen:     Normal bowel sounds, no masses, no organomegaly, soft  nontender, nondistended, no guarding, no rebound  tenderness   Extremities:   Moves all extremities well, no edema, no cyanosis, no  redness   Pulses:   Pulses palpable and equal bilaterally   Skin:   No bleeding, bruising or rash   Neurologic:   Awake, alert, oriented x3     Unchanged from prior encounter    Lab Review:   Results from last 7 days   Lab Units 06/18/24  0104 06/17/24  1445 06/17/24  0305 06/16/24  1100   SODIUM mmol/L 139  --  138 141   POTASSIUM mmol/L 3.8 4.3 3.6 3.8   CHLORIDE mmol/L 109*  --  104 105   CO2 mmol/L 20.6*  --  22.8 20.8*   BUN mg/dL 44*  --  55* 47*   CREATININE mg/dL 2.56*  --  2.89* 3.01*   GLUCOSE mg/dL 103*  --  82 71   CALCIUM mg/dL 7.9*  --  8.5* 9.2   AST (SGOT) U/L  --   --  26 29   ALT (SGPT) U/L  --   --  12 14     Results from last 7 days   Lab Units 06/16/24  1426 06/16/24  1100   CK TOTAL U/L  --  155   HSTROP T ng/L 64* 72*     Results from last 7 days   Lab Units 06/18/24  0104 06/17/24  0305   WBC 10*3/mm3 9.06 9.81   HEMOGLOBIN  g/dL 11.0* 12.2*   HEMATOCRIT % 33.2* 37.2*   PLATELETS 10*3/mm3 198 199         Results from last 7 days   Lab Units 06/18/24  0104 06/16/24  1100   MAGNESIUM mg/dL 1.9 2.1     Results from last 7 days   Lab Units 06/17/24  0305   CHOLESTEROL mg/dL 184   TRIGLYCERIDES mg/dL 45   HDL CHOL mg/dL 55     Results from last 7 days   Lab Units 06/16/24  1100   PROBNP pg/mL 18,249.0*           Recent Radiology:  Imaging Results (Most Recent)       Procedure Component Value Units Date/Time    XR Chest 1 View [[id number]] Collected: 06/16/24 1145     Updated: 06/16/24 1149    Narrative:      XR CHEST 1 VW    Date of Exam: 6/16/2024 11:26 AM EDT    Indication: Chest Pain Protocol    Comparison: [facility] chest x-ray 7/17/2023, 7/16/2023    Findings:  Lungs are adequately expanded and appear clear. No pneumothorax or large pleural effusion is seen. Cardiomediastinal contours appear stable.      Impression:      Impression:  No acute cardiopulmonary abnormality is identified.      Electronically Signed: Mary Ching    6/16/2024 11:47 AM EDT    Workstation ID: [id number]            ECHOCARDIOGRAM:    Results for orders placed during the hospital encounter of 12/06/22    Adult Transthoracic Echo Complete w/ Color, Spectral and Contrast if Necessary Per Protocol    Interpretation Summary    Left ventricular systolic function is normal. Left ventricular ejection fraction appears to be 61 - 65%.    Left ventricular diastolic function was normal.    Estimated right ventricular systolic pressure from tricuspid regurgitation is normal (<35 mmHg).        I reviewed the patient's new clinical results.    EKG:      Assessment:       Methamphetamine intoxication    Chest pain    Methamphetamine abuse    Tobacco use    Elevated troponin    JOSE ANGEL (acute kidney injury)    Hypertension    Chest Pain / elevated troponin in setting of CKD with Crt 3 and HTN  HTN urgency  Dyspnea on exertion / elevated proBNP  CKD   Methamphetamine use  Non obstructive CAD  on [facility] 2022    Plan:   ECHO as documented, EF 55%, no regional abnormalities, moderate concentric LVH secondary to hypertensive heart disease compounded by methamphetamine use  Increased amlodipine and added hydralazine, continue carvedilol which is beta-blocker of choice with catecholamine excess  B/p improving slowly  Creatinine improving slowly  No further chest pain  Troponin likely demand ischemia in setting of HTN urgency and CKD, no plan for invasive ischemic evaluation at this time given Crt 3 on admission  Optimize medical therapy      Stressed compliance with the patient  Continue to follow  Altered medicines as indicated by clinical course and response to therapy    Malik Devlin MD, PhD        Rocio Mgauire, APRSAGRARIO  06/18/24  12:03 EDT

## 2024-06-18 NOTE — PROGRESS NOTES
[facility] MEDICINE SERVICE  DAILY PROGRESS NOTE    NAME: Scott Gaytan  : 1975  MRN: [id number]      LOS: 1 day     PROVIDER OF SERVICE: Robin Mcgovern MD    Chief Complaint: Methamphetamine intoxication    Subjective:     Interval History:  History taken from: patient chart RN  No CP breathing stable   Reported HA     Review of Systems:   Review of Systems  All negative except as above   Objective:     Vital Signs  Temp:  [98.2 °F (36.8 °C)-98.5 °F (36.9 °C)] 98.2 °F (36.8 °C)  Heart Rate:  [63-86] 81  Resp:  [13-20] 13  BP: (130-180)/() 130/78   Body mass index is 28.24 kg/m².    Physical Exam  Physical Exam  AOx3 NAD  RRR S1-S2 audible  Lung CTA  Abdomen soft nontender no nondistended  Scheduled Meds   [START ON 2024] amLODIPine, 10 mg, Oral, Q24H  aspirin, 81 mg, Oral, Daily  atorvastatin, 20 mg, Oral, Nightly  carvedilol, 12.5 mg, Oral, BID With Meals  heparin (porcine), 5,000 Units, Subcutaneous, Q8H  isosorbide mononitrate, 30 mg, Oral, Q24H  sodium chloride, 10 mL, Intravenous, Q12H       PRN Meds     acetaminophen    senna-docusate sodium **AND** polyethylene glycol **AND** bisacodyl **AND** bisacodyl    butalbital-acetaminophen-caffeine    Calcium Replacement - Follow Nurse / BPA Driven Protocol    hydrALAZINE    Magnesium Standard Dose Replacement - Follow Nurse / BPA Driven Protocol    melatonin    nitroglycerin    ondansetron    Phosphorus Replacement - Follow Nurse / BPA Driven Protocol    Potassium Replacement - Follow Nurse / BPA Driven Protocol    sodium chloride    sodium chloride    sodium chloride   Infusions  sodium chloride, 75 mL/hr, Last Rate: 75 mL/hr (24 0844)          Diagnostic Data    Results from last 7 days   Lab Units 24  0104 24  1445 24  0305   WBC 10*3/mm3 9.06  --  9.81   HEMOGLOBIN g/dL 11.0*  --  12.2*   HEMATOCRIT % 33.2*  --  37.2*   PLATELETS 10*3/mm3 198  --  199   GLUCOSE mg/dL 103*  --  82   CREATININE mg/dL 2.56*   --  2.89*   BUN mg/dL 44*  --  55*   SODIUM mmol/L 139  --  138   POTASSIUM mmol/L 3.8   < > 3.6   AST (SGOT) U/L  --   --  26   ALT (SGPT) U/L  --   --  12   ALK PHOS U/L  --   --  41   BILIRUBIN mg/dL  --   --  0.3   ANION GAP mmol/L 9.4  --  11.2    < > = values in this interval not displayed.       XR Chest 1 View    Result Date: 6/16/2024  Impression: No acute cardiopulmonary abnormality is identified. Electronically Signed: Mary Ching  6/16/2024 11:47 AM EDT  Workstation ID: [id number]       I reviewed the patient's new clinical results.  I reviewed the patient's new imaging results and agree with the interpretation.    Assessment/Plan:     Active and Resolved Problems  Active Hospital Problems    Diagnosis  POA    **Methamphetamine intoxication [F15.929]  Yes    Hypertension [I10]  Yes    Methamphetamine abuse [F15.10]  Yes    Tobacco use [Z72.0]  Yes    Elevated troponin [R79.89]  Yes    JOSE ANGEL (acute kidney injury) [N17.9]  Yes    Chest pain [R07.9]  Yes      Resolved Hospital Problems   No resolved problems to display.       49-year-old male with history of hypertension, methamphetamine abuse, homelessness [facility] with chest pain and dyspnea     #Troponin elevation suspect in setting of demand ischemia  Cards following. No plan for ischemic workup  Continue aspirin   start Lipitor  BP control as below     #Hypertensive urgency  At home patient reports taking Entresto and Coreg  started on Tridil drip which has been weaned off   continue home dose of Coreg  Entresto currently on hold given JOSE ANGEL on CKD  Increase dose of amlodipine to 10 mg daily  Imdur 30 mg QD      #Methamphetamine abuse  Last reported use 2 weeks ago  Counseled on cessation >10 minutes were spent   consulted     #Homelessness  Consult       #JOSE ANGEL on CKD stage II  Baseline creatinine appears to be around 1.3.  On admission 3.01>2.89>2.53  Continue to hold Entresto  BP control as  above  Continue IV fluids  BMP daily       VTE Prophylaxis:  Pharmacologic VTE prophylaxis orders are present.         Code status is   Code Status and Medical Interventions:   Ordered at: 06/16/24 1322     Code Status (Patient has no pulse and is not breathing):    CPR (Attempt to Resuscitate)     Medical Interventions (Patient has pulse or is breathing):    Full Support       Plan for disposition:24 hours pending improvement in renal function     Time: 30 minutes    Signature: Electronically signed by Robin Mcgovern MD, 06/18/24, 10:24 EDT.  [facility] Hospitalist Team

## 2024-06-18 NOTE — PLAN OF CARE
Goal Outcome Evaluation:        Problem: Adult Inpatient Plan of Care  Goal: Plan of Care Review  Outcome: Ongoing, Progressing  Flowsheets (Taken 6/18/2024 4181)  Progress: improving        Progress: improving

## 2024-06-19 NOTE — CASE MANAGEMENT/SOCIAL WORK
Case Management Discharge Note      Final Note: Left AMA                 Transportation Services  Private: Car    Final Discharge Disposition Code: 07 - left AMA

## 2024-06-19 NOTE — PAYOR COMM NOTE
"OBSERVATION TO INPATIENT CASE    06/17/24 1132  Inpatient Admission  Once     Completed     Level of Care: Telemetry  Diagnosis: Methamphetamine intoxication [[id number]]  Admitting Physician: INDU VEGA [[id number]]  Attending Physician: SHARATH TANG [[id number]]  Certification: I Certify That Inpatient Hospital Services Are Medically Necessary For Greater Than 2 Midnights    06/17/24 1131   06/16/24 1320  Initiate Observation Status  Once     Completed     Level of Care: Telemetry  Diagnosis: Methamphetamine intoxication [[id number]]  Admitting Physician: INDU VEGA [[id number]]  Attending Physician: INDU VEGA [[id number]]           PATIENT LEFT AMA ON 6/18/24    THIS CASE WAS INITIALLY SUBMITTED FOR INPT AUTH REQUEST ON 6/17/24 AND RECEIVED A PENDING REF # [id number]. HOWEVER UPON CHECKING ONLINE AT [facility] THAT REFERENCE NUMBER IS NOT ACTIVE. ATTEMPTED TO UPDATE THE DIAGNOSIS CODE TO BE COMPATIBLE WITH A MEDICAL CASE TYPE BUT THEN RECEIVED THE NOTICE OF ERROR              THIS IS NOT A LATE NOTIFICATION. RECEIVED NOTICE OF \"PRIOR AUTH RESPONSE- PENDING- INITIAL UTILIZATION REVIEW IN PROGRESS\"         Khloe BLANKENSHIP, RN    Care Coordination   [place] [address]    [phone] office  [phone] fax  [email or web address]  [email or web address]    [place] Number TwoScott palacios (49 y.o. Male)       Date of Birth   1975    Social Security Number       Address   [address] [place] [address] [place] [address]    Home Phone       MRN   [id number]       Spiritism   Confucianist    Marital Status   Single                            Admission Date   6/16/24    Admission Type   Emergency    Admitting Provider   Indu Vega MD    Attending Provider       Department, Room/Bed   [facility] 2D, 252/1       Discharge Date   6/18/2024    Discharge Disposition   Left Against Medical Advice    Discharge Destination                     " "            Attending Provider: (none)   Allergies: Morphine    Isolation: None   Infection: None   Code Status: Prior    Ht: 170.2 cm (67\")   Wt: 81.8 kg (180 lb 5.4 oz)    Admission Cmt: None   Principal Problem: Methamphetamine intoxication [F15.929]                   Active Insurance as of 2024       Primary Coverage       Payor Plan Insurance Group Employer/Plan Group    [employer] [id number]       Payor Plan Address Payor Plan Phone Number Payor Plan Fax Number Effective Dates    MAIL STOP:   2021 - None Entered    [address] [address]       [facility] [address]         Subscriber Name Subscriber Birth Date Member ID       SCOTT GAYTAN 1975 [id number]                     Emergency Contacts        (Rel.) Home Phone Work Phone Mobile Phone    NORRIS JASSON (Sister) [phone] -- [phone]    BELINDA ALVARADO (Significant Other) [phone] -- [phone]                 History & Physical        Jamal Vega MD at 24 1403           [facility]   HISTORY AND PHYSICAL    Patient Name: Scott Gaytan  : 1975  MRN: [id number]  Primary Care Physician:  Provider, No Known  Date of admission: 2024  Service Date and time: 24 14:03 EDT  Subjective  Subjective     Chief Complaint: chest pain    HPI:    Scott Gaytan is a 49 y.o. male with PMHx significant for hypertension, CHF, methamphetamine abuse presenting to the ED with reports of chest pain and dyspnea that started yesterday and has been on and off since. Patient states he is currently homeless and was walking on the side of the road when his pain started this morning which brought him to the hospital. He states his chest pain is intermittent and 4/10 intensity. He describes as a sharp/squeezing type pain on the left side of his chest that is nonradiating. He also states its associated with dyspnea.  Patient states he is prescribed carvedilol and Entresto " and aspirin which he took last night. He does report smoking about 1/2 pack of cigarettes daily and reports marijuana use along with active meth use and states he used meth last week but his UDS is + for it here. He is currently on tridil gtt and wants to eat.   He denies abdominal pain, chills/fever, dysuria, syncope, N/V, constipation/diarrhea, or blurry vision.        Review of Systems   As per HPI    Personal History     Past Medical History:   Diagnosis Date    Hypertension     Substance abuse     Meth/MJ       Past Surgical History:   Procedure Laterality Date    CARDIAC CATHETERIZATION N/A 04/27/2022    ORIF TIBIA/FIBULA FRACTURES Right        Family History: family history includes Arthritis in his mother; Diabetes in his mother; Heart attack in his father and paternal grandfather; Hypertension in his father; Seizures in his sister. Otherwise pertinent FHx was reviewed and not pertinent to current issue.    Social History:  reports that he has been smoking cigarettes. He started smoking about 30 years ago. He has a 30.5 pack-year smoking history. He has never been exposed to tobacco smoke. He has never used smokeless tobacco. He reports that he does not currently use alcohol. He reports that he does not currently use drugs after having used the following drugs: Methamphetamines and Marijuana.    Home Medications:  aspirin, carvedilol, nitroglycerin, and sacubitril-valsartan      Allergies:  Allergies   Allergen Reactions    Morphine Other (See Comments)     Makes it feel like my blood is boiling in my veins       Objective  Objective     Vitals:   Temp:  [97.7 °F (36.5 °C)] 97.7 °F (36.5 °C)  Heart Rate:  [79-87] 84  Resp:  [19] 19  BP: (154-174)/(105-123) 159/123  Physical Exam    Constitutional: Awake, alert, disheveled   Eyes: PERRLA, sclerae anicteric, no conjunctival injection   HENT: NCAT, mucous membranes moist   Neck: Supple, no thyromegaly, no lymphadenopathy, trachea midline   Respiratory: Clear to  auscultation bilaterally, nonlabored respirations    Cardiovascular: RRR, no murmurs, rubs, or gallops, palpable pedal pulses bilaterally   Gastrointestinal: Positive bowel sounds, soft, nontender, nondistended   Musculoskeletal: No bilateral ankle edema, no clubbing or cyanosis to extremities   Psychiatric: Appropriate affect, cooperative   Neurologic: Oriented x 3, strength symmetric in all extremities, Cranial Nerves grossly intact to confrontation, speech clear   Skin: No rashes     Result Review   Result Review:  I have personally reviewed the results from the time of this admission to 6/16/2024 14:03 EDT and agree with these findings:  [x]  Laboratory list / accordion  [x]  Microbiology  [x]  Radiology  [x]  EKG/Telemetry   [x]  Cardiology/Vascular   []  Pathology  []  Old records  []  Other:        Assessment & Plan  Assessment / Plan       Active Hospital Problems:  Active Hospital Problems    Diagnosis     **Methamphetamine intoxication     Hypertension     Methamphetamine abuse     Tobacco use     Elevated troponin     JOSE ANGEL (acute kidney injury)     Chest pain      Plan:   - trend trops, ASA, telemetry, tridil gtt  - recommend cessation of Meth, likely all meth related  - cards has been consulted by the ER  - trend labs, avoid nephrotoxic meds, may need some gentle hydration unsure if this is baseline creatinine now  - cont home meds as able    VTE Prophylaxis:  Pharmacologic VTE prophylaxis orders are present.        CODE STATUS:    Code Status (Patient has no pulse and is not breathing): CPR (Attempt to Resuscitate)  Medical Interventions (Patient has pulse or is breathing): Full Support    Admission Status:  I believe this patient meets observation status.    Jamal Vega MD    Electronically signed by Jamal Vega MD at 06/16/24 1409          Emergency Department Notes        Martha Dos Santos RN at 06/16/24 1412          Nursing report ED to floor  Scott Gaytan  49 y.o.  male    HPI:    Chief Complaint   Patient presents with    Chest Pain       Admitting doctor:   Jamal Vega MD    Admitting diagnosis:   The primary encounter diagnosis was Chest pain, unspecified type. Diagnoses of Methamphetamine abuse, Hypertensive emergency, and Acute kidney injury superimposed on chronic kidney disease were also pertinent to this visit.    Code status:   Current Code Status       Date Active Code Status Order ID Comments User Context       6/16/2024 1322 CPR (Attempt to Resuscitate) [id number]  Jamal Vega MD ED        Question Answer    Code Status (Patient has no pulse and is not breathing) CPR (Attempt to Resuscitate)    Medical Interventions (Patient has pulse or is breathing) Full Support                    Allergies:   Morphine    Isolation:  No active isolations     Fall Risk:  Fall Risk Assessment was completed, and patient is at high risk for falls.   Predictive Model Details         3 (Low) Factor Value    Calculated 6/16/2024 14:12 Age 49    Risk of Fall Model Active Peripheral IV Present     Imaging order in this encounter Present     Respiratory Rate 19     Magnesium 2.1 mg/dL     Creatinine 3.01 mg/dL     Tobacco Use Current     Sy Scale not on file     Albumin 3.7 g/dL     Clinically Relevant Sex Not Female     Number of Distinct Medication Classes administered 1     Cardiac Assessment X     Total Bilirubin 0.6 mg/dL     Calcium 9.2 mg/dL     Days after Admission 0.144     Diastolic      ALT 14 U/L     Chloride 105 mmol/L     Potassium 3.8 mmol/L         Weight:       06/16/24  1051   Weight: 81.6 kg (180 lb)       Intake and Output  No intake or output data in the 24 hours ending 06/16/24 1412    Diet:   Dietary Orders (From admission, onward)       Start     Ordered    06/16/24 1330  Diet: Cardiac; Low Sodium (2g); Fluid Consistency: Thin (IDDSI 0)  Diet Effective Now        References:    Diet Order [place]   Question Answer Comment   Diets: Cardiac    Cardiac  Diet: Low Sodium (2g)    Fluid Consistency: Thin (IDDSI 0)        06/16/24 1329                     Most recent vitals:   Vitals:    06/16/24 1116 06/16/24 1145 06/16/24 1301 06/16/24 1317   BP: (!) 156/113 (!) 174/121 (!) 154/105 (!) 159/123   BP Location:       Patient Position:       Pulse: 79 87 82 84   Resp:       Temp:       TempSrc:       SpO2: 95% 99% 94% 97%   Weight:       Height:           Active LDAs/IV Access:   Lines, Drains & Airways       Active LDAs       Name Placement date Placement time Site Days    Peripheral IV 06/16/24 1200 Left Antecubital 06/16/24  1200  Antecubital  less than 1    Peripheral IV 06/16/24 1331 Anterior;Right;Medial Forearm 06/16/24  1331  Forearm  less than 1                    Skin Condition:   Skin Assessments (last day)       None             Labs (abnormal labs have a star):   Labs Reviewed   COMPREHENSIVE METABOLIC PANEL - Abnormal; Notable for the following components:       Result Value    BUN 47 (*)     Creatinine 3.01 (*)     CO2 20.8 (*)     Anion Gap 15.2 (*)     eGFR 24.9 (*)     All other components within normal limits    Narrative:     GFR Normal >60  Chronic Kidney Disease <60  Kidney Failure <15     TROPONIN - Abnormal; Notable for the following components:    HS Troponin T 72 (*)     All other components within normal limits    Narrative:     High Sensitive Troponin T Reference Range:  <14.0 ng/L- Negative Female for AMI  <22.0 ng/L- Negative Male for AMI  >=14 - Abnormal Female indicating possible myocardial injury.  >=22 - Abnormal Male indicating possible myocardial injury.   Clinicians would have to utilize clinical acumen, EKG, Troponin, and serial changes to determine if it is an Acute Myocardial Infarction or myocardial injury due to an underlying chronic condition.        BNP (IN-HOUSE) - Abnormal; Notable for the following components:    proBNP 18,249.0 (*)     All other components within normal limits    Narrative:     This assay is used as an aid  in the diagnosis of individuals suspected of having heart failure. It can be used as an aid in the diagnosis of acute decompensated heart failure (ADHF) in patients presenting with signs and symptoms of ADHF to the emergency department (ED). In addition, NT-proBNP of <300 pg/mL indicates ADHF is not likely.    Age Range Result Interpretation  NT-proBNP Concentration (pg/mL:      <50             Positive            >450                   Gray                 300-450                    Negative             <300    50-75           Positive            >900                  Gray                300-900                  Negative            <300      >75             Positive            >1800                  Gray                300-1800                  Negative            <300   URINE DRUG SCREEN - Abnormal; Notable for the following components:    Amphet/Methamphet, Screen Positive (*)     THC, Screen, Urine Positive (*)     All other components within normal limits    Narrative:     Negative Thresholds Per Drugs Screened:    Amphetamines                 500 ng/ml  Barbiturates                 200 ng/ml  Benzodiazepines              100 ng/ml  Cocaine                      300 ng/ml  Methadone                    300 ng/ml  Opiates                      300 ng/ml  Oxycodone                    100 ng/ml  THC                           50 ng/ml    The Normal Value for all drugs tested is negative. This report includes final unconfirmed screening results to be used for medical treatment purposes only. Unconfirmed results must not be used for non-medical purposes such as employment or legal testing. Clinical consideration should be applied to any drug of abuse test, particularly when unconfirmed results are used.          All urine drugs of abuse requests without chain of custody are for medical screening purposes only.  False positives are possible.     CBC WITH AUTO DIFFERENTIAL - Abnormal; Notable for the following components:     WBC 13.76 (*)     Hemoglobin 12.6 (*)     Neutrophil % 78.4 (*)     Lymphocyte % 14.3 (*)     Neutrophils, Absolute 10.79 (*)     All other components within normal limits   CK - Normal   MAGNESIUM - Normal   RAINBOW DRAW    Narrative:     The following orders were created for panel order [place] Draw.  Procedure                               Abnormality         Status                     ---------                               -----------         ------                     Green Top (Gel)[[id number]]                                  Final result               Lavender Top[[id number]]                                     Final result               Gold Top - SST[[id number]]                                   Final result               Light Blue Top[[id number]]                                   Final result                 Please view results for these tests on the individual orders.   ETHANOL    Narrative:     Plasma Ethanol Clinical Symptoms:    ETOH (%)               Clinical Symptom  .01-.05              No apparent influence  .03-.12              Euphoria, Diminished judgment and attention   .09-.25              Impaired comprehension, Muscle incoordination  .18-.30              Confusion, Staggered gait, Slurred speech  .25-.40              Markedly decreased response to stimuli, unable to stand or                        walk, vomitting, sleep or stupor  .35-.50              Comatose, Anesthesia, Subnormal body temperature       HIGH SENSITIVITIY TROPONIN T 2HR   TROPONIN   GREEN TOP   LAVENDER TOP   GOLD TOP - SST   LIGHT BLUE TOP   CBC AND DIFFERENTIAL    Narrative:     The following orders were created for panel order CBC & Differential.  Procedure                               Abnormality         Status                     ---------                               -----------         ------                     CBC Auto Differential[[id number]]        Abnormal            Final result                 Please view  results for these tests on the individual orders.       LOC: Person, Place, Time, and Situation    Telemetry:  Telemetry    Cardiac Monitoring Ordered: yes    EKG:   ECG 12 Lead Chest Pain   Preliminary Result   HEART RATE= 80  bpm   RR Interval= 756  ms   VT Interval= 200  ms   P Horizontal Axis= -2  deg   P Front Axis= 32  deg   QRSD Interval= 115  ms   QT Interval= 453  ms   QTcB= 521  ms   QRS Axis= -44  deg   T Wave Axis= 92  deg   - ABNORMAL ECG -   Sinus rhythm   Probable left atrial enlargement   Left anterior fascicular block   LVH with secondary repolarization abnormality   Anterior Q waves, possibly due to LVH   Prolonged QT interval   Electronically Signed By:    Date and Time of Study: 2024-06-16 10:50:58          Medications Given in the ED:   Medications   sodium chloride 0.9 % flush 10 mL (has no administration in time range)   nitroglycerin (TRIDIL) 200 mcg/ml infusion (30 mcg/min Intravenous Rate/Dose Change 6/16/24 1336)   aspirin EC tablet 81 mg (has no administration in time range)   sodium chloride 0.9 % flush 10 mL (has no administration in time range)   sodium chloride 0.9 % flush 10 mL (has no administration in time range)   sodium chloride 0.9 % infusion 40 mL (has no administration in time range)   heparin (porcine) 5000 UNIT/ML injection 5,000 Units (has no administration in time range)   nitroglycerin (NITROSTAT) SL tablet 0.4 mg (has no administration in time range)   Potassium Replacement - Follow Nurse / BPA Driven Protocol (has no administration in time range)   Magnesium Standard Dose Replacement - Follow Nurse / BPA Driven Protocol (has no administration in time range)   Phosphorus Replacement - Follow Nurse / BPA Driven Protocol (has no administration in time range)   Calcium Replacement - Follow Nurse / BPA Driven Protocol (has no administration in time range)   acetaminophen (TYLENOL) tablet 650 mg (has no administration in time range)   sennosides-docusate (PERICOLACE) 8.6-50  MG per tablet 2 tablet (has no administration in time range)     And   polyethylene glycol (MIRALAX) packet 17 g (has no administration in time range)     And   bisacodyl (DULCOLAX) EC tablet 5 mg (has no administration in time range)     And   bisacodyl (DULCOLAX) suppository 10 mg (has no administration in time range)   ondansetron (ZOFRAN) injection 4 mg (has no administration in time range)   carvedilol (COREG) tablet 12.5 mg (has no administration in time range)       Imaging results:  XR Chest 1 View    Result Date: 6/16/2024  Impression: No acute cardiopulmonary abnormality is identified. Electronically Signed: Mary Humza  6/16/2024 11:47 AM EDT  Workstation ID: [id number]     Social issues:   Social History     Socioeconomic History    Marital status: Single   Tobacco Use    Smoking status: Every Day     Current packs/day: 1.00     Average packs/day: 1 pack/day for 30.5 years (30.5 ttl pk-yrs)     Types: Cigarettes     Start date: 1994     Passive exposure: Never    Smokeless tobacco: Never   Vaping Use    Vaping status: Never Used   Substance and Sexual Activity    Alcohol use: Not Currently    Drug use: Not Currently     Types: Methamphetamines, Marijuana     Comment: quit 2022    Sexual activity: Defer       NIH Stroke Scale:  Interval: (not recorded)  1a. Level of Consciousness: (not recorded)  1b. LOC Questions: (not recorded)  1c. LOC Commands: (not recorded)  2. Best Gaze: (not recorded)  3. Visual: (not recorded)  4. Facial Palsy: (not recorded)  5a. Motor Arm, Left: (not recorded)  5b. Motor Arm, Right: (not recorded)  6a. Motor Leg, Left: (not recorded)  6b. Motor Leg, Right: (not recorded)  7. Limb Ataxia: (not recorded)  8. Sensory: (not recorded)  9. Best Language: (not recorded)  10. Dysarthria: (not recorded)  11. Extinction and Inattention (formerly Neglect): (not recorded)    Total (NIH Stroke Scale): (not recorded)     Additional notable assessment information:     Nursing report ED to  floor:  2D RN    Martha Dos Santos RN   06/16/24 14:12 EDT     Electronically signed by Martha Dos Santos RN at 06/16/24 1413       Sary Ocampo PA at 06/16/24 1116       Attestation signed by Aaron Chung MD at 06/19/24 0722        SHARED APC NON FACE TO FACE: I performed a substantive part of the MDM during the patient's E/M visit. I personally made or approved the documented management plan and acknowledge its risk of complications.   Aaron Chung MD 6/19/2024 07:41 EDT                         Subjective   History of Present Illness  Patient is a 49-year-old male PMH significant for hypertension, CHF, methamphetamine abuse presenting to the ED with reports of chest pain and dyspnea started yesterday.  In regards to his chest pain he states it is intermittent and waxes/ wanes in intensity.  He describes as a sharp/squeezing type pain on the left side of his chest that is nonradiating.  Reports associated dyspnea.  He reports chronic lower extremity edema with out any change here recently.  No reports of new cough congestion fever chills.  No abdominal pain nausea or vomiting.  Patient states he is prescribed carvedilol and Entresto and aspirin which he took last night.  He does report smoking about 1/2 pack of cigarettes daily and reports marijuana use but denies any other illicit drug use though history of methamphetamine abuse was noted in his chart.  Patient states when he normally gets his symptoms his congestive heart failure is acting up.  Patient states he is currently homeless and was walking on the side of the road when his pain started this morning.        Review of Systems   Constitutional: Negative.    HENT:  Negative for congestion, ear discharge, ear pain, rhinorrhea, sore throat and voice change.    Respiratory:  Positive for shortness of breath. Negative for apnea, cough, choking, chest tightness, wheezing and stridor.    Cardiovascular:  Positive for chest pain and leg swelling. Negative for  palpitations.   Gastrointestinal:  Negative for abdominal distention, abdominal pain, nausea and vomiting.   Neurological:  Negative for dizziness, syncope, weakness, light-headedness and numbness.       Past Medical History:   Diagnosis Date    Hypertension     Substance abuse     Meth/MJ       Allergies   Allergen Reactions    Morphine Other (See Comments)     Makes it feel like my blood is boiling in my veins       Past Surgical History:   Procedure Laterality Date    CARDIAC CATHETERIZATION N/A 04/27/2022    ORIF TIBIA/FIBULA FRACTURES Right        Family History   Problem Relation Age of Onset    Diabetes Mother     Arthritis Mother     Hypertension Father     Heart attack Father     Seizures Sister     Heart attack Paternal Grandfather        Social History     Socioeconomic History    Marital status: Single   Tobacco Use    Smoking status: Every Day     Current packs/day: 1.00     Average packs/day: 1 pack/day for 30.5 years (30.5 ttl pk-yrs)     Types: Cigarettes     Start date: 1994     Passive exposure: Never    Smokeless tobacco: Never   Vaping Use    Vaping status: Never Used   Substance and Sexual Activity    Alcohol use: Not Currently    Drug use: Not Currently     Types: Methamphetamines, Marijuana     Comment: quit 2022    Sexual activity: Defer           Objective   Physical Exam  Vitals and nursing note reviewed.   Constitutional:       General: He is not in acute distress.     Appearance: Normal appearance. He is well-developed. He is not ill-appearing, toxic-appearing or diaphoretic.   HENT:      Head: Normocephalic and atraumatic.      Mouth/Throat:      Mouth: Mucous membranes are moist.      Pharynx: Oropharynx is clear.   Eyes:      Extraocular Movements: Extraocular movements intact.      Pupils: Pupils are equal, round, and reactive to light.   Cardiovascular:      Rate and Rhythm: Normal rate and regular rhythm.      Pulses: Normal pulses.      Heart sounds: No murmur heard.     No  "friction rub. No gallop.   Pulmonary:      Effort: Pulmonary effort is normal. No tachypnea, accessory muscle usage or respiratory distress.      Breath sounds: Normal breath sounds. No stridor. No decreased breath sounds, wheezing, rhonchi or rales.   Chest:      Chest wall: No mass, deformity, tenderness or crepitus.   Abdominal:      General: Bowel sounds are normal.      Palpations: Abdomen is soft.      Tenderness: There is no abdominal tenderness. There is no guarding or rebound.   Skin:     General: Skin is warm.      Capillary Refill: Capillary refill takes less than 2 seconds.      Findings: No rash.   Neurological:      General: No focal deficit present.      Mental Status: He is alert and oriented to person, place, and time.      GCS: GCS eye subscore is 4. GCS verbal subscore is 5. GCS motor subscore is 6.   Psychiatric:         Mood and Affect: Mood normal.         Behavior: Behavior normal.         Procedures          ED Course      BP (!) 174/121   Pulse 87   Temp 97.7 °F (36.5 °C) (Oral)   Resp 19   Ht 170.2 cm (67\")   Wt 81.6 kg (180 lb)   SpO2 99%   BMI 28.19 kg/m²   Medications   sodium chloride 0.9 % flush 10 mL (has no administration in time range)   nitroglycerin (TRIDIL) 200 mcg/ml infusion (10 mcg/min Intravenous New Bag 6/16/24 1203)     Labs Reviewed   COMPREHENSIVE METABOLIC PANEL - Abnormal; Notable for the following components:       Result Value    BUN 47 (*)     Creatinine 3.01 (*)     CO2 20.8 (*)     Anion Gap 15.2 (*)     eGFR 24.9 (*)     All other components within normal limits    Narrative:     GFR Normal >60  Chronic Kidney Disease <60  Kidney Failure <15     TROPONIN - Abnormal; Notable for the following components:    HS Troponin T 72 (*)     All other components within normal limits    Narrative:     High Sensitive Troponin T Reference Range:  <14.0 ng/L- Negative Female for AMI  <22.0 ng/L- Negative Male for AMI  >=14 - Abnormal Female indicating possible myocardial " injury.  >=22 - Abnormal Male indicating possible myocardial injury.   Clinicians would have to utilize clinical acumen, EKG, Troponin, and serial changes to determine if it is an Acute Myocardial Infarction or myocardial injury due to an underlying chronic condition.        BNP (IN-HOUSE) - Abnormal; Notable for the following components:    proBNP 18,249.0 (*)     All other components within normal limits    Narrative:     This assay is used as an aid in the diagnosis of individuals suspected of having heart failure. It can be used as an aid in the diagnosis of acute decompensated heart failure (ADHF) in patients presenting with signs and symptoms of ADHF to the emergency department (ED). In addition, NT-proBNP of <300 pg/mL indicates ADHF is not likely.    Age Range Result Interpretation  NT-proBNP Concentration (pg/mL:      <50             Positive            >450                   Gray                 300-450                    Negative             <300    50-75           Positive            >900                  Gray                300-900                  Negative            <300      >75             Positive            >1800                  Gray                300-1800                  Negative            <300   URINE DRUG SCREEN - Abnormal; Notable for the following components:    Amphet/Methamphet, Screen Positive (*)     THC, Screen, Urine Positive (*)     All other components within normal limits    Narrative:     Negative Thresholds Per Drugs Screened:    Amphetamines                 500 ng/ml  Barbiturates                 200 ng/ml  Benzodiazepines              100 ng/ml  Cocaine                      300 ng/ml  Methadone                    300 ng/ml  Opiates                      300 ng/ml  Oxycodone                    100 ng/ml  THC                           50 ng/ml    The Normal Value for all drugs tested is negative. This report includes final unconfirmed screening results to be used for medical  treatment purposes only. Unconfirmed results must not be used for non-medical purposes such as employment or legal testing. Clinical consideration should be applied to any drug of abuse test, particularly when unconfirmed results are used.          All urine drugs of abuse requests without chain of custody are for medical screening purposes only.  False positives are possible.     CBC WITH AUTO DIFFERENTIAL - Abnormal; Notable for the following components:    WBC 13.76 (*)     Hemoglobin 12.6 (*)     Neutrophil % 78.4 (*)     Lymphocyte % 14.3 (*)     Neutrophils, Absolute 10.79 (*)     All other components within normal limits   CK - Normal   MAGNESIUM - Normal   RAINBOW DRAW    Narrative:     The following orders were created for panel order [place] Draw.  Procedure                               Abnormality         Status                     ---------                               -----------         ------                     Green Top (Gel)[[id number]]                                  Final result               Lavender Top[[id number]]                                     Final result               Gold Top - SST[[id number]]                                   Final result               Light Blue Top[[id number]]                                   Final result                 Please view results for these tests on the individual orders.   ETHANOL    Narrative:     Plasma Ethanol Clinical Symptoms:    ETOH (%)               Clinical Symptom  .01-.05              No apparent influence  .03-.12              Euphoria, Diminished judgment and attention   .09-.25              Impaired comprehension, Muscle incoordination  .18-.30              Confusion, Staggered gait, Slurred speech  .25-.40              Markedly decreased response to stimuli, unable to stand or                        walk, vomitting, sleep or stupor  .35-.50              Comatose, Anesthesia, Subnormal body temperature       HIGH SENSITIVITIY TROPONIN T 2HR    GREEN TOP   LAVENDER TOP   GOLD TOP - SST   LIGHT BLUE TOP   CBC AND DIFFERENTIAL    Narrative:     The following orders were created for panel order CBC & Differential.  Procedure                               Abnormality         Status                     ---------                               -----------         ------                     CBC Auto Differential[[id number]]        Abnormal            Final result                 Please view results for these tests on the individual orders.     XR Chest 1 View   Final Result   Impression:   No acute cardiopulmonary abnormality is identified.         Electronically Signed: Mary Ching     6/16/2024 11:47 AM EDT     Workstation ID: [id number]                                               Medical Decision Making  Chart Review: Cardiology consult reviewed from 7/19/2023 currently well-known to Dr. Devlin is of a history of substance abuse was noted in his consult note that patient had improvement of hypertensive emergency on Cardene drip stabilized and back on home medicines including carvedilol and loaded pain.  Was noted that he had a heart cath a year prior which showed no coronary artery disease.    Echocardiogram from 2/6/2022 reviewed showed an EF of 61 to 65%.     Cardiac catheterization 4/27/2022:  Conclusions recommendations  1 minimal epicardial coronary artery disease with anterior takeoff of the RCA, borderline reduced LV systolic function 45 to 50%, no transaortic valve gradient, high LVEDP at 18-22 with evidence of diastolic dysfunction  2.  Cessation of all substance abuse including methamphetamine clearly taking its toll, counseling provided again today  3.  Hypertension control, Coreg on board along with amlodipine, start losartan ultimately without LV systolic dysfunction in combination with Lasix if needed  4.  Patient can be discharged was discharged criteria met with hypertension controlled and stable oral medications with outpatient follow-up        Differentials: ACS, CHF, electrolyte abnormality, dehydration, polysubstance abuse, hypertensive urgency, hypertensive emergency     ;this list is not all inclusive and does not constitute the entirety of considered causes  EKG: Interpreted by myself and Dr. Chung shows sinus rhythm rate 80 probable suture enlargement LAFB LVH prolonged QT interval of 521  Labs: as above   Radiology:   XR Chest 1 View   Final Result    Impression:    No acute cardiopulmonary abnormality is identified.       Electronically Signed: Mary Humza      6/16/2024 11:47 AM EDT      Workstation ID: [id number]    Disposition/Treatment:  Appropriate PPE was worn during exam and throughout all encounters with the patient.  While in the ED IV was placed and labs were obtained patient was placed on proper monitors presents to the ED with reports of chest pain and dyspnea.  On chart review has a history of methamphetamine abuse patient is somewhat poor historian EKG showed no acute STEMI labs and imaging were obtained.  Did show new prolonged QT interval.    CBC showed a white count 13.76 hemoglobin 12.6 hematocrit 38.9 platelets 196.  Metabolic panel showed BUN 47 creatinine 3.01 potassium normal at 3.8 on chart review from 2 months ago BUN was 30 creatinine 1.92.  Initial troponin 72 on chart review patient has a history of chronically elevated troponin 11 months ago troponin was 59.  he was started on Tridil drip while in the ED.  BNP elevated 18,249 which is increased from 11 months ago when it was a little over 13,000 and prior to that 5,000.  Ethanol level unremarkable.  Urine drug screen positive for methamphetamine and THC.  Chest x-ray showed no acute cardiopulmonary abnormalities.    Findings were discussed with the patient at bedside boisterously of admission.  To Dr. Vega who agreed for admission with hospitalist group.  Cardiology consult was also placed while in the ED.    Problems Addressed:  Acute kidney injury: acute  illness or injury  Chest pain, unspecified type: complicated acute illness or injury  Hypertensive emergency: complicated acute illness or injury  Methamphetamine abuse: chronic illness or injury    Amount and/or Complexity of Data Reviewed  Labs: ordered. Decision-making details documented in ED Course.  Radiology: ordered. Decision-making details documented in ED Course.  ECG/medicine tests: ordered.    Risk  Prescription drug management.  Decision regarding hospitalization.        Final diagnoses:   Chest pain, unspecified type   Methamphetamine abuse   Hypertensive emergency   Acute kidney injury superimposed on chronic kidney disease       ED Disposition  ED Disposition       ED Disposition   Decision to Admit    Condition   --    Comment   Level of Care: Telemetry [5]   Admitting Physician: INDU ROBLERO [[id number]]   Attending Physician: INDU ROBLERO [[id number]]                 No follow-up provider specified.       Medication List      No changes were made to your prescriptions during this visit.            Sary Ocampo PA  06/16/24 1230      Electronically signed by Aaron Chung MD at 06/19/24 0741       Vital Signs (last 3 days) before discharge       Date/Time Temp Temp src Pulse Resp BP Patient Position SpO2    06/18/24 1500 98 (36.7) Tympanic 91 18 131/83 Lying --    06/18/24 1123 98.6 (37) Tympanic 67 18 150/101 Lying --    06/18/24 0843 -- -- 81 13 130/78 Lying --    06/18/24 0701 98.2 (36.8) Oral 82 17 180/117 Lying 98    06/18/24 0501 -- -- 83 16 176/112 Lying 98    06/18/24 0305 98.5 (36.9) Oral 73 20 151/99 Lying 98    06/17/24 2253 -- -- 86 -- 138/84 -- --    06/17/24 2252 98.3 (36.8) Oral 84 17 138/84 Lying 95    06/17/24 2119 -- -- 79 -- 179/119 -- --    06/17/24 2010 98.4 (36.9) Oral 82 16 161/99 Lying 98    06/17/24 1920 -- -- 85 -- 167/105 -- --    06/17/24 1651 -- -- 82 -- -- -- --    06/17/24 1643 -- -- 85 -- 155/103 -- --    06/17/24 1300 98.5 (36.9) Oral 80 17 154/81 Lying  98    06/17/24 1235 -- -- 77 -- -- -- 98    06/17/24 1230 -- -- 81 -- 173/110 -- 95    06/17/24 1225 -- -- 63 -- -- -- 98    06/17/24 1220 -- -- 77 -- -- -- 99    06/17/24 1215 -- -- 75 -- -- -- 89    06/17/24 1210 -- -- 74 -- -- -- 98    06/17/24 0900 -- -- 75 -- 142/94 -- 97    06/17/24 0847 98 (36.7) Oral 70 18 153/98 Lying --    06/17/24 0825 -- -- -- 19 -- Lying --    06/17/24 0752 98 (36.7) Oral 78 19 177/119 Lying 96    06/17/24 0530 98 (36.7) Oral 79 22 192/132 Lying 95    06/17/24 0515 -- -- 66 -- -- -- 96    06/17/24 0500 -- -- 60 -- 156/106 -- 95    06/17/24 0445 -- -- 63 -- -- -- 96    06/17/24 0430 -- -- 64 -- 156/111 -- 94    06/17/24 0415 -- -- 63 -- -- -- 96    06/17/24 0400 -- -- 66 -- 141/90 -- 94    06/17/24 0345 -- -- 60 -- -- -- 97    06/17/24 0330 -- -- 69 -- 181/128 -- 94    06/17/24 0315 -- -- 66 -- -- -- 98    06/17/24 0300 97.8 (36.6) Oral 61 20 156/108 Lying 96    06/17/24 0245 -- -- 77 -- -- -- --    06/17/24 0230 -- -- 54 -- 140/105 -- 97    06/17/24 0215 -- -- 62 -- -- -- 95    06/17/24 0200 -- -- 63 -- 132/88 -- 94    06/17/24 0145 -- -- 63 -- -- -- 94    06/17/24 0130 -- -- 66 -- 147/102 -- 97    06/17/24 0115 -- -- 65 -- -- -- 95    06/17/24 0100 -- -- 68 -- 126/81 -- 94    06/17/24 0045 -- -- 73 -- -- -- 94    06/17/24 0030 -- -- 80 -- 142/90 -- 92    06/17/24 0015 -- -- 71 -- -- -- 96    06/17/24 0000 -- -- 72 -- 147/100 -- 96    06/16/24 2300 98 (36.7) Oral -- 20 -- Lying --    06/16/24 2248 -- -- 70 -- 141/95 -- 97    06/16/24 2200 -- -- 69 -- 143/96 -- 97    06/16/24 2130 -- -- 68 -- 145/100 -- 97    06/16/24 2100 -- -- 71 -- 138/99 -- 96    06/16/24 2030 -- -- 67 -- 138/101 -- 96    06/16/24 2000 -- -- 71 -- 120/83 -- 95 06/16/24 1930 -- -- 72 -- 133/83 -- 95    06/16/24 1900 98.2 (36.8) Oral 72 16 129/75 Lying 93    06/16/24 1727 -- -- 73 -- 137/87 -- 96    06/16/24 1528 97.5 (36.4) Oral 71 16 143/97 Lying 96    06/16/24 1502 -- -- 70 -- 166/107 -- 95    06/16/24 1431 -- --  72 -- 156/111 -- 97    06/16/24 1416 -- -- 74 -- 181/126 -- 97    06/16/24 1317 -- -- 84 -- 159/123 -- 97    06/16/24 1301 -- -- 82 -- 154/105 -- 94    06/16/24 1145 -- -- 87 -- 174/121 -- 99    06/16/24 1116 -- -- 79 -- 156/113 -- 95    06/16/24 1056 -- -- 80 -- 155/105 -- 96    06/16/24 1051 97.7 (36.5) Oral 81 19 155/105 Sitting 96          Oxygen Therapy (last 3 days) before discharge       Date/Time SpO2 Device (Oxygen Therapy) Flow (L/min) Oxygen Concentration (%) ETCO2 (mmHg)    06/18/24 0701 98 room air -- -- --    06/18/24 0501 98 room air -- -- --    06/18/24 0400 -- room air -- -- --    06/18/24 0305 98 room air -- -- --    06/18/24 0052 -- room air -- -- --    06/17/24 2252 95 room air -- -- --    06/17/24 2010 98 room air -- -- --    06/17/24 2009 -- room air -- -- --    06/17/24 1300 98 -- -- -- --    06/17/24 1236 -- room air -- -- --    06/17/24 1235 98 -- -- -- --    06/17/24 1230 95 -- -- -- --    06/17/24 1225 98 -- -- -- --    06/17/24 1220 99 -- -- -- --    06/17/24 1215 89 -- -- -- --    06/17/24 1210 98 -- -- -- --    06/17/24 0900 97 -- -- -- --    06/17/24 0847 -- room air -- -- --    06/17/24 0825 -- room air -- -- --    06/17/24 0752 96 room air -- -- --    06/17/24 0744 -- room air -- -- --    06/17/24 0530 95 room air -- -- --    06/17/24 0515 96 -- -- -- --    06/17/24 0500 95 -- -- -- --    06/17/24 0445 96 -- -- -- --    06/17/24 0430 94 -- -- -- --    06/17/24 0415 96 -- -- -- --    06/17/24 0400 94 room air -- -- --    06/17/24 0345 97 -- -- -- --    06/17/24 0330 94 -- -- -- --    06/17/24 0315 98 -- -- -- --    06/17/24 0300 96 room air -- -- --    06/17/24 0230 97 -- -- -- --    06/17/24 0215 95 -- -- -- --    06/17/24 0200 94 -- -- -- --    06/17/24 0145 94 -- -- -- --    06/17/24 0130 97 -- -- -- --    06/17/24 0115 95 -- -- -- --    06/17/24 0100 94 -- -- -- --    06/17/24 0045 94 -- -- -- --    06/17/24 0030 92 -- -- -- --    06/17/24 0015 96 -- -- -- --    06/17/24 0000 96  room air -- -- --    24 2248 97 -- -- -- --    24 2200 97 -- -- -- --    24 2130 97 -- -- -- --    24 2100 96 -- -- -- --    24 2030 96 -- -- -- --    24 95 -- -- -- --    24 1930 95 -- -- -- --    24 1900 93 -- -- -- --    24 1727 96 -- -- -- --    24 1528 96 -- -- -- --    24 1502 95 -- -- -- --    24 1431 97 -- -- -- --    24 1416 97 -- -- -- --    24 1317 97 -- -- -- --    24 1301 94 -- -- -- --    24 1145 99 -- -- -- --    24 1116 95 -- -- -- --    24 1056 96 -- -- -- --    24 1051 96 room air -- -- --          CIWA (since admission)        None                  Facility-Administered Medications as of 2024   Medication Dose Route Frequency Provider Last Rate Last Admin    [COMPLETED] amLODIPine (NORVASC) tablet 5 mg  5 mg Oral Once Robin Mcgovern MD   5 mg at 24 0956    [COMPLETED] butalbital-acetaminophen-caffeine (FIORICET, ESGIC) -40 MG per tablet 2 tablet  2 tablet Oral Once Trini Stewart APRN   2 tablet at 24 0052    [COMPLETED] potassium chloride (KLOR-CON M20) CR tablet 40 mEq  40 mEq Oral Q4H Robin Mcgovern MD   40 mEq at 24 1139     Operative/Procedure Notes (all)    No notes of this type exist for this encounter.          Physician Progress Notes (all)        Malik Devlin MD at 24 1201          Cardiology [place]        LOS:  LOS: 1 day   Patient Name: Scott Gaytan  Age/Sex: 49 y.o. male  : 1975  MRN: [id number]    Day of Service: 24   Length of Stay: 1  Encounter Provider: CHANDLER Gilbert  Place of Service: [facility] CARDIOLOGY  Patient Care Team:  Provider, No Known as PCP - General    Subjective:     Chief Complaint: f/u elevated troponin, chest pain    Subjective: No acute events overnight  Chest pain-free  Blood pressure remains elevated  No evidence of ACS    2D echo  reviewed and interpreted by me demonstrates an EF of 55 to 60% with moderate concentric LVH, left atrial enlargement, mild valvular insufficiency as documented, no mass or effusion      Current Medications:   Scheduled Meds:[START ON 6/19/2024] amLODIPine, 10 mg, Oral, Q24H  aspirin, 81 mg, Oral, Daily  atorvastatin, 20 mg, Oral, Nightly  carvedilol, 12.5 mg, Oral, BID With Meals  heparin (porcine), 5,000 Units, Subcutaneous, Q8H  hydrALAZINE, 25 mg, Oral, Q12H  isosorbide mononitrate, 30 mg, Oral, Q24H  sodium chloride, 10 mL, Intravenous, Q12H      Continuous Infusions:sodium chloride, 75 mL/hr, Last Rate: 75 mL/hr (06/18/24 0844)        Allergies:  Allergies   Allergen Reactions    Morphine Other (See Comments)     Makes it feel like my blood is boiling in my veins       Review of Systems   Constitutional: Negative for chills, diaphoresis and malaise/fatigue.   Cardiovascular:  Negative for chest pain, dyspnea on exertion, irregular heartbeat, leg swelling, near-syncope, orthopnea, palpitations, paroxysmal nocturnal dyspnea and syncope.   Respiratory:  Negative for cough, shortness of breath, sleep disturbances due to breathing and sputum production.    Gastrointestinal:  Negative for change in bowel habit.   Genitourinary:  Negative for urgency.   Neurological:  Negative for dizziness and headaches.   Psychiatric/Behavioral:  Negative for altered mental status.          Objective:     Temp:  [98.2 °F (36.8 °C)-98.6 °F (37 °C)] 98.6 °F (37 °C)  Heart Rate:  [63-86] 67  Resp:  [13-20] 18  BP: (130-180)/() 150/101     Intake/Output Summary (Last 24 hours) at 6/18/2024 1203  Last data filed at 6/18/2024 0701  Gross per 24 hour   Intake 2370 ml   Output --   Net 2370 ml     Body mass index is 28.24 kg/m².      06/16/24  1051 06/17/24  0300 06/18/24  0305   Weight: 81.6 kg (180 lb) 81.9 kg (180 lb 8.9 oz) 81.8 kg (180 lb 5.4 oz)         General Appearance:    Alert, cooperative, in no acute distress                                 Head: Atraumatic, normocephalic, PERRLA               Neck:   supple, trachea midline, no thyromegaly, no carotid bruit, no JVD   Lungs:     Clear to auscultation, respirations regular, even and               unlabored    Heart:    Regular rhythm and normal rate, normal S1 and S2, no       murmur, no gallop, no rub, no click   Abdomen:     Normal bowel sounds, no masses, no organomegaly, soft  nontender, nondistended, no guarding, no rebound  tenderness   Extremities:   Moves all extremities well, no edema, no cyanosis, no  redness   Pulses:   Pulses palpable and equal bilaterally   Skin:   No bleeding, bruising or rash   Neurologic:   Awake, alert, oriented x3     Unchanged from prior encounter    Lab Review:   Results from last 7 days   Lab Units 06/18/24  0104 06/17/24  1445 06/17/24  0305 06/16/24  1100   SODIUM mmol/L 139  --  138 141   POTASSIUM mmol/L 3.8 4.3 3.6 3.8   CHLORIDE mmol/L 109*  --  104 105   CO2 mmol/L 20.6*  --  22.8 20.8*   BUN mg/dL 44*  --  55* 47*   CREATININE mg/dL 2.56*  --  2.89* 3.01*   GLUCOSE mg/dL 103*  --  82 71   CALCIUM mg/dL 7.9*  --  8.5* 9.2   AST (SGOT) U/L  --   --  26 29   ALT (SGPT) U/L  --   --  12 14     Results from last 7 days   Lab Units 06/16/24  1426 06/16/24  1100   CK TOTAL U/L  --  155   HSTROP T ng/L 64* 72*     Results from last 7 days   Lab Units 06/18/24  0104 06/17/24  0305   WBC 10*3/mm3 9.06 9.81   HEMOGLOBIN g/dL 11.0* 12.2*   HEMATOCRIT % 33.2* 37.2*   PLATELETS 10*3/mm3 198 199         Results from last 7 days   Lab Units 06/18/24  0104 06/16/24  1100   MAGNESIUM mg/dL 1.9 2.1     Results from last 7 days   Lab Units 06/17/24  0305   CHOLESTEROL mg/dL 184   TRIGLYCERIDES mg/dL 45   HDL CHOL mg/dL 55     Results from last 7 days   Lab Units 06/16/24  1100   PROBNP pg/mL 18,249.0*           Recent Radiology:  Imaging Results (Most Recent)       Procedure Component Value Units Date/Time    XR Chest 1 View [[id number]] Collected: 06/16/24  1145     Updated: 06/16/24 1149    Narrative:      XR CHEST 1 VW    Date of Exam: 6/16/2024 11:26 AM EDT    Indication: Chest Pain Protocol    Comparison: AP chest x-ray 7/17/2023, 7/16/2023    Findings:  Lungs are adequately expanded and appear clear. No pneumothorax or large pleural effusion is seen. Cardiomediastinal contours appear stable.      Impression:      Impression:  No acute cardiopulmonary abnormality is identified.      Electronically Signed: Mary Gasparley    6/16/2024 11:47 AM EDT    Workstation ID: [id number]            ECHOCARDIOGRAM:    Results for orders placed during the hospital encounter of 12/06/22    Adult Transthoracic Echo Complete w/ Color, Spectral and Contrast if Necessary Per Protocol    Interpretation Summary    Left ventricular systolic function is normal. Left ventricular ejection fraction appears to be 61 - 65%.    Left ventricular diastolic function was normal.    Estimated right ventricular systolic pressure from tricuspid regurgitation is normal (<35 mmHg).        I reviewed the patient's new clinical results.    EKG:      Assessment:       Methamphetamine intoxication    Chest pain    Methamphetamine abuse    Tobacco use    Elevated troponin    JOSE ANGEL (acute kidney injury)    Hypertension    Chest Pain / elevated troponin in setting of CKD with Crt 3 and HTN  HTN urgency  Dyspnea on exertion / elevated proBNP  CKD   Methamphetamine use  Non obstructive CAD on [facility] 2022    Plan:   ECHO as documented, EF 55%, no regional abnormalities, moderate concentric LVH secondary to hypertensive heart disease compounded by methamphetamine use  Increased amlodipine and added hydralazine, continue carvedilol which is beta-blocker of choice with catecholamine excess  B/p improving slowly  Creatinine improving slowly  No further chest pain  Troponin likely demand ischemia in setting of HTN urgency and CKD, no plan for invasive ischemic evaluation at this time given Crt 3 on admission  Optimize medical  therapy      Stressed compliance with the patient  Continue to follow  Altered medicines as indicated by clinical course and response to therapy    Malik Devlin MD, PhD        Rocio Ting, CHANDLER  24  12:03 EDT    Electronically signed by Malik Devlin MD at 24 1813       Robin Mcgovern MD at 24 [address] [place] [address] MEDICINE SERVICE  DAILY PROGRESS NOTE    NAME: Scott Gaytan  : 1975  MRN: [id number]      LOS: 1 day     PROVIDER OF SERVICE: Robin Mcgovern MD    Chief Complaint: Methamphetamine intoxication    Subjective:     Interval History:  History taken from: patient chart RN  No CP breathing stable   Reported HA     Review of Systems:   Review of Systems  All negative except as above   Objective:     Vital Signs  Temp:  [98.2 °F (36.8 °C)-98.5 °F (36.9 °C)] 98.2 °F (36.8 °C)  Heart Rate:  [63-86] 81  Resp:  [13-20] 13  BP: (130-180)/() 130/78   Body mass index is 28.24 kg/m².    Physical Exam  Physical Exam  AOx3 NAD  RRR S1-S2 audible  Lung CTA  Abdomen soft nontender no nondistended  Scheduled Meds   [START ON 2024] amLODIPine, 10 mg, Oral, Q24H  aspirin, 81 mg, Oral, Daily  atorvastatin, 20 mg, Oral, Nightly  carvedilol, 12.5 mg, Oral, BID With Meals  heparin (porcine), 5,000 Units, Subcutaneous, Q8H  isosorbide mononitrate, 30 mg, Oral, Q24H  sodium chloride, 10 mL, Intravenous, Q12H       PRN Meds     acetaminophen    senna-docusate sodium **AND** polyethylene glycol **AND** bisacodyl **AND** bisacodyl    butalbital-acetaminophen-caffeine    Calcium Replacement - Follow Nurse / BPA Driven Protocol    hydrALAZINE    Magnesium Standard Dose Replacement - Follow Nurse / BPA Driven Protocol    melatonin    nitroglycerin    ondansetron    Phosphorus Replacement - Follow Nurse / BPA Driven Protocol    Potassium Replacement - Follow Nurse / BPA Driven Protocol    sodium chloride    sodium chloride    sodium chloride   Infusions  sodium  chloride, 75 mL/hr, Last Rate: 75 mL/hr (06/18/24 0844)          Diagnostic Data    Results from last 7 days   Lab Units 06/18/24  0104 06/17/24  1445 06/17/24  0305   WBC 10*3/mm3 9.06  --  9.81   HEMOGLOBIN g/dL 11.0*  --  12.2*   HEMATOCRIT % 33.2*  --  37.2*   PLATELETS 10*3/mm3 198  --  199   GLUCOSE mg/dL 103*  --  82   CREATININE mg/dL 2.56*  --  2.89*   BUN mg/dL 44*  --  55*   SODIUM mmol/L 139  --  138   POTASSIUM mmol/L 3.8   < > 3.6   AST (SGOT) U/L  --   --  26   ALT (SGPT) U/L  --   --  12   ALK PHOS U/L  --   --  41   BILIRUBIN mg/dL  --   --  0.3   ANION GAP mmol/L 9.4  --  11.2    < > = values in this interval not displayed.       XR Chest 1 View    Result Date: 6/16/2024  Impression: No acute cardiopulmonary abnormality is identified. Electronically Signed: Mary Ching  6/16/2024 11:47 AM EDT  Workstation ID: [id number]       I reviewed the patient's new clinical results.  I reviewed the patient's new imaging results and agree with the interpretation.    Assessment/Plan:     Active and Resolved Problems  Active Hospital Problems    Diagnosis  POA    **Methamphetamine intoxication [F15.929]  Yes    Hypertension [I10]  Yes    Methamphetamine abuse [F15.10]  Yes    Tobacco use [Z72.0]  Yes    Elevated troponin [R79.89]  Yes    JOSE ANGEL (acute kidney injury) [N17.9]  Yes    Chest pain [R07.9]  Yes      Resolved Hospital Problems   No resolved problems to display.       49-year-old male with history of hypertension, methamphetamine abuse, homelessness presented [facility] with chest pain and dyspnea     #Troponin elevation suspect in setting of demand ischemia  Cards following. No plan for ischemic workup  Continue aspirin   start Lipitor  BP control as below     #Hypertensive urgency  At home patient reports taking Entresto and Coreg  started on Tridil drip which has been weaned off   continue home dose of Coreg  Entresto currently on hold given JOSE ANGEL on CKD  Increase dose of amlodipine to  10 mg daily  Imdur 30 mg QD      #Methamphetamine abuse  Last reported use 2 weeks ago  Counseled on cessation >10 minutes were spent   consulted     #Homelessness  Consult       #JOSE ANGEL on CKD stage II  Baseline creatinine appears to be around 1.3.  On admission 3.01>2.89>2.53  Continue to hold Entresto  BP control as above  Continue IV fluids  BMP daily       VTE Prophylaxis:  Pharmacologic VTE prophylaxis orders are present.         Code status is   Code Status and Medical Interventions:   Ordered at: 24 1322     Code Status (Patient has no pulse and is not breathing):    CPR (Attempt to Resuscitate)     Medical Interventions (Patient has pulse or is breathing):    Full Support       Plan for disposition:24 hours pending improvement in renal function     Time: 30 minutes    Signature: Electronically signed by Robin Mcgovern MD, 24, 10:24 EDT.  [facility], [place], operated by [facility] Hospitalist Team      Electronically signed by Robin Mcgovern MD at 24 1034       Robin Mcgovern MD at 24 1103              [facility] MEDICINE SERVICE  DAILY PROGRESS NOTE    NAME: Scott Gaytan  : 1975  MRN: [id number]      LOS: 0 days     PROVIDER OF SERVICE: Robin Mcgovern MD    Chief Complaint: Methamphetamine intoxication    Subjective:     Interval History:  History taken from: patient chart RN  No CP. Chest pressure resolved     Review of Systems:   Review of Systems  All negative except as above   Objective:     Vital Signs  Temp:  [97.5 °F (36.4 °C)-98.2 °F (36.8 °C)] 98 °F (36.7 °C)  Heart Rate:  [54-87] 75  Resp:  [16-22] 18  BP: (120-192)/() 142/94   Body mass index is 28.28 kg/m².    Physical Exam  Physical Exam  AOx3 NAD  RRR S1-S2 audible  Lung CTA  Abdomen soft nontender no nondistended    Scheduled Meds   aspirin, 81 mg, Oral, Daily  carvedilol, 12.5 mg, Oral, BID With Meals  heparin (porcine), 5,000 Units, Subcutaneous, Q8H  potassium chloride ER, 40 mEq,  Oral, Q4H  sodium chloride, 10 mL, Intravenous, Q12H       PRN Meds     acetaminophen    senna-docusate sodium **AND** polyethylene glycol **AND** bisacodyl **AND** bisacodyl    Calcium Replacement - Follow Nurse / BPA Driven Protocol    hydrALAZINE    Magnesium Standard Dose Replacement - Follow Nurse / BPA Driven Protocol    nitroglycerin    ondansetron    Phosphorus Replacement - Follow Nurse / BPA Driven Protocol    Potassium Replacement - Follow Nurse / BPA Driven Protocol    sodium chloride    sodium chloride    sodium chloride   Infusions  nitroglycerin, 5-200 mcg/min, Last Rate: Stopped (06/17/24 0847)  sodium chloride, 75 mL/hr, Last Rate: 75 mL/hr (06/17/24 0755)          Diagnostic Data    Results from last 7 days   Lab Units 06/17/24  0305   WBC 10*3/mm3 9.81   HEMOGLOBIN g/dL 12.2*   HEMATOCRIT % 37.2*   PLATELETS 10*3/mm3 199   GLUCOSE mg/dL 82   CREATININE mg/dL 2.89*   BUN mg/dL 55*   SODIUM mmol/L 138   POTASSIUM mmol/L 3.6   AST (SGOT) U/L 26   ALT (SGPT) U/L 12   ALK PHOS U/L 41   BILIRUBIN mg/dL 0.3   ANION GAP mmol/L 11.2       XR Chest 1 View    Result Date: 6/16/2024  Impression: No acute cardiopulmonary abnormality is identified. Electronically Signed: Mary Ching  6/16/2024 11:47 AM EDT  Workstation ID: [id number]       I reviewed the patient's new clinical results.  I reviewed the patient's new imaging results and agree with the interpretation.    Assessment/Plan:     Active and Resolved Problems  Active Hospital Problems    Diagnosis  POA    **Methamphetamine intoxication [F15.929]  Yes    Hypertension [I10]  Yes    Methamphetamine abuse [F15.10]  Yes    Tobacco use [Z72.0]  Yes    Elevated troponin [R79.89]  Yes    JOSE ANGEL (acute kidney injury) [N17.9]  Yes    Chest pain [R07.9]  Yes      Resolved Hospital Problems   No resolved problems to display.       49-year-old male with history of hypertension, methamphetamine abuse, homelessness presented to [facility] 6/16 with chest pain and  dyspnea    #Troponin elevation suspect in setting of demand ischemia  Cards consulted on admission will follow  Continue aspirin   start Lipitor  BP control as below    #Hypertensive urgency  At home patient reports taking Entresto and Coreg  started on Tridil drip which has been weaned off   continue home dose of Coreg  Entresto currently on hold given JOSE ANGEL on CKD  Start amlodipine 5    #Methamphetamine abuse  Last reported use 2 weeks ago  Counseled on cessation >10 minutes were spent   consult    #Homelessness  Consult      #JOSE ANGEL on CKD stage II  Baseline creatinine appears to be around 1.3.  On admission 3.01>2.89  Continue to hold Entresto  BP control as above  Continue IV fluids  BMP daily          VTE Prophylaxis:  Pharmacologic VTE prophylaxis orders are present.         Code status is   Code Status and Medical Interventions:   Ordered at: 06/16/24 1322     Code Status (Patient has no pulse and is not breathing):    CPR (Attempt to Resuscitate)     Medical Interventions (Patient has pulse or is breathing):    Full Support       Plan for disposition:24 hours    Time: 30 minutes    Signature: Electronically signed by Robin Mcgovern MD, 06/17/24, 11:04 EDT.  [facility] Hospitalist Team      Electronically signed by Robin Mcgovern MD at 06/17/24 1111          Consult Notes (all)        Malik Devlin MD at 06/17/24 1255        Consult Orders    1. Cardiology (on-call MD unless specified) [[id number]] ordered by Jamal Vega MD at 06/16/24 1227                 Cardiology [place]        Subjective:     Encounter Date:06/16/2024    Subjective  Patient ID: Scott Gaytan is a 49 y.o. male.    Chief Complaint: chest pain, shortness of breath    Referring Physician:    Jamal Vega MD     Seen and examined, agree with narrative as discussed with nurse practitioner after face-to-face counter scribed findings below verified by me for accuracy  additional documentation provided, greater than 50% of total medical decision making and encounter time performed by me on date of service    HPI:  Scott Gaytan is a 49 y.o. male who presents with chest pain, shortness of breath. Mr. Gaytan has previously seen Dr. Devlin while hospitalized.  Pmh includes HFpEF, HTN, CKD, substance abuse including methamphetamine use. Prior hospitalizations with HTN urgency in the setting of meth use. He has had prior LHC in 2022 revealing minimal epicardial coronary artery disease with anterior takeoff of the RCA, borderline reduced LV systolic function 45 to 50%.    Mr. Gaytan presented to ER with complaints of chest pain and shortness of breath which have been intermittent. He is homeless and was walking yesterday on side of road when he had worsening shortness of breath and sharp/squeezing chest discomfort. He also mentions intermittent lower extremity swelling. He has not had any additional chest pain while hospitalized. On my encounter his blood pressure is elevated in the 180s. He did test + for methamphetamines on UDS. Troponin 72, 64. ProBNP 18,249, He has CKD With Crt 3.0 on admission. No acute findings on CXR.     Review of systems otherwise negative x 14 point review of systems except as mentioned above  Historical data copied forward from previous encounters in EMR is unchanged    Patient well-known to me from prior encounters              Past Medical History:   Diagnosis Date    CHF (congestive heart failure)     CKD (chronic kidney disease)     Hypertension     Substance abuse     Meth/MJ       Past Surgical History:   Procedure Laterality Date    CARDIAC CATHETERIZATION N/A 04/27/2022    ORIF TIBIA/FIBULA FRACTURES Right        Family History   Problem Relation Age of Onset    Diabetes Mother     Arthritis Mother     Hypertension Father     Heart attack Father     Seizures Sister     Heart attack Paternal Grandfather        Social History     Socioeconomic History  "   Marital status: Single   Tobacco Use    Smoking status: Every Day     Current packs/day: 0.50     Average packs/day: 1 pack/day for 30.5 years (30.4 ttl pk-yrs)     Types: Cigarettes     Start date: 1994     Passive exposure: Never    Smokeless tobacco: Never   Vaping Use    Vaping status: Never Used   Substance and Sexual Activity    Alcohol use: Not Currently    Drug use: Yes     Types: Methamphetamines, Marijuana, Cocaine(coke)    Sexual activity: Yes         Allergies   Allergen Reactions    Morphine Other (See Comments)     Makes it feel like my blood is boiling in my veins       Current Medications:   Scheduled Meds:amLODIPine, 5 mg, Oral, Q24H  aspirin, 81 mg, Oral, Daily  atorvastatin, 20 mg, Oral, Nightly  carvedilol, 12.5 mg, Oral, BID With Meals  heparin (porcine), 5,000 Units, Subcutaneous, Q8H  sodium chloride, 10 mL, Intravenous, Q12H      Continuous Infusions:sodium chloride, 75 mL/hr, Last Rate: 75 mL/hr (06/17/24 0755)        Review of Systems   Constitutional: Negative for chills, diaphoresis and malaise/fatigue.   Cardiovascular:  Positive for chest pain and dyspnea on exertion. Negative for irregular heartbeat, leg swelling, near-syncope, orthopnea, palpitations, paroxysmal nocturnal dyspnea and syncope.   Respiratory:  Positive for shortness of breath. Negative for cough, sleep disturbances due to breathing and sputum production.    Gastrointestinal:  Negative for change in bowel habit.   Genitourinary:  Negative for urgency.   Neurological:  Negative for dizziness and headaches.   Psychiatric/Behavioral:  Negative for altered mental status.            Objective:   Objective      BP (!) 173/110   Pulse 77   Temp 98 °F (36.7 °C) (Oral)   Resp 18   Ht 170.2 cm (67\")   Wt 81.9 kg (180 lb 8.9 oz)   SpO2 98%   BMI 28.28 kg/m²     Physical Exam:  General Appearance:    Alert, cooperative, in no acute distress                                Head: Atraumatic, normocephalic, PERRLA             "   Neck:   supple, trachea midline, no thyromegaly, no carotid bruit, no JVD   Lungs:     Clear to auscultation,respirations regular, even and               unlabored    Heart:    Regular rhythm and normal rate, normal S1 and S2   Abdomen:     Normal bowel sounds, no masses, no organomegaly, soft  nontender, nondistended, no guarding, no rebound  tenderness   Extremities:   Moves all extremities well, no edema, no cyanosis, no  redness   Pulses:   Pulses palpable and equal bilaterally   Skin:   No bleeding, bruising or rash   Neurologic:   Awake, alert, oriented x3       Agree with exam as discussed with nurse practitioner after face-to-face encounter scribe findings above          ASCVD Risk Score::  The 10-year ASCVD risk score (Mee WEIR, et al., 2019) is: 9.7%    Values used to calculate the score:      Age: 49 years      Sex: Male      Is Non- : No      Diabetic: No      Tobacco smoker: Yes      Systolic Blood Pressure: 173 mmHg      Is BP treated: Yes      HDL Cholesterol: 55 mg/dL      Total Cholesterol: 184 mg/dL      Lab Review:     Results from last 7 days   Lab Units 06/17/24  0305 06/16/24  1100   SODIUM mmol/L 138 141   POTASSIUM mmol/L 3.6 3.8   CHLORIDE mmol/L 104 105   CO2 mmol/L 22.8 20.8*   BUN mg/dL 55* 47*   CREATININE mg/dL 2.89* 3.01*   GLUCOSE mg/dL 82 71   CALCIUM mg/dL 8.5* 9.2   AST (SGOT) U/L 26 29   ALT (SGPT) U/L 12 14     Results from last 7 days   Lab Units 06/16/24  1426 06/16/24  1100   CK TOTAL U/L  --  155   HSTROP T ng/L 64* 72*     Results from last 7 days   Lab Units 06/17/24  0305 06/16/24  1100   WBC 10*3/mm3 9.81 13.76*   HEMOGLOBIN g/dL 12.2* 12.6*   HEMATOCRIT % 37.2* 38.9   PLATELETS 10*3/mm3 199 196         Results from last 7 days   Lab Units 06/16/24  1100   MAGNESIUM mg/dL 2.1     Results from last 7 days   Lab Units 06/17/24  0305   CHOLESTEROL mg/dL 184   TRIGLYCERIDES mg/dL 45   HDL CHOL mg/dL 55     Results from last 7 days   Lab Units  06/16/24  1100   PROBNP pg/mL 18,249.0*           Recent Radiology:  Imaging Results (Most Recent)       Procedure Component Value Units Date/Time    XR Chest 1 View [[id number]] Collected: 06/16/24 1145     Updated: 06/16/24 1149    Narrative:      XR CHEST 1 VW    Date of Exam: 6/16/2024 11:26 AM EDT    Indication: Chest Pain Protocol    Comparison: [facility] chest x-ray 7/17/2023, 7/16/2023    Findings:  Lungs are adequately expanded and appear clear. No pneumothorax or large pleural effusion is seen. Cardiomediastinal contours appear stable.      Impression:      Impression:  No acute cardiopulmonary abnormality is identified.      Electronically Signed: Mary Ching    6/16/2024 11:47 AM EDT    Workstation ID: [id number]              ECHOCARDIOGRAM:    Results for orders placed during the hospital encounter of 12/06/22    Adult Transthoracic Echo Complete w/ Color, Spectral and Contrast if Necessary Per Protocol    Interpretation Summary    Left ventricular systolic function is normal. Left ventricular ejection fraction appears to be 61 - 65%.    Left ventricular diastolic function was normal.    Estimated right ventricular systolic pressure from tricuspid regurgitation is normal (<35 mmHg).           Assessment:   Assessment      Active Hospital Problems    Diagnosis  POA    **Methamphetamine intoxication [F15.929]  Yes    Hypertension [I10]  Yes    Methamphetamine abuse [F15.10]  Yes    Tobacco use [Z72.0]  Yes    Elevated troponin [R79.89]  Yes    JOSE ANGEL (acute kidney injury) [N17.9]  Yes    Chest pain [R07.9]  Yes     Chest Pain / elevated troponin in setting of CKD with Crt 3 and HTN  HTN urgency  Dyspnea on exertion / elevated proBNP  CKD   Methamphetamine use  Non obstructive CAD on [facility] [place] 2022    Plan:   Will repeat 2D ECHO for evaluation of LV filling pressures, LV and RV size and function  Needs optimal blood pressure control in the setting of methamphetamine intoxication  No further chest pain  Troponin likely  demand ischemia in setting of HTN urgency and CKD with decreased creatinine and renal clearance, no plan for invasive ischemic evaluation at this time given Crt 3 on admission, chest pain-free no evidence of ACS, no ischemic ECG changes  Optimize medical therapy  Add nitrates      Further recommendation follow findings and clinical course and echo findings    Malik Devlin MD, PhD                   Rocio Maguire, APRN  06/17/24  12:55 EDT      Electronically signed by Malik Devlin MD at 06/18/24 8602       Discharge Summary    No notes of this type exist for this encounter.

## 2024-06-19 NOTE — PAYOR COMM NOTE
"This is discharge notification for Scott Gaytan   Reference/Auth # [id number]   Pt discharged on 6/18/24    Patient left AMA  Pending inpatient authorization request.     Khloe Soliman RN, BSN  Utilization Review Nurse  [facility]  Direct & confidential phone # [phone]  Fax # [phone]      Scott Gaytan (49 y.o. Male)       Date of Birth   1975    Social Security Number       Address   [address] [place] [address]    Home Phone       MRN   [id number]       Adventism   [facility]    Marital Status   Single                            Admission Date   6/16/24    Admission Type   Emergency    Admitting Provider   Jamal Vega MD    Attending Provider       Department, Room/Bed   [facility] 2D, 252/1       Discharge Date   6/18/2024    Discharge Disposition   Left Against Medical Advice    Discharge Destination                                 Attending Provider: (none)   Allergies: Morphine    Isolation: None   Infection: None   Code Status: Prior    Ht: 170.2 cm (67\")   Wt: 81.8 kg (180 lb 5.4 oz)    Admission Cmt: None   Principal Problem: Methamphetamine intoxication [F15.929]                   Active Insurance as of 6/16/2024       Primary Coverage       Payor Plan Insurance Group Employer/Plan Group    [id number]       Payor Plan Address Payor Plan Phone Number Payor Plan Fax Number Effective Dates    MAIL STOP:   8/1/2021 - None Entered    [address] [address]       [facility] [address]         Subscriber Name Subscriber Birth Date Member ID       SCOTT GAYTAN 1975 [id number]                     Emergency Contacts        (Rel.) Home Phone Work Phone Mobile Phone    NORRIS JASSON (Sister) [phone] -- [phone]    BELINDA ALVARADO (Significant Other) [phone] -- [phone]              Vital Signs (last 2 days) before discharge       Date/Time Temp Temp src Pulse Resp " BP Patient Position SpO2    06/18/24 1500 98 (36.7) Tympanic 91 18 131/83 Lying --    06/18/24 1123 98.6 (37) Tympanic 67 18 150/101 Lying --    06/18/24 0843 -- -- 81 13 130/78 Lying --    06/18/24 0701 98.2 (36.8) Oral 82 17 180/117 Lying 98    06/18/24 0501 -- -- 83 16 176/112 Lying 98    06/18/24 0305 98.5 (36.9) Oral 73 20 151/99 Lying 98    06/17/24 2253 -- -- 86 -- 138/84 -- --    06/17/24 2252 98.3 (36.8) Oral 84 17 138/84 Lying 95    06/17/24 2119 -- -- 79 -- 179/119 -- --    06/17/24 2010 98.4 (36.9) Oral 82 16 161/99 Lying 98    06/17/24 1920 -- -- 85 -- 167/105 -- --    06/17/24 1651 -- -- 82 -- -- -- --    06/17/24 1643 -- -- 85 -- 155/103 -- --    06/17/24 1300 98.5 (36.9) Oral 80 17 154/81 Lying 98    06/17/24 1235 -- -- 77 -- -- -- 98    06/17/24 1230 -- -- 81 -- 173/110 -- 95    06/17/24 1225 -- -- 63 -- -- -- 98    06/17/24 1220 -- -- 77 -- -- -- 99    06/17/24 1215 -- -- 75 -- -- -- 89    06/17/24 1210 -- -- 74 -- -- -- 98    06/17/24 0900 -- -- 75 -- 142/94 -- 97    06/17/24 0847 98 (36.7) Oral 70 18 153/98 Lying --    06/17/24 0825 -- -- -- 19 -- Lying --    06/17/24 0752 98 (36.7) Oral 78 19 177/119 Lying 96    06/17/24 0530 98 (36.7) Oral 79 22 192/132 Lying 95    06/17/24 0515 -- -- 66 -- -- -- 96    06/17/24 0500 -- -- 60 -- 156/106 -- 95    06/17/24 0445 -- -- 63 -- -- -- 96    06/17/24 0430 -- -- 64 -- 156/111 -- 94    06/17/24 0415 -- -- 63 -- -- -- 96    06/17/24 0400 -- -- 66 -- 141/90 -- 94    06/17/24 0345 -- -- 60 -- -- -- 97    06/17/24 0330 -- -- 69 -- 181/128 -- 94    06/17/24 0315 -- -- 66 -- -- -- 98    06/17/24 0300 97.8 (36.6) Oral 61 20 156/108 Lying 96    06/17/24 0245 -- -- 77 -- -- -- --    06/17/24 0230 -- -- 54 -- 140/105 -- 97    06/17/24 0215 -- -- 62 -- -- -- 95    06/17/24 0200 -- -- 63 -- 132/88 -- 94    06/17/24 0145 -- -- 63 -- -- -- 94    06/17/24 0130 -- -- 66 -- 147/102 -- 97    06/17/24 0115 -- -- 65 -- -- -- 95    06/17/24 0100 -- -- 68 -- 126/81 -- 94     24 0045 -- -- 73 -- -- -- 94    24 0030 -- -- 80 -- 142/90 -- 92    24 0015 -- -- 71 -- -- -- 96    24 0000 -- -- 72 -- 147/100 -- 96    24 2300 98 (36.7) Oral -- 20 -- Lying --    24 2248 -- -- 70 -- 141/95 -- 97    24 2200 -- -- 69 -- 143/96 -- 97    24 2130 -- -- 68 -- 145/100 -- 97    24 -- -- 71 -- 138/99 -- 96    24 -- -- 67 -- 138/101 -- 96    24 -- -- 71 -- 120/83 -- 95    24 1930 -- -- 72 -- 133/83 -- 95    24 1900 98.2 (36.8) Oral 72 16 129/75 Lying 93    24 1727 -- -- 73 -- 137/87 -- 96    24 1528 97.5 (36.4) Oral 71 16 143/97 Lying 96    24 1502 -- -- 70 -- 166/107 -- 95    24 1431 -- -- 72 -- 156/111 -- 97    24 1416 -- -- 74 -- 181/126 -- 97    24 1317 -- -- 84 -- 159/123 -- 97    24 1301 -- -- 82 -- 154/105 -- 94    24 1145 -- -- 87 -- 174/121 -- 99    24 1116 -- -- 79 -- 156/113 -- 95    24 1056 -- -- 80 -- 155/105 -- 96    24 1051 97.7 (36.5) Oral 81 19 155/105 Sitting 96          CIWA (since admission)        None                     Physician Progress Notes (last 48 hours)        Malik Devlin MD at 24 1201          [facility]        LOS:  LOS: 1 day   Patient Name: Scott Gaytan  Age/Sex: 49 y.o. male  : 1975  MRN: [id number]    Day of Service: 24   Length of Stay: 1  Encounter Provider: CHANDLER Gilbert  Place of Service: [facility] CARDIOLOGY  Patient Care Team:  Provider, No Known as PCP - General    Subjective:     Chief Complaint: f/u elevated troponin, chest pain    Subjective: No acute events overnight  Chest pain-free  Blood pressure remains elevated  No evidence of ACS    2D echo reviewed and interpreted by me demonstrates an EF of 55 to 60% with moderate concentric LVH, left atrial enlargement, mild valvular insufficiency as documented, no mass or  effusion      Current Medications:   Scheduled Meds:[START ON 6/19/2024] amLODIPine, 10 mg, Oral, Q24H  aspirin, 81 mg, Oral, Daily  atorvastatin, 20 mg, Oral, Nightly  carvedilol, 12.5 mg, Oral, BID With Meals  heparin (porcine), 5,000 Units, Subcutaneous, Q8H  hydrALAZINE, 25 mg, Oral, Q12H  isosorbide mononitrate, 30 mg, Oral, Q24H  sodium chloride, 10 mL, Intravenous, Q12H      Continuous Infusions:sodium chloride, 75 mL/hr, Last Rate: 75 mL/hr (06/18/24 0844)        Allergies:  Allergies   Allergen Reactions    Morphine Other (See Comments)     Makes it feel like my blood is boiling in my veins       Review of Systems   Constitutional: Negative for chills, diaphoresis and malaise/fatigue.   Cardiovascular:  Negative for chest pain, dyspnea on exertion, irregular heartbeat, leg swelling, near-syncope, orthopnea, palpitations, paroxysmal nocturnal dyspnea and syncope.   Respiratory:  Negative for cough, shortness of breath, sleep disturbances due to breathing and sputum production.    Gastrointestinal:  Negative for change in bowel habit.   Genitourinary:  Negative for urgency.   Neurological:  Negative for dizziness and headaches.   Psychiatric/Behavioral:  Negative for altered mental status.          Objective:     Temp:  [98.2 °F (36.8 °C)-98.6 °F (37 °C)] 98.6 °F (37 °C)  Heart Rate:  [63-86] 67  Resp:  [13-20] 18  BP: (130-180)/() 150/101     Intake/Output Summary (Last 24 hours) at 6/18/2024 1203  Last data filed at 6/18/2024 0701  Gross per 24 hour   Intake 2370 ml   Output --   Net 2370 ml     Body mass index is 28.24 kg/m².      06/16/24  1051 06/17/24  0300 06/18/24  0305   Weight: 81.6 kg (180 lb) 81.9 kg (180 lb 8.9 oz) 81.8 kg (180 lb 5.4 oz)         General Appearance:    Alert, cooperative, in no acute distress                                Head: Atraumatic, normocephalic, PERRLA               Neck:   supple, trachea midline, no thyromegaly, no carotid bruit, no JVD   Lungs:     Clear to  auscultation, respirations regular, even and               unlabored    Heart:    Regular rhythm and normal rate, normal S1 and S2, no       murmur, no gallop, no rub, no click   Abdomen:     Normal bowel sounds, no masses, no organomegaly, soft  nontender, nondistended, no guarding, no rebound  tenderness   Extremities:   Moves all extremities well, no edema, no cyanosis, no  redness   Pulses:   Pulses palpable and equal bilaterally   Skin:   No bleeding, bruising or rash   Neurologic:   Awake, alert, oriented x3     Unchanged from prior encounter    Lab Review:   Results from last 7 days   Lab Units 06/18/24  0104 06/17/24  1445 06/17/24  0305 06/16/24  1100   SODIUM mmol/L 139  --  138 141   POTASSIUM mmol/L 3.8 4.3 3.6 3.8   CHLORIDE mmol/L 109*  --  104 105   CO2 mmol/L 20.6*  --  22.8 20.8*   BUN mg/dL 44*  --  55* 47*   CREATININE mg/dL 2.56*  --  2.89* 3.01*   GLUCOSE mg/dL 103*  --  82 71   CALCIUM mg/dL 7.9*  --  8.5* 9.2   AST (SGOT) U/L  --   --  26 29   ALT (SGPT) U/L  --   --  12 14     Results from last 7 days   Lab Units 06/16/24  1426 06/16/24  1100   CK TOTAL U/L  --  155   HSTROP T ng/L 64* 72*     Results from last 7 days   Lab Units 06/18/24  0104 06/17/24  0305   WBC 10*3/mm3 9.06 9.81   HEMOGLOBIN g/dL 11.0* 12.2*   HEMATOCRIT % 33.2* 37.2*   PLATELETS 10*3/mm3 198 199         Results from last 7 days   Lab Units 06/18/24  0104 06/16/24  1100   MAGNESIUM mg/dL 1.9 2.1     Results from last 7 days   Lab Units 06/17/24  0305   CHOLESTEROL mg/dL 184   TRIGLYCERIDES mg/dL 45   HDL CHOL mg/dL 55     Results from last 7 days   Lab Units 06/16/24  1100   PROBNP pg/mL 18,249.0*           Recent Radiology:  Imaging Results (Most Recent)       Procedure Component Value Units Date/Time    XR Chest 1 View [[id number]] Collected: 06/16/24 1145     Updated: 06/16/24 1149    Narrative:      XR CHEST 1 VW    Date of Exam: 6/16/2024 11:26 AM EDT    Indication: Chest Pain Protocol    Comparison: AP chest x-ray  7/17/2023, 7/16/2023    Findings:  Lungs are adequately expanded and appear clear. No pneumothorax or large pleural effusion is seen. Cardiomediastinal contours appear stable.      Impression:      Impression:  No acute cardiopulmonary abnormality is identified.      Electronically Signed: Mary Ching    6/16/2024 11:47 AM EDT    Workstation ID: [id number]            ECHOCARDIOGRAM:    Results for orders placed during the hospital encounter of 12/06/22    Adult Transthoracic Echo Complete w/ Color, Spectral and Contrast if Necessary Per Protocol    Interpretation Summary    Left ventricular systolic function is normal. Left ventricular ejection fraction appears to be 61 - 65%.    Left ventricular diastolic function was normal.    Estimated right ventricular systolic pressure from tricuspid regurgitation is normal (<35 mmHg).        I reviewed the patient's new clinical results.    EKG:      Assessment:       Methamphetamine intoxication    Chest pain    Methamphetamine abuse    Tobacco use    Elevated troponin    JOSE ANGEL (acute kidney injury)    Hypertension    Chest Pain / elevated troponin in setting of CKD with Crt 3 and HTN  HTN urgency  Dyspnea on exertion / elevated proBNP  CKD   Methamphetamine use  Non obstructive CAD on [facility] 2022    Plan:   ECHO as documented, EF 55%, no regional abnormalities, moderate concentric LVH secondary to hypertensive heart disease compounded by methamphetamine use  Increased amlodipine and added hydralazine, continue carvedilol which is beta-blocker of choice with catecholamine excess  B/p improving slowly  Creatinine improving slowly  No further chest pain  Troponin likely demand ischemia in setting of HTN urgency and CKD, no plan for invasive ischemic evaluation at this time given Crt 3 on admission  Optimize medical therapy      Stressed compliance with the patient  Continue to follow  Altered medicines as indicated by clinical course and response to therapy    Malik Devlin MD,  PhD        Rocio Maguire, APRN  24  12:03 EDT    Electronically signed by Malik Devlin MD at 24 1813       Robin Mcgovern MD at 24 1024              [facility] MEDICINE SERVICE  DAILY PROGRESS NOTE    NAME: Scott Gaytan  : 1975  MRN: [id number]      LOS: 1 day     PROVIDER OF SERVICE: Robin Mcgovern MD    Chief Complaint: Methamphetamine intoxication    Subjective:     Interval History:  History taken from: patient chart RN  No CP breathing stable   Reported HA     Review of Systems:   Review of Systems  All negative except as above   Objective:     Vital Signs  Temp:  [98.2 °F (36.8 °C)-98.5 °F (36.9 °C)] 98.2 °F (36.8 °C)  Heart Rate:  [63-86] 81  Resp:  [13-20] 13  BP: (130-180)/() 130/78   Body mass index is 28.24 kg/m².    Physical Exam  Physical Exam  AOx3 NAD  RRR S1-S2 audible  Lung CTA  Abdomen soft nontender no nondistended  Scheduled Meds   [START ON 2024] amLODIPine, 10 mg, Oral, Q24H  aspirin, 81 mg, Oral, Daily  atorvastatin, 20 mg, Oral, Nightly  carvedilol, 12.5 mg, Oral, BID With Meals  heparin (porcine), 5,000 Units, Subcutaneous, Q8H  isosorbide mononitrate, 30 mg, Oral, Q24H  sodium chloride, 10 mL, Intravenous, Q12H       PRN Meds     acetaminophen    senna-docusate sodium **AND** polyethylene glycol **AND** bisacodyl **AND** bisacodyl    butalbital-acetaminophen-caffeine    Calcium Replacement - Follow Nurse / BPA Driven Protocol    hydrALAZINE    Magnesium Standard Dose Replacement - Follow Nurse / BPA Driven Protocol    melatonin    nitroglycerin    ondansetron    Phosphorus Replacement - Follow Nurse / BPA Driven Protocol    Potassium Replacement - Follow Nurse / BPA Driven Protocol    sodium chloride    sodium chloride    sodium chloride   Infusions  sodium chloride, 75 mL/hr, Last Rate: 75 mL/hr (24 0844)          Diagnostic Data    Results from last 7 days   Lab Units 24  0104 24  1445 24  0305   WBC  10*3/mm3 9.06  --  9.81   HEMOGLOBIN g/dL 11.0*  --  12.2*   HEMATOCRIT % 33.2*  --  37.2*   PLATELETS 10*3/mm3 198  --  199   GLUCOSE mg/dL 103*  --  82   CREATININE mg/dL 2.56*  --  2.89*   BUN mg/dL 44*  --  55*   SODIUM mmol/L 139  --  138   POTASSIUM mmol/L 3.8   < > 3.6   AST (SGOT) U/L  --   --  26   ALT (SGPT) U/L  --   --  12   ALK PHOS U/L  --   --  41   BILIRUBIN mg/dL  --   --  0.3   ANION GAP mmol/L 9.4  --  11.2    < > = values in this interval not displayed.       XR Chest 1 View    Result Date: 6/16/2024  Impression: No acute cardiopulmonary abnormality is identified. Electronically Signed: Mary Ching  6/16/2024 11:47 AM EDT  Workstation ID: [id number]       I reviewed the patient's new clinical results.  I reviewed the patient's new imaging results and agree with the interpretation.    Assessment/Plan:     Active and Resolved Problems  Active Hospital Problems    Diagnosis  POA    **Methamphetamine intoxication [F15.929]  Yes    Hypertension [I10]  Yes    Methamphetamine abuse [F15.10]  Yes    Tobacco use [Z72.0]  Yes    Elevated troponin [R79.89]  Yes    JOSE ANGEL (acute kidney injury) [N17.9]  Yes    Chest pain [R07.9]  Yes      Resolved Hospital Problems   No resolved problems to display.       49-year-old male with history of hypertension, methamphetamine abuse, homelessness [facility] with chest pain and dyspnea     #Troponin elevation suspect in setting of demand ischemia  Cards following. No plan for ischemic workup  Continue aspirin   start Lipitor  BP control as below     #Hypertensive urgency  At home patient reports taking Entresto and Coreg  started on Tridil drip which has been weaned off   continue home dose of Coreg  Entresto currently on hold given JOSE ANGEL on CKD  Increase dose of amlodipine to 10 mg daily  Imdur 30 mg QD      #Methamphetamine abuse  Last reported use 2 weeks ago  Counseled on cessation >10 minutes were spent   consulted      #Homelessness  Consult       #JOSE ANGEL on CKD stage II  Baseline creatinine appears to be around 1.3.  On admission 3.01>2.89>2.53  Continue to hold Entresto  BP control as above  Continue IV fluids  BMP daily       VTE Prophylaxis:  Pharmacologic VTE prophylaxis orders are present.         Code status is   Code Status and Medical Interventions:   Ordered at: 24 1322     Code Status (Patient has no pulse and is not breathing):    CPR (Attempt to Resuscitate)     Medical Interventions (Patient has pulse or is breathing):    Full Support       Plan for disposition:24 hours pending improvement in renal function     Time: 30 minutes    Signature: Electronically signed by Robin Mcgovern MD, 24, 10:24 EDT.  [facility] Hospitalist Team      Electronically signed by Robin Mcgovern MD at 24 1034       Robin Mcgovern MD at 24 1103              [facility] MEDICINE SERVICE  DAILY PROGRESS NOTE    NAME: Scott Gaytan  : 1975  MRN: [id number]      LOS: 0 days     PROVIDER OF SERVICE: Robin Mcgovern MD    Chief Complaint: Methamphetamine intoxication    Subjective:     Interval History:  History taken from: patient chart RN  No CP. Chest pressure resolved     Review of Systems:   Review of Systems  All negative except as above   Objective:     Vital Signs  Temp:  [97.5 °F (36.4 °C)-98.2 °F (36.8 °C)] 98 °F (36.7 °C)  Heart Rate:  [54-87] 75  Resp:  [16-22] 18  BP: (120-192)/() 142/94   Body mass index is 28.28 kg/m².    Physical Exam  Physical Exam  AOx3 NAD  RRR S1-S2 audible  Lung CTA  Abdomen soft nontender no nondistended    Scheduled Meds   aspirin, 81 mg, Oral, Daily  carvedilol, 12.5 mg, Oral, BID With Meals  heparin (porcine), 5,000 Units, Subcutaneous, Q8H  potassium chloride ER, 40 mEq, Oral, Q4H  sodium chloride, 10 mL, Intravenous, Q12H       PRN Meds     acetaminophen    senna-docusate sodium **AND** polyethylene glycol **AND** bisacodyl  **AND** bisacodyl    Calcium Replacement - Follow Nurse / BPA Driven Protocol    hydrALAZINE    Magnesium Standard Dose Replacement - Follow Nurse / BPA Driven Protocol    nitroglycerin    ondansetron    Phosphorus Replacement - Follow Nurse / BPA Driven Protocol    Potassium Replacement - Follow Nurse / BPA Driven Protocol    sodium chloride    sodium chloride    sodium chloride   Infusions  nitroglycerin, 5-200 mcg/min, Last Rate: Stopped (06/17/24 0847)  sodium chloride, 75 mL/hr, Last Rate: 75 mL/hr (06/17/24 0755)          Diagnostic Data    Results from last 7 days   Lab Units 06/17/24  0305   WBC 10*3/mm3 9.81   HEMOGLOBIN g/dL 12.2*   HEMATOCRIT % 37.2*   PLATELETS 10*3/mm3 199   GLUCOSE mg/dL 82   CREATININE mg/dL 2.89*   BUN mg/dL 55*   SODIUM mmol/L 138   POTASSIUM mmol/L 3.6   AST (SGOT) U/L 26   ALT (SGPT) U/L 12   ALK PHOS U/L 41   BILIRUBIN mg/dL 0.3   ANION GAP mmol/L 11.2       XR Chest 1 View    Result Date: 6/16/2024  Impression: No acute cardiopulmonary abnormality is identified. Electronically Signed: Mary Ching  6/16/2024 11:47 AM EDT  Workstation ID: [id number]       I reviewed the patient's new clinical results.  I reviewed the patient's new imaging results and agree with the interpretation.    Assessment/Plan:     Active and Resolved Problems  Active Hospital Problems    Diagnosis  POA    **Methamphetamine intoxication [F15.929]  Yes    Hypertension [I10]  Yes    Methamphetamine abuse [F15.10]  Yes    Tobacco use [Z72.0]  Yes    Elevated troponin [R79.89]  Yes    JOSE ANGEL (acute kidney injury) [N17.9]  Yes    Chest pain [R07.9]  Yes      Resolved Hospital Problems   No resolved problems to display.       49-year-old male with history of hypertension, methamphetamine abuse, homelessness presented to [facility] with chest pain and dyspnea    #Troponin elevation suspect in setting of demand ischemia  Cards consulted on admission will follow  Continue aspirin   start Lipitor  BP  control as below    #Hypertensive urgency  At home patient reports taking Entresto and Coreg  started on Tridil drip which has been weaned off   continue home dose of Coreg  Entresto currently on hold given JOSE ANGEL on CKD  Start amlodipine 5    #Methamphetamine abuse  Last reported use 2 weeks ago  Counseled on cessation >10 minutes were spent   consult    #Homelessness  Consult      #JOSE ANGEL on CKD stage II  Baseline creatinine appears to be around 1.3.  On admission 3.01>2.89  Continue to hold Entresto  BP control as above  Continue IV fluids  BMP daily          VTE Prophylaxis:  Pharmacologic VTE prophylaxis orders are present.         Code status is   Code Status and Medical Interventions:   Ordered at: 06/16/24 1322     Code Status (Patient has no pulse and is not breathing):    CPR (Attempt to Resuscitate)     Medical Interventions (Patient has pulse or is breathing):    Full Support       Plan for disposition:24 hours    Time: 30 minutes    Signature: Electronically signed by Robin Mcgovern MD, 06/17/24, 11:04 EDT.  [facility] Hospitalist Team      Electronically signed by Robin Mcgovern MD at 06/17/24 1111          Consult Notes (last 48 hours)        Malik Devlin MD at 06/17/24 1255        Consult Orders    1. Cardiology (on-call MD unless specified) [[id number]] ordered by Jamal Vega MD at 06/16/24 1227                 [facility] [place]        Subjective:     Encounter Date:06/16/2024    Subjective  Patient ID: Scott Gaytan is a 49 y.o. male.    Chief Complaint: chest pain, shortness of breath    Referring Physician:    Jamal Vega MD     Seen and examined, agree with narrative as discussed with nurse practitioner after face-to-face counter scribed findings below verified by me for accuracy additional documentation provided, greater than 50% of total medical decision making and encounter time performed by me on date of service    HPI:  Scott FLORES  Lucila is a 49 y.o. male who presents with chest pain, shortness of breath. Mr. Gaytan has previously seen Dr. Devlin while hospitalized.  Pmh includes HFpEF, HTN, CKD, substance abuse including methamphetamine use. Prior hospitalizations with HTN urgency in the setting of meth use. He has had prior LHC in 2022 revealing minimal epicardial coronary artery disease with anterior takeoff of the RCA, borderline reduced LV systolic function 45 to 50%.    Mr. Gaytan presented to ER with complaints of chest pain and shortness of breath which have been intermittent. He is homeless and was walking yesterday on side of road when he had worsening shortness of breath and sharp/squeezing chest discomfort. He also mentions intermittent lower extremity swelling. He has not had any additional chest pain while hospitalized. On my encounter his blood pressure is elevated in the 180s. He did test + for methamphetamines on UDS. Troponin 72, 64. ProBNP 18,249, He has CKD With Crt 3.0 on admission. No acute findings on CXR.     Review of systems otherwise negative x 14 point review of systems except as mentioned above  Historical data copied forward from previous encounters in EMR is unchanged    Patient well-known to me from prior encounters              Past Medical History:   Diagnosis Date    CHF (congestive heart failure)     CKD (chronic kidney disease)     Hypertension     Substance abuse     Meth/MJ       Past Surgical History:   Procedure Laterality Date    CARDIAC CATHETERIZATION N/A 04/27/2022    ORIF TIBIA/FIBULA FRACTURES Right        Family History   Problem Relation Age of Onset    Diabetes Mother     Arthritis Mother     Hypertension Father     Heart attack Father     Seizures Sister     Heart attack Paternal Grandfather        Social History     Socioeconomic History    Marital status: Single   Tobacco Use    Smoking status: Every Day     Current packs/day: 0.50     Average packs/day: 1 pack/day for 30.5 years (30.4  "ttl [id number])     Types: Cigarettes     Start date: 1994     Passive exposure: Never    Smokeless tobacco: Never   Vaping Use    Vaping status: Never Used   Substance and Sexual Activity    Alcohol use: Not Currently    Drug use: Yes     Types: Methamphetamines, Marijuana, Cocaine(coke)    Sexual activity: Yes         Allergies   Allergen Reactions    Morphine Other (See Comments)     Makes it feel like my blood is boiling in my veins       Current Medications:   Scheduled Meds:amLODIPine, 5 mg, Oral, Q24H  aspirin, 81 mg, Oral, Daily  atorvastatin, 20 mg, Oral, Nightly  carvedilol, 12.5 mg, Oral, BID With Meals  heparin (porcine), 5,000 Units, Subcutaneous, Q8H  sodium chloride, 10 mL, Intravenous, Q12H      Continuous Infusions:sodium chloride, 75 mL/hr, Last Rate: 75 mL/hr (06/17/24 5055)        Review of Systems   Constitutional: Negative for chills, diaphoresis and malaise/fatigue.   Cardiovascular:  Positive for chest pain and dyspnea on exertion. Negative for irregular heartbeat, leg swelling, near-syncope, orthopnea, palpitations, paroxysmal nocturnal dyspnea and syncope.   Respiratory:  Positive for shortness of breath. Negative for cough, sleep disturbances due to breathing and sputum production.    Gastrointestinal:  Negative for change in bowel habit.   Genitourinary:  Negative for urgency.   Neurological:  Negative for dizziness and headaches.   Psychiatric/Behavioral:  Negative for altered mental status.            Objective:   Objective      BP (!) 173/110   Pulse 77   Temp 98 °F (36.7 °C) (Oral)   Resp 18   Ht 170.2 cm (67\")   Wt 81.9 kg (180 lb 8.9 oz)   SpO2 98%   BMI 28.28 kg/m²     Physical Exam:  General Appearance:    Alert, cooperative, in no acute distress                                Head: Atraumatic, normocephalic, PERRLA               Neck:   supple, trachea midline, no thyromegaly, no carotid bruit, no JVD   Lungs:     Clear to auscultation,respirations regular, even and            "    unlabored    Heart:    Regular rhythm and normal rate, normal S1 and S2   Abdomen:     Normal bowel sounds, no masses, no organomegaly, soft  nontender, nondistended, no guarding, no rebound  tenderness   Extremities:   Moves all extremities well, no edema, no cyanosis, no  redness   Pulses:   Pulses palpable and equal bilaterally   Skin:   No bleeding, bruising or rash   Neurologic:   Awake, alert, oriented x3       Agree with exam as discussed with nurse practitioner after face-to-face encounter scribe findings above          ASCVD Risk Score::  The 10-year ASCVD risk score (Mee WEIR, et al., 2019) is: 9.7%    Values used to calculate the score:      Age: 49 years      Sex: Male      Is Non- : No      Diabetic: No      Tobacco smoker: Yes      Systolic Blood Pressure: 173 mmHg      Is BP treated: Yes      HDL Cholesterol: 55 mg/dL      Total Cholesterol: 184 mg/dL      Lab Review:     Results from last 7 days   Lab Units 06/17/24  0305 06/16/24  1100   SODIUM mmol/L 138 141   POTASSIUM mmol/L 3.6 3.8   CHLORIDE mmol/L 104 105   CO2 mmol/L 22.8 20.8*   BUN mg/dL 55* 47*   CREATININE mg/dL 2.89* 3.01*   GLUCOSE mg/dL 82 71   CALCIUM mg/dL 8.5* 9.2   AST (SGOT) U/L 26 29   ALT (SGPT) U/L 12 14     Results from last 7 days   Lab Units 06/16/24  1426 06/16/24  1100   CK TOTAL U/L  --  155   HSTROP T ng/L 64* 72*     Results from last 7 days   Lab Units 06/17/24  0305 06/16/24  1100   WBC 10*3/mm3 9.81 13.76*   HEMOGLOBIN g/dL 12.2* 12.6*   HEMATOCRIT % 37.2* 38.9   PLATELETS 10*3/mm3 199 196         Results from last 7 days   Lab Units 06/16/24  1100   MAGNESIUM mg/dL 2.1     Results from last 7 days   Lab Units 06/17/24  0305   CHOLESTEROL mg/dL 184   TRIGLYCERIDES mg/dL 45   HDL CHOL mg/dL 55     Results from last 7 days   Lab Units 06/16/24  1100   PROBNP pg/mL 18,249.0*           Recent Radiology:  Imaging Results (Most Recent)       Procedure Component Value Units Date/Time    XR Chest  1 View [[id number]] Collected: 06/16/24 1145     Updated: 06/16/24 1149    Narrative:      XR CHEST 1 VW    Date of Exam: 6/16/2024 11:26 AM EDT    Indication: Chest Pain Protocol    Comparison: AP chest x-ray 7/17/2023, 7/16/2023    Findings:  Lungs are adequately expanded and appear clear. No pneumothorax or large pleural effusion is seen. Cardiomediastinal contours appear stable.      Impression:      Impression:  No acute cardiopulmonary abnormality is identified.      Electronically Signed: Mary Humza    6/16/2024 11:47 AM EDT    Workstation ID: [id number]              ECHOCARDIOGRAM:    Results for orders placed during the hospital encounter of 12/06/22    Adult Transthoracic Echo Complete w/ Color, Spectral and Contrast if Necessary Per Protocol    Interpretation Summary    Left ventricular systolic function is normal. Left ventricular ejection fraction appears to be 61 - 65%.    Left ventricular diastolic function was normal.    Estimated right ventricular systolic pressure from tricuspid regurgitation is normal (<35 mmHg).           Assessment:   Assessment      Active Hospital Problems    Diagnosis  POA    **Methamphetamine intoxication [F15.929]  Yes    Hypertension [I10]  Yes    Methamphetamine abuse [F15.10]  Yes    Tobacco use [Z72.0]  Yes    Elevated troponin [R79.89]  Yes    JOSE ANGEL (acute kidney injury) [N17.9]  Yes    Chest pain [R07.9]  Yes     Chest Pain / elevated troponin in setting of CKD with Crt 3 and HTN  HTN urgency  Dyspnea on exertion / elevated proBNP  CKD   Methamphetamine use  Non obstructive CAD on [facility] [place] 2022    Plan:   Will repeat 2D ECHO for evaluation of LV filling pressures, LV and RV size and function  Needs optimal blood pressure control in the setting of methamphetamine intoxication  No further chest pain  Troponin likely demand ischemia in setting of HTN urgency and CKD with decreased creatinine and renal clearance, no plan for invasive ischemic evaluation at this time given Crt 3  on admission, chest pain-free no evidence of ACS, no ischemic ECG changes  Optimize medical therapy  Add nitrates      Further recommendation follow findings and clinical course and echo findings    Malik Devlin MD, PhD                   Rocio Maguire, CHANDLER  06/17/24  12:55 EDT      Electronically signed by Malik Devlin MD at 06/18/24 1812       Discharge Summary    No notes of this type exist for this encounter.

## 2024-08-21 ENCOUNTER — TRANSCRIBE ORDERS (OUTPATIENT)
Dept: ADMINISTRATIVE | Facility: HOSPITAL | Age: 49
End: 2024-08-21
Payer: MEDICAID

## 2024-08-21 ENCOUNTER — LAB (OUTPATIENT)
Dept: LAB | Facility: HOSPITAL | Age: 49
End: 2024-08-21
Payer: MEDICAID

## 2024-08-21 DIAGNOSIS — N18.4 CHRONIC KIDNEY DISEASE, STAGE IV (SEVERE): ICD-10-CM

## 2024-08-21 DIAGNOSIS — N18.4 CHRONIC KIDNEY DISEASE, STAGE IV (SEVERE): Primary | ICD-10-CM

## 2024-08-21 LAB
ALBUMIN SERPL-MCNC: 3.8 G/DL (ref 3.5–5.2)
ALBUMIN/GLOB SERPL: 1 G/DL
ALP SERPL-CCNC: 35 U/L (ref 39–117)
ALT SERPL W P-5'-P-CCNC: 8 U/L (ref 1–41)
ANION GAP SERPL CALCULATED.3IONS-SCNC: 15.9 MMOL/L (ref 5–15)
AST SERPL-CCNC: 13 U/L (ref 1–40)
BACTERIA UR QL AUTO: NORMAL /HPF
BASOPHILS # BLD AUTO: 0.05 10*3/MM3 (ref 0–0.2)
BASOPHILS NFR BLD AUTO: 0.5 % (ref 0–1.5)
BILIRUB SERPL-MCNC: 0.3 MG/DL (ref 0–1.2)
BILIRUB UR QL STRIP: NEGATIVE
BUN SERPL-MCNC: 40 MG/DL (ref 6–20)
BUN/CREAT SERPL: 12.5 (ref 7–25)
CALCIUM SPEC-SCNC: 9.4 MG/DL (ref 8.6–10.5)
CHLORIDE SERPL-SCNC: 102 MMOL/L (ref 98–107)
CLARITY UR: CLEAR
CO2 SERPL-SCNC: 20.1 MMOL/L (ref 22–29)
COLOR UR: YELLOW
CREAT SERPL-MCNC: 3.2 MG/DL (ref 0.76–1.27)
CREAT UR-MCNC: 121.2 MG/DL
DEPRECATED RDW RBC AUTO: 41.3 FL (ref 37–54)
EGFRCR SERPLBLD CKD-EPI 2021: 23.1 ML/MIN/1.73
EOSINOPHIL # BLD AUTO: 0.26 10*3/MM3 (ref 0–0.4)
EOSINOPHIL NFR BLD AUTO: 2.6 % (ref 0.3–6.2)
ERYTHROCYTE [DISTWIDTH] IN BLOOD BY AUTOMATED COUNT: 12.8 % (ref 12.3–15.4)
GLOBULIN UR ELPH-MCNC: 3.9 GM/DL
GLUCOSE SERPL-MCNC: 171 MG/DL (ref 65–99)
GLUCOSE UR STRIP-MCNC: NEGATIVE MG/DL
HCT VFR BLD AUTO: 33.7 % (ref 37.5–51)
HGB BLD-MCNC: 11.1 G/DL (ref 13–17.7)
HGB UR QL STRIP.AUTO: NEGATIVE
HOLD SPECIMEN: NORMAL
HYALINE CASTS UR QL AUTO: NORMAL /LPF
IMM GRANULOCYTES # BLD AUTO: 0.03 10*3/MM3 (ref 0–0.05)
IMM GRANULOCYTES NFR BLD AUTO: 0.3 % (ref 0–0.5)
KETONES UR QL STRIP: NEGATIVE
LEUKOCYTE ESTERASE UR QL STRIP.AUTO: NEGATIVE
LYMPHOCYTES # BLD AUTO: 1.59 10*3/MM3 (ref 0.7–3.1)
LYMPHOCYTES NFR BLD AUTO: 15.7 % (ref 19.6–45.3)
MAGNESIUM SERPL-MCNC: 2.4 MG/DL (ref 1.6–2.6)
MCH RBC QN AUTO: 29.1 PG (ref 26.6–33)
MCHC RBC AUTO-ENTMCNC: 32.9 G/DL (ref 31.5–35.7)
MCV RBC AUTO: 88.2 FL (ref 79–97)
MONOCYTES # BLD AUTO: 0.94 10*3/MM3 (ref 0.1–0.9)
MONOCYTES NFR BLD AUTO: 9.3 % (ref 5–12)
NEUTROPHILS NFR BLD AUTO: 7.24 10*3/MM3 (ref 1.7–7)
NEUTROPHILS NFR BLD AUTO: 71.6 % (ref 42.7–76)
NITRITE UR QL STRIP: NEGATIVE
NRBC BLD AUTO-RTO: 0 /100 WBC (ref 0–0.2)
PH UR STRIP.AUTO: 5.5 [PH] (ref 5–8)
PHOSPHATE SERPL-MCNC: 3.3 MG/DL (ref 2.5–4.5)
PLATELET # BLD AUTO: 285 10*3/MM3 (ref 140–450)
PMV BLD AUTO: 10.7 FL (ref 6–12)
POTASSIUM SERPL-SCNC: 3.6 MMOL/L (ref 3.5–5.2)
PROT ?TM UR-MCNC: 80.2 MG/DL
PROT SERPL-MCNC: 7.7 G/DL (ref 6–8.5)
PROT UR QL STRIP: ABNORMAL
PROT/CREAT UR: 661.7 MG/G CREA (ref 0–200)
PTH-INTACT SERPL-MCNC: 86.9 PG/ML (ref 15–65)
RBC # BLD AUTO: 3.82 10*6/MM3 (ref 4.14–5.8)
RBC # UR STRIP: NORMAL /HPF
REF LAB TEST METHOD: NORMAL
SODIUM SERPL-SCNC: 138 MMOL/L (ref 136–145)
SP GR UR STRIP: 1.01 (ref 1–1.03)
SQUAMOUS #/AREA URNS HPF: NORMAL /HPF
UROBILINOGEN UR QL STRIP: ABNORMAL
WBC # UR STRIP: NORMAL /HPF
WBC NRBC COR # BLD AUTO: 10.11 10*3/MM3 (ref 3.4–10.8)

## 2024-08-21 PROCEDURE — 83970 ASSAY OF PARATHORMONE: CPT

## 2024-08-21 PROCEDURE — 82570 ASSAY OF URINE CREATININE: CPT

## 2024-08-21 PROCEDURE — 81001 URINALYSIS AUTO W/SCOPE: CPT

## 2024-08-21 PROCEDURE — 36415 COLL VENOUS BLD VENIPUNCTURE: CPT

## 2024-08-21 PROCEDURE — 84156 ASSAY OF PROTEIN URINE: CPT

## 2024-08-21 PROCEDURE — 83735 ASSAY OF MAGNESIUM: CPT

## 2024-08-21 PROCEDURE — 84100 ASSAY OF PHOSPHORUS: CPT

## 2024-08-21 PROCEDURE — 80053 COMPREHEN METABOLIC PANEL: CPT

## 2024-08-21 PROCEDURE — 85025 COMPLETE CBC W/AUTO DIFF WBC: CPT

## 2024-11-22 PROCEDURE — 83036 HEMOGLOBIN GLYCOSYLATED A1C: CPT | Performed by: NURSE PRACTITIONER

## 2024-11-22 PROCEDURE — 83880 ASSAY OF NATRIURETIC PEPTIDE: CPT | Performed by: NURSE PRACTITIONER

## 2024-11-22 PROCEDURE — 84484 ASSAY OF TROPONIN QUANT: CPT | Performed by: NURSE PRACTITIONER

## 2024-11-22 PROCEDURE — 85025 COMPLETE CBC W/AUTO DIFF WBC: CPT | Performed by: NURSE PRACTITIONER

## 2024-11-23 ENCOUNTER — APPOINTMENT (OUTPATIENT)
Dept: GENERAL RADIOLOGY | Facility: HOSPITAL | Age: 49
End: 2024-11-23
Payer: MEDICAID

## 2024-11-23 ENCOUNTER — HOSPITAL ENCOUNTER (INPATIENT)
Facility: HOSPITAL | Age: 49
LOS: 4 days | Discharge: HOME | End: 2024-11-27
Attending: STUDENT IN AN ORGANIZED HEALTH CARE EDUCATION/TRAINING PROGRAM | Admitting: STUDENT IN AN ORGANIZED HEALTH CARE EDUCATION/TRAINING PROGRAM
Payer: MEDICAID

## 2024-11-23 DIAGNOSIS — E87.70 HYPERVOLEMIA, UNSPECIFIED HYPERVOLEMIA TYPE: ICD-10-CM

## 2024-11-23 DIAGNOSIS — E87.5 HYPERKALEMIA: ICD-10-CM

## 2024-11-23 DIAGNOSIS — I48.91 ATRIAL FIBRILLATION WITH RAPID VENTRICULAR RESPONSE: ICD-10-CM

## 2024-11-23 DIAGNOSIS — R06.00 DYSPNEA, UNSPECIFIED TYPE: Primary | ICD-10-CM

## 2024-11-23 LAB
ALBUMIN SERPL-MCNC: 3.8 G/DL (ref 3.5–5.2)
ALBUMIN/GLOB SERPL: 1.2 G/DL
ALP SERPL-CCNC: 46 U/L (ref 39–117)
ALT SERPL W P-5'-P-CCNC: 15 U/L (ref 1–41)
AMPHET+METHAMPHET UR QL: NEGATIVE
AMPHETAMINES UR QL: NEGATIVE
ANION GAP SERPL CALCULATED.3IONS-SCNC: 14.6 MMOL/L (ref 5–15)
ANION GAP SERPL CALCULATED.3IONS-SCNC: 15.1 MMOL/L (ref 5–15)
AST SERPL-CCNC: 33 U/L (ref 1–40)
BACTERIA UR QL AUTO: NORMAL /HPF
BARBITURATES UR QL SCN: NEGATIVE
BASOPHILS # BLD AUTO: 0.04 10*3/MM3 (ref 0–0.2)
BASOPHILS NFR BLD AUTO: 0.3 % (ref 0–1.5)
BENZODIAZ UR QL SCN: NEGATIVE
BILIRUB SERPL-MCNC: 0.6 MG/DL (ref 0–1.2)
BILIRUB UR QL STRIP: NEGATIVE
BUN SERPL-MCNC: 47 MG/DL (ref 6–20)
BUN SERPL-MCNC: 48 MG/DL (ref 6–20)
BUN/CREAT SERPL: 15.7 (ref 7–25)
BUN/CREAT SERPL: 16.4 (ref 7–25)
BUPRENORPHINE SERPL-MCNC: NEGATIVE NG/ML
CALCIUM SPEC-SCNC: 9.2 MG/DL (ref 8.6–10.5)
CALCIUM SPEC-SCNC: 9.2 MG/DL (ref 8.6–10.5)
CANNABINOIDS SERPL QL: POSITIVE
CHLORIDE SERPL-SCNC: 100 MMOL/L (ref 98–107)
CHLORIDE SERPL-SCNC: 101 MMOL/L (ref 98–107)
CHOLEST SERPL-MCNC: 140 MG/DL (ref 0–200)
CLARITY UR: CLEAR
CO2 SERPL-SCNC: 16.9 MMOL/L (ref 22–29)
CO2 SERPL-SCNC: 19.4 MMOL/L (ref 22–29)
COCAINE UR QL: NEGATIVE
COLOR UR: YELLOW
CREAT SERPL-MCNC: 2.87 MG/DL (ref 0.76–1.27)
CREAT SERPL-MCNC: 3.06 MG/DL (ref 0.76–1.27)
DEPRECATED RDW RBC AUTO: 60.6 FL (ref 37–54)
EGFRCR SERPLBLD CKD-EPI 2021: 24.4 ML/MIN/1.73
EGFRCR SERPLBLD CKD-EPI 2021: 26.4 ML/MIN/1.73
EOSINOPHIL # BLD AUTO: 0.11 10*3/MM3 (ref 0–0.4)
EOSINOPHIL NFR BLD AUTO: 0.9 % (ref 0.3–6.2)
ERYTHROCYTE [DISTWIDTH] IN BLOOD BY AUTOMATED COUNT: 18.7 % (ref 12.3–15.4)
GLOBULIN UR ELPH-MCNC: 3.3 GM/DL
GLUCOSE BLDC GLUCOMTR-MCNC: 138 MG/DL (ref 70–105)
GLUCOSE SERPL-MCNC: 118 MG/DL (ref 65–99)
GLUCOSE SERPL-MCNC: 96 MG/DL (ref 65–99)
GLUCOSE UR STRIP-MCNC: NEGATIVE MG/DL
HBA1C MFR BLD: 5.54 % (ref 4.8–5.6)
HCT VFR BLD AUTO: 38.1 % (ref 37.5–51)
HDLC SERPL-MCNC: 39 MG/DL (ref 40–60)
HGB BLD-MCNC: 11.7 G/DL (ref 13–17.7)
HGB UR QL STRIP.AUTO: NEGATIVE
HOLD SPECIMEN: NORMAL
HYALINE CASTS UR QL AUTO: NORMAL /LPF
IMM GRANULOCYTES # BLD AUTO: 0.04 10*3/MM3 (ref 0–0.05)
IMM GRANULOCYTES NFR BLD AUTO: 0.3 % (ref 0–0.5)
KETONES UR QL STRIP: NEGATIVE
LDLC SERPL CALC-MCNC: 86 MG/DL (ref 0–100)
LDLC/HDLC SERPL: 2.19 {RATIO}
LEUKOCYTE ESTERASE UR QL STRIP.AUTO: ABNORMAL
LYMPHOCYTES # BLD AUTO: 3.19 10*3/MM3 (ref 0.7–3.1)
LYMPHOCYTES NFR BLD AUTO: 25.7 % (ref 19.6–45.3)
MAGNESIUM SERPL-MCNC: 2.1 MG/DL (ref 1.6–2.6)
MCH RBC QN AUTO: 27.5 PG (ref 26.6–33)
MCHC RBC AUTO-ENTMCNC: 30.7 G/DL (ref 31.5–35.7)
MCV RBC AUTO: 89.4 FL (ref 79–97)
METHADONE UR QL SCN: NEGATIVE
MONOCYTES # BLD AUTO: 1.06 10*3/MM3 (ref 0.1–0.9)
MONOCYTES NFR BLD AUTO: 8.6 % (ref 5–12)
NEUTROPHILS NFR BLD AUTO: 64.2 % (ref 42.7–76)
NEUTROPHILS NFR BLD AUTO: 7.95 10*3/MM3 (ref 1.7–7)
NITRITE UR QL STRIP: NEGATIVE
NRBC BLD AUTO-RTO: 0 /100 WBC (ref 0–0.2)
NT-PROBNP SERPL-MCNC: ABNORMAL PG/ML (ref 0–450)
OPIATES UR QL: POSITIVE
OXYCODONE UR QL SCN: POSITIVE
PCP UR QL SCN: NEGATIVE
PH UR STRIP.AUTO: 8 [PH] (ref 5–8)
PHOSPHATE SERPL-MCNC: 3.9 MG/DL (ref 2.5–4.5)
PLATELET # BLD AUTO: 212 10*3/MM3 (ref 140–450)
PMV BLD AUTO: 11.2 FL (ref 6–12)
POTASSIUM SERPL-SCNC: 5.2 MMOL/L (ref 3.5–5.2)
POTASSIUM SERPL-SCNC: 6.5 MMOL/L (ref 3.5–5.2)
PROT SERPL-MCNC: 7.1 G/DL (ref 6–8.5)
PROT UR QL STRIP: ABNORMAL
RBC # BLD AUTO: 4.26 10*6/MM3 (ref 4.14–5.8)
RBC # UR STRIP: NORMAL /HPF
REF LAB TEST METHOD: NORMAL
SODIUM SERPL-SCNC: 132 MMOL/L (ref 136–145)
SODIUM SERPL-SCNC: 135 MMOL/L (ref 136–145)
SP GR UR STRIP: 1.02 (ref 1–1.03)
SQUAMOUS #/AREA URNS HPF: NORMAL /HPF
TRICYCLICS UR QL SCN: NEGATIVE
TRIGL SERPL-MCNC: 77 MG/DL (ref 0–150)
TROPONIN T SERPL HS-MCNC: 33 NG/L
TROPONIN T SERPL HS-MCNC: 37 NG/L
TSH SERPL DL<=0.05 MIU/L-ACNC: 1.44 UIU/ML (ref 0.27–4.2)
UROBILINOGEN UR QL STRIP: ABNORMAL
VLDLC SERPL-MCNC: 15 MG/DL (ref 5–40)
WBC # UR STRIP: NORMAL /HPF
WBC NRBC COR # BLD AUTO: 12.39 10*3/MM3 (ref 3.4–10.8)
WHOLE BLOOD HOLD COAG: NORMAL

## 2024-11-23 PROCEDURE — 80050 GENERAL HEALTH PANEL: CPT | Performed by: NURSE PRACTITIONER

## 2024-11-23 PROCEDURE — 25010000002 BUMETANIDE PER 0.5 MG: Performed by: INTERNAL MEDICINE

## 2024-11-23 PROCEDURE — 25010000002 FUROSEMIDE PER 20 MG: Performed by: EMERGENCY MEDICINE

## 2024-11-23 PROCEDURE — 71045 X-RAY EXAM CHEST 1 VIEW: CPT

## 2024-11-23 PROCEDURE — 25010000002 AMIODARONE IN DEXTROSE 5% 150-4.21 MG/100ML-% SOLUTION: Performed by: INTERNAL MEDICINE

## 2024-11-23 PROCEDURE — 84100 ASSAY OF PHOSPHORUS: CPT | Performed by: NURSE PRACTITIONER

## 2024-11-23 PROCEDURE — 25010000002 CALCIUM GLUCONATE 2-0.675 GM/100ML-% SOLUTION: Performed by: EMERGENCY MEDICINE

## 2024-11-23 PROCEDURE — 81001 URINALYSIS AUTO W/SCOPE: CPT | Performed by: NURSE PRACTITIONER

## 2024-11-23 PROCEDURE — 80061 LIPID PANEL: CPT | Performed by: NURSE PRACTITIONER

## 2024-11-23 PROCEDURE — 63710000001 INSULIN REGULAR HUMAN PER 5 UNITS: Performed by: EMERGENCY MEDICINE

## 2024-11-23 PROCEDURE — 83735 ASSAY OF MAGNESIUM: CPT | Performed by: NURSE PRACTITIONER

## 2024-11-23 PROCEDURE — 94664 DEMO&/EVAL PT USE INHALER: CPT

## 2024-11-23 PROCEDURE — 99291 CRITICAL CARE FIRST HOUR: CPT

## 2024-11-23 PROCEDURE — 80053 COMPREHEN METABOLIC PANEL: CPT | Performed by: NURSE PRACTITIONER

## 2024-11-23 PROCEDURE — 36415 COLL VENOUS BLD VENIPUNCTURE: CPT

## 2024-11-23 PROCEDURE — 80048 BASIC METABOLIC PNL TOTAL CA: CPT | Performed by: NURSE PRACTITIONER

## 2024-11-23 PROCEDURE — 93005 ELECTROCARDIOGRAM TRACING: CPT

## 2024-11-23 PROCEDURE — 99222 1ST HOSP IP/OBS MODERATE 55: CPT | Performed by: INTERNAL MEDICINE

## 2024-11-23 PROCEDURE — 25010000002 AMIODARONE IN DEXTROSE 5% 360-4.14 MG/200ML-% SOLUTION: Performed by: INTERNAL MEDICINE

## 2024-11-23 PROCEDURE — 94799 UNLISTED PULMONARY SVC/PX: CPT

## 2024-11-23 PROCEDURE — 83036 HEMOGLOBIN GLYCOSYLATED A1C: CPT

## 2024-11-23 PROCEDURE — 84484 ASSAY OF TROPONIN QUANT: CPT

## 2024-11-23 PROCEDURE — 84484 ASSAY OF TROPONIN QUANT: CPT | Performed by: NURSE PRACTITIONER

## 2024-11-23 PROCEDURE — 83880 ASSAY OF NATRIURETIC PEPTIDE: CPT

## 2024-11-23 PROCEDURE — 94761 N-INVAS EAR/PLS OXIMETRY MLT: CPT

## 2024-11-23 PROCEDURE — 82948 REAGENT STRIP/BLOOD GLUCOSE: CPT

## 2024-11-23 PROCEDURE — 93005 ELECTROCARDIOGRAM TRACING: CPT | Performed by: STUDENT IN AN ORGANIZED HEALTH CARE EDUCATION/TRAINING PROGRAM

## 2024-11-23 PROCEDURE — 94640 AIRWAY INHALATION TREATMENT: CPT

## 2024-11-23 PROCEDURE — 25010000002 LORAZEPAM PER 2 MG: Performed by: NURSE PRACTITIONER

## 2024-11-23 PROCEDURE — 84443 ASSAY THYROID STIM HORMONE: CPT | Performed by: NURSE PRACTITIONER

## 2024-11-23 PROCEDURE — 80306 DRUG TEST PRSMV INSTRMNT: CPT | Performed by: NURSE PRACTITIONER

## 2024-11-23 RX ORDER — CALCIUM GLUCONATE 20 MG/ML
2000 INJECTION, SOLUTION INTRAVENOUS ONCE
Status: COMPLETED | OUTPATIENT
Start: 2024-11-23 | End: 2024-11-23

## 2024-11-23 RX ORDER — BUMETANIDE 0.25 MG/ML
3 INJECTION, SOLUTION INTRAMUSCULAR; INTRAVENOUS 3 TIMES DAILY
Status: DISCONTINUED | OUTPATIENT
Start: 2024-11-23 | End: 2024-11-26

## 2024-11-23 RX ORDER — SODIUM CHLORIDE 0.9 % (FLUSH) 0.9 %
10 SYRINGE (ML) INJECTION AS NEEDED
Status: DISCONTINUED | OUTPATIENT
Start: 2024-11-23 | End: 2024-11-27 | Stop reason: HOSPADM

## 2024-11-23 RX ORDER — ACETAMINOPHEN 325 MG/1
650 TABLET ORAL EVERY 4 HOURS PRN
Status: DISCONTINUED | OUTPATIENT
Start: 2024-11-23 | End: 2024-11-27 | Stop reason: HOSPADM

## 2024-11-23 RX ORDER — ALBUTEROL SULFATE 90 UG/1
2 INHALANT RESPIRATORY (INHALATION) EVERY 6 HOURS PRN
COMMUNITY

## 2024-11-23 RX ORDER — AMOXICILLIN 250 MG
2 CAPSULE ORAL 2 TIMES DAILY PRN
Status: DISCONTINUED | OUTPATIENT
Start: 2024-11-23 | End: 2024-11-27 | Stop reason: HOSPADM

## 2024-11-23 RX ORDER — BUDESONIDE AND FORMOTEROL FUMARATE DIHYDRATE 80; 4.5 UG/1; UG/1
2 AEROSOL RESPIRATORY (INHALATION)
COMMUNITY

## 2024-11-23 RX ORDER — NITROGLYCERIN 0.4 MG/1
0.4 TABLET SUBLINGUAL
Status: DISCONTINUED | OUTPATIENT
Start: 2024-11-23 | End: 2024-11-27 | Stop reason: HOSPADM

## 2024-11-23 RX ORDER — SODIUM CHLORIDE 9 MG/ML
40 INJECTION, SOLUTION INTRAVENOUS AS NEEDED
Status: DISCONTINUED | OUTPATIENT
Start: 2024-11-23 | End: 2024-11-27 | Stop reason: HOSPADM

## 2024-11-23 RX ORDER — ISOSORBIDE MONONITRATE 30 MG/1
60 TABLET, EXTENDED RELEASE ORAL DAILY
COMMUNITY

## 2024-11-23 RX ORDER — BUSPIRONE HYDROCHLORIDE 5 MG/1
10 TABLET ORAL 2 TIMES DAILY
Status: DISCONTINUED | OUTPATIENT
Start: 2024-11-23 | End: 2024-11-27 | Stop reason: HOSPADM

## 2024-11-23 RX ORDER — POTASSIUM CHLORIDE 1500 MG/1
20 TABLET, EXTENDED RELEASE ORAL 3 TIMES DAILY
COMMUNITY

## 2024-11-23 RX ORDER — METOLAZONE 2.5 MG/1
2.5 TABLET ORAL 3 TIMES WEEKLY
Status: DISCONTINUED | OUTPATIENT
Start: 2024-11-25 | End: 2024-11-27 | Stop reason: HOSPADM

## 2024-11-23 RX ORDER — BUMETANIDE 1 MG/1
3 TABLET ORAL 3 TIMES DAILY
COMMUNITY
End: 2024-11-27 | Stop reason: HOSPADM

## 2024-11-23 RX ORDER — NIFEDIPINE 60 MG/1
60 TABLET, EXTENDED RELEASE ORAL DAILY
COMMUNITY

## 2024-11-23 RX ORDER — PANTOPRAZOLE SODIUM 40 MG/1
40 TABLET, DELAYED RELEASE ORAL DAILY
COMMUNITY

## 2024-11-23 RX ORDER — BISACODYL 10 MG
10 SUPPOSITORY, RECTAL RECTAL DAILY PRN
Status: DISCONTINUED | OUTPATIENT
Start: 2024-11-23 | End: 2024-11-27 | Stop reason: HOSPADM

## 2024-11-23 RX ORDER — SEVELAMER CARBONATE 800 MG/1
800 TABLET, FILM COATED ORAL
Status: DISCONTINUED | OUTPATIENT
Start: 2024-11-23 | End: 2024-11-27 | Stop reason: HOSPADM

## 2024-11-23 RX ORDER — DILTIAZEM HCL/D5W 125 MG/125
5-15 PLASTIC BAG, INJECTION (ML) INTRAVENOUS CONTINUOUS
Status: DISCONTINUED | OUTPATIENT
Start: 2024-11-23 | End: 2024-11-23

## 2024-11-23 RX ORDER — BUSPIRONE HYDROCHLORIDE 10 MG/1
10 TABLET ORAL 2 TIMES DAILY
COMMUNITY

## 2024-11-23 RX ORDER — BISACODYL 5 MG/1
5 TABLET, DELAYED RELEASE ORAL DAILY PRN
Status: DISCONTINUED | OUTPATIENT
Start: 2024-11-23 | End: 2024-11-27 | Stop reason: HOSPADM

## 2024-11-23 RX ORDER — POLYETHYLENE GLYCOL 3350 17 G/17G
17 POWDER, FOR SOLUTION ORAL DAILY PRN
Status: DISCONTINUED | OUTPATIENT
Start: 2024-11-23 | End: 2024-11-27 | Stop reason: HOSPADM

## 2024-11-23 RX ORDER — FLUTICASONE PROPIONATE AND SALMETEROL 250; 50 UG/1; UG/1
1 POWDER RESPIRATORY (INHALATION)
COMMUNITY

## 2024-11-23 RX ORDER — SEVELAMER HYDROCHLORIDE 800 MG/1
800 TABLET, FILM COATED ORAL
COMMUNITY

## 2024-11-23 RX ORDER — BUDESONIDE AND FORMOTEROL FUMARATE DIHYDRATE 160; 4.5 UG/1; UG/1
2 AEROSOL RESPIRATORY (INHALATION)
Status: DISCONTINUED | OUTPATIENT
Start: 2024-11-23 | End: 2024-11-27 | Stop reason: HOSPADM

## 2024-11-23 RX ORDER — NICOTINE 21 MG/24HR
1 PATCH, TRANSDERMAL 24 HOURS TRANSDERMAL
Status: DISCONTINUED | OUTPATIENT
Start: 2024-11-23 | End: 2024-11-27 | Stop reason: HOSPADM

## 2024-11-23 RX ORDER — CARVEDILOL 25 MG/1
25 TABLET ORAL 2 TIMES DAILY WITH MEALS
Status: DISCONTINUED | OUTPATIENT
Start: 2024-11-23 | End: 2024-11-27 | Stop reason: HOSPADM

## 2024-11-23 RX ORDER — FUROSEMIDE 10 MG/ML
40 INJECTION INTRAMUSCULAR; INTRAVENOUS EVERY 12 HOURS
Status: DISCONTINUED | OUTPATIENT
Start: 2024-11-23 | End: 2024-11-23

## 2024-11-23 RX ORDER — PANTOPRAZOLE SODIUM 40 MG/1
40 TABLET, DELAYED RELEASE ORAL DAILY
Status: DISCONTINUED | OUTPATIENT
Start: 2024-11-23 | End: 2024-11-27 | Stop reason: HOSPADM

## 2024-11-23 RX ORDER — ISOSORBIDE MONONITRATE 30 MG/1
60 TABLET, EXTENDED RELEASE ORAL DAILY
Status: DISCONTINUED | OUTPATIENT
Start: 2024-11-23 | End: 2024-11-27 | Stop reason: HOSPADM

## 2024-11-23 RX ORDER — ONDANSETRON 2 MG/ML
4 INJECTION INTRAMUSCULAR; INTRAVENOUS EVERY 6 HOURS PRN
Status: DISCONTINUED | OUTPATIENT
Start: 2024-11-23 | End: 2024-11-27 | Stop reason: HOSPADM

## 2024-11-23 RX ORDER — LORAZEPAM 2 MG/ML
0.25 INJECTION INTRAMUSCULAR ONCE
Status: COMPLETED | OUTPATIENT
Start: 2024-11-23 | End: 2024-11-23

## 2024-11-23 RX ORDER — IPRATROPIUM BROMIDE AND ALBUTEROL SULFATE 2.5; .5 MG/3ML; MG/3ML
3 SOLUTION RESPIRATORY (INHALATION) EVERY 6 HOURS PRN
Status: DISCONTINUED | OUTPATIENT
Start: 2024-11-23 | End: 2024-11-27 | Stop reason: HOSPADM

## 2024-11-23 RX ORDER — METOLAZONE 2.5 MG/1
2.5 TABLET ORAL 3 TIMES WEEKLY
COMMUNITY

## 2024-11-23 RX ORDER — FUROSEMIDE 10 MG/ML
40 INJECTION INTRAMUSCULAR; INTRAVENOUS ONCE
Status: DISCONTINUED | OUTPATIENT
Start: 2024-11-23 | End: 2024-11-23

## 2024-11-23 RX ORDER — FUROSEMIDE 10 MG/ML
80 INJECTION INTRAMUSCULAR; INTRAVENOUS ONCE
Status: COMPLETED | OUTPATIENT
Start: 2024-11-23 | End: 2024-11-23

## 2024-11-23 RX ORDER — ASPIRIN 81 MG/1
81 TABLET ORAL DAILY
Status: DISCONTINUED | OUTPATIENT
Start: 2024-11-23 | End: 2024-11-26

## 2024-11-23 RX ORDER — SODIUM CHLORIDE 0.9 % (FLUSH) 0.9 %
10 SYRINGE (ML) INJECTION EVERY 12 HOURS SCHEDULED
Status: DISCONTINUED | OUTPATIENT
Start: 2024-11-23 | End: 2024-11-27 | Stop reason: HOSPADM

## 2024-11-23 RX ORDER — DEXTROSE MONOHYDRATE 25 G/50ML
25 INJECTION, SOLUTION INTRAVENOUS ONCE
Status: COMPLETED | OUTPATIENT
Start: 2024-11-23 | End: 2024-11-23

## 2024-11-23 RX ADMIN — AMIODARONE HYDROCHLORIDE 0.5 MG/MIN: 1.8 INJECTION, SOLUTION INTRAVENOUS at 17:36

## 2024-11-23 RX ADMIN — CARVEDILOL 25 MG: 25 TABLET, FILM COATED ORAL at 17:35

## 2024-11-23 RX ADMIN — ISOSORBIDE MONONITRATE 60 MG: 30 TABLET, EXTENDED RELEASE ORAL at 08:17

## 2024-11-23 RX ADMIN — APIXABAN 5 MG: 5 TABLET, FILM COATED ORAL at 08:17

## 2024-11-23 RX ADMIN — BUSPIRONE HYDROCHLORIDE 10 MG: 5 TABLET ORAL at 08:17

## 2024-11-23 RX ADMIN — Medication 5 MG/HR: at 01:59

## 2024-11-23 RX ADMIN — AMIODARONE HYDROCHLORIDE 1 MG/MIN: 1.8 INJECTION, SOLUTION INTRAVENOUS at 11:25

## 2024-11-23 RX ADMIN — FUROSEMIDE 80 MG: 10 INJECTION, SOLUTION INTRAMUSCULAR; INTRAVENOUS at 04:25

## 2024-11-23 RX ADMIN — SODIUM ZIRCONIUM CYCLOSILICATE 10 G: 10 POWDER, FOR SUSPENSION ORAL at 04:25

## 2024-11-23 RX ADMIN — LORAZEPAM 0.25 MG: 2 INJECTION INTRAMUSCULAR; INTRAVENOUS at 06:15

## 2024-11-23 RX ADMIN — INSULIN HUMAN 5 UNITS: 100 INJECTION, SOLUTION PARENTERAL at 04:25

## 2024-11-23 RX ADMIN — CALCIUM GLUCONATE 2000 MG: 20 INJECTION, SOLUTION INTRAVENOUS at 04:24

## 2024-11-23 RX ADMIN — NICOTINE 1 PATCH: 21 PATCH TRANSDERMAL at 05:28

## 2024-11-23 RX ADMIN — AMIODARONE HYDROCHLORIDE 150 MG: 1.5 INJECTION, SOLUTION INTRAVENOUS at 11:06

## 2024-11-23 RX ADMIN — APIXABAN 5 MG: 5 TABLET, FILM COATED ORAL at 20:47

## 2024-11-23 RX ADMIN — BUSPIRONE HYDROCHLORIDE 10 MG: 5 TABLET ORAL at 20:46

## 2024-11-23 RX ADMIN — DEXTROSE MONOHYDRATE 25 G: 25 INJECTION, SOLUTION INTRAVENOUS at 04:25

## 2024-11-23 RX ADMIN — Medication 10 ML: at 20:50

## 2024-11-23 RX ADMIN — BUMETANIDE 3 MG: 0.25 INJECTION INTRAMUSCULAR; INTRAVENOUS at 17:35

## 2024-11-23 RX ADMIN — SEVELAMER CARBONATE 800 MG: 800 TABLET, FILM COATED ORAL at 08:17

## 2024-11-23 RX ADMIN — BUMETANIDE 3 MG: 0.25 INJECTION INTRAMUSCULAR; INTRAVENOUS at 23:16

## 2024-11-23 RX ADMIN — ASPIRIN 81 MG: 81 TABLET, COATED ORAL at 08:17

## 2024-11-23 RX ADMIN — Medication 10 ML: at 13:30

## 2024-11-23 RX ADMIN — SEVELAMER CARBONATE 800 MG: 800 TABLET, FILM COATED ORAL at 13:30

## 2024-11-23 RX ADMIN — CARVEDILOL 25 MG: 25 TABLET, FILM COATED ORAL at 08:17

## 2024-11-23 RX ADMIN — BUMETANIDE 3 MG: 0.25 INJECTION INTRAMUSCULAR; INTRAVENOUS at 13:29

## 2024-11-23 RX ADMIN — PANTOPRAZOLE SODIUM 40 MG: 40 TABLET, DELAYED RELEASE ORAL at 08:17

## 2024-11-23 RX ADMIN — BUDESONIDE AND FORMOTEROL FUMARATE DIHYDRATE 2 PUFF: 160; 4.5 AEROSOL RESPIRATORY (INHALATION) at 08:40

## 2024-11-23 RX ADMIN — SEVELAMER CARBONATE 800 MG: 800 TABLET, FILM COATED ORAL at 17:35

## 2024-11-23 NOTE — H&P
[facility] Medicine Services  History & Physical    Patient Name: Scott Gaytan  : 1975  MRN: [id number]  Primary Care Physician:  Provider, No Known  Date of admission: 2024  Date and Time of Service: 2024 at 0435    Subjective      Chief Complaint: shortness of breath    History of Present Illness: Scott Gaytan is a 49 y.o. male with a CMH of substance abuse, HTN, chronic HF, CKD stage , nicotine dependence, hepatitis C, COPD who presented to [facility] on 2024 with dyspnea. Live at home, independent with ADLs. Reports recently admitted to [facility] for acute on chronic HF, placement of AV fistula with complications of hematoma formation.   Presented to ED tonight with dyspnea. Reports sudden onset of dyspnea few hours PTA while sitting at home. Denies CP, BLE swelling, dizziness. Denies fever, chills. On arrival to ED VS: 97.3-125-/117-98% room air. Initial EKG revealed Afib with RVR. Initiated on diltiazem infusion. Hyperkalemia protocol initiated.     Upon evaluation, awake, alert, restless in bed. Appears anxious. Current VS: 86-/101-99% room air. Bedside heart monitoring reveals Afib. Denies CP. On exam with tachypnea, bilateral rales and wheezing present. Intermittent NPC. Able to speak 1-2 sentences. No edema.   EKG reveals Afib, left anterior fascicular block, old anterior infarct. Rate 130. . Qtc 489  CXR reveals mild pulmonary vascular congestion   Blood work reveals WBC 12.39, Hgb 11.7, Hct 38.1, proBNP 53343, troponin 37, BUN 48, creatinine 3.06, sodium 135, potassium 6.5, CO2  19.4, GFR 24. Agap 14.6  Urinalysis > 300 protein, trace LE,   UDS positive for THC and opiates, oxycodone.       Review of Systems   Constitutional:  Positive for fatigue. Negative for chills and fever.   Respiratory:  Positive for shortness of breath and wheezing.    Cardiovascular:  Negative for chest pain and leg swelling.   Gastrointestinal:  Negative for  abdominal pain.   Genitourinary:  Negative for dysuria.   Neurological:  Positive for weakness.       Personal History     Past Medical History:   Diagnosis Date    CHF (congestive heart failure)     CKD (chronic kidney disease)     Hypertension     Substance abuse     Meth/MJ       Past Surgical History:   Procedure Laterality Date    CARDIAC CATHETERIZATION N/A 04/27/2022    ORIF TIBIA/FIBULA FRACTURES Right        Family History: family history includes Arthritis in his mother; Diabetes in his mother; Heart attack in his father and paternal grandfather; Hypertension in his father; Seizures in his sister. Otherwise pertinent FHx was reviewed and not pertinent to current issue.    Social History:  reports that he has been smoking cigarettes. He started smoking about 30 years ago. He has a 30.6 pack-year smoking history. He has never been exposed to tobacco smoke. He has never used smokeless tobacco. He reports that he does not currently use alcohol. He reports current drug use. Drugs: Methamphetamines, Marijuana, and Cocaine(coke).    Home Medications:  Prior to Admission Medications       Prescriptions Last Dose Informant Patient Reported? Taking?    albuterol sulfate  (90 Base) MCG/ACT inhaler   Yes Yes    Inhale 2 puffs Every 6 (Six) Hours As Needed for Wheezing.    apixaban (ELIQUIS) 5 MG tablet tablet   Yes Yes    Take 1 tablet by mouth 2 (Two) Times a Day.    aspirin 81 MG EC tablet   Yes Yes    Take 1 tablet by mouth Daily.    budesonide-formoterol (SYMBICORT) 80-4.5 MCG/ACT inhaler   Yes Yes    Inhale 2 puffs 2 (Two) Times a Day.    bumetanide (BUMEX) 1 MG tablet   Yes Yes    Take 3 tablets by mouth 3 times a day.    busPIRone (BUSPAR) 10 MG tablet   Yes Yes    Take 1 tablet by mouth 2 (Two) Times a Day.    carvedilol (COREG) 25 MG tablet   Yes Yes    Take 1 tablet by mouth 2 (Two) Times a Day With Meals.    Fluticasone-Salmeterol (Advair Diskus) 250-50 MCG/ACT DISKUS   Yes Yes    Inhale 1 puff 2  (Two) Times a Day.    isosorbide mononitrate (IMDUR) 30 MG 24 hr tablet   Yes Yes    Take 2 tablets by mouth Daily.    metOLazone (ZAROXOLYN) 2.5 MG tablet   Yes Yes    Take 1 tablet by mouth 3 (Three) Times a Week. Mon, Wed, Fri    NIFEdipine XL (PROCARDIA XL) 60 MG 24 hr tablet   Yes Yes    Take 1 tablet by mouth Daily.    nitroglycerin (NITROSTAT) 0.4 MG SL tablet   Yes Yes    Place 1 tablet under the tongue Every 5 (Five) Minutes As Needed for Chest Pain. Take no more than 3 doses in 15 minutes.    pantoprazole (PROTONIX) 40 MG EC tablet   Yes Yes    Take 1 tablet by mouth Daily.    potassium chloride (KLOR-CON M20) 20 MEQ CR tablet   Yes Yes    Take 1 tablet by mouth 3 (Three) Times a Day.    sevelamer (RENAGEL) 800 MG tablet   Yes Yes    Take 1 tablet by mouth 3 (Three) Times a Day With Meals.              Allergies:  Allergies   Allergen Reactions    Morphine Other (See Comments)     Makes it feel like my blood is boiling in my veins       Objective      Vitals:   Temp:  [97.3 °F (36.3 °C)] 97.3 °F (36.3 °C)  Heart Rate:  [] 90  Resp:  [24] 24  BP: (104-167)/() 161/107  Body mass index is 25.14 kg/m².  Physical Exam  Constitutional:       Appearance: He is ill-appearing.   HENT:      Head: Normocephalic and atraumatic.      Mouth/Throat:      Mouth: Mucous membranes are moist.   Eyes:      General: No scleral icterus.     Extraocular Movements: Extraocular movements intact.      Pupils: Pupils are equal, round, and reactive to light.   Cardiovascular:      Rate and Rhythm: Normal rate. Rhythm irregular.      Pulses: Normal pulses.      Heart sounds: Normal heart sounds.   Pulmonary:      Effort: Pulmonary effort is normal.      Breath sounds: Wheezing and rales present.      Comments: Intermittent NPC  Tachypnea.   Conversational dyspnea. Only able to speak 1-2 sentences.   Abdominal:      General: Bowel sounds are normal. There is no distension.      Palpations: Abdomen is soft.      Tenderness:  There is no abdominal tenderness.   Musculoskeletal:      Right lower leg: No edema.      Left lower leg: No edema.   Skin:     General: Skin is warm and dry.      Comments: MIRIAN fistula   Neurological:      Mental Status: He is alert and oriented to person, place, and time. Mental status is at baseline.      Motor: Weakness present.         Diagnostic Data:  Lab Results (last 24 hours)       Procedure Component Value Units Date/Time    Comprehensive Metabolic Panel [[id number]]  (Abnormal) Collected: 11/23/24 0342    Specimen: Blood Updated: 11/23/24 0415     Glucose 118 mg/dL      BUN 48 mg/dL      Creatinine 3.06 mg/dL      Sodium 135 mmol/L      Potassium 6.5 mmol/L      Chloride 101 mmol/L      CO2 19.4 mmol/L      Calcium 9.2 mg/dL      Total Protein 7.1 g/dL      Albumin 3.8 g/dL      ALT (SGPT) 15 U/L      AST (SGOT) 33 U/L      Alkaline Phosphatase 46 U/L      Total Bilirubin 0.6 mg/dL      Globulin 3.3 gm/dL      A/G Ratio 1.2 g/dL      BUN/Creatinine Ratio 15.7     Anion Gap 14.6 mmol/L      eGFR 24.4 mL/min/1.73     Narrative:      GFR Normal >60  Chronic Kidney Disease <60  Kidney Failure <15      Urine Drug Screen - Urine, Clean Catch [[id number]]  (Abnormal) Collected: 11/23/24 0319    Specimen: Urine, Clean Catch Updated: 11/23/24 0339     THC, Screen, Urine Positive     Phencyclidine (PCP), Urine Negative     Cocaine Screen, Urine Negative     Methamphetamine, Ur Negative     Opiate Screen Positive     Amphetamine Screen, Urine Negative     Benzodiazepine Screen, Urine Negative     Tricyclic Antidepressants Screen Negative     Methadone Screen, Urine Negative     Barbiturates Screen, Urine Negative     Oxycodone Screen, Urine Positive     Buprenorphine, Screen, Urine Negative    Narrative:      Cutoff For Drugs Screened:    Amphetamines               500 ng/ml  Barbiturates               200 ng/ml  Benzodiazepines            150 ng/ml  Cocaine                    150 ng/ml  Methadone                   200 ng/ml  Opiates                    100 ng/ml  Phencyclidine               25 ng/ml  THC                         50 ng/ml  Methamphetamine            500 ng/ml  Tricyclic Antidepressants  300 ng/ml  Oxycodone                  100 ng/ml  Buprenorphine               10 ng/ml    The normal value for all drugs tested is negative. This report includes unconfirmed screening results, with the cutoff values listed, to be used for medical treatment purposes only.  Unconfirmed results must not be used for non-medical purposes such as employment or legal testing.  Clinical consideration should be applied to any drug of abuse test, particularly when unconfirmed results are used.    All urine drugs of abuse requests without chain of custody are for medical screening purposes only.  False positives are possible.      Urinalysis, Microscopic Only - Urine, Clean Catch [[id number]] Collected: 11/23/24 0319    Specimen: Urine, Clean Catch Updated: 11/23/24 0338     RBC, UA 0-2 /HPF      WBC, UA 0-2 /HPF      Bacteria, UA None Seen /HPF      Squamous Epithelial Cells, UA None Seen /HPF      Hyaline Casts, UA None Seen /LPF      Methodology Automated Microscopy    Urinalysis With Microscopic If Indicated (No Culture) - Urine, Clean Catch [[id number]]  (Abnormal) Collected: 11/23/24 0319    Specimen: Urine, Clean Catch Updated: 11/23/24 0336     Color, UA Yellow     Appearance, UA Clear     pH, UA 8.0     Specific Gravity, UA 1.017     Glucose, UA Negative     Ketones, UA Negative     Bilirubin, UA Negative     Blood, UA Negative     Protein, UA >=300 mg/dL (3+)     Leuk Esterase, UA Trace     Nitrite, UA Negative     Urobilinogen, UA 1.0 E.U./dL    BNP [[id number]]  (Abnormal) Collected: 11/22/24 2324    Specimen: Blood Updated: 11/23/24 0240     proBNP 47,389.0 pg/mL     Narrative:      This assay is used as an aid in the diagnosis of individuals suspected of having heart failure. It can be used as an aid in the diagnosis of acute  decompensated heart failure (ADHF) in patients presenting with signs and symptoms of ADHF to the emergency department (ED). In addition, NT-proBNP of <300 pg/mL indicates ADHF is not likely.    Age Range Result Interpretation  NT-proBNP Concentration (pg/mL:      <50             Positive            >450                   Gray                 300-450                    Negative             <300    50-75           Positive            >900                  Gray                300-900                  Negative            <300      >75             Positive            >1800                  Gray                300-1800                  Negative            <300    Single High Sensitivity Troponin T [[id number]]  (Abnormal) Collected: 11/22/24 2324    Specimen: Blood Updated: 11/23/24 0226     HS Troponin T 37 ng/L     Narrative:      High Sensitive Troponin T Reference Range:  <14.0 ng/L- Negative Female for AMI  <22.0 ng/L- Negative Male for AMI  >=14 - Abnormal Female indicating possible myocardial injury.  >=22 - Abnormal Male indicating possible myocardial injury.   Clinicians would have to utilize clinical acumen, EKG, Troponin, and serial changes to determine if it is an Acute Myocardial Infarction or myocardial injury due to an underlying chronic condition.         CBC & Differential [[id number]]  (Abnormal) Collected: 11/22/24 2324    Specimen: Blood Updated: 11/23/24 0202    Narrative:      The following orders were created for panel order CBC & Differential.  Procedure                               Abnormality         Status                     ---------                               -----------         ------                     CBC Auto Differential[[id number]]        Abnormal            Final result                 Please view results for these tests on the individual orders.    CBC Auto Differential [[id number]]  (Abnormal) Collected: 11/22/24 2324    Specimen: Blood Updated: 11/23/24 0202     WBC 12.39 10*3/mm3       RBC 4.26 10*6/mm3      Hemoglobin 11.7 g/dL      Hematocrit 38.1 %      MCV 89.4 fL      MCH 27.5 pg      MCHC 30.7 g/dL      RDW 18.7 %      RDW-SD 60.6 fl      MPV 11.2 fL      Platelets 212 10*3/mm3      Neutrophil % 64.2 %      Lymphocyte % 25.7 %      Monocyte % 8.6 %      Eosinophil % 0.9 %      Basophil % 0.3 %      Immature Grans % 0.3 %      Neutrophils, Absolute 7.95 10*3/mm3      Lymphocytes, Absolute 3.19 10*3/mm3      Monocytes, Absolute 1.06 10*3/mm3      Eosinophils, Absolute 0.11 10*3/mm3      Basophils, Absolute 0.04 10*3/mm3      Immature Grans, Absolute 0.04 10*3/mm3      nRBC 0.0 /100 WBC     Extra Tubes [[id number]] Collected: 11/22/24 2324    Specimen: Blood, Venous Line Updated: 11/23/24 0200    Narrative:      The following orders were created for panel order Extra Tubes.  Procedure                               Abnormality         Status                     ---------                               -----------         ------                     Gold Top - SST[[id number]]                                   Final result               Light Blue Top[[id number]]                                   Final result                 Please view results for these tests on the individual orders.    Gold Top - SST [[id number]] Collected: 11/22/24 2324    Specimen: Blood Updated: 11/23/24 0200     Extra Tube Hold for add-ons.     Comment: Auto resulted.       Light Blue Top [[id number]] Collected: 11/22/24 2324    Specimen: Blood Updated: 11/23/24 0200     Extra Tube Hold for add-ons.     Comment: Auto resulted                Imaging Results (Last 24 Hours)       Procedure Component Value Units Date/Time    XR Chest 1 View [[id number]] Collected: 11/23/24 0214     Updated: 11/23/24 0217    Narrative:      XR CHEST 1 VW    Date of Exam: 11/23/2024 2:08 AM EST    Indication: dyspnea    Comparison: Chest radiograph 6/16/2024    Findings:  There is mild cardiomegaly. There is mild pulmonary vascular congestion. There  are no focal consolidations to indicate pneumonia. There is no pneumothorax. The osseous structures are normal.      Impression:      Impression:  Mild pulmonary vascular congestion.        Electronically Signed: Aaron Rod MD    11/23/2024 2:15 AM EST    Workstation ID: [id number]              Assessment & Plan        This is a 49 y.o. male with PMH of substance abuse, HTN, chronic HF, CKD stage , nicotine dependence, hepatitis C, COPD who presented to ED with dyspnea. Work up revealed potassium of 6.5, proBNP 55477, troponin 37, EKG with Afib with RVR. Was initiated on diltiazem infusion and hyperkalemia protocol. Will require hospitalization with cardiology and nephrology services.         Active and Resolved Problems  Active Hospital Problems    Diagnosis  POA    **Atrial fibrillation with RVR [I48.91]  Yes      Resolved Hospital Problems   No resolved problems to display.     Atrial Fibrillation with RVR  -EKG: Afib, no acute ischemic changes identified.   -serial troponins and TSH pending  -echocardiogram 6/18/2024 revealed EF 56-60%, moderate concentric hypertrophy of LV, mild dilation of aortic root.   -continue telemetry monitoring  -continue diltiazem infusion, titrate for HR < 90  -[id number] score 3, patient would likely benefit from anticoagulation for stroke risk reduction. Will continue home dosing eliquis.   -continue betablocker. Holding nifedipine while on diltiazem infusion.   -cardiology consult for further evaluation and management    Hyperkalemia  in setting of CKD stage 4:  -admission potassium 6.5  -admission creatinine 3.06 at baseline.   -BMP every 4 hours.   -s/p calcium gluconate, D 50, lasix 80 mg, IV insulin 5 units, lokelma  -telemetry.   -nephrology consult.   -recent placement of AV fistula MIRIAN, still maturing not ready for use. Denies hx of temporary HD.     Acute on chronic HFpEF:  -presented with sudden onset of dyspnea few hours PTA  -endorses compliance with home Bumex.    -proBNP elevated at [address]  -TSH, lipid panel pending  -EKG with Afib, left anterior fascicular block, old anterior infarct. Rate 130. . Qtc 489  -serial troponins pending.  -fluid restriction, strict I/Os, daily weights, cardiac diet  -scheduled IV lasix initiated for diuresis, will monitor serum creatinine and electrolytes during diuresis  -continue ASA, beta blocker, nitrate, metolazone  -cardiology consult for further evaluation and management.     Elevated Troponin  -no CP to suggest ACS  -EKG without BINDU or definite ischemic changes  -mild elevation in troponin likely secondary to demand ischemia from acute cardiac stressors as above  -obtain serial troponins to ensure flat or downward trend--initial 37  -telemetry.     COPD:  -faint expiratory wheezing on exam  -continue home bronchodilator treatments  -DuoNebs PRN.   -does not wear home oxygen.     Nicotine dependence:  -nicotine patch daily.                 VTE Prophylaxis:  Pharmacologic & mechanical VTE prophylaxis orders are present.        The patient desires to be as follows:    CODE STATUS:    Level Of Support Discussed With: Patient  Code Status (Patient has no pulse and is not breathing): CPR (Attempt to Resuscitate)  Medical Interventions (Patient has pulse or is breathing): Full Support          Admission Status:  I believe this patient meets IP status.    Expected Length of Stay: 4 days    PDMP and Medication Dispenses via Sidebar reviewed and consistent with patient reported medications.    I discussed the patient's findings and my recommendations with patient and nursing staff.      Signature:     This document has been electronically signed by CHANDLER Holcomb on November 23, 2024 04:51 [facility] Hospitalist Team

## 2024-11-23 NOTE — CONSULTS
INITIAL CONSULT NOTE      Patient Name: Scott Gaytan  : 1975  MRN: [id number]  Primary Care Physician: Provider, No Known  Date of admission: 2024    Patient Care Team:  Provider, No Known as PCP - General        Reason for Consult:       Chronic kidney disease, stage IV, hyperkalemia.  Subjective   History of Present Illness:   Chief Complaint:   Chief Complaint   Patient presents with    Shortness of Breath     History of present illness    This patient is a 49-year-old gentleman, very well-known to us, multiple previous hospitalizations at [facility], last hospitalization was from  to 2024, he has history of hypertension CHF advanced CKD, chronic kidney disease, stage IV, he had AV fistula placed on 2024, at [facility] by Dr. Schilling, subsequently underwent transposition on 2024, developed some hematoma postoperatively, was seen by surgery during previous hospitalization, discharged on 2024.  Never required dialysis, last creatinine was 3.1 on , potassium 3.2, hemoglobin 9.9, history of about 1 g proteinuria.  He was discharged on Bumex 3 mg twice daily and metolazone 2.5 mg , potassium 20 mg 3 times daily.  He comes back to the hospital here now,    Has history of substance abuse, nicotine dependence, he comes with complaints of shortness of breath, echocardiogram at [facility] on 2024 revealed ejection fraction 61%.  Admission creatinine was 2.83.0, baseline, potassium was 6.5, that has improved, renal function is stable, renal consult requested for advanced CKD hyperkalemia.      Review of systems:    Constitutional: No fever, no chills, no lethargy, no weakness.  HEENT:  No headache, otalgia, itchy eyes, nasal discharge or sore throat.  Cardiac:  Shortness of breath   chest:              No cough, phlegm or wheezing.  Abdomen:  No abdominal pain, nausea or  vomiting.  Neuro:  No focal weakness, abnormal movements or seizure-like activity.  :   No hematuria, no pyuria, no dysuria, no flank pain.  ROS was otherwise negative except as mentioned in the [place].       Personal History:     Past Medical History:   Past Medical History:   Diagnosis Date    CHF (congestive heart failure)     CKD (chronic kidney disease)     Hypertension     Substance abuse     Meth/MJ       Surgical History:      Past Surgical History:   Procedure Laterality Date    CARDIAC CATHETERIZATION N/A 04/27/2022    ORIF TIBIA/FIBULA FRACTURES Right        Family History: family history includes Arthritis in his mother; Diabetes in his mother; Heart attack in his father and paternal grandfather; Hypertension in his father; Seizures in his sister. Otherwise pertinent FHx was reviewed and unremarkable.     Social History:  reports that he has been smoking cigarettes. He started smoking about 30 years ago. He has a 30.5 pack-year smoking history. He has never been exposed to tobacco smoke. He has never used smokeless tobacco. He reports that he does not currently use alcohol. He reports current drug use. Drugs: Methamphetamines, Marijuana, and Cocaine(coke).    Medications:  Prior to Admission medications    Medication Sig Start Date End Date Taking? Authorizing Provider   albuterol sulfate  (90 Base) MCG/ACT inhaler Inhale 2 puffs Every 6 (Six) Hours As Needed for Wheezing.   Yes ProviderIsabel MD   apixaban (ELIQUIS) 5 MG tablet tablet Take 1 tablet by mouth 2 (Two) Times a Day.   Yes ProviderIsabel MD   aspirin 81 MG EC tablet Take 1 tablet by mouth Daily.   Yes ProviderIsabel MD   budesonide-formoterol (SYMBICORT) 80-4.5 MCG/ACT inhaler Inhale 2 puffs 2 (Two) Times a Day.   Yes ProviderIsabel MD   bumetanide (BUMEX) 1 MG tablet Take 3 tablets by mouth 3 times a day.   Yes ProviderIsabel MD   busPIRone (BUSPAR) 10 MG tablet Take 1 tablet by mouth 2 (Two) Times a  Day.   Yes Isabel Camp MD   carvedilol (COREG) 25 MG tablet Take 1 tablet by mouth 2 (Two) Times a Day With Meals.   Yes Isabel Camp MD   Fluticasone-Salmeterol (Advair Diskus) 250-50 MCG/ACT DISKUS Inhale 1 puff 2 (Two) Times a Day.   Yes Isabel Camp MD   isosorbide mononitrate (IMDUR) 30 MG 24 hr tablet Take 2 tablets by mouth Daily.   Yes Isabel Camp MD   metOLazone (ZAROXOLYN) 2.5 MG tablet Take 1 tablet by mouth 3 (Three) Times a Week. Mon, Wed, Fri   Yes Isabel Camp MD   NIFEdipine XL (PROCARDIA XL) 60 MG 24 hr tablet Take 1 tablet by mouth Daily.   Yes Isabel Camp MD   nitroglycerin (NITROSTAT) 0.4 MG SL tablet Place 1 tablet under the tongue Every 5 (Five) Minutes As Needed for Chest Pain. Take no more than 3 doses in 15 minutes.   Yes Isabel Camp MD   pantoprazole (PROTONIX) 40 MG EC tablet Take 1 tablet by mouth Daily.   Yes Isabel Camp MD   potassium chloride (KLOR-CON M20) 20 MEQ CR tablet Take 1 tablet by mouth 3 (Three) Times a Day.   Yes Isabel Camp MD   sevelamer (RENAGEL) 800 MG tablet Take 1 tablet by mouth 3 (Three) Times a Day With Meals.   Yes Isabel Camp MD   sacubitril-valsartan (ENTRESTO) 24-26 MG tablet Take 1 tablet by mouth 2 (Two) Times a Day.  11/23/24  Isabel Camp MD     Scheduled Meds:amiodarone, 150 mg, Intravenous, Once  apixaban, 5 mg, Oral, BID  aspirin, 81 mg, Oral, Daily  budesonide-formoterol, 2 puff, Inhalation, BID - RT  busPIRone, 10 mg, Oral, BID  carvedilol, 25 mg, Oral, BID With Meals  furosemide, 40 mg, Intravenous, Q12H  isosorbide mononitrate, 60 mg, Oral, Daily  [START ON 11/25/2024] metOLazone, 2.5 mg, Oral, Once per day on Monday Wednesday Friday  nicotine, 1 patch, Transdermal, Q24H  pantoprazole, 40 mg, Oral, Daily  sevelamer, 800 mg, Oral, TID With Meals  sodium chloride, 10 mL, Intravenous, Q12H      Continuous Infusions:amiodarone, 1 mg/min   Followed  by  amiodarone, 0.5 mg/min      PRN Meds:  acetaminophen    senna-docusate sodium **AND** polyethylene glycol **AND** bisacodyl **AND** bisacodyl    ipratropium-albuterol    nitroglycerin    ondansetron    [COMPLETED] Insert Peripheral IV **AND** sodium chloride    sodium chloride    sodium chloride  Allergies:    Allergies   Allergen Reactions    Morphine Other (See Comments)     Makes it feel like my blood is boiling in my veins       Objective   Exam:     Vital Signs  Temp:  [97.3 °F (36.3 °C)-98 °F (36.7 °C)] 98 °F (36.7 °C)  Heart Rate:  [] 101  Resp:  [20-24] 20  BP: (104-171)/() 159/116  SpO2:  [96 %-100 %] 98 %  on   ;   Device (Oxygen Therapy): room air  Body mass index is 25.14 kg/m².  EXAM  General: No acute distress  Head:      Normocephalic and atraumatic.    Eyes:      PERRL/EOM intact, conjunctivae and sclerae clear without nystagmus.    Neck:      No masses, thyromegaly,  trachea central   Lungs:    Clear bilaterally to auscultation.    Heart:      Regular rate and rhythm, no murmur no gallop  Abd:        Soft, nontender, not distended, bowel sounds positive, no shifting dullness.  Msk:        No deformity or scoliosis noted of thoracic or lumbar spine.    Pulses:   Pulses normal in all 4 extremities.    Extremities:        No cyanosis or clubbing--trace to 1+ edema.    Neuro:    No focal deficits.   alert oriented x3  Skin:       Intact without lesions or rashes.    Psych:    Alert and cooperative; normal mood and affect; normal attention span       Results Review:  I have personally reviewed most recent Data :  BMP @LABRCNT(creatinine:10)  CBC    Results from last 7 days   Lab Units 11/22/24  2324   WBC 10*3/mm3 12.39*   HEMOGLOBIN g/dL 11.7*   PLATELETS 10*3/mm3 212     CMP   Results from last 7 days   Lab Units 11/23/24  0646 11/23/24  0342   SODIUM mmol/L 132* 135*   POTASSIUM mmol/L 5.2 6.5*   CHLORIDE mmol/L 100 101   CO2 mmol/L 16.9* 19.4*   BUN mg/dL 47* 48*   CREATININE mg/dL  2.87* 3.06*   GLUCOSE mg/dL 96 118*   ALBUMIN g/dL  --  3.8   BILIRUBIN mg/dL  --  0.6   ALK PHOS U/L  --  46   AST (SGOT) U/L  --  33   ALT (SGPT) U/L  --  15     ABG      XR Chest 1 View    Result Date: 11/23/2024  Impression: Mild pulmonary vascular congestion. Electronically Signed: Aaron Rod MD  11/23/2024 2:15 AM EST  Workstation ID: [id number]     Results for orders placed during the hospital encounter of 06/16/24    Adult Transthoracic Echo Complete W/ Cont if Necessary Per Protocol    Interpretation Summary    Left ventricular systolic function is normal. Left ventricular ejection fraction appears to be 56 - 60%.    The left ventricular cavity is small in size.    Left ventricular wall thickness is consistent with moderate concentric hypertrophy.    Left ventricular diastolic function was indeterminate.    The left atrial cavity is moderately dilated.    Left atrial volume is moderately increased.    The right atrial cavity is borderline dilated.    Estimated right ventricular systolic pressure from tricuspid regurgitation is normal (<35 mmHg).    Mild dilation of the aortic root is present. Mild dilation of the sinuses of Valsalva is present.        Assessment & Plan   Assessment and Plan:         Atrial fibrillation with RVR    ASSESSMENT:  Chronic kidney disease stage IV, with kidney biopsy consistent with hypertensive nephrosclerosis with significant glomerulosclerosis and fibrosis, s/p AVF creation.  August 5, 2024, subsequently underwent transposition of brachiobasilic AV fistula on November 11, 2023 baseline creatinine variable but roughly 3 mg/dL  Acute on Chronic  Hypertension  History of atrial fibrillation  Coronary artery disease  History of polysubstance abuse  Anemia of chronic kidney disease  History of hepatitis C    Echocardiogram from 10/20/2024 revealed ejection fraction of 61%      PLAN :     Renal function is stable at baseline  Hyperkalemia, seems like she was on Entresto at home, not  discharged on Entresto from [facility], could be contributing to hyperkalemia, recommend not to be resumed  Resume Bumex 3 mg IV 3 times daily and metolazone 2.5 mg Monday Wednesday Friday, can be discharged on current dose  Monitor potassium, now improved  No need of dialysis  Follow-up as an outpatient as already scheduled  Follow-up with vascular as already scheduled  Will continue to follow  Thanks.  [facility] records reviewed          Kg Szymanski MD  [facility]  11/23/2024  10:55 EST

## 2024-11-23 NOTE — CONSULTS
HP      Name: Scott Gaytan ADMIT: 2024   : 1975  PCP: Provider, No Known    MRN: [id number] LOS: 0 days   AGE/SEX: 49 y.o. male  ROOM: [facility]/     Chief Complaint   Patient presents with    Shortness of Breath       Subjective        History of present illness  Scott Gaytan is a 49-year-old male patient who has history of substance abuse, renal insufficiency, no history of PCI or CABG, presented to the hospital due to shortness of breath.  He was found to be in A-fib with rapid ventricular rates to his knowledge this is a new issue.    Past Medical History:   Diagnosis Date    CHF (congestive heart failure)     CKD (chronic kidney disease)     Hypertension     Substance abuse     Meth/MJ     Past Surgical History:   Procedure Laterality Date    CARDIAC CATHETERIZATION N/A 2022    ORIF TIBIA/FIBULA FRACTURES Right      Family History   Problem Relation Age of Onset    Diabetes Mother     Arthritis Mother     Hypertension Father     Heart attack Father     Seizures Sister     Heart attack Paternal Grandfather      Social History     Tobacco Use    Smoking status: Every Day     Current packs/day: 0.25     Average packs/day: 1 pack/day for 30.9 years (30.5 ttl pk-yrs)     Types: Cigarettes     Start date:      Passive exposure: Never    Smokeless tobacco: Never   Vaping Use    Vaping status: Never Used   Substance Use Topics    Alcohol use: Not Currently    Drug use: Yes     Types: Methamphetamines, Marijuana, Cocaine(coke)     Medications Prior to Admission   Medication Sig Dispense Refill Last Dose/Taking    albuterol sulfate  (90 Base) MCG/ACT inhaler Inhale 2 puffs Every 6 (Six) Hours As Needed for Wheezing.   Taking As Needed    apixaban (ELIQUIS) 5 MG tablet tablet Take 1 tablet by mouth 2 (Two) Times a Day.   Taking    aspirin 81 MG EC tablet Take 1 tablet by mouth Daily.   Taking    budesonide-formoterol (SYMBICORT) 80-4.5 MCG/ACT inhaler Inhale 2 puffs 2 (Two) Times a Day.    Taking    bumetanide (BUMEX) 1 MG tablet Take 3 tablets by mouth 3 times a day.   Taking    busPIRone (BUSPAR) 10 MG tablet Take 1 tablet by mouth 2 (Two) Times a Day.   Taking    carvedilol (COREG) 25 MG tablet Take 1 tablet by mouth 2 (Two) Times a Day With Meals.   Taking    Fluticasone-Salmeterol (Advair Diskus) 250-50 MCG/ACT DISKUS Inhale 1 puff 2 (Two) Times a Day.   Taking    isosorbide mononitrate (IMDUR) 30 MG 24 hr tablet Take 2 tablets by mouth Daily.   Taking    metOLazone (ZAROXOLYN) 2.5 MG tablet Take 1 tablet by mouth 3 (Three) Times a Week. Mon, Wed, Fri   Taking    NIFEdipine XL (PROCARDIA XL) 60 MG 24 hr tablet Take 1 tablet by mouth Daily.   Taking    nitroglycerin (NITROSTAT) 0.4 MG SL tablet Place 1 tablet under the tongue Every 5 (Five) Minutes As Needed for Chest Pain. Take no more than 3 doses in 15 minutes.   Taking As Needed    pantoprazole (PROTONIX) 40 MG EC tablet Take 1 tablet by mouth Daily.   Taking    potassium chloride (KLOR-CON M20) 20 MEQ CR tablet Take 1 tablet by mouth 3 (Three) Times a Day.   Taking    sevelamer (RENAGEL) 800 MG tablet Take 1 tablet by mouth 3 (Three) Times a Day With Meals.   Taking     Allergies:  Morphine    Review of systems    Constitutional: Negative.    Respiratory and cardiovascular: As detailed in HPI section.  Gastrointestinal: Negative for constipation, nausea and vomiting negative for abdominal distention, abdominal pain and diarrhea.   Genitourinary: Negative for difficulty urinating and flank pain.   Musculoskeletal: Negative for arthralgias, joint swelling and myalgias.   Skin: Negative for color change, rash and wound.   Neurological: Negative for dizziness, syncope, weakness and headaches.   Hematological: Negative for adenopathy.   Psychiatric/Behavioral: Negative for confusion.   All other systems reviewed and are negative.       Physical Exam  VITALS REVIEWED    General:      well developed, in no acute distress.    Head:      normocephalic  and atraumatic.    Eyes:      PERRL/EOM intact, conjunctiva and sclera clear with out nystagmus.    Neck:      no masses, thyromegaly,  trachea central with normal respiratory effort and PMI displaced laterally  Lungs:      Clear to auscultation bilaterally  Heart:       Irregular rhythm  Msk:      no deformity or scoliosis noted of thoracic or lumbar spine.    Pulses:      pulses normal in all 4 extremities.    Extremities:       No lower extremity edema  Neurologic:      no focal deficits.   alert oriented x3  Skin:      intact without lesions or rashes.    Psych:      alert and cooperative; normal mood and affect; normal attention span and concentration.      Result Review :               Pertinent cardiac workup    EKG 11/23/2024 A-fib with left anterior fascicular block, ventricular rate 130 bpm.  Echo 6/18/2024 ejection fraction 55 to 60%.  EKG 1/16/2024 normal sinus rhythm  Heart cath 4/27/2022 no significant CAD.        Assessment and Plan         Atrial fibrillation with RVR      Scott Gaytan is a 49-year-old male patient who presented to the hospital due to shortness of breath.  Patient was found to be in A-fib with rapid ventricular rates which is a new diagnosis for him.  He also has elevated creatinine of 2.8, normal TSH, potassium was 6.5 on arrival now it is 5.2.  His troponin elevation is trivial, no active chest pain, no ST elevations on EKG.  He has recent echocardiogram in June with normal EF.  He is now on IV Cardizem but still in A-fib.  I will switch that to IV amiodarone and hopefully he will convert to sinus rhythm, otherwise we can proceed with cardioversion hopefully next week.  Eliquis was added for stroke prevention.        No follow-ups on file.  Patient was given instructions and counseling regarding his condition or for health maintenance advice. Please see specific information pulled into the AVS if appropriate.        Electronically signed by Cary Espinosa MD, 11/23/24, 10:00  AM EST.

## 2024-11-23 NOTE — Clinical Note
Level of Care: Progressive Care [20]   Admitting Physician: LLANES ALVAREZ, CARLOS [[id number]]   Attending Physician: LLANES ALVAREZ, CARLOS [[id number]]

## 2024-11-24 LAB
ANION GAP SERPL CALCULATED.3IONS-SCNC: 14.6 MMOL/L (ref 5–15)
B PARAPERT DNA SPEC QL NAA+PROBE: NOT DETECTED
B PERT DNA SPEC QL NAA+PROBE: NOT DETECTED
BASOPHILS # BLD AUTO: 0.03 10*3/MM3 (ref 0–0.2)
BASOPHILS NFR BLD AUTO: 0.3 % (ref 0–1.5)
BUN SERPL-MCNC: 46 MG/DL (ref 6–20)
BUN/CREAT SERPL: 15.8 (ref 7–25)
C PNEUM DNA NPH QL NAA+NON-PROBE: NOT DETECTED
CALCIUM SPEC-SCNC: 9 MG/DL (ref 8.6–10.5)
CHLORIDE SERPL-SCNC: 100 MMOL/L (ref 98–107)
CO2 SERPL-SCNC: 23.4 MMOL/L (ref 22–29)
CREAT SERPL-MCNC: 2.92 MG/DL (ref 0.76–1.27)
DEPRECATED RDW RBC AUTO: 58 FL (ref 37–54)
EGFRCR SERPLBLD CKD-EPI 2021: 25.8 ML/MIN/1.73
EOSINOPHIL # BLD AUTO: 0.09 10*3/MM3 (ref 0–0.4)
EOSINOPHIL NFR BLD AUTO: 0.8 % (ref 0.3–6.2)
ERYTHROCYTE [DISTWIDTH] IN BLOOD BY AUTOMATED COUNT: 18.8 % (ref 12.3–15.4)
FLUAV SUBTYP SPEC NAA+PROBE: NOT DETECTED
FLUAV SUBTYP SPEC NAA+PROBE: NOT DETECTED
FLUBV RNA ISLT QL NAA+PROBE: NOT DETECTED
FLUBV RNA ISLT QL NAA+PROBE: NOT DETECTED
GLUCOSE SERPL-MCNC: 125 MG/DL (ref 65–99)
HADV DNA SPEC NAA+PROBE: NOT DETECTED
HCOV 229E RNA SPEC QL NAA+PROBE: NOT DETECTED
HCOV HKU1 RNA SPEC QL NAA+PROBE: NOT DETECTED
HCOV NL63 RNA SPEC QL NAA+PROBE: NOT DETECTED
HCOV OC43 RNA SPEC QL NAA+PROBE: NOT DETECTED
HCT VFR BLD AUTO: 30 % (ref 37.5–51)
HGB BLD-MCNC: 9.7 G/DL (ref 13–17.7)
HMPV RNA NPH QL NAA+NON-PROBE: NOT DETECTED
HPIV1 RNA ISLT QL NAA+PROBE: NOT DETECTED
HPIV2 RNA SPEC QL NAA+PROBE: NOT DETECTED
HPIV3 RNA NPH QL NAA+PROBE: NOT DETECTED
HPIV4 P GENE NPH QL NAA+PROBE: NOT DETECTED
IMM GRANULOCYTES # BLD AUTO: 0.05 10*3/MM3 (ref 0–0.05)
IMM GRANULOCYTES NFR BLD AUTO: 0.4 % (ref 0–0.5)
LYMPHOCYTES # BLD AUTO: 1.98 10*3/MM3 (ref 0.7–3.1)
LYMPHOCYTES NFR BLD AUTO: 17.6 % (ref 19.6–45.3)
M PNEUMO IGG SER IA-ACNC: NOT DETECTED
MAGNESIUM SERPL-MCNC: 1.9 MG/DL (ref 1.6–2.6)
MCH RBC QN AUTO: 27.7 PG (ref 26.6–33)
MCHC RBC AUTO-ENTMCNC: 32.3 G/DL (ref 31.5–35.7)
MCV RBC AUTO: 85.7 FL (ref 79–97)
MONOCYTES # BLD AUTO: 0.99 10*3/MM3 (ref 0.1–0.9)
MONOCYTES NFR BLD AUTO: 8.8 % (ref 5–12)
NEUTROPHILS NFR BLD AUTO: 72.1 % (ref 42.7–76)
NEUTROPHILS NFR BLD AUTO: 8.14 10*3/MM3 (ref 1.7–7)
NRBC BLD AUTO-RTO: 0 /100 WBC (ref 0–0.2)
PLATELET # BLD AUTO: 167 10*3/MM3 (ref 140–450)
PMV BLD AUTO: 10.5 FL (ref 6–12)
POTASSIUM SERPL-SCNC: 3.5 MMOL/L (ref 3.5–5.2)
QT INTERVAL: 333 MS
QT INTERVAL: 422 MS
QTC INTERVAL: 489 MS
QTC INTERVAL: 515 MS
RBC # BLD AUTO: 3.5 10*6/MM3 (ref 4.14–5.8)
RHINOVIRUS RNA SPEC NAA+PROBE: DETECTED
RSV RNA NPH QL NAA+NON-PROBE: NOT DETECTED
SARS-COV-2 RNA NPH QL NAA+NON-PROBE: NOT DETECTED
SARS-COV-2 RNA RESP QL NAA+PROBE: NOT DETECTED
SODIUM SERPL-SCNC: 138 MMOL/L (ref 136–145)
WBC NRBC COR # BLD AUTO: 11.28 10*3/MM3 (ref 3.4–10.8)

## 2024-11-24 PROCEDURE — 83735 ASSAY OF MAGNESIUM: CPT | Performed by: NURSE PRACTITIONER

## 2024-11-24 PROCEDURE — 87636 SARSCOV2 & INF A&B AMP PRB: CPT | Performed by: INTERNAL MEDICINE

## 2024-11-24 PROCEDURE — 25010000002 AMIODARONE IN DEXTROSE 5% 360-4.14 MG/200ML-% SOLUTION: Performed by: INTERNAL MEDICINE

## 2024-11-24 PROCEDURE — 93005 ELECTROCARDIOGRAM TRACING: CPT | Performed by: INTERNAL MEDICINE

## 2024-11-24 PROCEDURE — 85025 COMPLETE CBC W/AUTO DIFF WBC: CPT | Performed by: NURSE PRACTITIONER

## 2024-11-24 PROCEDURE — 93010 ELECTROCARDIOGRAM REPORT: CPT | Performed by: INTERNAL MEDICINE

## 2024-11-24 PROCEDURE — 80048 BASIC METABOLIC PNL TOTAL CA: CPT | Performed by: NURSE PRACTITIONER

## 2024-11-24 PROCEDURE — 99232 SBSQ HOSP IP/OBS MODERATE 35: CPT | Performed by: INTERNAL MEDICINE

## 2024-11-24 PROCEDURE — 25010000002 DIPHENHYDRAMINE PER 50 MG: Performed by: STUDENT IN AN ORGANIZED HEALTH CARE EDUCATION/TRAINING PROGRAM

## 2024-11-24 PROCEDURE — 25010000002 HYDRALAZINE PER 20 MG: Performed by: STUDENT IN AN ORGANIZED HEALTH CARE EDUCATION/TRAINING PROGRAM

## 2024-11-24 PROCEDURE — 25010000002 BUMETANIDE PER 0.5 MG: Performed by: INTERNAL MEDICINE

## 2024-11-24 PROCEDURE — 94799 UNLISTED PULMONARY SVC/PX: CPT

## 2024-11-24 PROCEDURE — 0202U NFCT DS 22 TRGT SARS-COV-2: CPT | Performed by: INTERNAL MEDICINE

## 2024-11-24 RX ORDER — HYDRALAZINE HYDROCHLORIDE 20 MG/ML
10 INJECTION INTRAMUSCULAR; INTRAVENOUS ONCE
Status: COMPLETED | OUTPATIENT
Start: 2024-11-24 | End: 2024-11-24

## 2024-11-24 RX ORDER — DIPHENHYDRAMINE HYDROCHLORIDE 50 MG/ML
25 INJECTION INTRAMUSCULAR; INTRAVENOUS ONCE
Status: COMPLETED | OUTPATIENT
Start: 2024-11-24 | End: 2024-11-24

## 2024-11-24 RX ORDER — CLONIDINE HYDROCHLORIDE 0.1 MG/1
0.2 TABLET ORAL ONCE
Status: COMPLETED | OUTPATIENT
Start: 2024-11-24 | End: 2024-11-24

## 2024-11-24 RX ORDER — GUAIFENESIN 600 MG/1
1200 TABLET, EXTENDED RELEASE ORAL EVERY 12 HOURS SCHEDULED
Status: DISCONTINUED | OUTPATIENT
Start: 2024-11-24 | End: 2024-11-27 | Stop reason: HOSPADM

## 2024-11-24 RX ADMIN — HYDRALAZINE HYDROCHLORIDE 10 MG: 20 INJECTION INTRAMUSCULAR; INTRAVENOUS at 04:17

## 2024-11-24 RX ADMIN — CLONIDINE HYDROCHLORIDE 0.2 MG: 0.1 TABLET ORAL at 08:39

## 2024-11-24 RX ADMIN — Medication 10 ML: at 20:54

## 2024-11-24 RX ADMIN — GUAIFENESIN 1200 MG: 600 TABLET, EXTENDED RELEASE ORAL at 20:41

## 2024-11-24 RX ADMIN — NICOTINE 1 PATCH: 21 PATCH TRANSDERMAL at 08:39

## 2024-11-24 RX ADMIN — Medication 10 ML: at 08:40

## 2024-11-24 RX ADMIN — BUMETANIDE 3 MG: 0.25 INJECTION INTRAMUSCULAR; INTRAVENOUS at 17:17

## 2024-11-24 RX ADMIN — AMIODARONE HYDROCHLORIDE 0.5 MG/MIN: 1.8 INJECTION, SOLUTION INTRAVENOUS at 03:04

## 2024-11-24 RX ADMIN — SEVELAMER CARBONATE 800 MG: 800 TABLET, FILM COATED ORAL at 08:39

## 2024-11-24 RX ADMIN — CARVEDILOL 25 MG: 25 TABLET, FILM COATED ORAL at 17:17

## 2024-11-24 RX ADMIN — APIXABAN 5 MG: 5 TABLET, FILM COATED ORAL at 20:41

## 2024-11-24 RX ADMIN — ASPIRIN 81 MG: 81 TABLET, COATED ORAL at 08:40

## 2024-11-24 RX ADMIN — SEVELAMER CARBONATE 800 MG: 800 TABLET, FILM COATED ORAL at 12:05

## 2024-11-24 RX ADMIN — APIXABAN 5 MG: 5 TABLET, FILM COATED ORAL at 08:39

## 2024-11-24 RX ADMIN — BUMETANIDE 3 MG: 0.25 INJECTION INTRAMUSCULAR; INTRAVENOUS at 08:40

## 2024-11-24 RX ADMIN — SEVELAMER CARBONATE 800 MG: 800 TABLET, FILM COATED ORAL at 17:17

## 2024-11-24 RX ADMIN — BUSPIRONE HYDROCHLORIDE 10 MG: 5 TABLET ORAL at 20:41

## 2024-11-24 RX ADMIN — BUSPIRONE HYDROCHLORIDE 10 MG: 5 TABLET ORAL at 08:39

## 2024-11-24 RX ADMIN — PANTOPRAZOLE SODIUM 40 MG: 40 TABLET, DELAYED RELEASE ORAL at 08:39

## 2024-11-24 RX ADMIN — BUMETANIDE 3 MG: 0.25 INJECTION INTRAMUSCULAR; INTRAVENOUS at 20:41

## 2024-11-24 RX ADMIN — CARVEDILOL 25 MG: 25 TABLET, FILM COATED ORAL at 05:36

## 2024-11-24 RX ADMIN — ISOSORBIDE MONONITRATE 60 MG: 30 TABLET, EXTENDED RELEASE ORAL at 08:40

## 2024-11-24 RX ADMIN — DIPHENHYDRAMINE HYDROCHLORIDE 25 MG: 50 INJECTION, SOLUTION INTRAMUSCULAR; INTRAVENOUS at 05:37

## 2024-11-24 NOTE — PROGRESS NOTES
PROGRESS NOTE      Patient Name: Scott Gaytan  : 1975  MRN: [id number]  Primary Care Physician: Provider, No Known  Date of admission: 2024    Patient Care Team:  Provider, No Known as PCP - General        Subjective   Subjective:     Patient seen and examined alert awake no distress  Review of systems:  No complaints of any fever chills chest pain shortness of breath better      Allergies:    Allergies   Allergen Reactions    Morphine Other (See Comments)     Makes it feel like my blood is boiling in my veins       Objective   Exam:     Vital Signs  Temp:  [97.5 °F (36.4 °C)-98.2 °F (36.8 °C)] 97.5 °F (36.4 °C)  Heart Rate:  [] 114  Resp:  [17-28] 28  BP: (137-182)/() 146/100  SpO2:  [94 %-99 %] 98 %  on   ;   Device (Oxygen Therapy): room air  Body mass index is 25.14 kg/m².    General: No acute distress  Head:      Normocephalic and atraumatic.    Eyes:      PERRL/EOM intact, conjunctivae and sclerae clear without nystagmus.    Neck:      No masses, thyromegaly,  trachea central with normal respiratory effort   Lungs:    Clear bilaterally to auscultation.    Heart:      Regular rate and rhythm, no murmur no gallop  Abd:        Soft, nontender, not distended, bowel sounds positive, no shifting dullness   Pulses:   Pulses palpable  Extr:        No cyanosis or clubbing--no edema.    Neuro:    No focal deficits.   alert oriented x3  Skin:       Intact without lesions or rashes.    Psych:    Alert and cooperative; normal mood and affect; .      Results Review:  I have personally reviewed most recent Data :  CBC    Results from last 7 days   Lab Units 24  0642 24  2324   WBC 10*3/mm3 11.28* 12.39*   HEMOGLOBIN g/dL 9.7* 11.7*   PLATELETS 10*3/mm3 167 212     CMP   Results from last 7 days   Lab Units 24  0642 24  0646 24  0342   SODIUM mmol/L 138 132* 135*   POTASSIUM mmol/L 3.5 5.2 6.5*   CHLORIDE mmol/L 100 100 101   CO2 mmol/L 23.4 16.9* 19.4*   BUN mg/dL  46* 47* 48*   CREATININE mg/dL 2.92* 2.87* 3.06*   GLUCOSE mg/dL 125* 96 118*   ALBUMIN g/dL  --   --  3.8   BILIRUBIN mg/dL  --   --  0.6   ALK PHOS U/L  --   --  46   AST (SGOT) U/L  --   --  33   ALT (SGPT) U/L  --   --  15     ABG      XR Chest 1 View    Result Date: 11/23/2024  Impression: Mild pulmonary vascular congestion. Electronically Signed: Aaron Rod MD  11/23/2024 2:15 AM EST  Workstation ID: [id number]     Results for orders placed during the hospital encounter of 06/16/24    Adult Transthoracic Echo Complete W/ Cont if Necessary Per Protocol    Interpretation Summary    Left ventricular systolic function is normal. Left ventricular ejection fraction appears to be 56 - 60%.    The left ventricular cavity is small in size.    Left ventricular wall thickness is consistent with moderate concentric hypertrophy.    Left ventricular diastolic function was indeterminate.    The left atrial cavity is moderately dilated.    Left atrial volume is moderately increased.    The right atrial cavity is borderline dilated.    Estimated right ventricular systolic pressure from tricuspid regurgitation is normal (<35 mmHg).    Mild dilation of the aortic root is present. Mild dilation of the sinuses of Valsalva is present.    Scheduled Meds:apixaban, 5 mg, Oral, BID  aspirin, 81 mg, Oral, Daily  budesonide-formoterol, 2 puff, Inhalation, BID - RT  bumetanide, 3 mg, Intravenous, TID  busPIRone, 10 mg, Oral, BID  carvedilol, 25 mg, Oral, BID With Meals  isosorbide mononitrate, 60 mg, Oral, Daily  [START ON 11/25/2024] metOLazone, 2.5 mg, Oral, Once per day on Monday Wednesday Friday  nicotine, 1 patch, Transdermal, Q24H  pantoprazole, 40 mg, Oral, Daily  sevelamer, 800 mg, Oral, TID With Meals  sodium chloride, 10 mL, Intravenous, Q12H      Continuous Infusions:amiodarone, 0.5 mg/min, Last Rate: 0.5 mg/min (11/24/24 0304)      PRN Meds:  acetaminophen    senna-docusate sodium **AND** polyethylene glycol **AND** bisacodyl  **AND** bisacodyl    ipratropium-albuterol    nitroglycerin    ondansetron    [COMPLETED] Insert Peripheral IV **AND** sodium chloride    sodium chloride    sodium chloride    Assessment & Plan   Assessment and Plan:         Atrial fibrillation with RVR    ASSESSMENT:  Chronic kidney disease stage IV, with kidney biopsy consistent with hypertensive nephrosclerosis with significant glomerulosclerosis and fibrosis, s/p AVF creation.  August 5, 2024, subsequently underwent transposition of brachiobasilic AV fistula on November 11, 2023 baseline creatinine variable but roughly 3 mg/d  Acute on Chronic heart failure  Hypertension  New onset A-fib as per cardiology   coronary artery disease  History of polysubstance abuse  Anemia of chronic kidney disease  History of hepatitis C    Echocardiogram from 10/20/2024 revealed ejection fraction of 61%     Plan :      Renal function is stable at baseline  Hyperkalemia, seems like he was on Entresto at home, not discharged on Entresto from [facility], could be contributing to hyperkalemia, recommend not to be resumed   hyperkalemia resolved  Continue Bumex 3 mg IV 3 times daily and metolazone 2.5 mg Monday Wednesday Friday, can be discharged on current dose  Can be switched to oral diuretics next 24 to 48 hours  May need potassium supplements  No need of dialysis  Follow-up as an outpatient as already scheduled  Follow-up with vascular as already scheduled  Cardiology evaluation noted plan for cardioversion  [facility] records reviewed  Will continue to follow        Electronically signed by Kg Szymanski MD,   [facility]  [phone]  11/24/2024  11:05 EST

## 2024-11-24 NOTE — PROGRESS NOTES
[facility] MEDICINE SERVICE  DAILY PROGRESS NOTE    NAME: Scott Gaytan  : 1975  MRN: [id number]      LOS: 1 day     PROVIDER OF SERVICE: Alex Bower MD    Chief Complaint: Atrial fibrillation with RVR    Subjective:     Interval History:  History taken from: patient  Patient Complaints:   Remains on amiodarone drip, denied having any palpitation or chest pain, although his heart rate is controlled however he remains in A-fib    Objective:     Vital Signs  Temp:  [97.5 °F (36.4 °C)-98.2 °F (36.8 °C)] 97.5 °F (36.4 °C)  Heart Rate:  [] 114  Resp:  [17-28] 28  BP: (137-182)/() 146/100   Body mass index is 25.14 kg/m².    Physical Exam  General Appearance:  Alert, cooperative, no distress, appears stated age  Head:   Normocephalic, without obvious abnormality, atraumatic  Eyes:   PERRL, conjunctiva/corneas clear, EOM's intact, fundi benign, both eyes  Ears:    Normal TM's and external ear canals, both ears  Nose:   Nares normal, septum midline, mucosa normal, no drainage or sinus tenderness  Throat: Lips, mucosa, and tongue normal; teeth and gums normal  Neck:   Supple, symmetrical, trachea midline, no adenopathy, thyroid: not enlarged, symmetric, no tenderness/mass/nodules, no carotid bruit or JVD  Lungs:             Clear to auscultation bilaterally, respirations unlabored  Heart:  Regular rate and rhythm, S1, S2 normal, no murmur, rub or gallop  Abdomen:  Soft, non-tender, bowel sounds active all four quadrants,  no masses, no organomegaly  Extremities:     Extremities normal, atraumatic, no cyanosis or edema  Pulses:            2+ and symmetric  Skin:    Skin color, texture, turgor normal, no rashes or lesions  Neurologic: Normal    Scheduled Meds   apixaban, 5 mg, Oral, BID  aspirin, 81 mg, Oral, Daily  budesonide-formoterol, 2 puff, Inhalation, BID - RT  bumetanide, 3 mg, Intravenous, TID  busPIRone, 10 mg, Oral, BID  carvedilol, 25 mg, Oral, BID With Meals  isosorbide mononitrate,  60 mg, Oral, Daily  [START ON 11/25/2024] metOLazone, 2.5 mg, Oral, Once per day on Monday Wednesday Friday  nicotine, 1 patch, Transdermal, Q24H  pantoprazole, 40 mg, Oral, Daily  sevelamer, 800 mg, Oral, TID With Meals  sodium chloride, 10 mL, Intravenous, Q12H       PRN Meds     acetaminophen    senna-docusate sodium **AND** polyethylene glycol **AND** bisacodyl **AND** bisacodyl    ipratropium-albuterol    nitroglycerin    ondansetron    [COMPLETED] Insert Peripheral IV **AND** sodium chloride    sodium chloride    sodium chloride   Infusions  amiodarone, 0.5 mg/min, Last Rate: 0.5 mg/min (11/24/24 0304)          Diagnostic Data    Results from last 7 days   Lab Units 11/24/24  0642 11/23/24  0646 11/23/24  0342   WBC 10*3/mm3 11.28*  --   --    HEMOGLOBIN g/dL 9.7*  --   --    HEMATOCRIT % 30.0*  --   --    PLATELETS 10*3/mm3 167  --   --    GLUCOSE mg/dL 125*   < > 118*   CREATININE mg/dL 2.92*   < > 3.06*   BUN mg/dL 46*   < > 48*   SODIUM mmol/L 138   < > 135*   POTASSIUM mmol/L 3.5   < > 6.5*   AST (SGOT) U/L  --   --  33   ALT (SGPT) U/L  --   --  15   ALK PHOS U/L  --   --  46   BILIRUBIN mg/dL  --   --  0.6   ANION GAP mmol/L 14.6   < > 14.6    < > = values in this interval not displayed.       XR Chest 1 View    Result Date: 11/23/2024  Impression: Mild pulmonary vascular congestion. Electronically Signed: Aaron Rod MD  11/23/2024 2:15 AM EST  Workstation ID: [id number]       I reviewed the patient's new clinical results.    Assessment/Plan:     Active and Resolved Problems  Active Hospital Problems    Diagnosis  POA    **Atrial fibrillation with RVR [I48.91]  Yes      Resolved Hospital Problems   No resolved problems to display.         Atrial Fibrillation with RVR  -New events  -Initially started on IV Cardizem which was switched to IV amiodarone by cardiology yesterday  -Echocardiogram of June of this year showed an EF of 56 to 60%  -By this morning patient remains on amiodarone, rhythm remains  A-fib however rate is controlled in the mid 90s  -At this point patient is going to require GYPSY and cardioversion  -N.p.o. after midnight  -On anticoagulation with Eliquis  -Appreciate cardiology evaluation and input       Hyperkalemia  in setting of CKD stage 4:  -Resolved  -Likely secondary to Entresto that he was started on recently  -Upon discharge will keep patient off Entresto     Acute on chronic HFpEF:  -presented with sudden onset of dyspnea few hours PTA  -endorses compliance with home Bumex.   -proBNP elevated at [id number]  -Aggressive diuresis with Bumex 3 mg IV every 8 hours along with metolazone, appreciate nephrology input, please refer to their note    Elevated Troponin  -no CP to suggest ACS  -EKG without BINDU or definite ischemic changes  -mild elevation in troponin likely secondary to demand ischemia from acute cardiac stressors as above  -obtain serial troponins to ensure flat or downward trend--initial 37  -telemetry.      COPD:  -faint expiratory wheezing on exam  -continue home bronchodilator treatments  -DuoNebs PRN.   -does not wear home oxygen.      Nicotine dependence:  -nicotine patch daily.     VTE Prophylaxis:  Pharmacologic & mechanical VTE prophylaxis orders are present.         Code status is   Code Status and Medical Interventions: CPR (Attempt to Resuscitate); Full Support   Ordered at: 11/23/24 0451     Level Of Support Discussed With:    Patient     Code Status (Patient has no pulse and is not breathing):    CPR (Attempt to Resuscitate)     Medical Interventions (Patient has pulse or is breathing):    Full Support       Plan for disposition: 1-2 days based on clinical course and patient condition after cardioversion tomorrow could potentially be discharged tomorrow afternoon if the procedure was done early in the morning    Time: 30 minutes    Signature: Electronically signed by Alex Bower MD, 11/24/24, 11:24 EST.  [facility] Hospitalist Team

## 2024-11-24 NOTE — PROGRESS NOTES
"    Reason for follow-up: A-fib     Patient Care Team:  Provider, No Known as PCP - General    Subjective .   Scott Gaytan is doing fair today     ROS    Morphine    Scheduled Meds:apixaban, 5 mg, Oral, BID  aspirin, 81 mg, Oral, Daily  budesonide-formoterol, 2 puff, Inhalation, BID - RT  bumetanide, 3 mg, Intravenous, TID  busPIRone, 10 mg, Oral, BID  carvedilol, 25 mg, Oral, BID With Meals  isosorbide mononitrate, 60 mg, Oral, Daily  [START ON 11/25/2024] metOLazone, 2.5 mg, Oral, Once per day on Monday Wednesday Friday  nicotine, 1 patch, Transdermal, Q24H  pantoprazole, 40 mg, Oral, Daily  sevelamer, 800 mg, Oral, TID With Meals  sodium chloride, 10 mL, Intravenous, Q12H      Continuous Infusions:amiodarone, 0.5 mg/min, Last Rate: 0.5 mg/min (11/24/24 0304)      PRN Meds:.  acetaminophen    senna-docusate sodium **AND** polyethylene glycol **AND** bisacodyl **AND** bisacodyl    ipratropium-albuterol    nitroglycerin    ondansetron    [COMPLETED] Insert Peripheral IV **AND** sodium chloride    sodium chloride    sodium chloride      VITAL SIGNS  Vitals:    11/24/24 0536 11/24/24 0600 11/24/24 0630 11/24/24 0745   BP: (!) 167/117  (!) 169/106 146/100   BP Location:    Right arm   Patient Position:    Lying   Pulse:  106 97 114   Resp:    28   Temp:    97.5 °F (36.4 °C)   TempSrc:    Oral   SpO2:  98% 96% 98%   Weight:       Height:           Flowsheet Rows      Flowsheet Row First Filed Value   Admission Height 170.2 cm (67\") Documented at 11/23/2024 0137   Admission Weight 72.8 kg (160 lb 7.9 oz) Documented at 11/23/2024 0137               Physical Exam  VITALS REVIEWED    General:      well developed, in no acute distress.    Head:      normocephalic and atraumatic.    Eyes:      PERRL/EOM intact, conjunctiva and sclera clear with out nystagmus.    Neck:      no masses, thyromegaly,  trachea central with normal respiratory effort and PMI displaced laterally  Lungs:      Clear  Heart:       Irregular " rhythm  Msk:      no deformity or scoliosis noted of thoracic or lumbar spine.    Pulses:      pulses normal in all 4 extremities.    Extremities:       No lower extremity edema  Neurologic:      no focal deficits.   alert oriented x3  Skin:      intact without lesions or rashes.    Psych:      alert and cooperative; normal mood and affect; normal attention span and concentration.          LAB RESULTS (LAST 7 DAYS)    CBC  Results from last 7 days   Lab Units 11/24/24  0642 11/22/24  2324   WBC 10*3/mm3 11.28* 12.39*   RBC 10*6/mm3 3.50* 4.26   HEMOGLOBIN g/dL 9.7* 11.7*   HEMATOCRIT % 30.0* 38.1   MCV fL 85.7 89.4   PLATELETS 10*3/mm3 167 212       BMP  Results from last 7 days   Lab Units 11/24/24  0642 11/23/24  0646 11/23/24  0342   SODIUM mmol/L 138 132* 135*   POTASSIUM mmol/L 3.5 5.2 6.5*   CHLORIDE mmol/L 100 100 101   CO2 mmol/L 23.4 16.9* 19.4*   BUN mg/dL 46* 47* 48*   CREATININE mg/dL 2.92* 2.87* 3.06*   GLUCOSE mg/dL 125* 96 118*   MAGNESIUM mg/dL 1.9  --  2.1   PHOSPHORUS mg/dL  --   --  3.9       CMP   Results from last 7 days   Lab Units 11/24/24  0642 11/23/24  0646 11/23/24  0342   SODIUM mmol/L 138 132* 135*   POTASSIUM mmol/L 3.5 5.2 6.5*   CHLORIDE mmol/L 100 100 101   CO2 mmol/L 23.4 16.9* 19.4*   BUN mg/dL 46* 47* 48*   CREATININE mg/dL 2.92* 2.87* 3.06*   GLUCOSE mg/dL 125* 96 118*   ALBUMIN g/dL  --   --  3.8   BILIRUBIN mg/dL  --   --  0.6   ALK PHOS U/L  --   --  46   AST (SGOT) U/L  --   --  33   ALT (SGPT) U/L  --   --  15         BNP        TROPONIN  Results from last 7 days   Lab Units 11/23/24  0646   HSTROP T ng/L 33*       CoAg        Creatinine Clearance  Estimated Creatinine Clearance: 32.2 mL/min (A) (by C-G formula based on SCr of 2.92 mg/dL (H)).    ABG          EKG    I personally reviewed the patient's EKG/Telemetry data: A-fib      Assessment & Plan       Atrial fibrillation with RVR      Scott FLORES Lucila is a 49-year-old male patient who presented to the hospital due to  shortness of breath.  Patient was found to be in A-fib with rapid ventricular rates which is a new diagnosis for him.  He also has elevated creatinine of 2.8, normal TSH, potassium was 6.5 on arrival now it is 5.2.  His troponin elevation is trivial, no active chest pain, no ST elevations on EKG.  He has recent echocardiogram in June with normal EF.  He is now on IV Cardizem but still in A-fib.  I will switch that to IV amiodarone and hopefully he will convert to sinus rhythm, otherwise we can proceed with cardioversion hopefully next week.  Eliquis was added for stroke prevention.       11/24/2024    Patient remains in atrial fibrillation, continue amiodarone drip.  Keep n.p.o. after midnight for possible GYPSY cardioversion.    I discussed the patients findings and my recommendations with patient and agrees with outlined plan.    Cary Espinosa MD  11/24/24  09:39 EST      Electronically signed by Cary Espinosa MD, 11/24/24, 9:40 AM EST.

## 2024-11-25 ENCOUNTER — ANESTHESIA EVENT (OUTPATIENT)
Dept: CARDIOLOGY | Facility: HOSPITAL | Age: 49
End: 2024-11-25
Payer: MEDICAID

## 2024-11-25 ENCOUNTER — APPOINTMENT (OUTPATIENT)
Dept: CARDIOLOGY | Facility: HOSPITAL | Age: 49
End: 2024-11-25
Payer: MEDICAID

## 2024-11-25 ENCOUNTER — ANESTHESIA (OUTPATIENT)
Dept: CARDIOLOGY | Facility: HOSPITAL | Age: 49
End: 2024-11-25
Payer: MEDICAID

## 2024-11-25 LAB
ANION GAP SERPL CALCULATED.3IONS-SCNC: 13.8 MMOL/L (ref 5–15)
BASOPHILS # BLD AUTO: 0.02 10*3/MM3 (ref 0–0.2)
BASOPHILS NFR BLD AUTO: 0.2 % (ref 0–1.5)
BUN SERPL-MCNC: 42 MG/DL (ref 6–20)
BUN/CREAT SERPL: 14 (ref 7–25)
CALCIUM SPEC-SCNC: 8.8 MG/DL (ref 8.6–10.5)
CHLORIDE SERPL-SCNC: 100 MMOL/L (ref 98–107)
CO2 SERPL-SCNC: 26.2 MMOL/L (ref 22–29)
CREAT SERPL-MCNC: 2.99 MG/DL (ref 0.76–1.27)
DEPRECATED RDW RBC AUTO: 59.9 FL (ref 37–54)
EGFRCR SERPLBLD CKD-EPI 2021: 25.1 ML/MIN/1.73
EOSINOPHIL # BLD AUTO: 0.17 10*3/MM3 (ref 0–0.4)
EOSINOPHIL NFR BLD AUTO: 1.8 % (ref 0.3–6.2)
ERYTHROCYTE [DISTWIDTH] IN BLOOD BY AUTOMATED COUNT: 19.3 % (ref 12.3–15.4)
GLUCOSE SERPL-MCNC: 111 MG/DL (ref 65–99)
HCT VFR BLD AUTO: 31.5 % (ref 37.5–51)
HGB BLD-MCNC: 9.6 G/DL (ref 13–17.7)
IMM GRANULOCYTES # BLD AUTO: 0.03 10*3/MM3 (ref 0–0.05)
IMM GRANULOCYTES NFR BLD AUTO: 0.3 % (ref 0–0.5)
LYMPHOCYTES # BLD AUTO: 1.79 10*3/MM3 (ref 0.7–3.1)
LYMPHOCYTES NFR BLD AUTO: 19.2 % (ref 19.6–45.3)
MAGNESIUM SERPL-MCNC: 1.8 MG/DL (ref 1.6–2.6)
MAGNESIUM SERPL-MCNC: 1.9 MG/DL (ref 1.6–2.6)
MCH RBC QN AUTO: 26.5 PG (ref 26.6–33)
MCHC RBC AUTO-ENTMCNC: 30.5 G/DL (ref 31.5–35.7)
MCV RBC AUTO: 87 FL (ref 79–97)
MONOCYTES # BLD AUTO: 0.96 10*3/MM3 (ref 0.1–0.9)
MONOCYTES NFR BLD AUTO: 10.3 % (ref 5–12)
NEUTROPHILS NFR BLD AUTO: 6.35 10*3/MM3 (ref 1.7–7)
NEUTROPHILS NFR BLD AUTO: 68.2 % (ref 42.7–76)
NRBC BLD AUTO-RTO: 0 /100 WBC (ref 0–0.2)
PLATELET # BLD AUTO: 168 10*3/MM3 (ref 140–450)
PMV BLD AUTO: 10.2 FL (ref 6–12)
POTASSIUM SERPL-SCNC: 3 MMOL/L (ref 3.5–5.2)
POTASSIUM SERPL-SCNC: 3.2 MMOL/L (ref 3.5–5.2)
POTASSIUM SERPL-SCNC: 3.3 MMOL/L (ref 3.5–5.2)
QT INTERVAL: 414 MS
QT INTERVAL: 470 MS
QTC INTERVAL: 449 MS
QTC INTERVAL: 507 MS
RBC # BLD AUTO: 3.62 10*6/MM3 (ref 4.14–5.8)
SODIUM SERPL-SCNC: 140 MMOL/L (ref 136–145)
WBC NRBC COR # BLD AUTO: 9.32 10*3/MM3 (ref 3.4–10.8)

## 2024-11-25 PROCEDURE — 83735 ASSAY OF MAGNESIUM: CPT | Performed by: NURSE PRACTITIONER

## 2024-11-25 PROCEDURE — 93321 DOPPLER ECHO F-UP/LMTD STD: CPT | Performed by: INTERNAL MEDICINE

## 2024-11-25 PROCEDURE — 25010000002 PROPOFOL 10 MG/ML EMULSION: Performed by: NURSE ANESTHETIST, CERTIFIED REGISTERED

## 2024-11-25 PROCEDURE — 99232 SBSQ HOSP IP/OBS MODERATE 35: CPT | Performed by: INTERNAL MEDICINE

## 2024-11-25 PROCEDURE — 93005 ELECTROCARDIOGRAM TRACING: CPT | Performed by: INTERNAL MEDICINE

## 2024-11-25 PROCEDURE — 80048 BASIC METABOLIC PNL TOTAL CA: CPT | Performed by: NURSE PRACTITIONER

## 2024-11-25 PROCEDURE — 25010000002 BUMETANIDE PER 0.5 MG: Performed by: INTERNAL MEDICINE

## 2024-11-25 PROCEDURE — 92960 CARDIOVERSION ELECTRIC EXT: CPT

## 2024-11-25 PROCEDURE — 93010 ELECTROCARDIOGRAM REPORT: CPT | Performed by: INTERNAL MEDICINE

## 2024-11-25 PROCEDURE — 94799 UNLISTED PULMONARY SVC/PX: CPT

## 2024-11-25 PROCEDURE — 84132 ASSAY OF SERUM POTASSIUM: CPT | Performed by: INTERNAL MEDICINE

## 2024-11-25 PROCEDURE — 93325 DOPPLER ECHO COLOR FLOW MAPG: CPT | Performed by: INTERNAL MEDICINE

## 2024-11-25 PROCEDURE — 85025 COMPLETE CBC W/AUTO DIFF WBC: CPT | Performed by: NURSE PRACTITIONER

## 2024-11-25 PROCEDURE — 93320 DOPPLER ECHO COMPLETE: CPT

## 2024-11-25 PROCEDURE — 93321 DOPPLER ECHO F-UP/LMTD STD: CPT

## 2024-11-25 PROCEDURE — 83735 ASSAY OF MAGNESIUM: CPT | Performed by: INTERNAL MEDICINE

## 2024-11-25 PROCEDURE — 93312 ECHO TRANSESOPHAGEAL: CPT | Performed by: INTERNAL MEDICINE

## 2024-11-25 PROCEDURE — 25010000002 LIDOCAINE PF 2% 2 % SOLUTION: Performed by: NURSE ANESTHETIST, CERTIFIED REGISTERED

## 2024-11-25 PROCEDURE — 25010000002 PROPOFOL 1000 MG/100ML EMULSION: Performed by: NURSE ANESTHETIST, CERTIFIED REGISTERED

## 2024-11-25 PROCEDURE — 99222 1ST HOSP IP/OBS MODERATE 55: CPT | Performed by: STUDENT IN AN ORGANIZED HEALTH CARE EDUCATION/TRAINING PROGRAM

## 2024-11-25 PROCEDURE — 93312 ECHO TRANSESOPHAGEAL: CPT

## 2024-11-25 PROCEDURE — 93325 DOPPLER ECHO COLOR FLOW MAPG: CPT

## 2024-11-25 PROCEDURE — 92960 CARDIOVERSION ELECTRIC EXT: CPT | Performed by: INTERNAL MEDICINE

## 2024-11-25 RX ORDER — OXYCODONE HYDROCHLORIDE 5 MG/1
7.5 TABLET ORAL EVERY 4 HOURS PRN
Status: DISCONTINUED | OUTPATIENT
Start: 2024-11-25 | End: 2024-11-27 | Stop reason: HOSPADM

## 2024-11-25 RX ORDER — LIDOCAINE HYDROCHLORIDE 20 MG/ML
INJECTION, SOLUTION EPIDURAL; INFILTRATION; INTRACAUDAL; PERINEURAL AS NEEDED
Status: DISCONTINUED | OUTPATIENT
Start: 2024-11-25 | End: 2024-11-25 | Stop reason: SURG

## 2024-11-25 RX ORDER — POTASSIUM CHLORIDE 1500 MG/1
40 TABLET, EXTENDED RELEASE ORAL ONCE
Status: DISCONTINUED | OUTPATIENT
Start: 2024-11-25 | End: 2024-11-25

## 2024-11-25 RX ORDER — POTASSIUM CHLORIDE 1500 MG/1
20 TABLET, EXTENDED RELEASE ORAL ONCE
Status: COMPLETED | OUTPATIENT
Start: 2024-11-25 | End: 2024-11-25

## 2024-11-25 RX ORDER — POTASSIUM CHLORIDE 1500 MG/1
40 TABLET, EXTENDED RELEASE ORAL ONCE
Status: COMPLETED | OUTPATIENT
Start: 2024-11-25 | End: 2024-11-25

## 2024-11-25 RX ORDER — SODIUM CHLORIDE 9 MG/ML
INJECTION, SOLUTION INTRAVENOUS CONTINUOUS PRN
Status: DISCONTINUED | OUTPATIENT
Start: 2024-11-25 | End: 2024-11-25 | Stop reason: SURG

## 2024-11-25 RX ORDER — OXYMETAZOLINE HYDROCHLORIDE 0.05 G/100ML
2 SPRAY NASAL 2 TIMES DAILY PRN
Status: DISCONTINUED | OUTPATIENT
Start: 2024-11-25 | End: 2024-11-27 | Stop reason: HOSPADM

## 2024-11-25 RX ORDER — OXYCODONE AND ACETAMINOPHEN 7.5; 325 MG/1; MG/1
1 TABLET ORAL EVERY 8 HOURS PRN
COMMUNITY

## 2024-11-25 RX ORDER — PSEUDOEPHEDRINE HCL 30 MG/1
30 TABLET, FILM COATED ORAL EVERY 4 HOURS PRN
Status: DISCONTINUED | OUTPATIENT
Start: 2024-11-25 | End: 2024-11-27 | Stop reason: HOSPADM

## 2024-11-25 RX ORDER — PROPOFOL 10 MG/ML
INJECTION, EMULSION INTRAVENOUS AS NEEDED
Status: DISCONTINUED | OUTPATIENT
Start: 2024-11-25 | End: 2024-11-25 | Stop reason: SURG

## 2024-11-25 RX ADMIN — ISOSORBIDE MONONITRATE 60 MG: 30 TABLET, EXTENDED RELEASE ORAL at 09:05

## 2024-11-25 RX ADMIN — BUDESONIDE AND FORMOTEROL FUMARATE DIHYDRATE 2 PUFF: 160; 4.5 AEROSOL RESPIRATORY (INHALATION) at 07:32

## 2024-11-25 RX ADMIN — PROPOFOL INJECTABLE EMULSION 50 MG: 10 INJECTION, EMULSION INTRAVENOUS at 13:56

## 2024-11-25 RX ADMIN — BUDESONIDE AND FORMOTEROL FUMARATE DIHYDRATE 2 PUFF: 160; 4.5 AEROSOL RESPIRATORY (INHALATION) at 19:53

## 2024-11-25 RX ADMIN — OXYCODONE 7.5 MG: 5 TABLET ORAL at 23:23

## 2024-11-25 RX ADMIN — CARVEDILOL 25 MG: 25 TABLET, FILM COATED ORAL at 17:46

## 2024-11-25 RX ADMIN — PSEUDOEPHEDRINE HCL 30 MG: 30 TABLET, FILM COATED ORAL at 00:44

## 2024-11-25 RX ADMIN — APIXABAN 5 MG: 5 TABLET, FILM COATED ORAL at 20:42

## 2024-11-25 RX ADMIN — SEVELAMER CARBONATE 800 MG: 800 TABLET, FILM COATED ORAL at 09:11

## 2024-11-25 RX ADMIN — OXYCODONE 7.5 MG: 5 TABLET ORAL at 17:46

## 2024-11-25 RX ADMIN — SODIUM CHLORIDE: 9 INJECTION, SOLUTION INTRAVENOUS at 13:45

## 2024-11-25 RX ADMIN — OXYMETAZOLINE HYDROCHLORIDE 2 SPRAY: 0.5 SPRAY NASAL at 04:40

## 2024-11-25 RX ADMIN — GUAIFENESIN 1200 MG: 600 TABLET, EXTENDED RELEASE ORAL at 09:01

## 2024-11-25 RX ADMIN — BUMETANIDE 3 MG: 0.25 INJECTION INTRAMUSCULAR; INTRAVENOUS at 16:37

## 2024-11-25 RX ADMIN — METOLAZONE 2.5 MG: 2.5 TABLET ORAL at 09:06

## 2024-11-25 RX ADMIN — SEVELAMER CARBONATE 800 MG: 800 TABLET, FILM COATED ORAL at 15:24

## 2024-11-25 RX ADMIN — BUMETANIDE 3 MG: 0.25 INJECTION INTRAMUSCULAR; INTRAVENOUS at 09:02

## 2024-11-25 RX ADMIN — CARVEDILOL 25 MG: 25 TABLET, FILM COATED ORAL at 09:05

## 2024-11-25 RX ADMIN — APIXABAN 5 MG: 5 TABLET, FILM COATED ORAL at 09:06

## 2024-11-25 RX ADMIN — PANTOPRAZOLE SODIUM 40 MG: 40 TABLET, DELAYED RELEASE ORAL at 09:06

## 2024-11-25 RX ADMIN — PSEUDOEPHEDRINE HCL 30 MG: 30 TABLET, FILM COATED ORAL at 23:37

## 2024-11-25 RX ADMIN — BUSPIRONE HYDROCHLORIDE 10 MG: 5 TABLET ORAL at 20:42

## 2024-11-25 RX ADMIN — OXYMETAZOLINE HYDROCHLORIDE 2 SPRAY: 0.5 SPRAY NASAL at 23:23

## 2024-11-25 RX ADMIN — LIDOCAINE HYDROCHLORIDE 60 MG: 20 INJECTION, SOLUTION EPIDURAL; INFILTRATION; INTRACAUDAL; PERINEURAL at 13:56

## 2024-11-25 RX ADMIN — Medication 10 ML: at 20:42

## 2024-11-25 RX ADMIN — POTASSIUM CHLORIDE 20 MEQ: 1500 TABLET, EXTENDED RELEASE ORAL at 04:40

## 2024-11-25 RX ADMIN — NICOTINE 1 PATCH: 21 PATCH TRANSDERMAL at 09:08

## 2024-11-25 RX ADMIN — SACUBITRIL AND VALSARTAN 1 TABLET: 24; 26 TABLET, FILM COATED ORAL at 10:59

## 2024-11-25 RX ADMIN — BUSPIRONE HYDROCHLORIDE 10 MG: 5 TABLET ORAL at 09:05

## 2024-11-25 RX ADMIN — GUAIFENESIN 1200 MG: 600 TABLET, EXTENDED RELEASE ORAL at 20:42

## 2024-11-25 RX ADMIN — Medication 10 ML: at 09:11

## 2024-11-25 RX ADMIN — POTASSIUM CHLORIDE 40 MEQ: 1500 TABLET, EXTENDED RELEASE ORAL at 20:42

## 2024-11-25 RX ADMIN — PROPOFOL 200 MCG/KG/MIN: 10 INJECTION, EMULSION INTRAVENOUS at 13:56

## 2024-11-25 RX ADMIN — BUMETANIDE 3 MG: 0.25 INJECTION INTRAMUSCULAR; INTRAVENOUS at 20:41

## 2024-11-25 RX ADMIN — ASPIRIN 81 MG: 81 TABLET, COATED ORAL at 09:05

## 2024-11-25 NOTE — PAYOR COMM NOTE
"Scott Gaytan (49 y.o. Male)       Date of Birth   1975    Social Security Number       Address   [address] [place] [address]    Home Phone   [phone]    MRN   [id number]       Druze   Christian    Marital Status   Single                            Admission Date   24    Admission Type   Emergency    Admitting Provider   Llanes Alvarez, Carlos, MD    Attending Provider   Judith Scanlon MD    Department, Room/Bed   [facility] CARDIOVASCULAR [facility]ARE UNIT, 3109/       Discharge Date       Discharge Disposition       Discharge Destination                                 Attending Provider: Judith Scanlon MD    Allergies: Morphine    Isolation: Droplet   Infection: Rhinovirus  (24)   Code Status: CPR    Ht: 170.2 cm (67\")   Wt: 72.2 kg (159 lb 2.8 oz)    Admission Cmt: None   Principal Problem: Atrial fibrillation with RVR [I48.91]                   Active Insurance as of 2024       Primary Coverage       Payor Plan Insurance Group Employer/Plan Group    [id number]       Payor Plan Address Payor Plan Phone Number Payor Plan Fax Number Effective Dates    MAIL STOP:   2021 - None Entered    [address] [address]       [facility] [id number]         Subscriber Name Subscriber Birth Date Member ID       SCOTT GAYTAN 1975 [id number]                     Emergency Contacts        (Rel.) Home Phone Work Phone Mobile Phone    KARMA JASSO (Sister) [phone] -- [phone]    BELINDA ALVARADO (Significant Other) [phone] -- [phone]                 History & Physical        Caro Arango APRN at 24 0451       Attestation signed by Llanes Alvarez, Carlos, MD at 24    I have reviewed this documentation and agree.                      [facility]  History & Physical    Patient Name: Scott Gaytan  : 1975  MRN: " [id number]  Primary Care Physician:  Provider, No Known  Date of admission: 11/23/2024  Date and Time of Service: 11/23/2024 at 0435    Subjective      Chief Complaint: shortness of breath    History of Present Illness: Scott Gaytan is a 49 y.o. male with a CMH of substance abuse, HTN, chronic HF, CKD stage , nicotine dependence, hepatitis C, COPD who presented to [facility] on 11/23/2024 with dyspnea. Live at home, independent with ADLs. Reports recently admitted to [facility] for acute on chronic HF, placement of AV fistula with complications of hematoma formation.   Presented to ED tonight with dyspnea. Reports sudden onset of dyspnea few hours PTA while sitting at home. Denies CP, BLE swelling, dizziness. Denies fever, chills. On arrival to ED VS: 97.3-125-/117-98% room air. Initial EKG revealed Afib with RVR. Initiated on diltiazem infusion. Hyperkalemia protocol initiated.     Upon evaluation, awake, alert, restless in bed. Appears anxious. Current VS: 86-/101-99% room air. Bedside heart monitoring reveals Afib. Denies CP. On exam with tachypnea, bilateral rales and wheezing present. Intermittent NPC. Able to speak 1-2 sentences. No edema.   EKG reveals Afib, left anterior fascicular block, old anterior infarct. Rate 130. . Qtc 489  CXR reveals mild pulmonary vascular congestion   Blood work reveals WBC 12.39, Hgb 11.7, Hct 38.1, proBNP 75860, troponin 37, BUN 48, creatinine 3.06, sodium 135, potassium 6.5, CO2  19.4, GFR 24. Agap 14.6  Urinalysis > 300 protein, trace LE,   UDS positive for THC and opiates, oxycodone.       Review of Systems   Constitutional:  Positive for fatigue. Negative for chills and fever.   Respiratory:  Positive for shortness of breath and wheezing.    Cardiovascular:  Negative for chest pain and leg swelling.   Gastrointestinal:  Negative for abdominal pain.   Genitourinary:  Negative for dysuria.   Neurological:  Positive for weakness.       Personal History      Past Medical History:   Diagnosis Date    CHF (congestive heart failure)     CKD (chronic kidney disease)     Hypertension     Substance abuse     Meth/MJ       Past Surgical History:   Procedure Laterality Date    CARDIAC CATHETERIZATION N/A 04/27/2022    ORIF TIBIA/FIBULA FRACTURES Right        Family History: family history includes Arthritis in his mother; Diabetes in his mother; Heart attack in his father and paternal grandfather; Hypertension in his father; Seizures in his sister. Otherwise pertinent FHx was reviewed and not pertinent to current issue.    Social History:  reports that he has been smoking cigarettes. He started smoking about 30 years ago. He has a 30.6 pack-year smoking history. He has never been exposed to tobacco smoke. He has never used smokeless tobacco. He reports that he does not currently use alcohol. He reports current drug use. Drugs: Methamphetamines, Marijuana, and Cocaine(coke).    Home Medications:  Prior to Admission Medications       Prescriptions Last Dose Informant Patient Reported? Taking?    albuterol sulfate  (90 Base) MCG/ACT inhaler   Yes Yes    Inhale 2 puffs Every 6 (Six) Hours As Needed for Wheezing.    apixaban (ELIQUIS) 5 MG tablet tablet   Yes Yes    Take 1 tablet by mouth 2 (Two) Times a Day.    aspirin 81 MG EC tablet   Yes Yes    Take 1 tablet by mouth Daily.    budesonide-formoterol (SYMBICORT) 80-4.5 MCG/ACT inhaler   Yes Yes    Inhale 2 puffs 2 (Two) Times a Day.    bumetanide (BUMEX) 1 MG tablet   Yes Yes    Take 3 tablets by mouth 3 times a day.    busPIRone (BUSPAR) 10 MG tablet   Yes Yes    Take 1 tablet by mouth 2 (Two) Times a Day.    carvedilol (COREG) 25 MG tablet   Yes Yes    Take 1 tablet by mouth 2 (Two) Times a Day With Meals.    Fluticasone-Salmeterol (Advair Diskus) 250-50 MCG/ACT DISKUS   Yes Yes    Inhale 1 puff 2 (Two) Times a Day.    isosorbide mononitrate (IMDUR) 30 MG 24 hr tablet   Yes Yes    Take 2 tablets by mouth Daily.     metOLazone (ZAROXOLYN) 2.5 MG tablet   Yes Yes    Take 1 tablet by mouth 3 (Three) Times a Week. Mon, Wed, Fri    NIFEdipine XL (PROCARDIA XL) 60 MG 24 hr tablet   Yes Yes    Take 1 tablet by mouth Daily.    nitroglycerin (NITROSTAT) 0.4 MG SL tablet   Yes Yes    Place 1 tablet under the tongue Every 5 (Five) Minutes As Needed for Chest Pain. Take no more than 3 doses in 15 minutes.    pantoprazole (PROTONIX) 40 MG EC tablet   Yes Yes    Take 1 tablet by mouth Daily.    potassium chloride (KLOR-CON M20) 20 MEQ CR tablet   Yes Yes    Take 1 tablet by mouth 3 (Three) Times a Day.    sevelamer (RENAGEL) 800 MG tablet   Yes Yes    Take 1 tablet by mouth 3 (Three) Times a Day With Meals.              Allergies:  Allergies   Allergen Reactions    Morphine Other (See Comments)     Makes it feel like my blood is boiling in my veins       Objective      Vitals:   Temp:  [97.3 °F (36.3 °C)] 97.3 °F (36.3 °C)  Heart Rate:  [] 90  Resp:  [24] 24  BP: (104-167)/() 161/107  Body mass index is 25.14 kg/m².  Physical Exam  Constitutional:       Appearance: He is ill-appearing.   HENT:      Head: Normocephalic and atraumatic.      Mouth/Throat:      Mouth: Mucous membranes are moist.   Eyes:      General: No scleral icterus.     Extraocular Movements: Extraocular movements intact.      Pupils: Pupils are equal, round, and reactive to light.   Cardiovascular:      Rate and Rhythm: Normal rate. Rhythm irregular.      Pulses: Normal pulses.      Heart sounds: Normal heart sounds.   Pulmonary:      Effort: Pulmonary effort is normal.      Breath sounds: Wheezing and rales present.      Comments: Intermittent NPC  Tachypnea.   Conversational dyspnea. Only able to speak 1-2 sentences.   Abdominal:      General: Bowel sounds are normal. There is no distension.      Palpations: Abdomen is soft.      Tenderness: There is no abdominal tenderness.   Musculoskeletal:      Right lower leg: No edema.      Left lower leg: No edema.    Skin:     General: Skin is warm and dry.      Comments: MIRIAN fistula   Neurological:      Mental Status: He is alert and oriented to person, place, and time. Mental status is at baseline.      Motor: Weakness present.         Diagnostic Data:  Lab Results (last 24 hours)       Procedure Component Value Units Date/Time    Comprehensive Metabolic Panel [[id number]]  (Abnormal) Collected: 11/23/24 0342    Specimen: Blood Updated: 11/23/24 0415     Glucose 118 mg/dL      BUN 48 mg/dL      Creatinine 3.06 mg/dL      Sodium 135 mmol/L      Potassium 6.5 mmol/L      Chloride 101 mmol/L      CO2 19.4 mmol/L      Calcium 9.2 mg/dL      Total Protein 7.1 g/dL      Albumin 3.8 g/dL      ALT (SGPT) 15 U/L      AST (SGOT) 33 U/L      Alkaline Phosphatase 46 U/L      Total Bilirubin 0.6 mg/dL      Globulin 3.3 gm/dL      A/G Ratio 1.2 g/dL      BUN/Creatinine Ratio 15.7     Anion Gap 14.6 mmol/L      eGFR 24.4 mL/min/1.73     Narrative:      GFR Normal >60  Chronic Kidney Disease <60  Kidney Failure <15      Urine Drug Screen - Urine, Clean Catch [[id number]]  (Abnormal) Collected: 11/23/24 0319    Specimen: Urine, Clean Catch Updated: 11/23/24 0339     THC, Screen, Urine Positive     Phencyclidine (PCP), Urine Negative     Cocaine Screen, Urine Negative     Methamphetamine, Ur Negative     Opiate Screen Positive     Amphetamine Screen, Urine Negative     Benzodiazepine Screen, Urine Negative     Tricyclic Antidepressants Screen Negative     Methadone Screen, Urine Negative     Barbiturates Screen, Urine Negative     Oxycodone Screen, Urine Positive     Buprenorphine, Screen, Urine Negative    Narrative:      Cutoff For Drugs Screened:    Amphetamines               500 ng/ml  Barbiturates               200 ng/ml  Benzodiazepines            150 ng/ml  Cocaine                    150 ng/ml  Methadone                  200 ng/ml  Opiates                    100 ng/ml  Phencyclidine               25 ng/ml  THC                         50  ng/ml  Methamphetamine            500 ng/ml  Tricyclic Antidepressants  300 ng/ml  Oxycodone                  100 ng/ml  Buprenorphine               10 ng/ml    The normal value for all drugs tested is negative. This report includes unconfirmed screening results, with the cutoff values listed, to be used for medical treatment purposes only.  Unconfirmed results must not be used for non-medical purposes such as employment or legal testing.  Clinical consideration should be applied to any drug of abuse test, particularly when unconfirmed results are used.    All urine drugs of abuse requests without chain of custody are for medical screening purposes only.  False positives are possible.      Urinalysis, Microscopic Only - Urine, Clean Catch [[id number]] Collected: 11/23/24 0319    Specimen: Urine, Clean Catch Updated: 11/23/24 0338     RBC, UA 0-2 /HPF      WBC, UA 0-2 /HPF      Bacteria, UA None Seen /HPF      Squamous Epithelial Cells, UA None Seen /HPF      Hyaline Casts, UA None Seen /LPF      Methodology Automated Microscopy    Urinalysis With Microscopic If Indicated (No Culture) - Urine, Clean Catch [[id number]]  (Abnormal) Collected: 11/23/24 0319    Specimen: Urine, Clean Catch Updated: 11/23/24 0336     Color, UA Yellow     Appearance, UA Clear     pH, UA 8.0     Specific Gravity, UA 1.017     Glucose, UA Negative     Ketones, UA Negative     Bilirubin, UA Negative     Blood, UA Negative     Protein, UA >=300 mg/dL (3+)     Leuk Esterase, UA Trace     Nitrite, UA Negative     Urobilinogen, UA 1.0 E.U./dL    BNP [[id number]]  (Abnormal) Collected: 11/22/24 2324    Specimen: Blood Updated: 11/23/24 0240     proBNP 47,389.0 pg/mL     Narrative:      This assay is used as an aid in the diagnosis of individuals suspected of having heart failure. It can be used as an aid in the diagnosis of acute decompensated heart failure (ADHF) in patients presenting with signs and symptoms of ADHF to the emergency department  (ED). In addition, NT-proBNP of <300 pg/mL indicates ADHF is not likely.    Age Range Result Interpretation  NT-proBNP Concentration (pg/mL:      <50             Positive            >450                   Gray                 300-450                    Negative             <300    50-75           Positive            >900                  Gray                300-900                  Negative            <300      >75             Positive            >1800                  Gray                300-1800                  Negative            <300    Single High Sensitivity Troponin T [[id number]]  (Abnormal) Collected: 11/22/24 2324    Specimen: Blood Updated: 11/23/24 0226     HS Troponin T 37 ng/L     Narrative:      High Sensitive Troponin T Reference Range:  <14.0 ng/L- Negative Female for AMI  <22.0 ng/L- Negative Male for AMI  >=14 - Abnormal Female indicating possible myocardial injury.  >=22 - Abnormal Male indicating possible myocardial injury.   Clinicians would have to utilize clinical acumen, EKG, Troponin, and serial changes to determine if it is an Acute Myocardial Infarction or myocardial injury due to an underlying chronic condition.         CBC & Differential [[id number]]  (Abnormal) Collected: 11/22/24 2324    Specimen: Blood Updated: 11/23/24 0202    Narrative:      The following orders were created for panel order CBC & Differential.  Procedure                               Abnormality         Status                     ---------                               -----------         ------                     CBC Auto Differential[[id number]]        Abnormal            Final result                 Please view results for these tests on the individual orders.    CBC Auto Differential [[id number]]  (Abnormal) Collected: 11/22/24 2324    Specimen: Blood Updated: 11/23/24 0202     WBC 12.39 10*3/mm3      RBC 4.26 10*6/mm3      Hemoglobin 11.7 g/dL      Hematocrit 38.1 %      MCV 89.4 fL      MCH 27.5 pg      MCHC  30.7 g/dL      RDW 18.7 %      RDW-SD 60.6 fl      MPV 11.2 fL      Platelets 212 10*3/mm3      Neutrophil % 64.2 %      Lymphocyte % 25.7 %      Monocyte % 8.6 %      Eosinophil % 0.9 %      Basophil % 0.3 %      Immature Grans % 0.3 %      Neutrophils, Absolute 7.95 10*3/mm3      Lymphocytes, Absolute 3.19 10*3/mm3      Monocytes, Absolute 1.06 10*3/mm3      Eosinophils, Absolute 0.11 10*3/mm3      Basophils, Absolute 0.04 10*3/mm3      Immature Grans, Absolute 0.04 10*3/mm3      nRBC 0.0 /100 WBC     Extra Tubes [[id number]] Collected: 11/22/24 2324    Specimen: Blood, Venous Line Updated: 11/23/24 0200    Narrative:      The following orders were created for panel order Extra Tubes.  Procedure                               Abnormality         Status                     ---------                               -----------         ------                     Gold Top - SST[[id number]]                                   Final result               Light Blue Top[[id number]]                                   Final result                 Please view results for these tests on the individual orders.    Gold Top - SST [[id number]] Collected: 11/22/24 2324    Specimen: Blood Updated: 11/23/24 0200     Extra Tube Hold for add-ons.     Comment: Auto resulted.       Light Blue Top [[id number]] Collected: 11/22/24 2324    Specimen: Blood Updated: 11/23/24 0200     Extra Tube Hold for add-ons.     Comment: Auto resulted                Imaging Results (Last 24 Hours)       Procedure Component Value Units Date/Time    XR Chest 1 View [[id number]] Collected: 11/23/24 0214     Updated: 11/23/24 0217    Narrative:      XR CHEST 1 VW    Date of Exam: 11/23/2024 2:08 AM EST    Indication: dyspnea    Comparison: Chest radiograph 6/16/2024    Findings:  There is mild cardiomegaly. There is mild pulmonary vascular congestion. There are no focal consolidations to indicate pneumonia. There is no pneumothorax. The osseous structures are normal.       Impression:      Impression:  Mild pulmonary vascular congestion.        Electronically Signed: Aaron Rod MD    11/23/2024 2:15 AM EST    Workstation ID: [id number]              Assessment & Plan        This is a 49 y.o. male with PMH of substance abuse, HTN, chronic HF, CKD stage , nicotine dependence, hepatitis C, COPD who presented to ED with dyspnea. Work up revealed potassium of 6.5, proBNP 25598, troponin 37, EKG with Afib with RVR. Was initiated on diltiazem infusion and hyperkalemia protocol. Will require hospitalization with cardiology and nephrology services.         Active and Resolved Problems  Active Hospital Problems    Diagnosis  POA    **Atrial fibrillation with RVR [I48.91]  Yes      Resolved Hospital Problems   No resolved problems to display.     Atrial Fibrillation with RVR  -EKG: Afib, no acute ischemic changes identified.   -serial troponins and TSH pending  -echocardiogram 6/18/2024 revealed EF 56-60%, moderate concentric hypertrophy of LV, mild dilation of aortic root.   -continue telemetry monitoring  -continue diltiazem infusion, titrate for HR < 90  -[id number] score 3, patient would likely benefit from anticoagulation for stroke risk reduction. Will continue home dosing eliquis.   -continue betablocker. Holding nifedipine while on diltiazem infusion.   -cardiology consult for further evaluation and management    Hyperkalemia  in setting of CKD stage 4:  -admission potassium 6.5  -admission creatinine 3.06 at baseline.   -BMP every 4 hours.   -s/p calcium gluconate, D 50, lasix 80 mg, IV insulin 5 units, lokelma  -telemetry.   -nephrology consult.   -recent placement of AV fistula MIRIAN, still maturing not ready for use. Denies hx of temporary HD.     Acute on chronic HFpEF:  -presented with sudden onset of dyspnea few hours PTA  -endorses compliance with home Bumex.   -proBNP elevated at [address]  -TSH, lipid panel pending  -EKG with Afib, left anterior fascicular block, old anterior  infarct. Rate 130. . Qtc 489  -serial troponins pending.  -fluid restriction, strict I/Os, daily weights, cardiac diet  -scheduled IV lasix initiated for diuresis, will monitor serum creatinine and electrolytes during diuresis  -continue ASA, beta blocker, nitrate, metolazone  -cardiology consult for further evaluation and management.     Elevated Troponin  -no CP to suggest ACS  -EKG without BINDU or definite ischemic changes  -mild elevation in troponin likely secondary to demand ischemia from acute cardiac stressors as above  -obtain serial troponins to ensure flat or downward trend--initial 37  -telemetry.     COPD:  -faint expiratory wheezing on exam  -continue home bronchodilator treatments  -DuoNebs PRN.   -does not wear home oxygen.     Nicotine dependence:  -nicotine patch daily.                 VTE Prophylaxis:  Pharmacologic & mechanical VTE prophylaxis orders are present.        The patient desires to be as follows:    CODE STATUS:    Level Of Support Discussed With: Patient  Code Status (Patient has no pulse and is not breathing): CPR (Attempt to Resuscitate)  Medical Interventions (Patient has pulse or is breathing): Full Support          Admission Status:  I believe this patient meets IP status.    Expected Length of Stay: 4 days    PDMP and Medication Dispenses via Sidebar reviewed and consistent with patient reported medications.    I discussed the patient's findings and my recommendations with patient and nursing staff.      Signature:     This document has been electronically signed by CHANDLER Holcomb on November 23, 2024 04:51 [facility] Hospitalist Team    Electronically signed by Llanes Alvarez, Carlos, MD at 11/23/24 2012          Emergency Department Notes        Pierre Crowley MD at 11/23/24 0143          Subjective   Chief Complaint   Patient presents with    Shortness of Breath       History of Present Illness  Patient is a 49-year-old presents emergency department  "complaint of shortness of breath, onset a few hours ago.  Patient reports he was sitting at home whenever this started.  He states it feels similar to when he had \"fluid on his lungs\".  Denies lower extremity swelling.  No episodes of dizziness, lightheadedness or syncope.  No fevers or chills.  No cough or congestion.  Patient reports he does make urine.  Review of Systems  See HPI  Past Medical History:   Diagnosis Date    CHF (congestive heart failure)     CKD (chronic kidney disease)     Hypertension     Substance abuse     Meth/MJ       Allergies   Allergen Reactions    Morphine Other (See Comments)     Makes it feel like my blood is boiling in my veins       Past Surgical History:   Procedure Laterality Date    CARDIAC CATHETERIZATION N/A 04/27/2022    ORIF TIBIA/FIBULA FRACTURES Right        Family History   Problem Relation Age of Onset    Diabetes Mother     Arthritis Mother     Hypertension Father     Heart attack Father     Seizures Sister     Heart attack Paternal Grandfather        Social History     Socioeconomic History    Marital status: Single   Tobacco Use    Smoking status: Every Day     Current packs/day: 0.50     Average packs/day: 1 pack/day for 30.9 years (30.6 ttl pk-yrs)     Types: Cigarettes     Start date: 1994     Passive exposure: Never    Smokeless tobacco: Never   Vaping Use    Vaping status: Never Used   Substance and Sexual Activity    Alcohol use: Not Currently    Drug use: Yes     Types: Methamphetamines, Marijuana, Cocaine(coke)    Sexual activity: Yes           Objective   Physical Exam  Vitals reviewed.   Constitutional:       General: He is in acute distress.      Appearance: He is ill-appearing.   HENT:      Head: Normocephalic and atraumatic.      Mouth/Throat:      Mouth: Mucous membranes are moist.      Pharynx: Oropharynx is clear.   Eyes:      Extraocular Movements: Extraocular movements intact.      Pupils: Pupils are equal, round, and reactive to light. " "  Cardiovascular:      Rate and Rhythm: Tachycardia present. Rhythm irregular.      Heart sounds: No murmur heard.     No friction rub. No gallop.   Pulmonary:      Effort: Tachypnea and respiratory distress (Able to speak in short sentences) present.      Breath sounds: Rales present.   Musculoskeletal:      Cervical back: Normal range of motion and neck supple.      Right lower leg: No tenderness. No edema.      Left lower leg: No tenderness. No edema.   Skin:     General: Skin is warm and dry.      Capillary Refill: Capillary refill takes less than 2 seconds.   Neurological:      General: No focal deficit present.      Mental Status: He is alert and oriented to person, place, and time.         Procedures          ED Course      /96   Pulse 97   Temp 97.3 °F (36.3 °C)   Resp 24   Ht 170.2 cm (67\")   Wt 72.8 kg (160 lb 7.9 oz)   SpO2 98%   BMI 25.14 kg/m²   Labs Reviewed   COMPREHENSIVE METABOLIC PANEL - Abnormal; Notable for the following components:       Result Value    Glucose 118 (*)     BUN 48 (*)     Creatinine 3.06 (*)     Sodium 135 (*)     Potassium 6.5 (*)     CO2 19.4 (*)     eGFR 24.4 (*)     All other components within normal limits    Narrative:     GFR Normal >60  Chronic Kidney Disease <60  Kidney Failure <15     BNP (IN-HOUSE) - Abnormal; Notable for the following components:    proBNP 47,389.0 (*)     All other components within normal limits    Narrative:     This assay is used as an aid in the diagnosis of individuals suspected of having heart failure. It can be used as an aid in the diagnosis of acute decompensated heart failure (ADHF) in patients presenting with signs and symptoms of ADHF to the emergency department (ED). In addition, NT-proBNP of <300 pg/mL indicates ADHF is not likely.    Age Range Result Interpretation  NT-proBNP Concentration (pg/mL:      <50             Positive            >450                   Gray                 300-450                    Negative        "      <300    50-75           Positive            >900                  Gray                300-900                  Negative            <300      >75             Positive            >1800                  Gray                300-1800                  Negative            <300   SINGLE HS TROPONIN T - Abnormal; Notable for the following components:    HS Troponin T 37 (*)     All other components within normal limits    Narrative:     High Sensitive Troponin T Reference Range:  <14.0 ng/L- Negative Female for AMI  <22.0 ng/L- Negative Male for AMI  >=14 - Abnormal Female indicating possible myocardial injury.  >=22 - Abnormal Male indicating possible myocardial injury.   Clinicians would have to utilize clinical acumen, EKG, Troponin, and serial changes to determine if it is an Acute Myocardial Infarction or myocardial injury due to an underlying chronic condition.        URINALYSIS W/ MICROSCOPIC IF INDICATED (NO CULTURE) - Abnormal; Notable for the following components:    Protein, UA >=300 mg/dL (3+) (*)     Leuk Esterase, UA Trace (*)     All other components within normal limits   CBC WITH AUTO DIFFERENTIAL - Abnormal; Notable for the following components:    WBC 12.39 (*)     Hemoglobin 11.7 (*)     MCHC 30.7 (*)     RDW 18.7 (*)     RDW-SD 60.6 (*)     Neutrophils, Absolute 7.95 (*)     Lymphocytes, Absolute 3.19 (*)     Monocytes, Absolute 1.06 (*)     All other components within normal limits   URINE DRUG SCREEN - Abnormal; Notable for the following components:    THC, Screen, Urine Positive (*)     Opiate Screen Positive (*)     Oxycodone Screen, Urine Positive (*)     All other components within normal limits    Narrative:     Cutoff For Drugs Screened:    Amphetamines               500 ng/ml  Barbiturates               200 ng/ml  Benzodiazepines            150 ng/ml  Cocaine                    150 ng/ml  Methadone                  200 ng/ml  Opiates                    100 ng/ml  Phencyclidine                25 ng/ml  THC                         50 ng/ml  Methamphetamine            500 ng/ml  Tricyclic Antidepressants  300 ng/ml  Oxycodone                  100 ng/ml  Buprenorphine               10 ng/ml    The normal value for all drugs tested is negative. This report includes unconfirmed screening results, with the cutoff values listed, to be used for medical treatment purposes only.  Unconfirmed results must not be used for non-medical purposes such as employment or legal testing.  Clinical consideration should be applied to any drug of abuse test, particularly when unconfirmed results are used.    All urine drugs of abuse requests without chain of custody are for medical screening purposes only.  False positives are possible.     URINALYSIS, MICROSCOPIC ONLY   CBC AND DIFFERENTIAL    Narrative:     The following orders were created for panel order CBC & Differential.  Procedure                               Abnormality         Status                     ---------                               -----------         ------                     CBC Auto Differential[[id number]]        Abnormal            Final result                 Please view results for these tests on the individual orders.   EXTRA TUBES    Narrative:     The following orders were created for panel order Extra Tubes.  Procedure                               Abnormality         Status                     ---------                               -----------         ------                     Gold Top - SST[[id number]]                                   Final result               Light Blue Top[[id number]]                                   Final result                 Please view results for these tests on the individual orders.   GOLD TOP - SST   LIGHT BLUE TOP     XR Chest 1 View   Final Result   Impression:   Mild pulmonary vascular congestion.            Electronically Signed: Aaron Rod MD     11/23/2024 2:15 AM EST     Workstation ID: [id number]                                                          Medical Decision Making  Amount and/or Complexity of Data Reviewed  Labs: ordered.  Radiology: ordered.    Risk  Prescription drug management.    Critical Care Time     The high probability of sudden, clinically significant deterioration in the patient's condition required the highest level of my preparedness to intervene urgently.  The services I provided to this patient were to treat and/or prevent clinically significant deterioration that could result in: Cardiovascular collapse and death. Services included the following: chart data review, reviewing nurses notes and/or old charts, documentation time, consultant collaboration regarding findings and treatment options, vital sign assessments and ordering, interpreting and reviewing diagnostic studies/lab tests.  Aggregate critical care time was 36 minutes, which includes only time during which I was engaged in work directly related to the patient's care, as described above, whether at the bedside or elsewhere in the Emergency Department. It did not include time spent performing other reported procedures or the services of residents, students, nurses or physician assistants.      Chart review: ED visit 7/11/2024 flank pain and back pain  Discharge summary 7/10/2024 from [facility] for hypertensive urgency, nonischemic cardiomyopathy, acute kidney injury superimposed on chronic kidney disease.          EKG interpreted independently per ED attending physican- Dr. Crowley atrial fibrillation rate of 130.  Compared to previous 6/16/2024.  Rhythm change noted.  Rate change noted.  Concern for mildly peaked T waves when compared to prior EKG.      My interpretation chest x-ray concerning for volume overload.  See system for radiology interpretation.      Patient hyperkalemic to 6.5 here.  Ordered hyperkalemia medications.  Patient states that he has been urinating normally.  Was started on potassium during last  hospital stay.    Excepted by Caro Arango of hospitalist service.      Final diagnoses:   Dyspnea, unspecified type   Hyperkalemia   Atrial fibrillation with rapid ventricular response   Hypervolemia, unspecified hypervolemia type       ED Disposition  ED Disposition       ED Disposition   Decision to Admit    Condition   --    Comment   Level of Care: Progressive Care [20]   Admitting Physician: LLANES ALVAREZ, CARLOS [[id number]]   Attending Physician: LLANES ALVAREZ, CARLOS [[id number]]                 No follow-up provider specified.       Medication List      No changes were made to your prescriptions during this visit.            Pierre Crowley MD  11/23/24 0431      Electronically signed by Pierre Crowley MD at 11/23/24 0431       Vital Signs (last 2 days)       Date/Time Temp Temp src Pulse Resp BP Patient Position SpO2    11/25/24 0821 98.6 (37) Oral 90 25 157/114 Lying 96    11/25/24 0500 -- -- 92 -- -- -- 96    11/25/24 0400 -- -- 94 -- -- -- 98    11/25/24 0333 97.9 (36.6) Oral -- 26 155/101 Lying --    11/25/24 0300 -- -- 88 -- -- -- 97    11/25/24 0200 -- -- 100 20 -- -- 96    11/25/24 0100 -- -- 89 20 -- -- 95    11/25/24 0000 98.4 (36.9) Oral 93 20 142/97 Lying 97    11/24/24 2300 -- -- 102 -- 138/101 -- 96    11/24/24 2200 -- -- 103 -- 143/102 Lying 98    11/24/24 2100 -- -- 99 -- -- -- 98    11/24/24 2004 -- -- 106 -- -- -- 96    11/24/24 2000 -- -- 94 -- -- -- 97    11/24/24 1900 98.6 (37) Oral 104 24 143/93 Lying 97    11/24/24 1600 97.6 (36.4) Oral 91 27 136/99 Lying 96    11/24/24 1140 98 (36.7) Oral 102 28 124/93 Lying 97    11/24/24 0745 97.5 (36.4) Oral 114 28 146/100 Lying 98    11/24/24 0630 -- -- 97 -- 169/106 -- 96    11/24/24 0600 -- -- 106 -- -- -- 98    11/24/24 0536 -- -- -- -- 167/117 -- --    11/24/24 0530 -- -- 105 -- 167/117 -- 98    11/24/24 0500 -- -- 99 -- 178/113 -- 99    11/24/24 0430 -- -- 95 -- 165/121 -- --    11/24/24 0417 -- -- -- -- 182/120 -- --    11/24/24 0400 97.5  (36.4) Oral 92 17 176/112  Lying --    BP: RN notified at 11/24/24 0400    11/24/24 0330 -- -- 93 -- 176/129 -- --    11/24/24 0300 -- -- 84 -- 167/117 -- --    11/24/24 0230 -- -- 96 -- 170/119 -- --    11/24/24 0200 -- -- 81 -- 165/109 -- --    11/24/24 0130 -- -- 90 -- 169/110 -- --    11/24/24 0100 -- -- 85 -- 139/107 -- --    11/24/24 0030 -- -- 91 -- 154/109 -- --    11/24/24 0000 98.1 (36.7) Oral 80 20 167/106  -- --    BP: RN notified at 11/24/24 0000    11/23/24 2330 -- -- 98 -- 171/122 -- --    11/23/24 2316 -- -- 89 -- 163/125 -- --    11/23/24 2300 -- -- 95 -- 162/120 -- --    11/23/24 2230 -- -- 96 -- 161/110 -- --    11/23/24 2200 -- -- 90 -- 157/107 -- --    11/23/24 2130 -- -- 88 -- 153/111 -- --    11/23/24 2100 -- -- 91 -- 162/115 -- --    11/23/24 2030 -- -- 101 -- 157/113 -- --    11/23/24 2000 97.7 (36.5) Oral 97 28 157/113  Lying 97    BP: RN notified at 11/23/24 2000 11/23/24 1930 -- -- 86 -- 148/97 -- 96    11/23/24 1900 -- -- 93 -- 156/105 -- 95    11/23/24 1800 -- -- 84 -- 154/99 -- 98    11/23/24 1730 -- -- 85 -- 147/99 -- 95    11/23/24 1700 -- -- 86 -- 147/98 -- 95    11/23/24 1658 98.2 (36.8) Oral -- 21 144/108 Lying --    11/23/24 1630 -- -- 92 -- 144/108 -- 97    11/23/24 1600 -- -- 87 -- 150/100 -- 94    11/23/24 1530 -- -- 76 -- 145/103 -- 95    11/23/24 1500 -- -- 81 -- 139/96 -- 99    11/23/24 1430 -- -- 81 -- 142/89 -- 97    11/23/24 1400 -- -- 78 -- 137/94 -- 98    11/23/24 1330 -- -- 81 -- 145/92 -- 97    11/23/24 1300 -- -- 83 -- 143/109 -- 96    11/23/24 1230 -- -- 77 -- 151/103 -- 98    11/23/24 1200 -- -- 84 -- 145/106 -- 99    11/23/24 1130 -- -- 80 -- 146/114 -- 98    11/23/24 1106 98 (36.7) Oral -- 25 146/114 Lying --    11/23/24 1100 -- -- 89 -- 154/107 -- 97    11/23/24 1030 -- -- 77 -- 159/111 -- 97    11/23/24 1000 -- -- 87 -- 141/120 -- 90    11/23/24 0930 -- -- 87 -- 159/119 -- 98    11/23/24 0900 -- -- 90 -- 153/98 -- 96    11/23/24 0843 -- -- 101 20 -- -- 98     2440 -- -- 84 20 -- -- 99    2430 -- -- 97 -- 160/111 -- 98    24 0817 -- -- 92 -- 159/116 -- --    24 0800 -- -- 91 -- 159/116 -- 97    2430 -- -- 90 -- -- -- 93    24 0700 98 (36.7) Oral 86 24 177/122 Lying 99    24 0615 -- -- 86 -- 168/122 -- 99    24 0532 -- -- 87 -- 157/108 -- --    24 0515 -- -- 89 -- 171/121 -- 99    24 0502 -- -- 83 -- 171/124 -- 98    24 0500 -- -- 88 -- -- -- 100    24 0432 -- -- 90 -- 161/107 -- 97    247 -- -- 118 -- 167/101 -- 96    24 0402 -- -- 88 -- 166/101 -- 100    24 0359 -- -- 97 -- -- -- 98    24 0346 -- -- 101 -- 164/96 -- 98    24 0331 -- -- 102 -- 165/132 -- 98    24 0321 -- -- 103 -- 104/86 -- 100    24 0302 -- -- 97 -- 124/103 -- 98    24 0257 -- -- 96 -- 135/108 -- 98    24 0245 -- -- 102 -- 144/114 -- 99    24 0230 -- -- 91 -- 144/116 -- 99    24 0216 -- -- 98 -- 153/107 -- 98    24 0214 -- -- 101 -- -- -- 99    24 0213 -- -- 96 -- -- -- 99    24 0206 -- -- 95 -- -- -- 98    24 -- -- 102 -- -- -- 99    24 -- -- 117 -- -- -- 100    24 -- -- 126 -- 164/125 -- 100    249 -- -- 132 -- 164/125 -- --    24 -- -- 136 -- -- -- 99    24 -- -- 129 -- -- -- 100    24 -- -- 141 -- -- -- 100    248 -- -- 138 -- -- -- 100    24 -- -- -- -- 167/117 -- --    24 0137 97.3 (36.3) -- 125 24 -- -- 98          Operative/Procedure Notes (all)    No notes of this type exist for this encounter.          Physician Progress Notes (all)        Alex Bower MD at [address] [place] [address] MEDICINE SERVICE  DAILY PROGRESS NOTE    NAME: Scott Gaytan  : 1975  MRN: [id number]      LOS: 1 day     PROVIDER OF SERVICE: Alex Bower MD    Chief Complaint: Atrial fibrillation with RVR    Subjective:     Interval  History:  History taken from: patient  Patient Complaints:   Remains on amiodarone drip, denied having any palpitation or chest pain, although his heart rate is controlled however he remains in A-fib    Objective:     Vital Signs  Temp:  [97.5 °F (36.4 °C)-98.2 °F (36.8 °C)] 97.5 °F (36.4 °C)  Heart Rate:  [] 114  Resp:  [17-28] 28  BP: (137-182)/() 146/100   Body mass index is 25.14 kg/m².    Physical Exam  General Appearance:  Alert, cooperative, no distress, appears stated age  Head:   Normocephalic, without obvious abnormality, atraumatic  Eyes:   PERRL, conjunctiva/corneas clear, EOM's intact, fundi benign, both eyes  Ears:    Normal TM's and external ear canals, both ears  Nose:   Nares normal, septum midline, mucosa normal, no drainage or sinus tenderness  Throat: Lips, mucosa, and tongue normal; teeth and gums normal  Neck:   Supple, symmetrical, trachea midline, no adenopathy, thyroid: not enlarged, symmetric, no tenderness/mass/nodules, no carotid bruit or JVD  Lungs:             Clear to auscultation bilaterally, respirations unlabored  Heart:  Regular rate and rhythm, S1, S2 normal, no murmur, rub or gallop  Abdomen:  Soft, non-tender, bowel sounds active all four quadrants,  no masses, no organomegaly  Extremities:     Extremities normal, atraumatic, no cyanosis or edema  Pulses:            2+ and symmetric  Skin:    Skin color, texture, turgor normal, no rashes or lesions  Neurologic: Normal    Scheduled Meds   apixaban, 5 mg, Oral, BID  aspirin, 81 mg, Oral, Daily  budesonide-formoterol, 2 puff, Inhalation, BID - RT  bumetanide, 3 mg, Intravenous, TID  busPIRone, 10 mg, Oral, BID  carvedilol, 25 mg, Oral, BID With Meals  isosorbide mononitrate, 60 mg, Oral, Daily  [START ON 11/25/2024] metOLazone, 2.5 mg, Oral, Once per day on Monday Wednesday Friday  nicotine, 1 patch, Transdermal, Q24H  pantoprazole, 40 mg, Oral, Daily  sevelamer, 800 mg, Oral, TID With Meals  sodium chloride, 10 mL,  Intravenous, Q12H       PRN Meds     acetaminophen    senna-docusate sodium **AND** polyethylene glycol **AND** bisacodyl **AND** bisacodyl    ipratropium-albuterol    nitroglycerin    ondansetron    [COMPLETED] Insert Peripheral IV **AND** sodium chloride    sodium chloride    sodium chloride   Infusions  amiodarone, 0.5 mg/min, Last Rate: 0.5 mg/min (11/24/24 0304)          Diagnostic Data    Results from last 7 days   Lab Units 11/24/24  0642 11/23/24  0646 11/23/24  0342   WBC 10*3/mm3 11.28*  --   --    HEMOGLOBIN g/dL 9.7*  --   --    HEMATOCRIT % 30.0*  --   --    PLATELETS 10*3/mm3 167  --   --    GLUCOSE mg/dL 125*   < > 118*   CREATININE mg/dL 2.92*   < > 3.06*   BUN mg/dL 46*   < > 48*   SODIUM mmol/L 138   < > 135*   POTASSIUM mmol/L 3.5   < > 6.5*   AST (SGOT) U/L  --   --  33   ALT (SGPT) U/L  --   --  15   ALK PHOS U/L  --   --  46   BILIRUBIN mg/dL  --   --  0.6   ANION GAP mmol/L 14.6   < > 14.6    < > = values in this interval not displayed.       XR Chest 1 View    Result Date: 11/23/2024  Impression: Mild pulmonary vascular congestion. Electronically Signed: Aaron Rod MD  11/23/2024 2:15 AM EST  Workstation ID: [id number]       I reviewed the patient's new clinical results.    Assessment/Plan:     Active and Resolved Problems  Active Hospital Problems    Diagnosis  POA    **Atrial fibrillation with RVR [I48.91]  Yes      Resolved Hospital Problems   No resolved problems to display.         Atrial Fibrillation with RVR  -New events  -Initially started on IV Cardizem which was switched to IV amiodarone by cardiology yesterday  -Echocardiogram of June of this year showed an EF of 56 to 60%  -By this morning patient remains on amiodarone, rhythm remains A-fib however rate is controlled in the mid 90s  -At this point patient is going to require GYPSY and cardioversion  -N.p.o. after midnight  -On anticoagulation with Eliquis  -Appreciate cardiology evaluation and input       Hyperkalemia  in setting of  CKD stage 4:  -Resolved  -Likely secondary to Entresto that he was started on recently  -Upon discharge will keep patient off Entresto     Acute on chronic HFpEF:  -presented with sudden onset of dyspnea few hours PTA  -endorses compliance with home Bumex.   -proBNP elevated at 22878  -Aggressive diuresis with Bumex 3 mg IV every 8 hours along with metolazone, appreciate nephrology input, please refer to their note    Elevated Troponin  -no CP to suggest ACS  -EKG without BINDU or definite ischemic changes  -mild elevation in troponin likely secondary to demand ischemia from acute cardiac stressors as above  -obtain serial troponins to ensure flat or downward trend--initial 37  -telemetry.      COPD:  -faint expiratory wheezing on exam  -continue home bronchodilator treatments  -DuoNebs PRN.   -does not wear home oxygen.      Nicotine dependence:  -nicotine patch daily.     VTE Prophylaxis:  Pharmacologic & mechanical VTE prophylaxis orders are present.         Code status is   Code Status and Medical Interventions: CPR (Attempt to Resuscitate); Full Support   Ordered at: 24 0451     Level Of Support Discussed With:    Patient     Code Status (Patient has no pulse and is not breathing):    CPR (Attempt to Resuscitate)     Medical Interventions (Patient has pulse or is breathing):    Full Support       Plan for disposition: 1-2 days based on clinical course and patient condition after cardioversion tomorrow could potentially be discharged tomorrow afternoon if the procedure was done early in the morning    Time: 30 minutes    Signature: Electronically signed by Alex Bower MD, 24, 11:24 [facility].  [facility] Hospitalist Team      Electronically signed by Alex Bower MD at 24 1127       Kg Szymanski MD at 24 1105              PROGRESS NOTE      Patient Name: Scott Gaytan  : 1975  MRN: [id number]  Primary Care Physician: Provider, No Known  Date of admission: 2024    Patient  Care Team:  Provider, No Known as PCP - General        Subjective   Subjective:     Patient seen and examined alert awake no distress  Review of systems:  No complaints of any fever chills chest pain shortness of breath better      Allergies:    Allergies   Allergen Reactions    Morphine Other (See Comments)     Makes it feel like my blood is boiling in my veins       Objective   Exam:     Vital Signs  Temp:  [97.5 °F (36.4 °C)-98.2 °F (36.8 °C)] 97.5 °F (36.4 °C)  Heart Rate:  [] 114  Resp:  [17-28] 28  BP: (137-182)/() 146/100  SpO2:  [94 %-99 %] 98 %  on   ;   Device (Oxygen Therapy): room air  Body mass index is 25.14 kg/m².    General: No acute distress  Head:      Normocephalic and atraumatic.    Eyes:      PERRL/EOM intact, conjunctivae and sclerae clear without nystagmus.    Neck:      No masses, thyromegaly,  trachea central with normal respiratory effort   Lungs:    Clear bilaterally to auscultation.    Heart:      Regular rate and rhythm, no murmur no gallop  Abd:        Soft, nontender, not distended, bowel sounds positive, no shifting dullness   Pulses:   Pulses palpable  Extr:        No cyanosis or clubbing--no edema.    Neuro:    No focal deficits.   alert oriented x3  Skin:       Intact without lesions or rashes.    Psych:    Alert and cooperative; normal mood and affect; .      Results Review:  I have personally reviewed most recent Data :  CBC    Results from last 7 days   Lab Units 11/24/24  0642 11/22/24  2324   WBC 10*3/mm3 11.28* 12.39*   HEMOGLOBIN g/dL 9.7* 11.7*   PLATELETS 10*3/mm3 167 212     CMP   Results from last 7 days   Lab Units 11/24/24  0642 11/23/24  0646 11/23/24  0342   SODIUM mmol/L 138 132* 135*   POTASSIUM mmol/L 3.5 5.2 6.5*   CHLORIDE mmol/L 100 100 101   CO2 mmol/L 23.4 16.9* 19.4*   BUN mg/dL 46* 47* 48*   CREATININE mg/dL 2.92* 2.87* 3.06*   GLUCOSE mg/dL 125* 96 118*   ALBUMIN g/dL  --   --  3.8   BILIRUBIN mg/dL  --   --  0.6   ALK PHOS U/L  --   --  46    AST (SGOT) U/L  --   --  33   ALT (SGPT) U/L  --   --  15     ABG      XR Chest 1 View    Result Date: 11/23/2024  Impression: Mild pulmonary vascular congestion. Electronically Signed: Aaron Rod MD  11/23/2024 2:15 AM EST  Workstation ID: [id number]     Results for orders placed during the hospital encounter of 06/16/24    Adult Transthoracic Echo Complete W/ Cont if Necessary Per Protocol    Interpretation Summary    Left ventricular systolic function is normal. Left ventricular ejection fraction appears to be 56 - 60%.    The left ventricular cavity is small in size.    Left ventricular wall thickness is consistent with moderate concentric hypertrophy.    Left ventricular diastolic function was indeterminate.    The left atrial cavity is moderately dilated.    Left atrial volume is moderately increased.    The right atrial cavity is borderline dilated.    Estimated right ventricular systolic pressure from tricuspid regurgitation is normal (<35 mmHg).    Mild dilation of the aortic root is present. Mild dilation of the sinuses of Valsalva is present.    Scheduled Meds:apixaban, 5 mg, Oral, BID  aspirin, 81 mg, Oral, Daily  budesonide-formoterol, 2 puff, Inhalation, BID - RT  bumetanide, 3 mg, Intravenous, TID  busPIRone, 10 mg, Oral, BID  carvedilol, 25 mg, Oral, BID With Meals  isosorbide mononitrate, 60 mg, Oral, Daily  [START ON 11/25/2024] metOLazone, 2.5 mg, Oral, Once per day on Monday Wednesday Friday  nicotine, 1 patch, Transdermal, Q24H  pantoprazole, 40 mg, Oral, Daily  sevelamer, 800 mg, Oral, TID With Meals  sodium chloride, 10 mL, Intravenous, Q12H      Continuous Infusions:amiodarone, 0.5 mg/min, Last Rate: 0.5 mg/min (11/24/24 0304)      PRN Meds:  acetaminophen    senna-docusate sodium **AND** polyethylene glycol **AND** bisacodyl **AND** bisacodyl    ipratropium-albuterol    nitroglycerin    ondansetron    [COMPLETED] Insert Peripheral IV **AND** sodium chloride    sodium chloride    sodium  chloride    Assessment & Plan   Assessment and Plan:         Atrial fibrillation with RVR    ASSESSMENT:  Chronic kidney disease stage IV, with kidney biopsy consistent with hypertensive nephrosclerosis with significant glomerulosclerosis and fibrosis, s/p AVF creation.  August 5, 2024, subsequently underwent transposition of brachiobasilic AV fistula on November 11, 2023 baseline creatinine variable but roughly 3 mg/d  Acute on Chronic heart failure  Hypertension  New onset A-fib as per cardiology   coronary artery disease  History of polysubstance abuse  Anemia of chronic kidney disease  History of hepatitis C    Echocardiogram from 10/20/2024 revealed ejection fraction of 61%     Plan :      Renal function is stable at baseline  Hyperkalemia, seems like he was on Entresto at home, not discharged on Entresto from [facility], could be contributing to hyperkalemia, recommend not to be resumed   hyperkalemia resolved  Continue Bumex 3 mg IV 3 times daily and metolazone 2.5 mg Monday Wednesday Friday, can be discharged on current dose  Can be switched to oral diuretics next 24 to 48 hours  May need potassium supplements  No need of dialysis  Follow-up as an outpatient as already scheduled  Follow-up with vascular as already scheduled  Cardiology evaluation noted plan for cardioversion  [facility] records reviewed  Will continue to follow        Electronically signed by Kg Szymanski MD,   Psychiatric kidney consultant  [phone]  11/24/2024  11:05 EST       Electronically signed by Kg Szymanski MD at 11/24/24 1109       Cary Espinosa MD at 11/24/24 0973              Reason for follow-up: A-fib     Patient Care Team:  Provider, No Known as PCP - General    Subjective .   Scott Gaytan is doing fair today     ROS    Morphine    Scheduled Meds:apixaban, 5 mg, Oral, BID  aspirin, 81 mg, Oral, Daily  budesonide-formoterol, 2 puff, Inhalation, BID - RT  bumetanide, 3 mg, Intravenous,  "TID  busPIRone, 10 mg, Oral, BID  carvedilol, 25 mg, Oral, BID With Meals  isosorbide mononitrate, 60 mg, Oral, Daily  [START ON 11/25/2024] metOLazone, 2.5 mg, Oral, Once per day on Monday Wednesday Friday  nicotine, 1 patch, Transdermal, Q24H  pantoprazole, 40 mg, Oral, Daily  sevelamer, 800 mg, Oral, TID With Meals  sodium chloride, 10 mL, Intravenous, Q12H      Continuous Infusions:amiodarone, 0.5 mg/min, Last Rate: 0.5 mg/min (11/24/24 0304)      PRN Meds:.  acetaminophen    senna-docusate sodium **AND** polyethylene glycol **AND** bisacodyl **AND** bisacodyl    ipratropium-albuterol    nitroglycerin    ondansetron    [COMPLETED] Insert Peripheral IV **AND** sodium chloride    sodium chloride    sodium chloride      VITAL SIGNS  Vitals:    11/24/24 0536 11/24/24 0600 11/24/24 0630 11/24/24 0745   BP: (!) 167/117  (!) 169/106 146/100   BP Location:    Right arm   Patient Position:    Lying   Pulse:  106 97 114   Resp:    28   Temp:    97.5 °F (36.4 °C)   TempSrc:    Oral   SpO2:  98% 96% 98%   Weight:       Height:           Flowsheet Rows      Flowsheet Row First Filed Value   Admission Height 170.2 cm (67\") Documented at 11/23/2024 0137   Admission Weight 72.8 kg (160 lb 7.9 oz) Documented at 11/23/2024 0137               Physical Exam  VITALS REVIEWED    General:      well developed, in no acute distress.    Head:      normocephalic and atraumatic.    Eyes:      PERRL/EOM intact, conjunctiva and sclera clear with out nystagmus.    Neck:      no masses, thyromegaly,  trachea central with normal respiratory effort and PMI displaced laterally  Lungs:      Clear  Heart:       Irregular rhythm  Msk:      no deformity or scoliosis noted of thoracic or lumbar spine.    Pulses:      pulses normal in all 4 extremities.    Extremities:       No lower extremity edema  Neurologic:      no focal deficits.   alert oriented x3  Skin:      intact without lesions or rashes.    Psych:      alert and cooperative; normal mood " and affect; normal attention span and concentration.          LAB RESULTS (LAST 7 DAYS)    CBC  Results from last 7 days   Lab Units 11/24/24  0642 11/22/24  2324   WBC 10*3/mm3 11.28* 12.39*   RBC 10*6/mm3 3.50* 4.26   HEMOGLOBIN g/dL 9.7* 11.7*   HEMATOCRIT % 30.0* 38.1   MCV fL 85.7 89.4   PLATELETS 10*3/mm3 167 212       BMP  Results from last 7 days   Lab Units 11/24/24  0642 11/23/24  0646 11/23/24  0342   SODIUM mmol/L 138 132* 135*   POTASSIUM mmol/L 3.5 5.2 6.5*   CHLORIDE mmol/L 100 100 101   CO2 mmol/L 23.4 16.9* 19.4*   BUN mg/dL 46* 47* 48*   CREATININE mg/dL 2.92* 2.87* 3.06*   GLUCOSE mg/dL 125* 96 118*   MAGNESIUM mg/dL 1.9  --  2.1   PHOSPHORUS mg/dL  --   --  3.9       CMP   Results from last 7 days   Lab Units 11/24/24  0642 11/23/24  0646 11/23/24  0342   SODIUM mmol/L 138 132* 135*   POTASSIUM mmol/L 3.5 5.2 6.5*   CHLORIDE mmol/L 100 100 101   CO2 mmol/L 23.4 16.9* 19.4*   BUN mg/dL 46* 47* 48*   CREATININE mg/dL 2.92* 2.87* 3.06*   GLUCOSE mg/dL 125* 96 118*   ALBUMIN g/dL  --   --  3.8   BILIRUBIN mg/dL  --   --  0.6   ALK PHOS U/L  --   --  46   AST (SGOT) U/L  --   --  33   ALT (SGPT) U/L  --   --  15         BNP        TROPONIN  Results from last 7 days   Lab Units 11/23/24  0646   HSTROP T ng/L 33*       CoAg        Creatinine Clearance  Estimated Creatinine Clearance: 32.2 mL/min (A) (by C-G formula based on SCr of 2.92 mg/dL (H)).    ABG          EKG    I personally reviewed the patient's EKG/Telemetry data: A-fib      Assessment & Plan       Atrial fibrillation with RVR      Scott Gaytan is a 49-year-old male patient who presented to the hospital due to shortness of breath.  Patient was found to be in A-fib with rapid ventricular rates which is a new diagnosis for him.  He also has elevated creatinine of 2.8, normal TSH, potassium was 6.5 on arrival now it is 5.2.  His troponin elevation is trivial, no active chest pain, no ST elevations on EKG.  He has recent echocardiogram in June  with normal EF.  He is now on IV Cardizem but still in A-fib.  I will switch that to IV amiodarone and hopefully he will convert to sinus rhythm, otherwise we can proceed with cardioversion hopefully next week.  Eliquis was added for stroke prevention.       2024    Patient remains in atrial fibrillation, continue amiodarone drip.  Keep n.p.o. after midnight for possible GYPSY cardioversion.    I discussed the patients findings and my recommendations with patient and agrees with outlined plan.    Cary Espinosa MD  24  09:39 EST      Electronically signed by Cary Epsinosa MD, 24, 9:40 AM EST.               Electronically signed by Cary Espinosa MD at 24 0940          Consult Notes (all)        Kg Szymanski MD at 24 1055              INITIAL CONSULT NOTE      Patient Name: Scott Gaytan  : 1975  MRN: [id number]  Primary Care Physician: Provider, No Known  Date of admission: 2024    Patient Care Team:  Provider, No Known as PCP - General        Reason for Consult:       Chronic kidney disease, stage IV, hyperkalemia.  Subjective   History of Present Illness:   Chief Complaint:   Chief Complaint   Patient presents with    Shortness of Breath     History of present illness    This patient is a 49-year-old gentleman, very well-known to us, multiple previous hospitalizations at [facility], last hospitalization was from  to 2024, he has history of hypertension CHF advanced CKD, chronic kidney disease, stage IV, he had AV fistula placed on 2024, at [facility] by Dr. Schilling, subsequently underwent transposition on 2024, developed some hematoma postoperatively, was seen by surgery during previous hospitalization, discharged on 2024.  Never required dialysis, last creatinine was 3.1 on , potassium 3.2, hemoglobin 9.9, history of about 1 g proteinuria.  He was discharged on Bumex 3 mg  twice daily and metolazone 2.5 mg Monday Wednesday Friday, potassium 20 mg 3 times daily.  He comes back to the hospital here now,    Has history of substance abuse, nicotine dependence, he comes with complaints of shortness of breath, echocardiogram at [facility] on October 20, 2024 revealed ejection fraction 61%.  Admission creatinine was 2.83.0, baseline, potassium was 6.5, that has improved, renal function is stable, renal consult requested for advanced CKD hyperkalemia.      Review of systems:    Constitutional: No fever, no chills, no lethargy, no weakness.  HEENT:  No headache, otalgia, itchy eyes, nasal discharge or sore throat.  Cardiac:  Shortness of breath   chest:              No cough, phlegm or wheezing.  Abdomen:  No abdominal pain, nausea or vomiting.  Neuro:  No focal weakness, abnormal movements or seizure-like activity.  :   No hematuria, no pyuria, no dysuria, no flank pain.  ROS was otherwise negative except as mentioned in the [place].       Personal History:     Past Medical History:   Past Medical History:   Diagnosis Date    CHF (congestive heart failure)     CKD (chronic kidney disease)     Hypertension     Substance abuse     Meth/MJ       Surgical History:      Past Surgical History:   Procedure Laterality Date    CARDIAC CATHETERIZATION N/A 04/27/2022    ORIF TIBIA/FIBULA FRACTURES Right        Family History: family history includes Arthritis in his mother; Diabetes in his mother; Heart attack in his father and paternal grandfather; Hypertension in his father; Seizures in his sister. Otherwise pertinent FHx was reviewed and unremarkable.     Social History:  reports that he has been smoking cigarettes. He started smoking about 30 years ago. He has a 30.5 pack-year smoking history. He has never been exposed to tobacco smoke. He has never used smokeless tobacco. He reports that he does not currently use alcohol. He reports current drug use. Drugs: Methamphetamines, Marijuana, and  Cocaine(coke).    Medications:  Prior to Admission medications    Medication Sig Start Date End Date Taking? Authorizing Provider   albuterol sulfate  (90 Base) MCG/ACT inhaler Inhale 2 puffs Every 6 (Six) Hours As Needed for Wheezing.   Yes Isabel Camp MD   apixaban (ELIQUIS) 5 MG tablet tablet Take 1 tablet by mouth 2 (Two) Times a Day.   Yes Isabel Camp MD   aspirin 81 MG EC tablet Take 1 tablet by mouth Daily.   Yes Isabel Camp MD   budesonide-formoterol (SYMBICORT) 80-4.5 MCG/ACT inhaler Inhale 2 puffs 2 (Two) Times a Day.   Yes Isabel Camp MD   bumetanide (BUMEX) 1 MG tablet Take 3 tablets by mouth 3 times a day.   Yes Isabel Camp MD   busPIRone (BUSPAR) 10 MG tablet Take 1 tablet by mouth 2 (Two) Times a Day.   Yes Isabel Camp MD   carvedilol (COREG) 25 MG tablet Take 1 tablet by mouth 2 (Two) Times a Day With Meals.   Yes Isabel Camp MD   Fluticasone-Salmeterol (Advair Diskus) 250-50 MCG/ACT DISKUS Inhale 1 puff 2 (Two) Times a Day.   Yes Isabel Camp MD   isosorbide mononitrate (IMDUR) 30 MG 24 hr tablet Take 2 tablets by mouth Daily.   Yes Isabel Camp MD   metOLazone (ZAROXOLYN) 2.5 MG tablet Take 1 tablet by mouth 3 (Three) Times a Week. Mon, Wed, Fri   Yes Isabel Camp MD   NIFEdipine XL (PROCARDIA XL) 60 MG 24 hr tablet Take 1 tablet by mouth Daily.   Yes Isabel Camp MD   nitroglycerin (NITROSTAT) 0.4 MG SL tablet Place 1 tablet under the tongue Every 5 (Five) Minutes As Needed for Chest Pain. Take no more than 3 doses in 15 minutes.   Yes Isabel Camp MD   pantoprazole (PROTONIX) 40 MG EC tablet Take 1 tablet by mouth Daily.   Yes Isabel Camp MD   potassium chloride (KLOR-CON M20) 20 MEQ CR tablet Take 1 tablet by mouth 3 (Three) Times a Day.   Yes Isabel Camp MD   sevelamer (RENAGEL) 800 MG tablet Take 1 tablet by mouth 3 (Three) Times a Day With Meals.   Yes  Provider, MD Isabel   sacubitril-valsartan (ENTRESTO) 24-26 MG tablet Take 1 tablet by mouth 2 (Two) Times a Day.  11/23/24  Isabel Camp MD     Scheduled Meds:amiodarone, 150 mg, Intravenous, Once  apixaban, 5 mg, Oral, BID  aspirin, 81 mg, Oral, Daily  budesonide-formoterol, 2 puff, Inhalation, BID - RT  busPIRone, 10 mg, Oral, BID  carvedilol, 25 mg, Oral, BID With Meals  furosemide, 40 mg, Intravenous, Q12H  isosorbide mononitrate, 60 mg, Oral, Daily  [START ON 11/25/2024] metOLazone, 2.5 mg, Oral, Once per day on Monday Wednesday Friday  nicotine, 1 patch, Transdermal, Q24H  pantoprazole, 40 mg, Oral, Daily  sevelamer, 800 mg, Oral, TID With Meals  sodium chloride, 10 mL, Intravenous, Q12H      Continuous Infusions:amiodarone, 1 mg/min   Followed by  amiodarone, 0.5 mg/min      PRN Meds:  acetaminophen    senna-docusate sodium **AND** polyethylene glycol **AND** bisacodyl **AND** bisacodyl    ipratropium-albuterol    nitroglycerin    ondansetron    [COMPLETED] Insert Peripheral IV **AND** sodium chloride    sodium chloride    sodium chloride  Allergies:    Allergies   Allergen Reactions    Morphine Other (See Comments)     Makes it feel like my blood is boiling in my veins       Objective   Exam:     Vital Signs  Temp:  [97.3 °F (36.3 °C)-98 °F (36.7 °C)] 98 °F (36.7 °C)  Heart Rate:  [] 101  Resp:  [20-24] 20  BP: (104-171)/() 159/116  SpO2:  [96 %-100 %] 98 %  on   ;   Device (Oxygen Therapy): room air  Body mass index is 25.14 kg/m².  EXAM  General: No acute distress  Head:      Normocephalic and atraumatic.    Eyes:      PERRL/EOM intact, conjunctivae and sclerae clear without nystagmus.    Neck:      No masses, thyromegaly,  trachea central   Lungs:    Clear bilaterally to auscultation.    Heart:      Regular rate and rhythm, no murmur no gallop  Abd:        Soft, nontender, not distended, bowel sounds positive, no shifting dullness.  Msk:        No deformity or scoliosis noted of  thoracic or lumbar spine.    Pulses:   Pulses normal in all 4 extremities.    Extremities:        No cyanosis or clubbing--trace to 1+ edema.    Neuro:    No focal deficits.   alert oriented x3  Skin:       Intact without lesions or rashes.    Psych:    Alert and cooperative; normal mood and affect; normal attention span       Results Review:  I have personally reviewed most recent Data :  [facility]  CBC    Results from last 7 days   Lab Units 11/22/24  2324   WBC 10*3/mm3 12.39*   HEMOGLOBIN g/dL 11.7*   PLATELETS 10*3/mm3 212     CMP   Results from last 7 days   Lab Units 11/23/24  0646 11/23/24  0342   SODIUM mmol/L 132* 135*   POTASSIUM mmol/L 5.2 6.5*   CHLORIDE mmol/L 100 101   CO2 mmol/L 16.9* 19.4*   BUN mg/dL 47* 48*   CREATININE mg/dL 2.87* 3.06*   GLUCOSE mg/dL 96 118*   ALBUMIN g/dL  --  3.8   BILIRUBIN mg/dL  --  0.6   ALK PHOS U/L  --  46   AST (SGOT) U/L  --  33   ALT (SGPT) U/L  --  15     ABG      XR Chest 1 View    Result Date: 11/23/2024  Impression: Mild pulmonary vascular congestion. Electronically Signed: Aaron Rod MD  11/23/2024 2:15 AM EST  Workstation ID: [id number]     Results for orders placed during the hospital encounter of 06/16/24    Adult Transthoracic Echo Complete W/ Cont if Necessary Per Protocol    Interpretation Summary    Left ventricular systolic function is normal. Left ventricular ejection fraction appears to be 56 - 60%.    The left ventricular cavity is small in size.    Left ventricular wall thickness is consistent with moderate concentric hypertrophy.    Left ventricular diastolic function was indeterminate.    The left atrial cavity is moderately dilated.    Left atrial volume is moderately increased.    The right atrial cavity is borderline dilated.    Estimated right ventricular systolic pressure from tricuspid regurgitation is normal (<35 mmHg).    Mild dilation of the aortic root is present. Mild dilation of the sinuses of Valsalva is  present.        Assessment & Plan   Assessment and Plan:         Atrial fibrillation with RVR    ASSESSMENT:  Chronic kidney disease stage IV, with kidney biopsy consistent with hypertensive nephrosclerosis with significant glomerulosclerosis and fibrosis, s/p AVF creation.  2024, subsequently underwent transposition of brachiobasilic AV fistula on 2023 baseline creatinine variable but roughly 3 mg/dL  Acute on Chronic  Hypertension  History of atrial fibrillation  Coronary artery disease  History of polysubstance abuse  Anemia of chronic kidney disease  History of hepatitis C    Echocardiogram from 10/20/2024 revealed ejection fraction of 61%      PLAN :     Renal function is stable at baseline  Hyperkalemia, seems like she was on Entresto at home, not discharged on Entresto from [facility], could be contributing to hyperkalemia, recommend not to be resumed  Resume Bumex 3 mg IV 3 times daily and metolazone 2.5 mg , can be discharged on current dose  Monitor potassium, now improved  No need of dialysis  Follow-up as an outpatient as already scheduled  Follow-up with vascular as already scheduled  Will continue to follow  Thanks.  [facility] records reviewed          Kg Szymanski MD  [facility]  2024  10:55 EST         Electronically signed by Kg Szymanski MD at 24 1306       Cary Espinosa MD at 24 0999                 Name: Scott Gaytan ADMIT: 2024   : 1975  PCP: Provider, No Known    MRN: [id number] LOS: 0 days   AGE/SEX: 49 y.o. male  ROOM: [facility]     Chief Complaint   Patient presents with    Shortness of Breath       Subjective        History of present illness  Scott Gaytan is a 49-year-old male patient who has history of substance abuse, renal insufficiency, no history of PCI or CABG, presented to the hospital due to shortness of breath.  He was found to be in A-fib with rapid  ventricular rates to his knowledge this is a new issue.    Past Medical History:   Diagnosis Date    CHF (congestive heart failure)     CKD (chronic kidney disease)     Hypertension     Substance abuse     Meth/MJ     Past Surgical History:   Procedure Laterality Date    CARDIAC CATHETERIZATION N/A 04/27/2022    ORIF TIBIA/FIBULA FRACTURES Right      Family History   Problem Relation Age of Onset    Diabetes Mother     Arthritis Mother     Hypertension Father     Heart attack Father     Seizures Sister     Heart attack Paternal Grandfather      Social History     Tobacco Use    Smoking status: Every Day     Current packs/day: 0.25     Average packs/day: 1 pack/day for 30.9 years (30.5 ttl pk-yrs)     Types: Cigarettes     Start date: 1994     Passive exposure: Never    Smokeless tobacco: Never   Vaping Use    Vaping status: Never Used   Substance Use Topics    Alcohol use: Not Currently    Drug use: Yes     Types: Methamphetamines, Marijuana, Cocaine(coke)     Medications Prior to Admission   Medication Sig Dispense Refill Last Dose/Taking    albuterol sulfate  (90 Base) MCG/ACT inhaler Inhale 2 puffs Every 6 (Six) Hours As Needed for Wheezing.   Taking As Needed    apixaban (ELIQUIS) 5 MG tablet tablet Take 1 tablet by mouth 2 (Two) Times a Day.   Taking    aspirin 81 MG EC tablet Take 1 tablet by mouth Daily.   Taking    budesonide-formoterol (SYMBICORT) 80-4.5 MCG/ACT inhaler Inhale 2 puffs 2 (Two) Times a Day.   Taking    bumetanide (BUMEX) 1 MG tablet Take 3 tablets by mouth 3 times a day.   Taking    busPIRone (BUSPAR) 10 MG tablet Take 1 tablet by mouth 2 (Two) Times a Day.   Taking    carvedilol (COREG) 25 MG tablet Take 1 tablet by mouth 2 (Two) Times a Day With Meals.   Taking    Fluticasone-Salmeterol (Advair Diskus) 250-50 MCG/ACT DISKUS Inhale 1 puff 2 (Two) Times a Day.   Taking    isosorbide mononitrate (IMDUR) 30 MG 24 hr tablet Take 2 tablets by mouth Daily.   Taking    metOLazone (ZAROXOLYN)  2.5 MG tablet Take 1 tablet by mouth 3 (Three) Times a Week. Mon, Wed, Fri   Taking    NIFEdipine XL (PROCARDIA XL) 60 MG 24 hr tablet Take 1 tablet by mouth Daily.   Taking    nitroglycerin (NITROSTAT) 0.4 MG SL tablet Place 1 tablet under the tongue Every 5 (Five) Minutes As Needed for Chest Pain. Take no more than 3 doses in 15 minutes.   Taking As Needed    pantoprazole (PROTONIX) 40 MG EC tablet Take 1 tablet by mouth Daily.   Taking    potassium chloride (KLOR-CON M20) 20 MEQ CR tablet Take 1 tablet by mouth 3 (Three) Times a Day.   Taking    sevelamer (RENAGEL) 800 MG tablet Take 1 tablet by mouth 3 (Three) Times a Day With Meals.   Taking     Allergies:  Morphine    Review of systems    Constitutional: Negative.    Respiratory and cardiovascular: As detailed in HPI section.  Gastrointestinal: Negative for constipation, nausea and vomiting negative for abdominal distention, abdominal pain and diarrhea.   Genitourinary: Negative for difficulty urinating and flank pain.   Musculoskeletal: Negative for arthralgias, joint swelling and myalgias.   Skin: Negative for color change, rash and wound.   Neurological: Negative for dizziness, syncope, weakness and headaches.   Hematological: Negative for adenopathy.   Psychiatric/Behavioral: Negative for confusion.   All other systems reviewed and are negative.       Physical Exam  VITALS REVIEWED    General:      well developed, in no acute distress.    Head:      normocephalic and atraumatic.    Eyes:      PERRL/EOM intact, conjunctiva and sclera clear with out nystagmus.    Neck:      no masses, thyromegaly,  trachea central with normal respiratory effort and PMI displaced laterally  Lungs:      Clear to auscultation bilaterally  Heart:       Irregular rhythm  Msk:      no deformity or scoliosis noted of thoracic or lumbar spine.    Pulses:      pulses normal in all 4 extremities.    Extremities:       No lower extremity edema  Neurologic:      no focal deficits.    alert oriented x3  Skin:      intact without lesions or rashes.    Psych:      alert and cooperative; normal mood and affect; normal attention span and concentration.      Result Review :               Pertinent cardiac workup    EKG 11/23/2024 A-fib with left anterior fascicular block, ventricular rate 130 bpm.  Echo 6/18/2024 ejection fraction 55 to 60%.  EKG 1/16/2024 normal sinus rhythm  Heart cath 4/27/2022 no significant CAD.        Assessment and Plan         Atrial fibrillation with RVR      Scott Gaytan is a 49-year-old male patient who presented to the hospital due to shortness of breath.  Patient was found to be in A-fib with rapid ventricular rates which is a new diagnosis for him.  He also has elevated creatinine of 2.8, normal TSH, potassium was 6.5 on arrival now it is 5.2.  His troponin elevation is trivial, no active chest pain, no ST elevations on EKG.  He has recent echocardiogram in June with normal EF.  He is now on IV Cardizem but still in A-fib.  I will switch that to IV amiodarone and hopefully he will convert to sinus rhythm, otherwise we can proceed with cardioversion hopefully next week.  Eliquis was added for stroke prevention.        No follow-ups on file.  Patient was given instructions and counseling regarding his condition or for health maintenance advice. Please see specific information pulled into the AVS if appropriate.        Electronically signed by Cary Espinosa MD, 11/23/24, 10:00 AM EST.      Electronically signed by Cary Espinosa MD at 11/23/24 1002

## 2024-11-25 NOTE — PROGRESS NOTES
[facility] MEDICINE SERVICE  DAILY PROGRESS NOTE    NAME: Scott Gaytan  : 1975  MRN: [id number]      LOS: 2 days     PROVIDER OF SERVICE: Judith Scanlon MD    Chief Complaint: Atrial fibrillation with RVR    Subjective:     Interval History:  History taken from: patient    No new complaint        Review of Systems:   Review of Systems   All other systems reviewed and are negative.      Objective:     Vital Signs  Temp:  [97.6 °F (36.4 °C)-98.6 °F (37 °C)] 98.6 °F (37 °C)  Heart Rate:  [] 88  Resp:  [20-28] 25  BP: (124-157)/() 157/114   Body mass index is 24.93 kg/m².    Physical Exam  Physical Exam  Constitutional:       Appearance: Normal appearance.   HENT:      Head: Normocephalic and atraumatic.      Nose: Nose normal.      Mouth/Throat:      Mouth: Mucous membranes are moist.   Eyes:      Extraocular Movements: Extraocular movements intact.      Conjunctiva/sclera: Conjunctivae normal.      Pupils: Pupils are equal, round, and reactive to light.   Cardiovascular:      Rate and Rhythm: Normal rate and regular rhythm.      Pulses: Normal pulses.      Heart sounds: Normal heart sounds.   Pulmonary:      Effort: Pulmonary effort is normal.      Breath sounds: Normal breath sounds.   Abdominal:      General: Abdomen is flat. Bowel sounds are normal.      Palpations: Abdomen is soft.   Musculoskeletal:         General: Normal range of motion.      Cervical back: Normal range of motion and neck supple.   Skin:     General: Skin is warm and dry.      Capillary Refill: Capillary refill takes less than 2 seconds.   Neurological:      General: No focal deficit present.      Mental Status: He is alert and oriented to person, place, and time.   Psychiatric:         Mood and Affect: Mood normal.         Behavior: Behavior normal.         Thought Content: Thought content normal.         Judgment: Judgment normal.         Current Medications:  Scheduled Meds:apixaban, 5 mg, Oral,  BID  aspirin, 81 mg, Oral, Daily  budesonide-formoterol, 2 puff, Inhalation, BID - RT  bumetanide, 3 mg, Intravenous, TID  busPIRone, 10 mg, Oral, BID  carvedilol, 25 mg, Oral, BID With Meals  guaiFENesin, 1,200 mg, Oral, Q12H  isosorbide mononitrate, 60 mg, Oral, Daily  metOLazone, 2.5 mg, Oral, Once per day on Monday Wednesday Friday  nicotine, 1 patch, Transdermal, Q24H  pantoprazole, 40 mg, Oral, Daily  sacubitril-valsartan, 1 tablet, Oral, Q24H  sevelamer, 800 mg, Oral, TID With Meals  sodium chloride, 10 mL, Intravenous, Q12H      Continuous Infusions:   PRN Meds:.  acetaminophen    senna-docusate sodium **AND** polyethylene glycol **AND** bisacodyl **AND** bisacodyl    ipratropium-albuterol    nitroglycerin    ondansetron    oxymetazoline    pseudoephedrine    [COMPLETED] Insert Peripheral IV **AND** sodium chloride    sodium chloride    sodium chloride       Diagnostic Data    Results from last 7 days   Lab Units 11/25/24  0316 11/23/24  0646 11/23/24  0342   WBC 10*3/mm3 9.32   < >  --    HEMOGLOBIN g/dL 9.6*   < >  --    HEMATOCRIT % 31.5*   < >  --    PLATELETS 10*3/mm3 168   < >  --    GLUCOSE mg/dL 111*   < > 118*   CREATININE mg/dL 2.99*   < > 3.06*   BUN mg/dL 42*   < > 48*   SODIUM mmol/L 140   < > 135*   POTASSIUM mmol/L 3.2*   < > 6.5*   AST (SGOT) U/L  --   --  33   ALT (SGPT) U/L  --   --  15   ALK PHOS U/L  --   --  46   BILIRUBIN mg/dL  --   --  0.6   ANION GAP mmol/L 13.8   < > 14.6    < > = values in this interval not displayed.       No radiology results for the last day      I reviewed the patient's new clinical results.    Assessment/Plan:     Active and Resolved Problems  Active Hospital Problems    Diagnosis  POA    **Atrial fibrillation with RVR [I48.91]  Yes      Resolved Hospital Problems   No resolved problems to display.       Atrial Fibrillation with RVR  -Rate controlled.  Continue Coreg.  Continue Eliquis for anticoagulation.    Hypokalemia  - Replace potassium     JOSE ANGEL on CKD  -  Worsening creatinine.  See further recommendations per nephrology who is following the patient.     Acute on chronic HFpEF:  -Continue IV Bumex, continue metoprolol, optimize medical management for CHF exacerbation.  Strict I's and O's, daily weights, 2 g sodium diet       COPD:  -Does not appear to be in exacerbation.  Continue bronchodilators as needed.     Nicotine dependence:  -nicotine patch daily.     VTE Prophylaxis:  Pharmacologic & mechanical VTE prophylaxis orders are present.             Disposition Planning:     Barriers to Discharge: Pending clinical improvement   Anticipated Date of Discharge: 10/29/2024  Place of Discharge: Home      Code Status and Medical Interventions: CPR (Attempt to Resuscitate); Full Support   Ordered at: 11/23/24 0451     Level Of Support Discussed With:    Patient     Code Status (Patient has no pulse and is not breathing):    CPR (Attempt to Resuscitate)     Medical Interventions (Patient has pulse or is breathing):    Full Support       Signature: Electronically signed by Judith Scanlon MD, 11/25/24, 11:39 EST.  [facility] Hospitalist Team

## 2024-11-25 NOTE — PROGRESS NOTES
"    Reason for follow-up: A-fib     Patient Care Team:  Provider, No Known as PCP - General    Subjective .   Scott Gaytan is doing fair today     ROS    Morphine    Scheduled Meds:apixaban, 5 mg, Oral, BID  aspirin, 81 mg, Oral, Daily  budesonide-formoterol, 2 puff, Inhalation, BID - RT  bumetanide, 3 mg, Intravenous, TID  busPIRone, 10 mg, Oral, BID  carvedilol, 25 mg, Oral, BID With Meals  guaiFENesin, 1,200 mg, Oral, Q12H  isosorbide mononitrate, 60 mg, Oral, Daily  metOLazone, 2.5 mg, Oral, Once per day on Monday Wednesday Friday  nicotine, 1 patch, Transdermal, Q24H  pantoprazole, 40 mg, Oral, Daily  sacubitril-valsartan, 1 tablet, Oral, Q24H  sevelamer, 800 mg, Oral, TID With Meals  sodium chloride, 10 mL, Intravenous, Q12H      Continuous Infusions:   PRN Meds:.  acetaminophen    senna-docusate sodium **AND** polyethylene glycol **AND** bisacodyl **AND** bisacodyl    ipratropium-albuterol    nitroglycerin    ondansetron    oxymetazoline    pseudoephedrine    [COMPLETED] Insert Peripheral IV **AND** sodium chloride    sodium chloride    sodium chloride      VITAL SIGNS  Vitals:    11/25/24 1100 11/25/24 1118 11/25/24 1200 11/25/24 1225   BP:  143/99  (!) 149/103   BP Location:    Right arm   Patient Position:    Lying   Pulse: 80 83 84 92   Resp:    23   Temp:    97.9 °F (36.6 °C)   TempSrc:    Oral   SpO2: 95% 95% 96% 96%   Weight:       Height:           Flowsheet Rows      Flowsheet Row First Filed Value   Admission Height 170.2 cm (67\") Documented at 11/23/2024 0137   Admission Weight 72.8 kg (160 lb 7.9 oz) Documented at 11/23/2024 0137               Physical Exam  VITALS REVIEWED    General:      well developed, in no acute distress.    Head:      normocephalic and atraumatic.    Eyes:      PERRL/EOM intact, conjunctiva and sclera clear with out nystagmus.    Neck:      no masses, thyromegaly,  trachea central with normal respiratory effort and PMI displaced laterally  Lungs:      Clear  Heart:       " Irregular rhythm  Msk:      no deformity or scoliosis noted of thoracic or lumbar spine.    Pulses:      pulses normal in all 4 extremities.    Extremities:       No lower extremity edema  Neurologic:      no focal deficits.   alert oriented x3  Skin:      intact without lesions or rashes.    Psych:      alert and cooperative; normal mood and affect; normal attention span and concentration.          LAB RESULTS (LAST 7 DAYS)    CBC  Results from last 7 days   Lab Units 11/25/24  0316 11/24/24  0642 11/22/24  2324   WBC 10*3/mm3 9.32 11.28* 12.39*   RBC 10*6/mm3 3.62* 3.50* 4.26   HEMOGLOBIN g/dL 9.6* 9.7* 11.7*   HEMATOCRIT % 31.5* 30.0* 38.1   MCV fL 87.0 85.7 89.4   PLATELETS 10*3/mm3 168 167 212       BMP  Results from last 7 days   Lab Units 11/25/24  1125 11/25/24  0316 11/24/24  0642 11/23/24  0646 11/23/24  0342   SODIUM mmol/L  --  140 138 132* 135*   POTASSIUM mmol/L 3.3* 3.2* 3.5 5.2 6.5*   CHLORIDE mmol/L  --  100 100 100 101   CO2 mmol/L  --  26.2 23.4 16.9* 19.4*   BUN mg/dL  --  42* 46* 47* 48*   CREATININE mg/dL  --  2.99* 2.92* 2.87* 3.06*   GLUCOSE mg/dL  --  111* 125* 96 118*   MAGNESIUM mg/dL  --  1.9 1.9  --  2.1   PHOSPHORUS mg/dL  --   --   --   --  3.9       CMP   Results from last 7 days   Lab Units 11/25/24  1125 11/25/24  0316 11/24/24  0642 11/23/24  0646 11/23/24  0342   SODIUM mmol/L  --  140 138 132* 135*   POTASSIUM mmol/L 3.3* 3.2* 3.5 5.2 6.5*   CHLORIDE mmol/L  --  100 100 100 101   CO2 mmol/L  --  26.2 23.4 16.9* 19.4*   BUN mg/dL  --  42* 46* 47* 48*   CREATININE mg/dL  --  2.99* 2.92* 2.87* 3.06*   GLUCOSE mg/dL  --  111* 125* 96 118*   ALBUMIN g/dL  --   --   --   --  3.8   BILIRUBIN mg/dL  --   --   --   --  0.6   ALK PHOS U/L  --   --   --   --  46   AST (SGOT) U/L  --   --   --   --  33   ALT (SGPT) U/L  --   --   --   --  15         BNP        TROPONIN  Results from last 7 days   Lab Units 11/23/24  0646   HSTROP T ng/L 33*       CoAg        Creatinine Clearance  Estimated  Creatinine Clearance: 31.2 mL/min (A) (by C-G formula based on SCr of 2.99 mg/dL (H)).    ABG          EKG    I personally reviewed the patient's EKG/Telemetry data: A-fib      Assessment & Plan       Atrial fibrillation with RVR      Scott Gaytan is a 49-year-old male patient who presented to the hospital due to shortness of breath.  Patient was found to be in A-fib with rapid ventricular rates which is a new diagnosis for him.  He also has elevated creatinine of 2.8, normal TSH, potassium was 6.5 on arrival now it is 5.2.  His troponin elevation is trivial, no active chest pain, no ST elevations on EKG.  He has recent echocardiogram in June with normal EF.  He is now on IV Cardizem but still in A-fib.  I will switch that to IV amiodarone and hopefully he will convert to sinus rhythm, otherwise we can proceed with cardioversion hopefully next week.  Eliquis was added for stroke prevention.        11/24/2024     Patient remains in atrial fibrillation, continue amiodarone drip.  Keep n.p.o. after midnight for possible GYPSY cardioversion.    11/25/2024    GYPSY guided cardioversion was performed with return to sinus rhythm.  Continue Eliquis and carvedilol.    I discussed the patients findings and my recommendations with patient and agrees with outlined plan.    Cary Espinosa MD  11/25/24  14:28 EST      Electronically signed by Cary Espinosa MD, 11/25/24, 2:30 PM EST.

## 2024-11-25 NOTE — CASE MANAGEMENT/SOCIAL WORK
Case Management/Social Work    Patient Name:  Scott Gaytan  YOB: 1975  MRN: [id number]  Admit Date:  11/23/2024        Social Drivers of Health     Tobacco Use: High Risk (11/23/2024)    Patient History     Smoking Tobacco Use: Every Day     Smokeless Tobacco Use: Never     Passive Exposure: Never   Alcohol Use: Not At Risk (11/25/2024)    AUDIT-C     Frequency of Alcohol Consumption: Never     Average Number of Drinks: Patient does not drink     Frequency of Binge Drinking: Never   Financial Resource Strain: High Risk (11/25/2024)    Overall Financial Resource Strain (CARDIA)     Difficulty of Paying Living Expenses: Very hard   Food Insecurity: Food Insecurity Present (11/25/2024)    Hunger Vital Sign     Worried About Running Out of Food in the Last Year: Often true     Ran Out of Food in the Last Year: Often true   Transportation Needs: Unmet Transportation Needs (11/25/2024)    PRAPARE - Transportation     Lack of Transportation (Medical): Yes     Lack of Transportation (Non-Medical): Yes   Physical Activity: Inactive (11/25/2024)    Exercise Vital Sign     Days of Exercise per Week: 0 days     Minutes of Exercise per Session: 0 min   Stress: Stress Concern Present (11/25/2024)    [employer] - Occupational Stress Questionnaire     Feeling of Stress : Very much   Social Connections: Not At Risk (11/25/2024)    Family and Community Support     Help with Day-to-Day Activities: I get all the help I need     Lonely or Isolated: Sometimes   Interpersonal Safety: Not At Risk (11/25/2024)    Abuse Screen     Unsafe at Home or Work/School: no     Feels Threatened by Someone?: no     Does Anyone Keep You from Contacting Others or Doint Things Outside the Home?: no     Physical Sign of Abuse Present: no   Depression: At risk (11/25/2024)    PHQ-2     PHQ-2 Score: 4   Housing Stability: Not At Risk (11/25/2024)    Housing Stability     Current Living Arrangements:  homeless;other (see comments)     Potentially Unsafe Housing Conditions: none   Recent Concern: Housing Stability - At Risk (11/23/2024)    Housing Stability     Current Living Arrangements: homeless;patient worried     Potentially Unsafe Housing Conditions: none   Utilities: Not At Risk (11/25/2024)    [facility] Utilities     Threatened with loss of utilities: No   Health Literacy: Not At Risk (11/25/2024)    Education     Help with school or training?: No     Preferred Language: English   Employment: Not At Risk (11/25/2024)    Employment     Do you want help finding or keeping work or a job?: I do not need or want help   Disabilities: At Risk (11/25/2024)    Disabilities     Concentrating, Remembering, or Making Decisions Difficulty: no     Doing Errands Independently Difficulty: yes         Electronically signed by:  Eneida Tobar RN  11/25/24 13:32 EST    Office Phone: [phone]  Office Cell:     [phone]

## 2024-11-25 NOTE — CONSULTS
[facility] Vascular Surgery  Consult Note    Patient Name: Scott Gaytan  : 1975  MRN: [id number]  Primary Care Physician:  Provider, No Known  Referring Physician: No Known Provider  Date of admission: 2024    Inpatient Vascular Surgery Consult  Consult performed by: Pierre Watson II, MD  Consult ordered by: Judith Scanlon MD        Subjective   Subjective     Reason for Consult/ Chief Complaint: Left arm pain after open fistula revision    History of Present Illness  Scott Gaytan is a 49 y.o. male with stage IV chronic kidney disease with a new diagnosis of atrial fibrillation admitted for atrial fibrillation had a left arm brachiobasilic fistula revision with Dr. Schilling at  on 24.  Patient reports has had pain around his incision since then.  There is some scant drainage from the incision as well noted on 4 x 4 gauze.  Patient otherwise doing okay at this time.  Reportedly is planning for GYPSY and cardioversion today.  Has not required dialysis thus far    Review of Systems     Personal History     Past Medical History:   Diagnosis Date    CHF (congestive heart failure)     CKD (chronic kidney disease)     Hypertension     Substance abuse     Meth/MJ       Past Surgical History:   Procedure Laterality Date    CARDIAC CATHETERIZATION N/A 2022    ORIF TIBIA/FIBULA FRACTURES Right        Family History: His family history includes Arthritis in his mother; Diabetes in his mother; Heart attack in his father and paternal grandfather; Hypertension in his father; Seizures in his sister.     Social History: He  reports that he has been smoking cigarettes. He started smoking about 30 years ago. He has a 30.5 pack-year smoking history. He has never been exposed to tobacco smoke. He has never used smokeless tobacco. He reports that he does not currently use alcohol. He reports current drug use. Drugs: Methamphetamines, Marijuana, and Cocaine(coke).    Home  Medications:  Fluticasone-Salmeterol, NIFEdipine XL, albuterol sulfate HFA, apixaban, aspirin, budesonide-formoterol, bumetanide, busPIRone, carvedilol, isosorbide mononitrate, metOLazone, nitroglycerin, pantoprazole, potassium chloride, and sevelamer    Allergies:  He is allergic to morphine.    Objective    Objective     Vitals:    Temp:  [97.6 °F (36.4 °C)-98.6 °F (37 °C)] 98.6 °F (37 °C)  Heart Rate:  [] 88  Resp:  [20-28] 25  BP: (124-157)/() 157/114    Physical Exam  NAD  Left arm incision healing well with nylon sutures in place, no erythema, induration, or drainage at the time of my evaluation. Appropriately tender to palpation.   Excellent thrill in fistula  Palpable left radial pulse.     Result Review    Result Review:  I have personally reviewed the results from the time of this admission to 11/25/2024 10:35 EST and agree with these findings:  [x]  Laboratory list / accordion  [x]  Microbiology  []  Radiology  []  EKG/Telemetry   []  Cardiology/Vascular   []  Pathology  [x]  Old records  []  Other:  Most notable findings include: Cr 2.99, + for rhinovirus/enterovirus.       CBC    Results from last 7 days   Lab Units 11/25/24  0316 11/24/24  0642 11/22/24  2324   WBC 10*3/mm3 9.32 11.28* 12.39*   HEMOGLOBIN g/dL 9.6* 9.7* 11.7*   PLATELETS 10*3/mm3 168 167 212     BMP   Results from last 7 days   Lab Units 11/25/24  0316 11/24/24  0642 11/23/24  0646 11/23/24  0342   SODIUM mmol/L 140 138 132* 135*   POTASSIUM mmol/L 3.2* 3.5 5.2 6.5*   CHLORIDE mmol/L 100 100 100 101   CO2 mmol/L 26.2 23.4 16.9* 19.4*   BUN mg/dL 42* 46* 47* 48*   CREATININE mg/dL 2.99* 2.92* 2.87* 3.06*   GLUCOSE mg/dL 111* 125* 96 118*   MAGNESIUM mg/dL 1.9 1.9  --  2.1   PHOSPHORUS mg/dL  --   --   --  3.9     Cr Clearance Estimated Creatinine Clearance: 31.2 mL/min (A) (by C-G formula based on SCr of 2.99 mg/dL (H)).  Coa     HbA1C   Lab Results   Component Value Date    HGBA1C 5.54 11/22/2024    HGBA1C 5.4 04/09/2024  "   HGBA1C 5.0 12/06/2022     Blood Glucose   Glucose   Date/Time Value Ref Range Status   11/23/2024 0522 138 (H) 70 - 105 mg/dL Final     Comment:     Serial Number: [id number]:  [id number]     Infection     CMP   Results from last 7 days   Lab Units 11/25/24 0316 11/24/24  0642 11/23/24  0646 11/23/24  0342   SODIUM mmol/L 140 138 132* 135*   POTASSIUM mmol/L 3.2* 3.5 5.2 6.5*   CHLORIDE mmol/L 100 100 100 101   CO2 mmol/L 26.2 23.4 16.9* 19.4*   BUN mg/dL 42* 46* 47* 48*   CREATININE mg/dL 2.99* 2.92* 2.87* 3.06*   GLUCOSE mg/dL 111* 125* 96 118*   ALBUMIN g/dL  --   --   --  3.8   BILIRUBIN mg/dL  --   --   --  0.6   ALK PHOS U/L  --   --   --  46   AST (SGOT) U/L  --   --   --  33   ALT (SGPT) U/L  --   --   --  15     ABG      UA    Results from last 7 days   Lab Units 11/23/24  0319   NITRITE UA  Negative   WBC UA /HPF 0-2   BACTERIA UA /HPF None Seen   SQUAM EPITHEL UA /HPF None Seen     ALISHA  No results found for: \"POCMETH\", \"POCAMPHET\", \"POCBARBITUR\", \"POCBENZO\", \"POCCOCAINE\", \"POCOPIATES\", \"POCOXYCODO\", \"POCPHENCYC\", \"POCPROPOXY\", \"POCTHC\", \"POCTRICYC\"  Lysis Labs   Results from last 7 days   Lab Units 11/25/24 0316 11/24/24  0642 11/23/24  0646 11/23/24  0342 11/22/24  2324   HEMOGLOBIN g/dL 9.6* 9.7*  --   --  11.7*   PLATELETS 10*3/mm3 168 167  --   --  212   CREATININE mg/dL 2.99* 2.92* 2.87* 3.06*  --      Radiology(recent) No radiology results for the last day    Assessment & Plan   Assessment / Plan     Brief Patient Summary:  Scott Gaytan is a 49 y.o. male who is 12 days status post left arm brachiobasilic fistula revision by Dr. Schilling.  Incision appears to be in a normal state of healing.  No evidence of infection on exam.  The amount of pain patient describes seems normal for the surgery he had.  Good thrill in the fistula and a palpable left radial pulse.  No indication for further workup or intervention at this time.    Active Hospital Problems:  Active Hospital Problems    " Diagnosis     **Atrial fibrillation with RVR        Plan:   Recommend patient follow-up with [facility] vascular surgery as previously scheduled.  Arm was rewrapped with Ace wrap per patient request.  Will see patient as needed for now. Please do not hesitate to call with any questions or concerns.       VTE Prophylaxis:  Pharmacologic & mechanical VTE prophylaxis orders are present.         MD Pierre Washburn II, II, MD  11/25/24  10:35 EST  Office Number [phone]    [facility] Vascular Surgery

## 2024-11-25 NOTE — CASE MANAGEMENT/SOCIAL WORK
Discharge Planning Assessment   Bhavik     Patient Name: Scott Gaytan  MRN: [id number]  Today's Date: 11/25/2024    Admit Date: 11/23/2024    Plan: NE Plan: Return to cousin's home. Sister will provide transportation.   Discharge Needs Assessment       Row Name 11/25/24 1552       Living Environment    People in Home other (see comments)  Lives with Cousin    Current Living Arrangements home;homeless  No registered address and needs to get out of his Cousins home    Potentially Unsafe Housing Conditions none    Primary Care Provided by self    Provides Primary Care For no one    Family Caregiver if Needed sibling(s)    Family Caregiver Names Sister Ann Marie Horowitz    Quality of Family Relationships helpful;supportive    Able to Return to Prior Arrangements yes       Resource/Environmental Concerns    Resource/Environmental Concerns financial;reliable transportation    Financial Concerns food, unable to afford;rent or mortgage, unable to afford;unemployed    Home Accessibility Concerns none    Transportation Concerns no car;rides, unreliable from others;other (see comments)  states he doesn't ask for rides because he doesn't want to bother people.       Transition Planning    Patient/Family Anticipates Transition to home with family    Patient/Family Anticipated Services at Transition community agency    Transportation Anticipated family or friend will provide       Discharge Needs Assessment    Readmission Within the Last 30 Days no previous admission in last 30 days    Equipment Currently Used at Home none    Concerns to be Addressed basic needs;discharge planning;substance/tobacco abuse/use;financial/insurance;homelessness    Anticipated Changes Related to Illness none    Equipment Needed After Discharge none    Provided Post Acute Provider List? N/A    Provided Post Acute Provider Quality & Resource List? N/A    Current Discharge Risk financial support inadequate;homeless;substance use/abuse                    Discharge Plan       Row Name 11/25/24 1555       Plan    Plan DC Plan: Return to cousin's home. Sister will provide transportation.    Patient/Family in Agreement with Plan yes    Provided Post Acute Provider List? N/A    Provided Post Acute Provider Quality & Resource List? N/A    Plan Comments CM spoke with patient at bedside to discuss admission assessment, SDOH screening, and discharge planning. Patient confirms no PCP and pharmacy [facility] [place]. Patient confirms he is agreeable to meds to bed program at this time. CM updated pharmacy in [facility]. Patient denies any difficulty affording medications. However, cannot afford food. States he has filed for emergency food stamp assistance and Disability, but no help yet. Patient is aware of local food chris but states he cannot eat their food due to specific dietary needs. CM to provide [employer] packet to patient for resources in the [facility]. SW notified of concerns. Patient denies any additional needs for services or DME at this time. Patient reports IADL's but does not drive. Patient sister will provide transportation when ready for DC.CM spoke with Hospitalist and nursing for morning rounds and reviewed chart for clinical updates. Plan for Cardioversion/GYPSY today. [facility] pharmacy cost for Eliquis and Entresto $0 with insurance.CM will continue to follow for any additional needs and adjust DC plan accordingly. DC Barriers: Cardiac monitoring, pending procedure, IV Bumex, and monitor renal function.               Expected Discharge Date and Time       Expected Discharge Date Expected Discharge Time    Nov 26, 2024            Demographic Summary       Row Name 11/25/24 4468       General Information    Admission Type inpatient    Arrived From emergency department;home    Referral Source admission list    Reason for Consult discharge planning    Preferred Language English       Contact Information    Permission Granted to Share Info With                     Functional Status       Row Name 11/25/24 1551       Functional Status    Usual Activity Tolerance good    Current Activity Tolerance good       Physical Activity    On average, how many days per week do you engage in moderate to strenuous exercise (like a brisk walk)? 0 days    On average, how many minutes do you engage in exercise at this level? 0 min    Number of minutes of exercise per week 0       Functional Status, IADL    Medications independent    Meal Preparation independent    Housekeeping independent    Laundry independent    Shopping independent;assistive person    IADL Comments No transportation       Mental Status    General Appearance WDL WDL       Mental Status Summary    Recent Changes in Mental Status/Cognitive Functioning no changes       Employment/    Employment Status unemployed    Current or Previous Occupation not applicable                 Met with patient in room wearing PPE: mask, gloves    Maintain distance greater than six feet and spent less than fifteen minutes in the room.      Eneida Tobar RN    Office Phone: [phone]  Office Cell:     [phone]

## 2024-11-25 NOTE — ANESTHESIA PREPROCEDURE EVALUATION
Anesthesia Evaluation     NPO Solid Status: > 8 hours  NPO Liquid Status: > 8 hours           Airway   Mallampati: II  TM distance: >3 FB  Neck ROM: full  No difficulty expected  Dental - normal exam     Pulmonary - normal exam   Cardiovascular - normal exam    (+) hypertension well controlled, dysrhythmias Atrial Fib, CHF     ROS comment: ·  Left ventricular systolic function is normal. Left ventricular ejection fraction appears to be 56 - 60%.  ·  The left ventricular cavity is small in size.  ·  Left ventricular wall thickness is consistent with moderate concentric hypertrophy.  ·  Left ventricular diastolic function was indeterminate.  ·  The left atrial cavity is moderately dilated.  ·  Left atrial volume is moderately increased.  ·  The right atrial cavity is borderline dilated.  ·  Estimated right ventricular systolic pressure from tricuspid regurgitation is normal (<35 mmHg).  ·  Mild dilation of the aortic root is present. Mild dilation of the sinuses of Valsalva is present.      Neuro/Psych  (+) dizziness/light headedness  GI/Hepatic/Renal/Endo    (+) renal disease-    Musculoskeletal     Abdominal  - normal exam    Bowel sounds: normal.   Substance History      OB/GYN          Other                      Anesthesia Plan    ASA 4     MAC   total IV anesthesia  intravenous induction     Anesthetic plan, risks, benefits, and alternatives have been provided, discussed and informed consent has been obtained with: patient.  Pre-procedure education provided  Plan discussed with CRNA.    CODE STATUS:    Level Of Support Discussed With: Patient  Code Status (Patient has no pulse and is not breathing): CPR (Attempt to Resuscitate)  Medical Interventions (Patient has pulse or is breathing): Full Support

## 2024-11-25 NOTE — PROGRESS NOTES
PROGRESS NOTE      Patient Name: Scott Gaytan  : 1975  MRN: [id number]  Primary Care Physician: Provider, No Known  Date of admission: 2024    Patient Care Team:  Provider, No Known as PCP - General        Subjective   Subjective:     Patient seen and examined alert awake no distress  Review of systems:  No complaints of any fever chills chest pain shortness of breath better      Allergies:    Allergies   Allergen Reactions    Morphine Other (See Comments)     Makes it feel like my blood is boiling in my veins       Objective   Exam:     Vital Signs  Temp:  [97.6 °F (36.4 °C)-98.6 °F (37 °C)] 97.9 °F (36.6 °C)  Heart Rate:  [] 57  Resp:  [20-27] 23  BP: (125-167)/() 167/82  SpO2:  [95 %-100 %] 95 %  on  Flow (L/min) (Oxygen Therapy):  [10] 10;   Device (Oxygen Therapy): room air  Body mass index is 24.93 kg/m².    General: No acute distress  Head:      Normocephalic and atraumatic.    Eyes:      PERRL/EOM intact, conjunctivae and sclerae clear without nystagmus.    Neck:      No masses, thyromegaly,  trachea central with normal respiratory effort   Lungs:    Clear bilaterally to auscultation.    Heart:      Regular rate and rhythm, no murmur no gallop  Abd:        Soft, nontender, not distended, bowel sounds positive, no shifting dullness   Pulses:   Pulses palpable  Extr:        No cyanosis or clubbing--no edema.    Neuro:    No focal deficits.   alert oriented x3  Skin:       Intact without lesions or rashes.    Psych:    Alert and cooperative; normal mood and affect; .      Results Review:  I have personally reviewed most recent Data :  CBC    Results from last 7 days   Lab Units 24  0316 24  0642 24  2324   WBC 10*3/mm3 9.32 11.28* 12.39*   HEMOGLOBIN g/dL 9.6* 9.7* 11.7*   PLATELETS 10*3/mm3 168 167 212     CMP   Results from last 7 days   Lab Units 24  1125 24  0316 24  0642 24  0646 24  0342   SODIUM mmol/L  --  140 138 132* 135*    POTASSIUM mmol/L 3.3* 3.2* 3.5 5.2 6.5*   CHLORIDE mmol/L  --  100 100 100 101   CO2 mmol/L  --  26.2 23.4 16.9* 19.4*   BUN mg/dL  --  42* 46* 47* 48*   CREATININE mg/dL  --  2.99* 2.92* 2.87* 3.06*   GLUCOSE mg/dL  --  111* 125* 96 118*   ALBUMIN g/dL  --   --   --   --  3.8   BILIRUBIN mg/dL  --   --   --   --  0.6   ALK PHOS U/L  --   --   --   --  46   AST (SGOT) U/L  --   --   --   --  33   ALT (SGPT) U/L  --   --   --   --  15     ABG      No radiology results for the last day    Results for orders placed during the hospital encounter of 06/16/24    Adult Transthoracic Echo Complete W/ Cont if Necessary Per Protocol    Interpretation Summary    Left ventricular systolic function is normal. Left ventricular ejection fraction appears to be 56 - 60%.    The left ventricular cavity is small in size.    Left ventricular wall thickness is consistent with moderate concentric hypertrophy.    Left ventricular diastolic function was indeterminate.    The left atrial cavity is moderately dilated.    Left atrial volume is moderately increased.    The right atrial cavity is borderline dilated.    Estimated right ventricular systolic pressure from tricuspid regurgitation is normal (<35 mmHg).    Mild dilation of the aortic root is present. Mild dilation of the sinuses of Valsalva is present.    Scheduled Meds:apixaban, 5 mg, Oral, BID  aspirin, 81 mg, Oral, Daily  budesonide-formoterol, 2 puff, Inhalation, BID - RT  bumetanide, 3 mg, Intravenous, TID  busPIRone, 10 mg, Oral, BID  carvedilol, 25 mg, Oral, BID With Meals  guaiFENesin, 1,200 mg, Oral, Q12H  isosorbide mononitrate, 60 mg, Oral, Daily  metOLazone, 2.5 mg, Oral, Once per day on Monday Wednesday Friday  nicotine, 1 patch, Transdermal, Q24H  pantoprazole, 40 mg, Oral, Daily  sacubitril-valsartan, 1 tablet, Oral, Q24H  sevelamer, 800 mg, Oral, TID With Meals  sodium chloride, 10 mL, Intravenous, Q12H      Continuous Infusions:     PRN Meds:  acetaminophen     senna-docusate sodium **AND** polyethylene glycol **AND** bisacodyl **AND** bisacodyl    ipratropium-albuterol    nitroglycerin    ondansetron    oxymetazoline    pseudoephedrine    [COMPLETED] Insert Peripheral IV **AND** sodium chloride    sodium chloride    sodium chloride    Assessment & Plan   Assessment and Plan:         Atrial fibrillation with RVR    ASSESSMENT:  Chronic kidney disease stage IV, with kidney biopsy consistent with hypertensive nephrosclerosis with significant glomerulosclerosis and fibrosis, s/p AVF creation.  August 5, 2024, subsequently underwent transposition of brachiobasilic AV fistula on November 11, 2023 baseline creatinine variable but roughly 3 mg/d  Acute on Chronic heart failure  Hypertension  New onset A-fib as per cardiology   coronary artery disease  History of polysubstance abuse  Anemia of chronic kidney disease  History of hepatitis C    Echocardiogram from 10/20/2024 revealed ejection fraction of 61%     Plan :      Renal function is stable at baseline  I am decreasing Entresto to 1 pill a day as patient is hypokalemic that will help to take Underlying cardiac condition and may help to stabilize renal functions  Renal functions significantly significantly on the high side likely multifactorial including cardiorenal syndrome and Crestor.  hyperkalemia resolved  Decreasing Bumex at this time to p.o. dose  Add metolazone on as needed basis  Clinically patient is better  AV fistula left upper arm with some drainage from the stitches will follow-up with the vascular  No need of dialysis  Follow-up as an outpatient as already scheduled  Follow-up with vascular as already scheduled  Cardiology evaluation noted plan for cardioversion  [facility] records reviewed  Will continue to follow        Electronically signed by Nelson Das MD,   [facility] kidney consultant  [phone]  11/25/2024  14:56 EST

## 2024-11-25 NOTE — PROGRESS NOTES
"Transitions of Care (test claim):    Patient insurance type: Medicaid - this means they are NOT eligible for a monthly discount card in the future (see \"free month\" note below)  Is patient signed up for M2B? yes    Drug Entresto:   Covered/PA Required: Covered without PA  Copay Per Month: $0.00  Is the medication eligible for a trial card/copay card? N/A      For billing questions please call [facility] Pharmacist at ext: [phone]  For M2B questions please call [facility] at ext: [phone]     Caro HerbertD, BCPS    "

## 2024-11-26 LAB
ANION GAP SERPL CALCULATED.3IONS-SCNC: 12.8 MMOL/L (ref 5–15)
BASOPHILS # BLD AUTO: 0.03 10*3/MM3 (ref 0–0.2)
BASOPHILS NFR BLD AUTO: 0.3 % (ref 0–1.5)
BH CV ECHO SHUNT ASSESSMENT PERFORMED (HIDDEN SCRIPTING): 1
BUN SERPL-MCNC: 41 MG/DL (ref 6–20)
BUN/CREAT SERPL: 14.1 (ref 7–25)
CALCIUM SPEC-SCNC: 9.1 MG/DL (ref 8.6–10.5)
CHLORIDE SERPL-SCNC: 99 MMOL/L (ref 98–107)
CO2 SERPL-SCNC: 27.2 MMOL/L (ref 22–29)
CREAT SERPL-MCNC: 2.91 MG/DL (ref 0.76–1.27)
DEPRECATED RDW RBC AUTO: 61.4 FL (ref 37–54)
EGFRCR SERPLBLD CKD-EPI 2021: 25.9 ML/MIN/1.73
EOSINOPHIL # BLD AUTO: 0.27 10*3/MM3 (ref 0–0.4)
EOSINOPHIL NFR BLD AUTO: 3 % (ref 0.3–6.2)
ERYTHROCYTE [DISTWIDTH] IN BLOOD BY AUTOMATED COUNT: 19.5 % (ref 12.3–15.4)
GLUCOSE SERPL-MCNC: 97 MG/DL (ref 65–99)
HCT VFR BLD AUTO: 34.9 % (ref 37.5–51)
HGB BLD-MCNC: 11 G/DL (ref 13–17.7)
IMM GRANULOCYTES # BLD AUTO: 0.03 10*3/MM3 (ref 0–0.05)
IMM GRANULOCYTES NFR BLD AUTO: 0.3 % (ref 0–0.5)
LYMPHOCYTES # BLD AUTO: 2.3 10*3/MM3 (ref 0.7–3.1)
LYMPHOCYTES NFR BLD AUTO: 25.5 % (ref 19.6–45.3)
MAGNESIUM SERPL-MCNC: 2.1 MG/DL (ref 1.6–2.6)
MCH RBC QN AUTO: 27.7 PG (ref 26.6–33)
MCHC RBC AUTO-ENTMCNC: 31.5 G/DL (ref 31.5–35.7)
MCV RBC AUTO: 87.9 FL (ref 79–97)
MONOCYTES # BLD AUTO: 1.22 10*3/MM3 (ref 0.1–0.9)
MONOCYTES NFR BLD AUTO: 13.5 % (ref 5–12)
NEUTROPHILS NFR BLD AUTO: 5.16 10*3/MM3 (ref 1.7–7)
NEUTROPHILS NFR BLD AUTO: 57.4 % (ref 42.7–76)
NRBC BLD AUTO-RTO: 0 /100 WBC (ref 0–0.2)
PLATELET # BLD AUTO: 176 10*3/MM3 (ref 140–450)
PMV BLD AUTO: 10.1 FL (ref 6–12)
POTASSIUM SERPL-SCNC: 3.4 MMOL/L (ref 3.5–5.2)
POTASSIUM SERPL-SCNC: 3.6 MMOL/L (ref 3.5–5.2)
QT INTERVAL: 505 MS
QTC INTERVAL: 488 MS
RBC # BLD AUTO: 3.97 10*6/MM3 (ref 4.14–5.8)
SODIUM SERPL-SCNC: 139 MMOL/L (ref 136–145)
WBC NRBC COR # BLD AUTO: 9.01 10*3/MM3 (ref 3.4–10.8)

## 2024-11-26 PROCEDURE — 94799 UNLISTED PULMONARY SVC/PX: CPT

## 2024-11-26 PROCEDURE — 93005 ELECTROCARDIOGRAM TRACING: CPT | Performed by: INTERNAL MEDICINE

## 2024-11-26 PROCEDURE — 84132 ASSAY OF SERUM POTASSIUM: CPT | Performed by: NURSE PRACTITIONER

## 2024-11-26 PROCEDURE — 93010 ELECTROCARDIOGRAM REPORT: CPT | Performed by: INTERNAL MEDICINE

## 2024-11-26 PROCEDURE — 83735 ASSAY OF MAGNESIUM: CPT | Performed by: NURSE PRACTITIONER

## 2024-11-26 PROCEDURE — B24BZZ4 ULTRASONOGRAPHY OF HEART WITH AORTA, TRANSESOPHAGEAL: ICD-10-PCS | Performed by: INTERNAL MEDICINE

## 2024-11-26 PROCEDURE — 94664 DEMO&/EVAL PT USE INHALER: CPT

## 2024-11-26 PROCEDURE — 5A2204Z RESTORATION OF CARDIAC RHYTHM, SINGLE: ICD-10-PCS | Performed by: INTERNAL MEDICINE

## 2024-11-26 PROCEDURE — 99232 SBSQ HOSP IP/OBS MODERATE 35: CPT | Performed by: NURSE PRACTITIONER

## 2024-11-26 PROCEDURE — 80048 BASIC METABOLIC PNL TOTAL CA: CPT | Performed by: NURSE PRACTITIONER

## 2024-11-26 PROCEDURE — 94761 N-INVAS EAR/PLS OXIMETRY MLT: CPT

## 2024-11-26 PROCEDURE — 85025 COMPLETE CBC W/AUTO DIFF WBC: CPT | Performed by: NURSE PRACTITIONER

## 2024-11-26 PROCEDURE — 25010000002 BUMETANIDE PER 0.5 MG: Performed by: INTERNAL MEDICINE

## 2024-11-26 RX ORDER — BUMETANIDE 0.25 MG/ML
2 INJECTION, SOLUTION INTRAMUSCULAR; INTRAVENOUS 3 TIMES DAILY
Status: DISCONTINUED | OUTPATIENT
Start: 2024-11-26 | End: 2024-11-27

## 2024-11-26 RX ORDER — BUMETANIDE 0.25 MG/ML
2 INJECTION, SOLUTION INTRAMUSCULAR; INTRAVENOUS 2 TIMES DAILY
Status: DISCONTINUED | OUTPATIENT
Start: 2024-11-26 | End: 2024-11-26

## 2024-11-26 RX ADMIN — APIXABAN 5 MG: 5 TABLET, FILM COATED ORAL at 20:09

## 2024-11-26 RX ADMIN — Medication 10 ML: at 09:21

## 2024-11-26 RX ADMIN — SEVELAMER CARBONATE 800 MG: 800 TABLET, FILM COATED ORAL at 12:18

## 2024-11-26 RX ADMIN — OXYCODONE 7.5 MG: 5 TABLET ORAL at 09:20

## 2024-11-26 RX ADMIN — OXYCODONE 7.5 MG: 5 TABLET ORAL at 22:27

## 2024-11-26 RX ADMIN — SEVELAMER CARBONATE 800 MG: 800 TABLET, FILM COATED ORAL at 17:41

## 2024-11-26 RX ADMIN — APIXABAN 5 MG: 5 TABLET, FILM COATED ORAL at 09:18

## 2024-11-26 RX ADMIN — SACUBITRIL AND VALSARTAN 1 TABLET: 24; 26 TABLET, FILM COATED ORAL at 20:09

## 2024-11-26 RX ADMIN — SEVELAMER CARBONATE 800 MG: 800 TABLET, FILM COATED ORAL at 09:20

## 2024-11-26 RX ADMIN — ASPIRIN 81 MG: 81 TABLET, COATED ORAL at 09:19

## 2024-11-26 RX ADMIN — PSEUDOEPHEDRINE HCL 30 MG: 30 TABLET, FILM COATED ORAL at 17:41

## 2024-11-26 RX ADMIN — BUDESONIDE AND FORMOTEROL FUMARATE DIHYDRATE 2 PUFF: 160; 4.5 AEROSOL RESPIRATORY (INHALATION) at 22:19

## 2024-11-26 RX ADMIN — ISOSORBIDE MONONITRATE 60 MG: 30 TABLET, EXTENDED RELEASE ORAL at 09:19

## 2024-11-26 RX ADMIN — PANTOPRAZOLE SODIUM 40 MG: 40 TABLET, DELAYED RELEASE ORAL at 09:20

## 2024-11-26 RX ADMIN — OXYCODONE 7.5 MG: 5 TABLET ORAL at 03:52

## 2024-11-26 RX ADMIN — Medication 10 ML: at 20:10

## 2024-11-26 RX ADMIN — BUMETANIDE 2 MG: 0.25 INJECTION INTRAMUSCULAR; INTRAVENOUS at 20:09

## 2024-11-26 RX ADMIN — BUMETANIDE 2 MG: 0.25 INJECTION INTRAMUSCULAR; INTRAVENOUS at 09:18

## 2024-11-26 RX ADMIN — BUSPIRONE HYDROCHLORIDE 10 MG: 5 TABLET ORAL at 20:09

## 2024-11-26 RX ADMIN — OXYCODONE 7.5 MG: 5 TABLET ORAL at 17:56

## 2024-11-26 RX ADMIN — BUMETANIDE 2 MG: 0.25 INJECTION INTRAMUSCULAR; INTRAVENOUS at 17:41

## 2024-11-26 RX ADMIN — OXYCODONE 7.5 MG: 5 TABLET ORAL at 13:04

## 2024-11-26 RX ADMIN — CARVEDILOL 25 MG: 25 TABLET, FILM COATED ORAL at 09:20

## 2024-11-26 RX ADMIN — NICOTINE 1 PATCH: 21 PATCH TRANSDERMAL at 09:21

## 2024-11-26 RX ADMIN — CARVEDILOL 25 MG: 25 TABLET, FILM COATED ORAL at 17:41

## 2024-11-26 RX ADMIN — GUAIFENESIN 1200 MG: 600 TABLET, EXTENDED RELEASE ORAL at 20:09

## 2024-11-26 RX ADMIN — SACUBITRIL AND VALSARTAN 1 TABLET: 24; 26 TABLET, FILM COATED ORAL at 09:19

## 2024-11-26 RX ADMIN — GUAIFENESIN 1200 MG: 600 TABLET, EXTENDED RELEASE ORAL at 09:20

## 2024-11-26 RX ADMIN — BUSPIRONE HYDROCHLORIDE 10 MG: 5 TABLET ORAL at 09:20

## 2024-11-26 RX ADMIN — BUDESONIDE AND FORMOTEROL FUMARATE DIHYDRATE 2 PUFF: 160; 4.5 AEROSOL RESPIRATORY (INHALATION) at 06:40

## 2024-11-26 NOTE — PROGRESS NOTES
"    Reason for follow-up: A-fib     Patient Care Team:  Provider, No Known as PCP - General    Subjective .   Scott FLORES Lucila is doing fair today. Had 10 beat run of VT overnight. No other complaints.     ROS    Morphine    Scheduled Meds:apixaban, 5 mg, Oral, BID  aspirin, 81 mg, Oral, Daily  budesonide-formoterol, 2 puff, Inhalation, BID - RT  bumetanide, 2 mg, Intravenous, TID  busPIRone, 10 mg, Oral, BID  carvedilol, 25 mg, Oral, BID With Meals  guaiFENesin, 1,200 mg, Oral, Q12H  isosorbide mononitrate, 60 mg, Oral, Daily  metOLazone, 2.5 mg, Oral, Once per day on Monday Wednesday Friday  nicotine, 1 patch, Transdermal, Q24H  pantoprazole, 40 mg, Oral, Daily  sacubitril-valsartan, 1 tablet, Oral, Q12H  sevelamer, 800 mg, Oral, TID With Meals  sodium chloride, 10 mL, Intravenous, Q12H      Continuous Infusions:   PRN Meds:.  acetaminophen    senna-docusate sodium **AND** polyethylene glycol **AND** bisacodyl **AND** bisacodyl    ipratropium-albuterol    nitroglycerin    ondansetron    oxyCODONE    oxymetazoline    pseudoephedrine    [COMPLETED] Insert Peripheral IV **AND** sodium chloride    sodium chloride    sodium chloride      VITAL SIGNS  Vitals:    11/26/24 0355 11/26/24 0640 11/26/24 0641 11/26/24 0700   BP: 125/87   137/87   BP Location: Right arm   Right arm   Patient Position: Lying   Lying   Pulse: 59 55 53    Resp: 14 16  16   Temp: 98.3 °F (36.8 °C)   98.3 °F (36.8 °C)   TempSrc: Oral   Oral   SpO2: 96% 95% 94%    Weight: 62.3 kg (137 lb 5.6 oz)      Height:           Flowsheet Rows      Flowsheet Row First Filed Value   Admission Height 170.2 cm (67\") Documented at 11/23/2024 0137   Admission Weight 72.8 kg (160 lb 7.9 oz) Documented at 11/23/2024 0137               Physical Exam  VITALS REVIEWED    General:      well developed, in no acute distress.    Head:      normocephalic and atraumatic.    Eyes:      PERRL/EOM intact, conjunctiva and sclera clear with out nystagmus.    Neck:      no masses, " thyromegaly,  trachea central with normal respiratory effort and PMI displaced laterally  Lungs:      Clear  Heart:       Irregular rhythm  Msk:      no deformity or scoliosis noted of thoracic or lumbar spine.    Pulses:      pulses normal in all 4 extremities.    Extremities:       No lower extremity edema  Neurologic:      no focal deficits.   alert oriented x3  Skin:      intact without lesions or rashes.    Psych:      alert and cooperative; normal mood and affect; normal attention span and concentration.          LAB RESULTS (LAST 7 DAYS)    CBC  Results from last 7 days   Lab Units 11/26/24 0359 11/25/24 0316 11/24/24 0642 11/22/24  2324   WBC 10*3/mm3 9.01 9.32 11.28* 12.39*   RBC 10*6/mm3 3.97* 3.62* 3.50* 4.26   HEMOGLOBIN g/dL 11.0* 9.6* 9.7* 11.7*   HEMATOCRIT % 34.9* 31.5* 30.0* 38.1   MCV fL 87.9 87.0 85.7 89.4   PLATELETS 10*3/mm3 176 168 167 212       BMP  Results from last 7 days   Lab Units 11/26/24 0359 11/25/24 1927 11/25/24 1125 11/25/24 0316 11/24/24  0642 11/23/24  0646 11/23/24  0342   SODIUM mmol/L 139  --   --  140 138 132* 135*   POTASSIUM mmol/L 3.6 3.0* 3.3* 3.2* 3.5 5.2 6.5*   CHLORIDE mmol/L 99  --   --  100 100 100 101   CO2 mmol/L 27.2  --   --  26.2 23.4 16.9* 19.4*   BUN mg/dL 41*  --   --  42* 46* 47* 48*   CREATININE mg/dL 2.91*  --   --  2.99* 2.92* 2.87* 3.06*   GLUCOSE mg/dL 97  --   --  111* 125* 96 118*   MAGNESIUM mg/dL 2.1 1.8  --  1.9 1.9  --  2.1   PHOSPHORUS mg/dL  --   --   --   --   --   --  3.9       CMP   Results from last 7 days   Lab Units 11/26/24 0359 11/25/24 1927 11/25/24  1125 11/25/24  0316 11/24/24  0642 11/23/24  0646 11/23/24  0342   SODIUM mmol/L 139  --   --  140 138 132* 135*   POTASSIUM mmol/L 3.6 3.0* 3.3* 3.2* 3.5 5.2 6.5*   CHLORIDE mmol/L 99  --   --  100 100 100 101   CO2 mmol/L 27.2  --   --  26.2 23.4 16.9* 19.4*   BUN mg/dL 41*  --   --  42* 46* 47* 48*   CREATININE mg/dL 2.91*  --   --  2.99* 2.92* 2.87* 3.06*   GLUCOSE mg/dL 97  --    --  111* 125* 96 118*   ALBUMIN g/dL  --   --   --   --   --   --  3.8   BILIRUBIN mg/dL  --   --   --   --   --   --  0.6   ALK PHOS U/L  --   --   --   --   --   --  46   AST (SGOT) U/L  --   --   --   --   --   --  33   ALT (SGPT) U/L  --   --   --   --   --   --  15         BNP        TROPONIN  Results from last 7 days   Lab Units 11/23/24  0646   HSTROP T ng/L 33*       CoAg        Creatinine Clearance  Estimated Creatinine Clearance: 27.7 mL/min (A) (by C-G formula based on SCr of 2.91 mg/dL (H)).    ABG          EKG    I personally reviewed the patient's EKG/Telemetry data: A-fib      Assessment & Plan       Atrial fibrillation with RVR      Scott Gaytan is a 49-year-old male patient who presented to the hospital due to shortness of breath.  Patient was found to be in A-fib with rapid ventricular rates which is a new diagnosis for him.  He also has elevated creatinine of 2.8, normal TSH, potassium was 6.5 on arrival now it is 5.2.  His troponin elevation is trivial, no active chest pain, no ST elevations on EKG.  He has recent echocardiogram in June with normal EF.  He is now on IV Cardizem but still in A-fib.  I will switch that to IV amiodarone and hopefully he will convert to sinus rhythm, otherwise we can proceed with cardioversion hopefully next week.  Eliquis was added for stroke prevention.        11/24/2024     Patient remains in atrial fibrillation, continue amiodarone drip.  Keep n.p.o. after midnight for possible GYPSY cardioversion.    11/25/2024    GYPSY guided cardioversion was performed with return to sinus rhythm.  Continue Eliquis and carvedilol.    11/26/2024    Remains in sinus rhythm. Patient had 10 beat run of VT overnight. Has had frequent PVCs also. Potassium 3.0 yesterday. Per nursing, nephrology did not want to replace K, however did decreased Entresto to 1 pill daily to help with hypokalemia. Bumex changed to PO. Continue carvedilol and Eliquis.    I discussed the patients findings  and my recommendations with patient and agrees with outlined plan.    CHANDLER Mata  11/26/24  11:37 EST    Electronically signed by CHANDLER Mata, 11/26/24, 11:42 AM EST.

## 2024-11-26 NOTE — CASE MANAGEMENT/SOCIAL WORK
Social Work Assessment   Bhavik     Patient Name: Scott Gaytan  MRN: [id number]  Today's Date: 11/26/2024    Admit Date: 11/23/2024     Demographic Summary       Row Name 11/26/24 1236       General Information    Reason for Consult community resources;financial concerns;housing concerns/homeless    General Information Comments SW was consulted re: SDOH. SW met with Pt bedside in room 3109. Pt reports not having housing, stating he has been living between sister, cousin, and friends. Pt declined resources for homeless shelters but stated that he has been to [facility] in the past and will not return. Pt reports no income. Pt reports applying for SSDI twice for CHF, COPD, and kidney failure and was denied, stating he plans to hire an  to assist. Pt reports applying for SNAP two months ago but has not heard back and told writer “I ain’t fighting for it.” Pt is aware of how to follow-up. Pt reports having transportation needs and states he was given information and phone number for [facility] transportation services. Pt reports not having access to a cellphone. Info for free cellphone service through [employer] placed on [employer]. Pt was encouraged to visit library for access to computer but Pt reports having access to laptop at sister or cousin’s house. Pt denies any substance use in the last 5-6 months. Pt denies tobacco use in the last two weeks, reporting he has nicotine patches at home. SW gave Pt [employer] resource packet for [facility] where Pt resides. Pt verbalizes desire for LTC but affirms his ability to ambulate independently, eat, bathe, and toilet independently. Pt reports sister will pick-up at D/C with plans to return to his cousin’s house.           Soo Linton, [facility]  Medical Social Worker

## 2024-11-26 NOTE — PROGRESS NOTES
PROGRESS NOTE      Patient Name: Scott Gaytan  : 1975  MRN: [id number]  Primary Care Physician: Provider, No Known  Date of admission: 2024    Patient Care Team:  Provider, No Known as PCP - General        Subjective   Subjective:     Patient seen and examined alert awake no distress  Review of systems:  No complaints of any fever chills chest pain shortness of breath better      Allergies:    Allergies   Allergen Reactions    Morphine Other (See Comments)     Makes it feel like my blood is boiling in my veins       Objective   Exam:     Vital Signs  Temp:  [97.8 °F (36.6 °C)-98.6 °F (37 °C)] 98 °F (36.7 °C)  Heart Rate:  [52-70] 52  Resp:  [14-20] 16  BP: ()/(62-99) 95/66  SpO2:  [93 %-100 %] 94 %  on  Flow (L/min) (Oxygen Therapy):  [10] 10;   Device (Oxygen Therapy): room air  Body mass index is 21.51 kg/m².    General: No acute distress  Head:      Normocephalic and atraumatic.    Eyes:      PERRL/EOM intact, conjunctivae and sclerae clear without nystagmus.    Neck:      No masses, thyromegaly,  trachea central with normal respiratory effort   Lungs:    Clear bilaterally to auscultation.    Heart:      Regular rate and rhythm, no murmur no gallop  Abd:        Soft, nontender, not distended, bowel sounds positive, no shifting dullness   Pulses:   Pulses palpable  Extr:        No cyanosis or clubbing--no edema.    Neuro:    No focal deficits.   alert oriented x3  Skin:       Intact without lesions or rashes.    Psych:    Alert and cooperative; normal mood and affect; .      Results Review:  I have personally reviewed most recent Data :  CBC    Results from last 7 days   Lab Units 24  0359 24  0316 24  0642 24  2324   WBC 10*3/mm3 9.01 9.32 11.28* 12.39*   HEMOGLOBIN g/dL 11.0* 9.6* 9.7* 11.7*   PLATELETS 10*3/mm3 176 168 167 212     CMP   Results from last 7 days   Lab Units 24  0359 24  1927 24  1125 24  0316 24  0642 24  0646  11/23/24  0342   SODIUM mmol/L 139  --   --  140 138 132* 135*   POTASSIUM mmol/L 3.6 3.0* 3.3* 3.2* 3.5 5.2 6.5*   CHLORIDE mmol/L 99  --   --  100 100 100 101   CO2 mmol/L 27.2  --   --  26.2 23.4 16.9* 19.4*   BUN mg/dL 41*  --   --  42* 46* 47* 48*   CREATININE mg/dL 2.91*  --   --  2.99* 2.92* 2.87* 3.06*   GLUCOSE mg/dL 97  --   --  111* 125* 96 118*   ALBUMIN g/dL  --   --   --   --   --   --  3.8   BILIRUBIN mg/dL  --   --   --   --   --   --  0.6   ALK PHOS U/L  --   --   --   --   --   --  46   AST (SGOT) U/L  --   --   --   --   --   --  33   ALT (SGPT) U/L  --   --   --   --   --   --  15     ABG      No radiology results for the last day    Results for orders placed during the hospital encounter of 06/16/24    Adult Transthoracic Echo Complete W/ Cont if Necessary Per Protocol    Interpretation Summary    Left ventricular systolic function is normal. Left ventricular ejection fraction appears to be 56 - 60%.    The left ventricular cavity is small in size.    Left ventricular wall thickness is consistent with moderate concentric hypertrophy.    Left ventricular diastolic function was indeterminate.    The left atrial cavity is moderately dilated.    Left atrial volume is moderately increased.    The right atrial cavity is borderline dilated.    Estimated right ventricular systolic pressure from tricuspid regurgitation is normal (<35 mmHg).    Mild dilation of the aortic root is present. Mild dilation of the sinuses of Valsalva is present.    Scheduled Meds:apixaban, 5 mg, Oral, BID  aspirin, 81 mg, Oral, Daily  budesonide-formoterol, 2 puff, Inhalation, BID - RT  bumetanide, 2 mg, Intravenous, TID  busPIRone, 10 mg, Oral, BID  carvedilol, 25 mg, Oral, BID With Meals  guaiFENesin, 1,200 mg, Oral, Q12H  isosorbide mononitrate, 60 mg, Oral, Daily  metOLazone, 2.5 mg, Oral, Once per day on Monday Wednesday Friday  nicotine, 1 patch, Transdermal, Q24H  pantoprazole, 40 mg, Oral, Daily  sacubitril-valsartan,  1 tablet, Oral, Q12H  sevelamer, 800 mg, Oral, TID With Meals  sodium chloride, 10 mL, Intravenous, Q12H      Continuous Infusions:     PRN Meds:  acetaminophen    senna-docusate sodium **AND** polyethylene glycol **AND** bisacodyl **AND** bisacodyl    ipratropium-albuterol    nitroglycerin    ondansetron    oxyCODONE    oxymetazoline    pseudoephedrine    [COMPLETED] Insert Peripheral IV **AND** sodium chloride    sodium chloride    sodium chloride    Assessment & Plan   Assessment and Plan:         Atrial fibrillation with RVR    ASSESSMENT:  Chronic kidney disease stage IV, with kidney biopsy consistent with hypertensive nephrosclerosis with significant glomerulosclerosis and fibrosis, s/p AVF creation.  August 5, 2024, subsequently underwent transposition of brachiobasilic AV fistula on November 11, 2023 baseline creatinine variable but roughly 3 mg/d  Acute on Chronic heart failure  Hypertension  New onset A-fib as per cardiology   coronary artery disease  History of polysubstance abuse  Anemia of chronic kidney disease  History of hepatitis C    Echocardiogram from 10/20/2024 revealed ejection fraction of 61%     Plan :      Renal function is stable at baseline in fact slightly better than before  Increasing Entresto to 1 pill twice a day at the low-dose.  Because potassium is still acceptable patient creatinine improved even after first dose of Entresto  I think etiology of hyperkalemia was not because of Entresto because he was not taking it it was underlying cardiorenal syndrome with increased volume causing significant hyperkalemia and mild acidosis  Renal functions significantly significantly on the high side likely multifactorial including cardiorenal syndrome   Patient has significant social issues does not have a place to live I do not know how everything is going to work out regarding his dialysis in future evaluate  Continue Bumex at 2 mg p.o. twice a day add metolazone on Monday Wednesday Friday and  may be every day as per patient volume  Clinically patient is better edema better already lost about 20 pounds  AV fistula left upper arm with some drainage from the stitches will follow-up with the vascular  No need of dialysis  Follow-up as an outpatient as already scheduled  Follow-up with vascular as already scheduled  Cardiology evaluation noted plan for cardioversion  [facility] records reviewed  Will continue to follow        Electronically signed by Nelson Das MD,   [facility] kidney consultant  [phone]  11/26/2024  13:09 EST

## 2024-11-26 NOTE — PROGRESS NOTES
[facility] MEDICINE SERVICE  DAILY PROGRESS NOTE    NAME: Scott Gaytan  : 1975  MRN: [id number]      LOS: 3 days     PROVIDER OF SERVICE: Judith Scanlon MD    Chief Complaint: Atrial fibrillation with RVR    Subjective:     Interval History:  History taken from: patient    No new complaint        Review of Systems:   Review of Systems   All other systems reviewed and are negative.      Objective:     Vital Signs  Temp:  [97.8 °F (36.6 °C)-98.6 °F (37 °C)] 98 °F (36.7 °C)  Heart Rate:  [53-92] 53  Resp:  [14-23] 16  BP: ()/() 95/66  Flow (L/min) (Oxygen Therapy):  [10] 10   Body mass index is 21.51 kg/m².    Physical Exam  Physical Exam  Constitutional:       Appearance: Normal appearance.   HENT:      Head: Normocephalic and atraumatic.      Nose: Nose normal.      Mouth/Throat:      Mouth: Mucous membranes are moist.   Eyes:      Extraocular Movements: Extraocular movements intact.      Conjunctiva/sclera: Conjunctivae normal.      Pupils: Pupils are equal, round, and reactive to light.   Cardiovascular:      Rate and Rhythm: Normal rate and regular rhythm.      Pulses: Normal pulses.      Heart sounds: Normal heart sounds.   Pulmonary:      Effort: Pulmonary effort is normal.      Breath sounds: Normal breath sounds.   Abdominal:      General: Abdomen is flat. Bowel sounds are normal.      Palpations: Abdomen is soft.   Musculoskeletal:         General: Normal range of motion.      Cervical back: Normal range of motion and neck supple.   Skin:     General: Skin is warm and dry.      Capillary Refill: Capillary refill takes less than 2 seconds.   Neurological:      General: No focal deficit present.      Mental Status: He is alert and oriented to person, place, and time.   Psychiatric:         Mood and Affect: Mood normal.         Behavior: Behavior normal.         Thought Content: Thought content normal.         Judgment: Judgment normal.         Current Medications:  Scheduled  Meds:apixaban, 5 mg, Oral, BID  aspirin, 81 mg, Oral, Daily  budesonide-formoterol, 2 puff, Inhalation, BID - RT  bumetanide, 2 mg, Intravenous, TID  busPIRone, 10 mg, Oral, BID  carvedilol, 25 mg, Oral, BID With Meals  guaiFENesin, 1,200 mg, Oral, Q12H  isosorbide mononitrate, 60 mg, Oral, Daily  metOLazone, 2.5 mg, Oral, Once per day on Monday Wednesday Friday  nicotine, 1 patch, Transdermal, Q24H  pantoprazole, 40 mg, Oral, Daily  sacubitril-valsartan, 1 tablet, Oral, Q12H  sevelamer, 800 mg, Oral, TID With Meals  sodium chloride, 10 mL, Intravenous, Q12H      Continuous Infusions:   PRN Meds:.  acetaminophen    senna-docusate sodium **AND** polyethylene glycol **AND** bisacodyl **AND** bisacodyl    ipratropium-albuterol    nitroglycerin    ondansetron    oxyCODONE    oxymetazoline    pseudoephedrine    [COMPLETED] Insert Peripheral IV **AND** sodium chloride    sodium chloride    sodium chloride       Diagnostic Data    Results from last 7 days   Lab Units 11/26/24  0359 11/23/24  0646 11/23/24  0342   WBC 10*3/mm3 9.01   < >  --    HEMOGLOBIN g/dL 11.0*   < >  --    HEMATOCRIT % 34.9*   < >  --    PLATELETS 10*3/mm3 176   < >  --    GLUCOSE mg/dL 97   < > 118*   CREATININE mg/dL 2.91*   < > 3.06*   BUN mg/dL 41*   < > 48*   SODIUM mmol/L 139   < > 135*   POTASSIUM mmol/L 3.6   < > 6.5*   AST (SGOT) U/L  --   --  33   ALT (SGPT) U/L  --   --  15   ALK PHOS U/L  --   --  46   BILIRUBIN mg/dL  --   --  0.6   ANION GAP mmol/L 12.8   < > 14.6    < > = values in this interval not displayed.       No radiology results for the last day      I reviewed the patient's new clinical results.    Assessment/Plan:     Active and Resolved Problems  Active Hospital Problems    Diagnosis  POA    **Atrial fibrillation with RVR [I48.91]  Yes      Resolved Hospital Problems   No resolved problems to display.       Atrial Fibrillation with RVR  -Rate controlled.  Continue Coreg.  Continue Eliquis for anticoagulation.  Status post GYPSY  cardioversion on 11/25/2024.      Nonsustained V. tach  - Continue Coreg.  Replace electrolytes.    Hypokalemia  - Replace potassium     JOSE ANGEL on CKD  - Stable creatinine over the past 24 hours.  See further recommendations per nephrology who is following the patient.     Acute on chronic HFpEF:  -Continue IV Bumex, continue metoprolol, optimize medical management for CHF exacerbation.  Strict I's and O's, daily weights, 2 g sodium diet       COPD:  -Does not appear to be in exacerbation.  Continue bronchodilators as needed.     Nicotine dependence:  -nicotine patch daily.     VTE Prophylaxis:  Pharmacologic & mechanical VTE prophylaxis orders are present.             Disposition Planning:     Barriers to Discharge: Pending clinical improvement   Anticipated Date of Discharge: 10/29/2024  Place of Discharge: Home      Code Status and Medical Interventions: CPR (Attempt to Resuscitate); Full Support   Ordered at: 11/23/24 0451     Level Of Support Discussed With:    Patient     Code Status (Patient has no pulse and is not breathing):    CPR (Attempt to Resuscitate)     Medical Interventions (Patient has pulse or is breathing):    Full Support       Signature: Electronically signed by Judith Scanlon MD, 11/26/24, 12:10 EST.  [facility] Hospitalist Team

## 2024-11-27 ENCOUNTER — READMISSION MANAGEMENT (OUTPATIENT)
Dept: CALL CENTER | Facility: HOSPITAL | Age: 49
End: 2024-11-27
Payer: MEDICAID

## 2024-11-27 VITALS
HEIGHT: 67 IN | BODY MASS INDEX: 22.87 KG/M2 | TEMPERATURE: 97.3 F | RESPIRATION RATE: 13 BRPM | OXYGEN SATURATION: 95 % | WEIGHT: 145.72 LBS | SYSTOLIC BLOOD PRESSURE: 103 MMHG | HEART RATE: 65 BPM | DIASTOLIC BLOOD PRESSURE: 79 MMHG

## 2024-11-27 PROCEDURE — 25010000002 BUMETANIDE PER 0.5 MG: Performed by: INTERNAL MEDICINE

## 2024-11-27 PROCEDURE — 99232 SBSQ HOSP IP/OBS MODERATE 35: CPT | Performed by: INTERNAL MEDICINE

## 2024-11-27 RX ORDER — BUMETANIDE 2 MG/1
2 TABLET ORAL 2 TIMES DAILY
Qty: 60 TABLET | Refills: 0 | Status: SHIPPED | OUTPATIENT
Start: 2024-11-27 | End: 2024-12-27

## 2024-11-27 RX ORDER — POTASSIUM CHLORIDE 1500 MG/1
40 TABLET, EXTENDED RELEASE ORAL ONCE
Status: DISCONTINUED | OUTPATIENT
Start: 2024-11-27 | End: 2024-11-27

## 2024-11-27 RX ORDER — BUMETANIDE 1 MG/1
2 TABLET ORAL
Status: DISCONTINUED | OUTPATIENT
Start: 2024-11-27 | End: 2024-11-27 | Stop reason: HOSPADM

## 2024-11-27 RX ORDER — LOSARTAN POTASSIUM 50 MG/1
50 TABLET ORAL
Status: DISCONTINUED | OUTPATIENT
Start: 2024-11-28 | End: 2024-11-27 | Stop reason: HOSPADM

## 2024-11-27 RX ORDER — LOSARTAN POTASSIUM 50 MG/1
50 TABLET ORAL DAILY
Qty: 30 TABLET | Refills: 5 | Status: SHIPPED | OUTPATIENT
Start: 2024-11-27

## 2024-11-27 RX ADMIN — OXYCODONE 7.5 MG: 5 TABLET ORAL at 15:20

## 2024-11-27 RX ADMIN — CARVEDILOL 25 MG: 25 TABLET, FILM COATED ORAL at 09:37

## 2024-11-27 RX ADMIN — METOLAZONE 2.5 MG: 2.5 TABLET ORAL at 09:37

## 2024-11-27 RX ADMIN — BUSPIRONE HYDROCHLORIDE 10 MG: 5 TABLET ORAL at 09:37

## 2024-11-27 RX ADMIN — APIXABAN 5 MG: 5 TABLET, FILM COATED ORAL at 09:37

## 2024-11-27 RX ADMIN — GUAIFENESIN 1200 MG: 600 TABLET, EXTENDED RELEASE ORAL at 09:37

## 2024-11-27 RX ADMIN — Medication 10 ML: at 09:38

## 2024-11-27 RX ADMIN — SEVELAMER CARBONATE 800 MG: 800 TABLET, FILM COATED ORAL at 09:37

## 2024-11-27 RX ADMIN — PANTOPRAZOLE SODIUM 40 MG: 40 TABLET, DELAYED RELEASE ORAL at 09:37

## 2024-11-27 RX ADMIN — OXYCODONE 7.5 MG: 5 TABLET ORAL at 04:08

## 2024-11-27 RX ADMIN — BUMETANIDE 2 MG: 0.25 INJECTION INTRAMUSCULAR; INTRAVENOUS at 09:37

## 2024-11-27 RX ADMIN — ISOSORBIDE MONONITRATE 60 MG: 30 TABLET, EXTENDED RELEASE ORAL at 09:37

## 2024-11-27 RX ADMIN — SEVELAMER CARBONATE 800 MG: 800 TABLET, FILM COATED ORAL at 12:41

## 2024-11-27 RX ADMIN — NICOTINE 1 PATCH: 21 PATCH TRANSDERMAL at 09:38

## 2024-11-27 RX ADMIN — SACUBITRIL AND VALSARTAN 1 TABLET: 24; 26 TABLET, FILM COATED ORAL at 09:37

## 2024-11-27 RX ADMIN — OXYCODONE 7.5 MG: 5 TABLET ORAL at 09:37

## 2024-11-27 NOTE — PROGRESS NOTES
PROGRESS NOTE      Patient Name: Scott Gaytan  : 1975  MRN: [id number]  Primary Care Physician: Provider, No Known  Date of admission: 2024    Patient Care Team:  Provider, No Known as PCP - General        Subjective   Subjective:     Patient seen and examined alert awake no distress  Review of systems:  No complaints of any fever chills chest pain shortness of breath better      Allergies:    Allergies   Allergen Reactions    Morphine Other (See Comments)     Makes it feel like my blood is boiling in my veins       Objective   Exam:     Vital Signs  Temp:  [97.1 °F (36.2 °C)-98.2 °F (36.8 °C)] 97.7 °F (36.5 °C)  Heart Rate:  [52-71] 54  Resp:  [12-16] 13  BP: ()/(66-79) 106/77  SpO2:  [0 %-99 %] 96 %  on   ;   Device (Oxygen Therapy): room air  Body mass index is 22.82 kg/m².    General: No acute distress  Head:      Normocephalic and atraumatic.    Eyes:      PERRL/EOM intact, conjunctivae and sclerae clear without nystagmus.    Neck:      No masses, thyromegaly,  trachea central with normal respiratory effort   Lungs:    Clear bilaterally to auscultation.    Heart:      Regular rate and rhythm, no murmur no gallop  Abd:        Soft, nontender, not distended, bowel sounds positive, no shifting dullness   Pulses:   Pulses palpable  Extr:        No cyanosis or clubbing--no edema.    Neuro:    No focal deficits.   alert oriented x3  Skin:       Intact without lesions or rashes.    Psych:    Alert and cooperative; normal mood and affect; .      Results Review:  I have personally reviewed most recent Data :  CBC    Results from last 7 days   Lab Units 24  0359 24  0316 24  0642 24  2324   WBC 10*3/mm3 9.01 9.32 11.28* 12.39*   HEMOGLOBIN g/dL 11.0* 9.6* 9.7* 11.7*   PLATELETS 10*3/mm3 176 168 167 212     CMP   Results from last 7 days   Lab Units 24  1549 24  0359 24  1927 24  1125 24  0316 24  0642 24  0646 24  0342    SODIUM mmol/L  --  139  --   --  140 138 132* 135*   POTASSIUM mmol/L 3.4* 3.6 3.0* 3.3* 3.2* 3.5 5.2 6.5*   CHLORIDE mmol/L  --  99  --   --  100 100 100 101   CO2 mmol/L  --  27.2  --   --  26.2 23.4 16.9* 19.4*   BUN mg/dL  --  41*  --   --  42* 46* 47* 48*   CREATININE mg/dL  --  2.91*  --   --  2.99* 2.92* 2.87* 3.06*   GLUCOSE mg/dL  --  97  --   --  111* 125* 96 118*   ALBUMIN g/dL  --   --   --   --   --   --   --  3.8   BILIRUBIN mg/dL  --   --   --   --   --   --   --  0.6   ALK PHOS U/L  --   --   --   --   --   --   --  46   AST (SGOT) U/L  --   --   --   --   --   --   --  33   ALT (SGPT) U/L  --   --   --   --   --   --   --  15     ABG      No radiology results for the last day    Results for orders placed during the hospital encounter of 11/23/24    Adult Transesophageal Echo (GYPSY) W/ Cont if Necessary Per Protocol (Cardiology Department)    Interpretation Summary    Left ventricular ejection fraction appears to be 51 - 55%.    Saline test results are negative.    No thrombus was visualized in the left atrial appendage.      Electronically signed by Cary Espinosa MD, 11/26/24, 5:37 PM EST.    Scheduled Meds:apixaban, 5 mg, Oral, BID  budesonide-formoterol, 2 puff, Inhalation, BID - RT  bumetanide, 2 mg, Oral, BID  busPIRone, 10 mg, Oral, BID  carvedilol, 25 mg, Oral, BID With Meals  guaiFENesin, 1,200 mg, Oral, Q12H  isosorbide mononitrate, 60 mg, Oral, Daily  metOLazone, 2.5 mg, Oral, Once per day on Monday Wednesday Friday  nicotine, 1 patch, Transdermal, Q24H  pantoprazole, 40 mg, Oral, Daily  sacubitril-valsartan, 1 tablet, Oral, Q12H  sevelamer, 800 mg, Oral, TID With Meals  sodium chloride, 10 mL, Intravenous, Q12H      Continuous Infusions:     PRN Meds:  acetaminophen    senna-docusate sodium **AND** polyethylene glycol **AND** bisacodyl **AND** bisacodyl    ipratropium-albuterol    nitroglycerin    ondansetron    oxyCODONE    oxymetazoline    pseudoephedrine    [COMPLETED] Insert  Peripheral IV **AND** sodium chloride    sodium chloride    sodium chloride    Assessment & Plan   Assessment and Plan:         Atrial fibrillation with RVR    ASSESSMENT:  Chronic kidney disease stage IV, with kidney biopsy consistent with hypertensive nephrosclerosis with significant glomerulosclerosis and fibrosis, s/p AVF creation.  August 5, 2024, subsequently underwent transposition of brachiobasilic AV fistula on November 11, 2023 baseline creatinine variable but roughly 3 mg/d  Acute on Chronic heart failure  Hypertension  New onset A-fib as per cardiology   coronary artery disease  History of polysubstance abuse  Anemia of chronic kidney disease  History of hepatitis C    Echocardiogram from 10/20/2024 revealed ejection fraction of 61%     Plan :      Renal function is stable at baseline in fact slightly better than before  Increasing Entresto to 1 pill twice a day at the low-dose.  Because potassium is still acceptable patient creatinine improved even after first dose of Entresto  Consider adding SGLT2 inhibitors   I think etiology of hyperkalemia was not because of Entresto because he was not taking it it was underlying cardiorenal syndrome with increased volume causing significant hyperkalemia and mild acidosis  Renal functions significantly significantly on the high side likely multifactorial including cardiorenal syndrome   Patient has significant social issues does not have a place to live I do not know how everything is going to work out regarding his dialysis in future evaluate  Entresto was started by cardiologist few months back but now ejection fraction is improved I am discontinuing Entresto starting patient on angiotensin receptor blockers follow-up in the renal clinic continue loop diuretics and thiazide diuretic  Continue Bumex at 2 mg p.o. twice a day add metolazone on Monday Wednesday Friday and may be every day as per patient volume  Clinically patient is better edema better already lost  about 20 pounds  AV fistula left upper arm with some drainage from the stitches will follow-up with the vascular  No need of dialysis  Follow-up as an outpatient as already scheduled  Follow-up with vascular as already scheduled  Cardiology evaluation noted plan for cardioversion  [facility] records reviewed  Will continue to follow        Electronically signed by Nelson Das MD,   [facility] kidney consultant  [phone]  11/27/2024  10:45 EST

## 2024-11-27 NOTE — OUTREACH NOTE
Prep Survey      Flowsheet Row Responses   [facility] patient discharged from? [facility]   Is LACE score < 7 ? No   Eligibility Readm Mgmt   Discharge diagnosis Atrial fibrillation with RVR   Does the patient have one of the following disease processes/diagnoses(primary or secondary)? Other   Does the patient have Home health ordered? No   Is there a DME ordered? No   Prep survey completed? Yes            Erica RENO - Registered Nurse

## 2024-11-27 NOTE — ADDENDUM NOTE
Addendum  created 11/27/24 1218 by Markos Boyd MD    Attestation recorded in Intraprocedure, Clinical Note Signed, Intraprocedure Attestations filed, Intraprocedure Event edited

## 2024-11-27 NOTE — DISCHARGE SUMMARY
[facility] Medicine Services  Discharge Summary    Date of Service: 2024  Patient Name: Scott Gaytan  : 1975  MRN: [id number]    Date of Admission: 2024  Discharge Diagnosis:    Atrial fibrillation with RVR  JOSE ANGEL on CKD stage IV  Acute on diastolic chronic CHF EF of 51-55%  COPD  Nicotine patch    Date of Discharge: 2024  Primary Care Physician: Provider, No Known      Presenting Problem:   Hyperkalemia [E87.5]  Atrial fibrillation with rapid ventricular response [I48.91]  Atrial fibrillation with RVR [I48.91]  Hypervolemia, unspecified hypervolemia type [E87.70]  Dyspnea, unspecified type [R06.00]    Active and Resolved Hospital Problems:  Active Hospital Problems    Diagnosis POA    **Atrial fibrillation with RVR [I48.91] Yes      Resolved Hospital Problems   No resolved problems to display.         Hospital Course       Hospital Course:    This patient is a 49-year-old gentleman who was admitted with atrial fibrillation rapid ventricular response and acute on chronic diastolic CHF exacerbation EF of 51 to 55%.  He was started on IV Cardizem but remained rate uncontrolled and so it was converted to IV amiodarone.  He was seen by cardiology.  He successfully underwent a GYPSY cardioversion.  He was started on Eliquis for anticoagulation in view of A-fib for stroke prevention.  He was treated for acute on chronic diastolic CHF exacerbation and started on IV Bumex with optimization of medical management for CHF exacerbation.  As already mentioned he gradually improved.  He had an echocardiogram done which showed an ejection fraction of 51 to 55%.  He was counseled on maintaining a 2 g sodium diet.  He is being discharged home today.  Of note is that he was seen by nephrology for acute kidney injury on chronic kidney disease stage IV which gradually improved and stabilized.  Nephrology started him on Entresto.  Patient was mildly hypokalemic however nephrology counseled  against replacing potassium due to initiation of Entresto.      DISCHARGE Follow Up Recommendations for labs and diagnostics: Follow-up PCP normal, follow-up with cardiology in 1 week, follow-up with nephrology in 1 week.        Day of Discharge     Vital Signs:  Temp:  [97.1 °F (36.2 °C)-97.7 °F (36.5 °C)] 97.3 °F (36.3 °C)  Heart Rate:  [54-71] 65  Resp:  [12-17] 13  BP: (103-141)/(76-99) 103/79    Physical Exam:  Physical Exam  Constitutional:       Appearance: Normal appearance.   HENT:      Head: Normocephalic and atraumatic.      Nose: Nose normal.      Mouth/Throat:      Mouth: Mucous membranes are moist.   Eyes:      Extraocular Movements: Extraocular movements intact.      Conjunctiva/sclera: Conjunctivae normal.      Pupils: Pupils are equal, round, and reactive to light.   Cardiovascular:      Rate and Rhythm: Normal rate and regular rhythm.      Pulses: Normal pulses.      Heart sounds: Normal heart sounds.   Pulmonary:      Effort: Pulmonary effort is normal.      Breath sounds: Normal breath sounds.   Abdominal:      General: Abdomen is flat. Bowel sounds are normal.      Palpations: Abdomen is soft.   Musculoskeletal:         General: Normal range of motion.      Cervical back: Normal range of motion and neck supple.   Skin:     General: Skin is warm and dry.      Capillary Refill: Capillary refill takes less than 2 seconds.   Neurological:      General: No focal deficit present.      Mental Status: He is alert and oriented to person, place, and time.   Psychiatric:         Mood and Affect: Mood normal.         Behavior: Behavior normal.         Thought Content: Thought content normal.         Judgment: Judgment normal.           Pertinent  and/or Most Recent Results     LAB RESULTS:      Lab 11/26/24  0359 11/25/24  0316 11/24/24  0642 11/22/24  2324   WBC 9.01 9.32 11.28* 12.39*   HEMOGLOBIN 11.0* 9.6* 9.7* 11.7*   HEMATOCRIT 34.9* 31.5* 30.0* 38.1   PLATELETS 176 168 167 212   NEUTROS ABS 5.16 6.35  8.14* 7.95*   IMMATURE GRANS (ABS) 0.03 0.03 0.05 0.04   LYMPHS ABS 2.30 1.79 1.98 3.19*   MONOS ABS 1.22* 0.96* 0.99* 1.06*   EOS ABS 0.27 0.17 0.09 0.11   MCV 87.9 87.0 85.7 89.4         Lab 11/26/24  1549 11/26/24  0359 11/25/24  1927 11/25/24  1125 11/25/24  0316 11/24/24  0642 11/23/24  0646 11/23/24  0342 11/23/24  0342 11/22/24  2324   SODIUM  --  139  --   --  140 138 132*  --  135*  --    POTASSIUM 3.4* 3.6 3.0* 3.3* 3.2* 3.5 5.2   < > 6.5*  --    CHLORIDE  --  99  --   --  100 100 100  --  101  --    CO2  --  27.2  --   --  26.2 23.4 16.9*  --  19.4*  --    ANION GAP  --  12.8  --   --  13.8 14.6 15.1*  --  14.6  --    BUN  --  41*  --   --  42* 46* 47*  --  48*  --    CREATININE  --  2.91*  --   --  2.99* 2.92* 2.87*  --  3.06*  --    EGFR  --  25.9*  --   --  25.1* 25.8* 26.4*  --  24.4*  --    GLUCOSE  --  97  --   --  111* 125* 96  --  118*  --    CALCIUM  --  9.1  --   --  8.8 9.0 9.2  --  9.2  --    MAGNESIUM  --  2.1 1.8  --  1.9 1.9  --   --  2.1  --    PHOSPHORUS  --   --   --   --   --   --   --   --  3.9  --    HEMOGLOBIN A1C  --   --   --   --   --   --   --   --   --  5.54   TSH  --   --   --   --   --   --   --   --  1.440  --     < > = values in this interval not displayed.         Lab 11/23/24  0342   TOTAL PROTEIN 7.1   ALBUMIN 3.8   GLOBULIN 3.3   ALT (SGPT) 15   AST (SGOT) 33   BILIRUBIN 0.6   ALK PHOS 46         Lab 11/23/24  0646 11/22/24  2324   PROBNP  --  47,389.0*   HSTROP T 33* 37*         Lab 11/23/24  0342   CHOLESTEROL 140   LDL CHOL 86   HDL CHOL 39*   TRIGLYCERIDES 77             Brief Urine Lab Results  (Last result in the past 365 days)        Color   Clarity   Blood   Leuk Est   Nitrite   Protein   CREAT   Urine HCG        11/23/24 0319 Yellow   Clear   Negative   Trace   Negative   >=300 mg/dL (3+)                 Microbiology Results (last 10 days)       Procedure Component Value - Date/Time    Respiratory Panel PCR w/COVID-19(SARS-CoV-2) RAMILA/ALONSO/GM/PAD/COR/WILLI  In-House, NP Swab in UTM/VTM, 2 HR TAT - Swab, Nasopharynx [[id number]]  (Abnormal) Collected: 11/24/24 1724    Lab Status: Final result Specimen: Swab from Nasopharynx Updated: 11/24/24 1855     ADENOVIRUS, PCR Not Detected     Coronavirus 229E Not Detected     Coronavirus HKU1 Not Detected     Coronavirus NL63 Not Detected     Coronavirus OC43 Not Detected     COVID19 Not Detected     Human Metapneumovirus Not Detected     Human Rhinovirus/Enterovirus Detected     Influenza A PCR Not Detected     Influenza B PCR Not Detected     Parainfluenza Virus 1 Not Detected     Parainfluenza Virus 2 Not Detected     Parainfluenza Virus 3 Not Detected     Parainfluenza Virus 4 Not Detected     RSV, PCR Not Detected     Bordetella pertussis pcr Not Detected     Bordetella parapertussis PCR Not Detected     Chlamydophila pneumoniae PCR Not Detected     Mycoplasma pneumo by PCR Not Detected    Narrative:      In the setting of a positive respiratory panel with a viral infection PLUS a negative procalcitonin without other underlying concern for bacterial infection, consider observing off antibiotics or discontinuation of antibiotics and continue supportive care. If the respiratory panel is positive for atypical bacterial infection (Bordetella pertussis, Chlamydophila pneumoniae, or Mycoplasma pneumoniae), consider antibiotic de-escalation to target atypical bacterial infection.    COVID-19 and FLU A/B PCR, 1 HR TAT - Swab, Nasopharynx [[id number]]  (Normal) Collected: 11/24/24 0545    Lab Status: Final result Specimen: Swab from Nasopharynx Updated: 11/24/24 0655     COVID19 Not Detected     Influenza A PCR Not Detected     Influenza B PCR Not Detected    Narrative:      Fact sheet for providers: https://www.fda.gov/media/790906/download    Fact sheet for patients: https://www.fda.gov/media/378455/download    Test performed by PCR.            XR Chest 1 View    Result Date: 11/23/2024  Impression: Impression: Mild pulmonary  vascular congestion. Electronically Signed: Aaron Rod MD  11/23/2024 2:15 AM EST  Workstation ID: [id number]             Results for orders placed during the hospital encounter of 11/23/24    Adult Transesophageal Echo (GYPSY) W/ Cont if Necessary Per Protocol (Cardiology Department)    Interpretation Summary    Left ventricular ejection fraction appears to be 51 - 55%.    Saline test results are negative.    No thrombus was visualized in the left atrial appendage.      Electronically signed by Cary Espinosa MD, 11/26/24, 5:37 PM EST.      Labs Pending at Discharge:  Pending Results       Procedure [Order ID] Specimen - Date/Time    Basic Metabolic Panel [[id number]]     Specimen: Blood     ECG 12 Lead Drug Monitoring; Amiodarone [[id number]]     High Sensitivity Troponin T 2Hr [[id number]]     Specimen: Blood             Procedures Performed           Consults:   Consults       Date and Time Order Name Status Description    11/25/2024  9:39 AM Inpatient Vascular Surgery Consult Completed     11/23/2024  4:51 AM Inpatient Nephrology Consult      11/23/2024  4:51 AM Inpatient Cardiology Consult                Discharge Details        Discharge Medications        New Medications        Instructions Start Date   losartan 50 MG tablet  Commonly known as: COZAAR   50 mg, Oral, Daily             Changes to Medications        Instructions Start Date   bumetanide 2 MG tablet  Commonly known as: BUMEX  What changed:   medication strength  how much to take  when to take this   2 mg, Oral, 2 Times Daily             Continue These Medications        Instructions Start Date   Advair Diskus 250-50 MCG/ACT DISKUS  Generic drug: Fluticasone-Salmeterol   1 puff, 2 Times Daily - RT      albuterol sulfate  (90 Base) MCG/ACT inhaler  Commonly known as: PROVENTIL HFA;VENTOLIN HFA;PROAIR HFA   2 puffs, Every 6 Hours PRN      apixaban 5 MG tablet tablet  Commonly known as: ELIQUIS   5 mg, 2 Times Daily      aspirin 81 MG EC  tablet   81 mg, Daily      budesonide-formoterol 80-4.5 MCG/ACT inhaler  Commonly known as: SYMBICORT   2 puffs, 2 Times Daily - RT      busPIRone 10 MG tablet  Commonly known as: BUSPAR   10 mg, 2 Times Daily      carvedilol 25 MG tablet  Commonly known as: COREG   25 mg, 2 Times Daily With Meals      isosorbide mononitrate 30 MG 24 hr tablet  Commonly known as: IMDUR   60 mg, Daily      metOLazone 2.5 MG tablet  Commonly known as: ZAROXOLYN   2.5 mg, 3 Times Weekly      NIFEdipine XL 60 MG 24 hr tablet  Commonly known as: PROCARDIA XL   60 mg, Daily      nitroglycerin 0.4 MG SL tablet  Commonly known as: NITROSTAT   0.4 mg, Every 5 Minutes PRN      oxyCODONE-acetaminophen 7.5-325 MG per tablet  Commonly known as: PERCOCET   1 tablet, Oral, Every 8 Hours PRN      pantoprazole 40 MG EC tablet  Commonly known as: PROTONIX   40 mg, Daily      potassium chloride 20 MEQ CR tablet  Commonly known as: KLOR-CON M20   20 mEq, 3 Times Daily      sevelamer 800 MG tablet  Commonly known as: RENAGEL   800 mg, 3 Times Daily With Meals               Allergies   Allergen Reactions    Morphine Other (See Comments)     Makes it feel like my blood is boiling in my veins         Discharge Disposition: Home  Home or Self Care    Diet:  Hospital:  Diet Order   Procedures    Diet: Cardiac, Fluid Restriction (240 mL/tray); Healthy Heart (2-3 Na+); 1500 mL/day; Fluid Consistency: Thin (IDDSI 0)         Discharge Activity:   Activity Instructions       Activity as Tolerated                CODE STATUS:  Code Status and Medical Interventions: CPR (Attempt to Resuscitate); Full Support   Ordered at: 11/23/24 0455     Level Of Support Discussed With:    Patient     Code Status (Patient has no pulse and is not breathing):    CPR (Attempt to Resuscitate)     Medical Interventions (Patient has pulse or is breathing):    Full Support         Future Appointments   Date Time Provider Department Center   12/3/2024  3:45 PM Cary Espinosa MD [facility]       Additional Instructions for the Follow-ups that You Need to Schedule       Discharge Follow-up with PCP   As directed       Currently Documented PCP:    Provider, No Known    PCP Phone Number:    None     Follow Up Details: 1 week        Discharge Follow-up with Specified Provider: Cardiology; 1 Week   As directed      To: Cardiology   Follow Up: 1 Week        Discharge Follow-up with Specified Provider: Nephrology; 1 Week   As directed      To: Nephrology   Follow Up: 1 Week                Time spent on Discharge including face to face service: Greater than 30 minutes    Signature: Electronically signed by Judith Scanlon MD, 11/27/24, 18:01 EST.  [facility] Hospitalist Team

## 2024-11-27 NOTE — PLAN OF CARE
Goal Outcome Evaluation:      Made follow up appointments with patient before discharge. Patent stated that he would utilize a medicaid cab for transport.

## 2024-11-27 NOTE — PROGRESS NOTES
"    Reason for follow-up: A-fib     Patient Care Team:  Provider, No Known as PCP - General    Subjective .   Scott Bedoyaett is doing well today     ROS    Morphine    Scheduled Meds:apixaban, 5 mg, Oral, BID  budesonide-formoterol, 2 puff, Inhalation, BID - RT  bumetanide, 2 mg, Intravenous, TID  busPIRone, 10 mg, Oral, BID  carvedilol, 25 mg, Oral, BID With Meals  guaiFENesin, 1,200 mg, Oral, Q12H  isosorbide mononitrate, 60 mg, Oral, Daily  metOLazone, 2.5 mg, Oral, Once per day on Monday Wednesday Friday  nicotine, 1 patch, Transdermal, Q24H  pantoprazole, 40 mg, Oral, Daily  sacubitril-valsartan, 1 tablet, Oral, Q12H  sevelamer, 800 mg, Oral, TID With Meals  sodium chloride, 10 mL, Intravenous, Q12H      Continuous Infusions:   PRN Meds:.  acetaminophen    senna-docusate sodium **AND** polyethylene glycol **AND** bisacodyl **AND** bisacodyl    ipratropium-albuterol    nitroglycerin    ondansetron    oxyCODONE    oxymetazoline    pseudoephedrine    [COMPLETED] Insert Peripheral IV **AND** sodium chloride    sodium chloride    sodium chloride      VITAL SIGNS  Vitals:    11/27/24 0500 11/27/24 0545 11/27/24 0600 11/27/24 0700   BP:       BP Location:       Patient Position:       Pulse: 56 56 54    Resp:    13   Temp:    97.7 °F (36.5 °C)   TempSrc:    Axillary   SpO2: 95% (!) 0% 96%    Weight:  66.1 kg (145 lb 11.6 oz)     Height:           Flowsheet Rows      Flowsheet Row First Filed Value   Admission Height 170.2 cm (67\") Documented at 11/23/2024 0137   Admission Weight 72.8 kg (160 lb 7.9 oz) Documented at 11/23/2024 0137               Physical Exam  VITALS REVIEWED    General:      well developed, in no acute distress.    Head:      normocephalic and atraumatic.    Eyes:      PERRL/EOM intact, conjunctiva and sclera clear with out nystagmus.    Neck:      no masses, thyromegaly,  trachea central with normal respiratory effort and PMI displaced laterally  Lungs:      Clear  Heart:       Regular rate and " rhythm  Msk:      no deformity or scoliosis noted of thoracic or lumbar spine.    Pulses:      pulses normal in all 4 extremities.    Extremities:       No lower extremity edema  Neurologic:      no focal deficits.   alert oriented x3  Skin:      intact without lesions or rashes.    Psych:      alert and cooperative; normal mood and affect; normal attention span and concentration.          LAB RESULTS (LAST 7 DAYS)    CBC  Results from last 7 days   Lab Units 11/26/24 0359 11/25/24 0316 11/24/24  0642 11/22/24  2324   WBC 10*3/mm3 9.01 9.32 11.28* 12.39*   RBC 10*6/mm3 3.97* 3.62* 3.50* 4.26   HEMOGLOBIN g/dL 11.0* 9.6* 9.7* 11.7*   HEMATOCRIT % 34.9* 31.5* 30.0* 38.1   MCV fL 87.9 87.0 85.7 89.4   PLATELETS 10*3/mm3 176 168 167 212       BMP  Results from last 7 days   Lab Units 11/26/24  1549 11/26/24 0359 11/25/24 1927 11/25/24  1125 11/25/24 0316 11/24/24  0642 11/23/24  0646 11/23/24  0342   SODIUM mmol/L  --  139  --   --  140 138 132* 135*   POTASSIUM mmol/L 3.4* 3.6 3.0* 3.3* 3.2* 3.5 5.2 6.5*   CHLORIDE mmol/L  --  99  --   --  100 100 100 101   CO2 mmol/L  --  27.2  --   --  26.2 23.4 16.9* 19.4*   BUN mg/dL  --  41*  --   --  42* 46* 47* 48*   CREATININE mg/dL  --  2.91*  --   --  2.99* 2.92* 2.87* 3.06*   GLUCOSE mg/dL  --  97  --   --  111* 125* 96 118*   MAGNESIUM mg/dL  --  2.1 1.8  --  1.9 1.9  --  2.1   PHOSPHORUS mg/dL  --   --   --   --   --   --   --  3.9       CMP   Results from last 7 days   Lab Units 11/26/24  1549 11/26/24 0359 11/25/24 1927 11/25/24  1125 11/25/24  0316 11/24/24  0642 11/23/24  0646 11/23/24  0342   SODIUM mmol/L  --  139  --   --  140 138 132* 135*   POTASSIUM mmol/L 3.4* 3.6 3.0* 3.3* 3.2* 3.5 5.2 6.5*   CHLORIDE mmol/L  --  99  --   --  100 100 100 101   CO2 mmol/L  --  27.2  --   --  26.2 23.4 16.9* 19.4*   BUN mg/dL  --  41*  --   --  42* 46* 47* 48*   CREATININE mg/dL  --  2.91*  --   --  2.99* 2.92* 2.87* 3.06*   GLUCOSE mg/dL  --  97  --   --  111* 125* 96  118*   ALBUMIN g/dL  --   --   --   --   --   --   --  3.8   BILIRUBIN mg/dL  --   --   --   --   --   --   --  0.6   ALK PHOS U/L  --   --   --   --   --   --   --  46   AST (SGOT) U/L  --   --   --   --   --   --   --  33   ALT (SGPT) U/L  --   --   --   --   --   --   --  15         BNP        TROPONIN  Results from last 7 days   Lab Units 11/23/24  0646   HSTROP T ng/L 33*       CoAg        Creatinine Clearance  Estimated Creatinine Clearance: 29.3 mL/min (A) (by C-G formula based on SCr of 2.91 mg/dL (H)).    ABG          EKG    I personally reviewed the patient's EKG/Telemetry data: Sinus rhythm      Assessment & Plan       Atrial fibrillation with RVR      Scott Gaytan is a 49-year-old male patient who presented to the hospital due to shortness of breath.  Patient was found to be in A-fib with rapid ventricular rates which is a new diagnosis for him.  He also has elevated creatinine of 2.8, normal TSH, potassium was 6.5 on arrival now it is 5.2.  His troponin elevation is trivial, no active chest pain, no ST elevations on EKG.  He has recent echocardiogram in June with normal EF.  He is now on IV Cardizem but still in A-fib.  I will switch that to IV amiodarone and hopefully he will convert to sinus rhythm, otherwise we can proceed with cardioversion hopefully next week.  Eliquis was added for stroke prevention.        11/24/2024     Patient remains in atrial fibrillation, continue amiodarone drip.  Keep n.p.o. after midnight for possible GYPSY cardioversion.     11/25/2024     GYPSY guided cardioversion was performed with return to sinus rhythm.  Continue Eliquis and carvedilol.    11/27/2024    Remains in sinus rhythm, continue Eliquis and Coreg, Entresto was added, his EF was mildly reduced 50 to 55% on GYPSY.  May switch diuretics to p.o.  Okay for discharge from my standpoint.    I discussed the patients findings and my recommendations with patient and agrees with outlined plan.    Cary Espinosa,  MD  11/27/24  08:47 EST      Electronically signed by Cary Espinosa MD, 11/27/24, 8:49 AM EST.

## 2024-11-27 NOTE — CASE MANAGEMENT/SOCIAL WORK
Case Management Discharge Note      Final Note: CM spoke with Hospitalist and nursing for morning rounds and reviewed chart for clinical updates. Patient discharging home.    Provided Post Acute Provider List?: N/A  Provided Post Acute Provider Quality & Resource List?: N/A    Selected Continued Care - Admitted Since 11/23/2024        Transportation Services  Private: Car    Final Discharge Disposition Code: 01 - home or self-care    Desiree Farley RN    Office: [phone]

## 2024-11-27 NOTE — ANESTHESIA POSTPROCEDURE EVALUATION
Patient: Scott Gaytan    Procedure Summary       Date: 11/25/24 Room / Location: [facility]    Anesthesia Start: 1345 Anesthesia Stop: 1411    Procedures:       ADULT TRANSESOPHAGEAL ECHO (GYPSY) W/ CONT IF NECESSARY PER PROTOCOL      [facility]PART[facility] Diagnosis:       (Arrhythmia)      (AFib)      (atrial fibrillation)    Scheduled Providers: Markos Boyd MD Provider: Markos Boyd MD    Anesthesia Type: MAC ASA Status: 4            Anesthesia Type: MAC    Vitals  Vitals Value Taken Time   /99 11/27/24 0724   Temp 97.3 °F (36.3 °C) 11/27/24 1100   Pulse 61 11/27/24 1208   Resp 13 11/27/24 0700   SpO2 95 % 11/27/24 0905   Vitals shown include unfiled device data.        Post Anesthesia Care and Evaluation    Patient location during evaluation: PACU  Patient participation: complete - patient participated  Level of consciousness: awake  Pain scale: See nurse's notes for pain score.  Pain management: adequate    Airway patency: patent  Anesthetic complications: No anesthetic complications  PONV Status: none  Cardiovascular status: acceptable  Respiratory status: acceptable and spontaneous ventilation  Hydration status: acceptable    Comments: Patient seen and examined postoperatively; vital signs stable; SpO2 greater than or equal to 90%; cardiopulmonary status stable; nausea/vomiting adequately controlled; pain adequately controlled; no apparent anesthesia complications; patient discharged from anesthesia care when discharge criteria were met

## 2024-11-28 NOTE — PAYOR COMM NOTE
"This is discharge notification for Scott Gaytan  Reference/Auth # [id number]   Pt discharged on 11/27/24    Khloe Soliman, RN, BSN  Utilization Review Nurse  [facility]  Direct & confidential phone # [phone]  Fax # [phone]        Scott Gaytan (49 y.o. Male)       Date of Birth   1975    Social Security Number       Address   [address] [place] [address] [place] [address]    Home Phone   [phone]    MRN   [id number]       Orthodoxy   [facility]    Marital Status   Single                            Admission Date   11/23/24    Admission Type   Emergency    Admitting Provider   Llanes Alvarez, Carlos, MD    Attending Provider       Department, Room/Bed   [facility] CARD[facility] UNIT, 3107/1       Discharge Date   11/27/2024    Discharge Disposition   Home or Self Care    Discharge Destination   Home                              Attending Provider: (none)   Allergies: Morphine    Isolation: None   Infection: Rhinovirus  (11/24/24)   Code Status: Prior    Ht: 170.2 cm (67\")   Wt: 66.1 kg (145 lb 11.6 oz)    Admission Cmt: None   Principal Problem: Atrial fibrillation with RVR [I48.91]                   Active Insurance as of 11/23/2024       Primary Coverage       Payor Plan Insurance Group Employer/Plan Group    [id number]       Payor Plan Address Payor Plan Phone Number Payor Plan Fax Number Effective Dates    MAIL STOP:   8/1/2021 - None Entered    [address] [address]       [facility] [address]         Subscriber Name Subscriber Birth Date Member ID       SCOTT GAYTAN 1975 [id number]                     Emergency Contacts        (Rel.) Home Phone Work Phone Mobile Phone    ROMEROKARMA (Sister) [phone] -- [phone]    BELINDA ALVARADO (Significant Other) [phone] -- [phone]                 Discharge Summary        Judith Scanlon MD at 11/27/24 1615                   "     [facility]  Discharge Summary    Date of Service: 2024  Patient Name: Scott Gaytan  : 1975  MRN: [id number]    Date of Admission: 2024  Discharge Diagnosis:    Atrial fibrillation with RVR  JOSE ANGEL on CKD stage IV  Acute on diastolic chronic CHF EF of 51-55%  COPD  Nicotine patch    Date of Discharge: 2024  Primary Care Physician: Provider, No Known      Presenting Problem:   Hyperkalemia [E87.5]  Atrial fibrillation with rapid ventricular response [I48.91]  Atrial fibrillation with RVR [I48.91]  Hypervolemia, unspecified hypervolemia type [E87.70]  Dyspnea, unspecified type [R06.00]    Active and Resolved Hospital Problems:  Active Hospital Problems    Diagnosis POA    **Atrial fibrillation with RVR [I48.91] Yes      Resolved Hospital Problems   No resolved problems to display.         Hospital Course       Hospital Course:    This patient is a 49-year-old gentleman who was admitted with atrial fibrillation rapid ventricular response and acute on chronic diastolic CHF exacerbation EF of 51 to 55%.  He was started on IV Cardizem but remained rate uncontrolled and so it was converted to IV amiodarone.  He was seen by cardiology.  He successfully underwent a GYPSY cardioversion.  He was started on Eliquis for anticoagulation in view of A-fib for stroke prevention.  He was treated for acute on chronic diastolic CHF exacerbation and started on IV Bumex with optimization of medical management for CHF exacerbation.  As already mentioned he gradually improved.  He had an echocardiogram done which showed an ejection fraction of 51 to 55%.  He was counseled on maintaining a 2 g sodium diet.  He is being discharged home today.  Of note is that he was seen by nephrology for acute kidney injury on chronic kidney disease stage IV which gradually improved and stabilized.  Nephrology started him on Entresto.  Patient was mildly hypokalemic however nephrology counseled against  replacing potassium due to initiation of Entresto.      DISCHARGE Follow Up Recommendations for labs and diagnostics: Follow-up PCP normal, follow-up with cardiology in 1 week, follow-up with nephrology in 1 week.        Day of Discharge     Vital Signs:  Temp:  [97.1 °F (36.2 °C)-97.7 °F (36.5 °C)] 97.3 °F (36.3 °C)  Heart Rate:  [54-71] 65  Resp:  [12-17] 13  BP: (103-141)/(76-99) 103/79    Physical Exam:  Physical Exam  Constitutional:       Appearance: Normal appearance.   HENT:      Head: Normocephalic and atraumatic.      Nose: Nose normal.      Mouth/Throat:      Mouth: Mucous membranes are moist.   Eyes:      Extraocular Movements: Extraocular movements intact.      Conjunctiva/sclera: Conjunctivae normal.      Pupils: Pupils are equal, round, and reactive to light.   Cardiovascular:      Rate and Rhythm: Normal rate and regular rhythm.      Pulses: Normal pulses.      Heart sounds: Normal heart sounds.   Pulmonary:      Effort: Pulmonary effort is normal.      Breath sounds: Normal breath sounds.   Abdominal:      General: Abdomen is flat. Bowel sounds are normal.      Palpations: Abdomen is soft.   Musculoskeletal:         General: Normal range of motion.      Cervical back: Normal range of motion and neck supple.   Skin:     General: Skin is warm and dry.      Capillary Refill: Capillary refill takes less than 2 seconds.   Neurological:      General: No focal deficit present.      Mental Status: He is alert and oriented to person, place, and time.   Psychiatric:         Mood and Affect: Mood normal.         Behavior: Behavior normal.         Thought Content: Thought content normal.         Judgment: Judgment normal.           Pertinent  and/or Most Recent Results     LAB RESULTS:      Lab 11/26/24  0359 11/25/24  0316 11/24/24  0642 11/22/24  2324   WBC 9.01 9.32 11.28* 12.39*   HEMOGLOBIN 11.0* 9.6* 9.7* 11.7*   HEMATOCRIT 34.9* 31.5* 30.0* 38.1   PLATELETS 176 168 167 212   NEUTROS ABS 5.16 6.35 8.14*  7.95*   IMMATURE GRANS (ABS) 0.03 0.03 0.05 0.04   LYMPHS ABS 2.30 1.79 1.98 3.19*   MONOS ABS 1.22* 0.96* 0.99* 1.06*   EOS ABS 0.27 0.17 0.09 0.11   MCV 87.9 87.0 85.7 89.4         Lab 11/26/24  1549 11/26/24  0359 11/25/24  1927 11/25/24  1125 11/25/24  0316 11/24/24  0642 11/23/24  0646 11/23/24  0342 11/23/24  0342 11/22/24  2324   SODIUM  --  139  --   --  140 138 132*  --  135*  --    POTASSIUM 3.4* 3.6 3.0* 3.3* 3.2* 3.5 5.2   < > 6.5*  --    CHLORIDE  --  99  --   --  100 100 100  --  101  --    CO2  --  27.2  --   --  26.2 23.4 16.9*  --  19.4*  --    ANION GAP  --  12.8  --   --  13.8 14.6 15.1*  --  14.6  --    BUN  --  41*  --   --  42* 46* 47*  --  48*  --    CREATININE  --  2.91*  --   --  2.99* 2.92* 2.87*  --  3.06*  --    EGFR  --  25.9*  --   --  25.1* 25.8* 26.4*  --  24.4*  --    GLUCOSE  --  97  --   --  111* 125* 96  --  118*  --    CALCIUM  --  9.1  --   --  8.8 9.0 9.2  --  9.2  --    MAGNESIUM  --  2.1 1.8  --  1.9 1.9  --   --  2.1  --    PHOSPHORUS  --   --   --   --   --   --   --   --  3.9  --    HEMOGLOBIN A1C  --   --   --   --   --   --   --   --   --  5.54   TSH  --   --   --   --   --   --   --   --  1.440  --     < > = values in this interval not displayed.         Lab 11/23/24  0342   TOTAL PROTEIN 7.1   ALBUMIN 3.8   GLOBULIN 3.3   ALT (SGPT) 15   AST (SGOT) 33   BILIRUBIN 0.6   ALK PHOS 46         Lab 11/23/24  0646 11/22/24  2324   PROBNP  --  47,389.0*   HSTROP T 33* 37*         Lab 11/23/24  0342   CHOLESTEROL 140   LDL CHOL 86   HDL CHOL 39*   TRIGLYCERIDES 77             Brief Urine Lab Results  (Last result in the past 365 days)        Color   Clarity   Blood   Leuk Est   Nitrite   Protein   CREAT   Urine HCG        11/23/24 0319 Yellow   Clear   Negative   Trace   Negative   >=300 mg/dL (3+)                 Microbiology Results (last 10 days)       Procedure Component Value - Date/Time    Respiratory Panel PCR w/COVID-19(SARS-CoV-2) RAMILA/ALONSO/GM/PAD/COR/WILLI In-House, NP  Swab in UTM/VTM, 2 HR TAT - Swab, Nasopharynx [[id number]]  (Abnormal) Collected: 11/24/24 1724    Lab Status: Final result Specimen: Swab from Nasopharynx Updated: 11/24/24 1855     ADENOVIRUS, PCR Not Detected     Coronavirus 229E Not Detected     Coronavirus HKU1 Not Detected     Coronavirus NL63 Not Detected     Coronavirus OC43 Not Detected     COVID19 Not Detected     Human Metapneumovirus Not Detected     Human Rhinovirus/Enterovirus Detected     Influenza A PCR Not Detected     Influenza B PCR Not Detected     Parainfluenza Virus 1 Not Detected     Parainfluenza Virus 2 Not Detected     Parainfluenza Virus 3 Not Detected     Parainfluenza Virus 4 Not Detected     RSV, PCR Not Detected     Bordetella pertussis pcr Not Detected     Bordetella parapertussis PCR Not Detected     Chlamydophila pneumoniae PCR Not Detected     Mycoplasma pneumo by PCR Not Detected    Narrative:      In the setting of a positive respiratory panel with a viral infection PLUS a negative procalcitonin without other underlying concern for bacterial infection, consider observing off antibiotics or discontinuation of antibiotics and continue supportive care. If the respiratory panel is positive for atypical bacterial infection (Bordetella pertussis, Chlamydophila pneumoniae, or Mycoplasma pneumoniae), consider antibiotic de-escalation to target atypical bacterial infection.    COVID-19 and FLU A/B PCR, 1 HR TAT - Swab, Nasopharynx [[id number]]  (Normal) Collected: 11/24/24 0545    Lab Status: Final result Specimen: Swab from Nasopharynx Updated: 11/24/24 0655     COVID19 Not Detected     Influenza A PCR Not Detected     Influenza B PCR Not Detected    Narrative:      Fact sheet for providers: https://www.fda.gov/media/897613/download    Fact sheet for patients: https://www.fda.gov/media/537669/download    Test performed by PCR.            XR Chest 1 View    Result Date: 11/23/2024  Impression: Impression: Mild pulmonary vascular congestion.  Electronically Signed: Aaron Rod MD  11/23/2024 2:15 AM EST  Workstation ID: [id number]             Results for orders placed during the hospital encounter of 11/23/24    Adult Transesophageal Echo (GYPSY) W/ Cont if Necessary Per Protocol (Cardiology Department)    Interpretation Summary    Left ventricular ejection fraction appears to be 51 - 55%.    Saline test results are negative.    No thrombus was visualized in the left atrial appendage.      Electronically signed by Cary Espinosa MD, 11/26/24, 5:37 PM EST.      Labs Pending at Discharge:  Pending Results       Procedure [Order ID] Specimen - Date/Time    Basic Metabolic Panel [[id number]]     Specimen: Blood     ECG 12 Lead Drug Monitoring; Amiodarone [[id number]]     High Sensitivity Troponin T 2Hr [[id number]]     Specimen: Blood             Procedures Performed           Consults:   Consults       Date and Time Order Name Status Description    11/25/2024  9:39 AM Inpatient Vascular Surgery Consult Completed     11/23/2024  4:51 AM Inpatient Nephrology Consult      11/23/2024  4:51 AM Inpatient Cardiology Consult                Discharge Details        Discharge Medications        New Medications        Instructions Start Date   losartan 50 MG tablet  Commonly known as: COZAAR   50 mg, Oral, Daily             Changes to Medications        Instructions Start Date   bumetanide 2 MG tablet  Commonly known as: BUMEX  What changed:   medication strength  how much to take  when to take this   2 mg, Oral, 2 Times Daily             Continue These Medications        Instructions Start Date   Advair Diskus 250-50 MCG/ACT DISKUS  Generic drug: Fluticasone-Salmeterol   1 puff, 2 Times Daily - RT      albuterol sulfate  (90 Base) MCG/ACT inhaler  Commonly known as: PROVENTIL HFA;VENTOLIN HFA;PROAIR HFA   2 puffs, Every 6 Hours PRN      apixaban 5 MG tablet tablet  Commonly known as: ELIQUIS   5 mg, 2 Times Daily      aspirin 81 MG EC tablet   81 mg, Daily       budesonide-formoterol 80-4.5 MCG/ACT inhaler  Commonly known as: SYMBICORT   2 puffs, 2 Times Daily - RT      busPIRone 10 MG tablet  Commonly known as: BUSPAR   10 mg, 2 Times Daily      carvedilol 25 MG tablet  Commonly known as: COREG   25 mg, 2 Times Daily With Meals      isosorbide mononitrate 30 MG 24 hr tablet  Commonly known as: IMDUR   60 mg, Daily      metOLazone 2.5 MG tablet  Commonly known as: ZAROXOLYN   2.5 mg, 3 Times Weekly      NIFEdipine XL 60 MG 24 hr tablet  Commonly known as: PROCARDIA XL   60 mg, Daily      nitroglycerin 0.4 MG SL tablet  Commonly known as: NITROSTAT   0.4 mg, Every 5 Minutes PRN      oxyCODONE-acetaminophen 7.5-325 MG per tablet  Commonly known as: PERCOCET   1 tablet, Oral, Every 8 Hours PRN      pantoprazole 40 MG EC tablet  Commonly known as: PROTONIX   40 mg, Daily      potassium chloride 20 MEQ CR tablet  Commonly known as: KLOR-CON M20   20 mEq, 3 Times Daily      sevelamer 800 MG tablet  Commonly known as: RENAGEL   800 mg, 3 Times Daily With Meals               Allergies   Allergen Reactions    Morphine Other (See Comments)     Makes it feel like my blood is boiling in my veins         Discharge Disposition: Home  Home or Self Care    Diet:  Hospital:  Diet Order   Procedures    Diet: Cardiac, Fluid Restriction (240 mL/tray); Healthy Heart (2-3 Na+); 1500 mL/day; Fluid Consistency: Thin (IDDSI 0)         Discharge Activity:   Activity Instructions       Activity as Tolerated                CODE STATUS:  Code Status and Medical Interventions: CPR (Attempt to Resuscitate); Full Support   Ordered at: 11/23/24 0458     Level Of Support Discussed With:    Patient     Code Status (Patient has no pulse and is not breathing):    CPR (Attempt to Resuscitate)     Medical Interventions (Patient has pulse or is breathing):    Full Support         Future Appointments   Date Time Provider Department Center   12/3/2024  3:45 PM Cary Espinosa MD [facility]        Additional Instructions for the Follow-ups that You Need to Schedule       Discharge Follow-up with PCP   As directed       Currently Documented PCP:    Provider, No Known    PCP Phone Number:    None     Follow Up Details: 1 week        Discharge Follow-up with Specified Provider: Cardiology; 1 Week   As directed      To: Cardiology   Follow Up: 1 Week        Discharge Follow-up with Specified Provider: Nephrology; 1 Week   As directed      To: Nephrology   Follow Up: 1 Week                Time spent on Discharge including face to face service: Greater than 30 minutes    Signature: Electronically signed by Judith Scanlon MD, 11/27/24, 18:01 [facility].  [facility]     Electronically signed by Judith Scanlon MD at 11/27/24 6139

## 2024-12-03 ENCOUNTER — READMISSION MANAGEMENT (OUTPATIENT)
Dept: CALL CENTER | Facility: HOSPITAL | Age: 49
End: 2024-12-03

## 2024-12-03 LAB
BASOPHILS # BLD AUTO: 0.04 10*3/MM3 (ref 0–0.2)
BASOPHILS NFR BLD AUTO: 0.3 % (ref 0–1.5)
DEPRECATED RDW RBC AUTO: 60.6 FL (ref 37–54)
EOSINOPHIL # BLD AUTO: 0.11 10*3/MM3 (ref 0–0.4)
EOSINOPHIL NFR BLD AUTO: 0.9 % (ref 0.3–6.2)
ERYTHROCYTE [DISTWIDTH] IN BLOOD BY AUTOMATED COUNT: 18.7 % (ref 12.3–15.4)
HBA1C MFR BLD: 5.54 % (ref 4.8–5.6)
HCT VFR BLD AUTO: 38.1 % (ref 37.5–51)
HGB BLD-MCNC: 11.7 G/DL (ref 13–17.7)
HOLD SPECIMEN: NORMAL
IMM GRANULOCYTES # BLD AUTO: 0.04 10*3/MM3 (ref 0–0.05)
IMM GRANULOCYTES NFR BLD AUTO: 0.3 % (ref 0–0.5)
LYMPHOCYTES # BLD AUTO: 3.19 10*3/MM3 (ref 0.7–3.1)
LYMPHOCYTES NFR BLD AUTO: 25.7 % (ref 19.6–45.3)
MCH RBC QN AUTO: 27.5 PG (ref 26.6–33)
MCHC RBC AUTO-ENTMCNC: 30.7 G/DL (ref 31.5–35.7)
MCV RBC AUTO: 89.4 FL (ref 79–97)
MONOCYTES # BLD AUTO: 1.06 10*3/MM3 (ref 0.1–0.9)
MONOCYTES NFR BLD AUTO: 8.6 % (ref 5–12)
NEUTROPHILS NFR BLD AUTO: 64.2 % (ref 42.7–76)
NEUTROPHILS NFR BLD AUTO: 7.95 10*3/MM3 (ref 1.7–7)
NRBC BLD AUTO-RTO: 0 /100 WBC (ref 0–0.2)
NT-PROBNP SERPL-MCNC: ABNORMAL PG/ML (ref 0–450)
PLATELET # BLD AUTO: 212 10*3/MM3 (ref 140–450)
PMV BLD AUTO: 11.2 FL (ref 6–12)
RBC # BLD AUTO: 4.26 10*6/MM3 (ref 4.14–5.8)
TROPONIN T SERPL HS-MCNC: 37 NG/L
WBC NRBC COR # BLD AUTO: 12.39 10*3/MM3 (ref 3.4–10.8)
WHOLE BLOOD HOLD COAG: NORMAL

## 2024-12-03 NOTE — OUTREACH NOTE
Medical Week 1 Survey      Flowsheet Row Responses   [facility] facility patient discharged from? [facility]   Does the patient have one of the following disease processes/diagnoses(primary or secondary)? Other   Week 1 attempt successful? No   Unsuccessful attempts Attempt 1            Lali RENO - Registered Nurse

## 2024-12-05 LAB
QT INTERVAL: 505 MS
QTC INTERVAL: 488 MS

## 2024-12-16 ENCOUNTER — READMISSION MANAGEMENT (OUTPATIENT)
Dept: CALL CENTER | Facility: HOSPITAL | Age: 49
End: 2024-12-16

## 2024-12-16 NOTE — OUTREACH NOTE
Medical Week 3 Survey      Flowsheet Row Responses   [facility] facility patient discharged from? [facility]   Does the patient have one of the following disease processes/diagnoses(primary or secondary)? Other   Week 3 attempt successful? No   Unsuccessful attempts Attempt 1   Revoke Phone number issues  [Number provided by patient are non working numbers.]            Johanna GARCIA - Registered Nurse

## 2025-01-03 ENCOUNTER — OFFICE VISIT (OUTPATIENT)
Dept: FAMILY MEDICINE CLINIC | Facility: CLINIC | Age: 50
End: 2025-01-03
Payer: MEDICAID

## 2025-01-03 ENCOUNTER — LAB (OUTPATIENT)
Dept: FAMILY MEDICINE CLINIC | Facility: CLINIC | Age: 50
End: 2025-01-03
Payer: MEDICAID

## 2025-01-03 ENCOUNTER — HOSPITAL ENCOUNTER (OUTPATIENT)
Dept: GENERAL RADIOLOGY | Facility: HOSPITAL | Age: 50
Discharge: HOME | End: 2025-01-03
Payer: MEDICAID

## 2025-01-03 VITALS
OXYGEN SATURATION: 96 % | RESPIRATION RATE: 16 BRPM | BODY MASS INDEX: 26.74 KG/M2 | HEIGHT: 66 IN | DIASTOLIC BLOOD PRESSURE: 88 MMHG | SYSTOLIC BLOOD PRESSURE: 133 MMHG | TEMPERATURE: 98.8 F | HEART RATE: 65 BPM | WEIGHT: 166.4 LBS

## 2025-01-03 DIAGNOSIS — Z00.00 HEALTHCARE MAINTENANCE: ICD-10-CM

## 2025-01-03 DIAGNOSIS — N18.4 STAGE 4 CHRONIC KIDNEY DISEASE: ICD-10-CM

## 2025-01-03 DIAGNOSIS — B19.20 HEPATITIS C VIRUS INFECTION WITHOUT HEPATIC COMA, UNSPECIFIED CHRONICITY: ICD-10-CM

## 2025-01-03 DIAGNOSIS — J44.1 COPD WITH EXACERBATION: ICD-10-CM

## 2025-01-03 DIAGNOSIS — Z00.00 HEALTHCARE MAINTENANCE: Primary | ICD-10-CM

## 2025-01-03 DIAGNOSIS — K21.9 GASTROESOPHAGEAL REFLUX DISEASE, UNSPECIFIED WHETHER ESOPHAGITIS PRESENT: ICD-10-CM

## 2025-01-03 DIAGNOSIS — I50.31 ACUTE DIASTOLIC CHF (CONGESTIVE HEART FAILURE): ICD-10-CM

## 2025-01-03 PROBLEM — N18.30 STAGE 3 CHRONIC KIDNEY DISEASE: Status: ACTIVE | Noted: 2024-03-07

## 2025-01-03 PROBLEM — Z91.199 NONCOMPLIANCE: Status: ACTIVE | Noted: 2024-04-10

## 2025-01-03 PROBLEM — I5A NONISCHEMIC NONTRAUMATIC MYOCARDIAL INJURY: Status: ACTIVE | Noted: 2023-09-25

## 2025-01-03 PROBLEM — I50.23 ACUTE ON CHRONIC SYSTOLIC HEART FAILURE: Status: ACTIVE | Noted: 2023-09-25

## 2025-01-03 PROBLEM — F15.10 AMPHETAMINE ABUSE: Status: ACTIVE | Noted: 2023-09-25

## 2025-01-03 LAB
ALBUMIN SERPL-MCNC: 4.1 G/DL (ref 3.5–5.2)
ALBUMIN/GLOB SERPL: 1.1 G/DL
ALP SERPL-CCNC: 43 U/L (ref 39–117)
ALT SERPL W P-5'-P-CCNC: 34 U/L (ref 1–41)
ANION GAP SERPL CALCULATED.3IONS-SCNC: 12.1 MMOL/L (ref 5–15)
AST SERPL-CCNC: 34 U/L (ref 1–40)
BASOPHILS # BLD AUTO: 0.06 10*3/MM3 (ref 0–0.2)
BASOPHILS NFR BLD AUTO: 0.7 % (ref 0–1.5)
BILIRUB SERPL-MCNC: 0.5 MG/DL (ref 0–1.2)
BUN SERPL-MCNC: 55 MG/DL (ref 6–20)
BUN/CREAT SERPL: 18 (ref 7–25)
CALCIUM SPEC-SCNC: 9.4 MG/DL (ref 8.6–10.5)
CHLORIDE SERPL-SCNC: 102 MMOL/L (ref 98–107)
CHOLEST SERPL-MCNC: 236 MG/DL (ref 0–200)
CO2 SERPL-SCNC: 23.9 MMOL/L (ref 22–29)
CREAT SERPL-MCNC: 3.06 MG/DL (ref 0.76–1.27)
DEPRECATED RDW RBC AUTO: 53.4 FL (ref 37–54)
EGFRCR SERPLBLD CKD-EPI 2021: 24.4 ML/MIN/1.73
EOSINOPHIL # BLD AUTO: 0.52 10*3/MM3 (ref 0–0.4)
EOSINOPHIL NFR BLD AUTO: 6 % (ref 0.3–6.2)
ERYTHROCYTE [DISTWIDTH] IN BLOOD BY AUTOMATED COUNT: 16.8 % (ref 12.3–15.4)
FERRITIN SERPL-MCNC: 274 NG/ML (ref 30–400)
GLOBULIN UR ELPH-MCNC: 3.8 GM/DL
GLUCOSE SERPL-MCNC: 98 MG/DL (ref 65–99)
HBA1C MFR BLD: 5 % (ref 4.8–5.6)
HCT VFR BLD AUTO: 40.2 % (ref 37.5–51)
HDLC SERPL-MCNC: 66 MG/DL (ref 40–60)
HGB BLD-MCNC: 13.5 G/DL (ref 13–17.7)
HOLD SPECIMEN: NORMAL
IMM GRANULOCYTES # BLD AUTO: 0.02 10*3/MM3 (ref 0–0.05)
IMM GRANULOCYTES NFR BLD AUTO: 0.2 % (ref 0–0.5)
IRON 24H UR-MRATE: 54 MCG/DL (ref 59–158)
IRON SATN MFR SERPL: 16 % (ref 20–50)
LDLC SERPL CALC-MCNC: 155 MG/DL (ref 0–100)
LDLC/HDLC SERPL: 2.32 {RATIO}
LYMPHOCYTES # BLD AUTO: 2.77 10*3/MM3 (ref 0.7–3.1)
LYMPHOCYTES NFR BLD AUTO: 32 % (ref 19.6–45.3)
MCH RBC QN AUTO: 29.2 PG (ref 26.6–33)
MCHC RBC AUTO-ENTMCNC: 33.6 G/DL (ref 31.5–35.7)
MCV RBC AUTO: 86.8 FL (ref 79–97)
MONOCYTES # BLD AUTO: 0.94 10*3/MM3 (ref 0.1–0.9)
MONOCYTES NFR BLD AUTO: 10.9 % (ref 5–12)
NEUTROPHILS NFR BLD AUTO: 4.34 10*3/MM3 (ref 1.7–7)
NEUTROPHILS NFR BLD AUTO: 50.2 % (ref 42.7–76)
NRBC BLD AUTO-RTO: 0 /100 WBC (ref 0–0.2)
NT-PROBNP SERPL-MCNC: 9528 PG/ML (ref 0–450)
PLATELET # BLD AUTO: 260 10*3/MM3 (ref 140–450)
PMV BLD AUTO: 10.5 FL (ref 6–12)
POTASSIUM SERPL-SCNC: 4.5 MMOL/L (ref 3.5–5.2)
PROT SERPL-MCNC: 7.9 G/DL (ref 6–8.5)
RBC # BLD AUTO: 4.63 10*6/MM3 (ref 4.14–5.8)
SODIUM SERPL-SCNC: 138 MMOL/L (ref 136–145)
TIBC SERPL-MCNC: 338 MCG/DL (ref 298–536)
TRANSFERRIN SERPL-MCNC: 227 MG/DL (ref 200–360)
TRIGL SERPL-MCNC: 84 MG/DL (ref 0–150)
TROPONIN T SERPL HS-MCNC: 32 NG/L
TSH SERPL DL<=0.05 MIU/L-ACNC: 2.35 UIU/ML (ref 0.27–4.2)
VLDLC SERPL-MCNC: 15 MG/DL (ref 5–40)
WBC NRBC COR # BLD AUTO: 8.65 10*3/MM3 (ref 3.4–10.8)

## 2025-01-03 PROCEDURE — 83880 ASSAY OF NATRIURETIC PEPTIDE: CPT | Performed by: NURSE PRACTITIONER

## 2025-01-03 PROCEDURE — 87522 HEPATITIS C REVRS TRNSCRPJ: CPT | Performed by: NURSE PRACTITIONER

## 2025-01-03 PROCEDURE — 83540 ASSAY OF IRON: CPT | Performed by: NURSE PRACTITIONER

## 2025-01-03 PROCEDURE — 81001 URINALYSIS AUTO W/SCOPE: CPT | Performed by: NURSE PRACTITIONER

## 2025-01-03 PROCEDURE — 84484 ASSAY OF TROPONIN QUANT: CPT | Performed by: NURSE PRACTITIONER

## 2025-01-03 PROCEDURE — 84466 ASSAY OF TRANSFERRIN: CPT | Performed by: NURSE PRACTITIONER

## 2025-01-03 PROCEDURE — 71046 X-RAY EXAM CHEST 2 VIEWS: CPT

## 2025-01-03 PROCEDURE — 36415 COLL VENOUS BLD VENIPUNCTURE: CPT | Performed by: NURSE PRACTITIONER

## 2025-01-03 PROCEDURE — 83036 HEMOGLOBIN GLYCOSYLATED A1C: CPT | Performed by: NURSE PRACTITIONER

## 2025-01-03 PROCEDURE — 80050 GENERAL HEALTH PANEL: CPT | Performed by: NURSE PRACTITIONER

## 2025-01-03 PROCEDURE — 80061 LIPID PANEL: CPT | Performed by: NURSE PRACTITIONER

## 2025-01-03 PROCEDURE — 82728 ASSAY OF FERRITIN: CPT | Performed by: NURSE PRACTITIONER

## 2025-01-03 RX ORDER — ASPIRIN 81 MG/1
81 TABLET ORAL DAILY
Qty: 90 TABLET | Refills: 1 | Status: SHIPPED | OUTPATIENT
Start: 2025-01-03 | End: 2025-07-02

## 2025-01-03 RX ORDER — CARVEDILOL 25 MG/1
25 TABLET ORAL 2 TIMES DAILY WITH MEALS
Qty: 180 TABLET | Refills: 0 | Status: SHIPPED | OUTPATIENT
Start: 2025-01-03 | End: 2025-04-03

## 2025-01-03 RX ORDER — BUMETANIDE 2 MG/1
2 TABLET ORAL 2 TIMES DAILY
Qty: 60 TABLET | Refills: 0 | Status: SHIPPED | OUTPATIENT
Start: 2025-01-03 | End: 2025-02-02

## 2025-01-03 RX ORDER — NICOTINE 21 MG/24HR
1 PATCH, TRANSDERMAL 24 HOURS TRANSDERMAL EVERY 24 HOURS
Qty: 30 PATCH | Refills: 0 | Status: SHIPPED | OUTPATIENT
Start: 2025-01-03 | End: 2025-02-02

## 2025-01-03 RX ORDER — NITROGLYCERIN 0.4 MG/1
0.4 TABLET SUBLINGUAL
Qty: 30 TABLET | Refills: 0 | Status: SHIPPED | OUTPATIENT
Start: 2025-01-03 | End: 2025-02-02

## 2025-01-03 RX ORDER — LOSARTAN POTASSIUM 50 MG/1
50 TABLET ORAL DAILY
Qty: 30 TABLET | Refills: 5 | Status: SHIPPED | OUTPATIENT
Start: 2025-01-03

## 2025-01-03 RX ORDER — POTASSIUM CHLORIDE 1500 MG/1
20 TABLET, EXTENDED RELEASE ORAL DAILY
COMMUNITY

## 2025-01-03 RX ORDER — SEVELAMER HYDROCHLORIDE 800 MG/1
800 TABLET, FILM COATED ORAL
Qty: 270 TABLET | Refills: 0 | Status: SHIPPED | OUTPATIENT
Start: 2025-01-03 | End: 2025-04-03

## 2025-01-03 RX ORDER — BUSPIRONE HYDROCHLORIDE 10 MG/1
10 TABLET ORAL 2 TIMES DAILY
Qty: 180 TABLET | Refills: 0 | Status: SHIPPED | OUTPATIENT
Start: 2025-01-03 | End: 2025-04-03

## 2025-01-03 RX ORDER — ISOSORBIDE MONONITRATE 30 MG/1
60 TABLET, EXTENDED RELEASE ORAL DAILY
Qty: 60 TABLET | Refills: 0 | Status: SHIPPED | OUTPATIENT
Start: 2025-01-03 | End: 2025-02-02

## 2025-01-03 RX ORDER — PANTOPRAZOLE SODIUM 40 MG/1
40 TABLET, DELAYED RELEASE ORAL DAILY
Qty: 180 TABLET | Refills: 0 | Status: SHIPPED | OUTPATIENT
Start: 2025-01-03 | End: 2025-07-02

## 2025-01-04 LAB
BACTERIA UR QL AUTO: NORMAL /HPF
BILIRUB UR QL STRIP: NEGATIVE
CLARITY UR: CLEAR
COLOR UR: YELLOW
GLUCOSE UR STRIP-MCNC: NEGATIVE MG/DL
HGB UR QL STRIP.AUTO: NEGATIVE
HYALINE CASTS UR QL AUTO: NORMAL /LPF
KETONES UR QL STRIP: NEGATIVE
LEUKOCYTE ESTERASE UR QL STRIP.AUTO: NEGATIVE
NITRITE UR QL STRIP: NEGATIVE
PH UR STRIP.AUTO: 6 [PH] (ref 5–8)
PROT UR QL STRIP: ABNORMAL
RBC # UR STRIP: NORMAL /HPF
REF LAB TEST METHOD: NORMAL
SP GR UR STRIP: 1.01 (ref 1–1.03)
SQUAMOUS #/AREA URNS HPF: NORMAL /HPF
UROBILINOGEN UR QL STRIP: ABNORMAL
WBC # UR STRIP: NORMAL /HPF

## 2025-01-04 NOTE — PROGRESS NOTES
Transitional Care Follow Up Visit  Subjective     Scott MARK Gaytan is a 49 y.o. male who presents for a transitional care management visit.    Within 48 business hours after discharge our office contacted him via telephone to coordinate his care and needs.      I reviewed and discussed the details of that call along with the discharge summary, hospital problems, inpatient lab results, inpatient diagnostic studies, and consultation reports with Scott.     Current outpatient and discharge medications have been reconciled for the patient.  Reviewed by: CHANDLER Omalley          12/7/2022     7:06 PM   Date of TCM Phone Call   [facility]   Date of Admission 12/6/2022   Date of Discharge 12/7/2022   Discharge Disposition Home or Self Care     Risk for Readmission (LACE) No data recorded    History of Present Illness  48 y/o  male patient, born and raised in [place].  He has never seen a PCP or had preventative health management.  He had a hospitalization in the last 6 weeks.  He reports a history of Hep C and methamphetamine abuse.  He denies IV route drug abuse.    Pulmonology - COPD.    Nephrology - [facility], next appointment is 2/3/25. Stage 4 Kidney failure, CHF    Cardiologist - Stephanie.  A-fib, CHF    Vascular - Raad Schilling.  Placed fistula in left upper arm, in preparation and anticipation for dialysis.       Course During Hospital Stay:  11/23/24 - 11/27/24.  Admit for A-fib with RVR, JOSE ANGEL on CKD - stage 4, Acute on Chronicf CHF >>> EF 51-55%., and COPD exacerbation.     The following portions of the patient's history were reviewed and updated as appropriate: allergies, current medications, past family history, past medical history, past social history, past surgical history, and problem list.    Review of Systems   Constitutional:  Positive for activity change. Negative for fatigue and fever.   HENT:  Negative for congestion.    Respiratory:  Positive for cough. Negative for  "chest tightness and shortness of breath.    Cardiovascular:  Negative for chest pain, palpitations and leg swelling.   Gastrointestinal:  Negative for diarrhea, nausea and vomiting.   Genitourinary:  Negative for dysuria.   Musculoskeletal:  Positive for back pain and myalgias.   Skin:  Negative for rash and wound.   Neurological:  Negative for dizziness, syncope, weakness and headaches.       Objective   /88 (BP Location: Left arm, Patient Position: Sitting, Cuff Size: Adult)   Pulse 65   Temp 98.8 °F (37.1 °C) (Oral)   Resp 16   Ht 167.6 cm (66\")   Wt 75.5 kg (166 lb 6.4 oz)   SpO2 96%   BMI 26.86 kg/m²   Physical Exam  Vitals reviewed.   Constitutional:       General: He is not in acute distress.     Appearance: Normal appearance. He is not ill-appearing, toxic-appearing or diaphoretic.   HENT:      Head: Normocephalic and atraumatic.      Right Ear: Tympanic membrane, ear canal and external ear normal. There is no impacted cerumen.      Left Ear: Tympanic membrane, ear canal and external ear normal. There is no impacted cerumen.      Nose: Nose normal.      Mouth/Throat:      Mouth: Mucous membranes are moist.      Pharynx: Oropharynx is clear.   Cardiovascular:      Rate and Rhythm: Normal rate and regular rhythm.      Pulses: Normal pulses.      Heart sounds: Normal heart sounds.   Pulmonary:      Effort: Pulmonary effort is normal.      Breath sounds: Rhonchi present.   Abdominal:      Palpations: Abdomen is soft.      Tenderness: There is no abdominal tenderness. There is no guarding or rebound.   Musculoskeletal:      Cervical back: Normal range of motion and neck supple.      Right lower leg: No edema.      Left lower leg: No edema.   Skin:     General: Skin is warm and dry.      Capillary Refill: Capillary refill takes less than 2 seconds.   Neurological:      General: No focal deficit present.      Mental Status: He is alert and oriented to person, place, and time.      Motor: No weakness. "      Gait: Gait normal.   Psychiatric:         Mood and Affect: Mood normal.         Behavior: Behavior normal.         Thought Content: Thought content normal.         Judgment: Judgment normal.         Assessment & Plan   Problems Addressed this Visit          Gastrointestinal Abdominal     Hepatitis C    Relevant Orders    Hepatitis C RNA, Quantitative, PCR (graph)     Other Visit Diagnoses       Healthcare maintenance    -  Primary    Relevant Medications    apixaban (ELIQUIS) 5 MG tablet tablet    aspirin 81 MG EC tablet    busPIRone (BUSPAR) 10 MG tablet    carvedilol (COREG) 25 MG tablet    losartan (COZAAR) 50 MG tablet    nitroglycerin (NITROSTAT) 0.4 MG SL tablet    nicotine (NICODERM CQ) 21 MG/24HR patch    Other Relevant Orders    CBC & Differential (Completed)    Comprehensive Metabolic Panel (Completed)    Lipid Panel (Completed)    TSH Rfx On Abnormal To Free T4 (Completed)    Urinalysis With Culture If Indicated - Urine, Clean Catch (Completed)    Hemoglobin A1c (Completed)    BNP (Completed)    High Sensitivity Troponin T (Completed)    Ferritin (Completed)    Iron Profile (Completed)    XR Chest PA & Lateral    COPD with exacerbation        Relevant Medications    Budeson-Glycopyrrol-Formoterol (BREZTRI) 160-9-4.8 MCG/ACT aerosol inhaler    Albuterol-Budesonide 90-80 MCG/ACT aerosol    Other Relevant Orders    Ambulatory Referral to Pulmonology    Acute diastolic CHF (congestive heart failure)        Relevant Medications    carvedilol (COREG) 25 MG tablet    nitroglycerin (NITROSTAT) 0.4 MG SL tablet    bumetanide (BUMEX) 2 MG tablet    isosorbide mononitrate (IMDUR) 30 MG 24 hr tablet    Stage 4 chronic kidney disease        Relevant Medications    bumetanide (BUMEX) 2 MG tablet    sevelamer (RENAGEL) 800 MG tablet    Gastroesophageal reflux disease, unspecified whether esophagitis present        Relevant Medications    pantoprazole (PROTONIX) 40 MG EC tablet          Diagnoses         Codes  Comments    Healthcare maintenance    -  Primary ICD-10-CM: Z00.00  ICD-9-CM: V70.0     COPD with exacerbation     ICD-10-CM: J44.1  ICD-9-CM: 491.21     Acute diastolic CHF (congestive heart failure)     ICD-10-CM: I50.31  ICD-9-CM: 428.31, 428.0     Hepatitis C virus infection without hepatic coma, unspecified chronicity     ICD-10-CM: B19.20  ICD-9-CM: 070.70     Stage 4 chronic kidney disease     ICD-10-CM: N18.4  ICD-9-CM: 585.4     Gastroesophageal reflux disease, unspecified whether esophagitis present     ICD-10-CM: K21.9  ICD-9-CM: 530.81

## 2025-01-06 LAB
HCV RNA SERPL NAA+PROBE-ACNC: 1850 IU/ML
HCV RNA SERPL NAA+PROBE-LOG IU: 3.27 LOG10 IU/ML
REF LAB TEST REF RANGE: NORMAL

## 2025-01-21 ENCOUNTER — LAB (OUTPATIENT)
Dept: LAB | Facility: HOSPITAL | Age: 50
End: 2025-01-21
Payer: MEDICAID

## 2025-01-21 ENCOUNTER — OFFICE VISIT (OUTPATIENT)
Dept: FAMILY MEDICINE CLINIC | Facility: CLINIC | Age: 50
End: 2025-01-21
Payer: MEDICAID

## 2025-01-21 ENCOUNTER — OFFICE VISIT (OUTPATIENT)
Dept: CARDIOLOGY | Facility: CLINIC | Age: 50
End: 2025-01-21
Payer: MEDICAID

## 2025-01-21 ENCOUNTER — HOSPITAL ENCOUNTER (OUTPATIENT)
Dept: GENERAL RADIOLOGY | Facility: HOSPITAL | Age: 50
Discharge: HOME | End: 2025-01-21
Payer: MEDICAID

## 2025-01-21 VITALS
SYSTOLIC BLOOD PRESSURE: 157 MMHG | WEIGHT: 179.8 LBS | DIASTOLIC BLOOD PRESSURE: 97 MMHG | OXYGEN SATURATION: 98 % | HEIGHT: 67 IN | BODY MASS INDEX: 28.22 KG/M2 | HEART RATE: 62 BPM

## 2025-01-21 VITALS
RESPIRATION RATE: 16 BRPM | WEIGHT: 180.3 LBS | SYSTOLIC BLOOD PRESSURE: 177 MMHG | HEART RATE: 88 BPM | TEMPERATURE: 98.3 F | HEIGHT: 68 IN | DIASTOLIC BLOOD PRESSURE: 105 MMHG | BODY MASS INDEX: 27.32 KG/M2 | OXYGEN SATURATION: 100 %

## 2025-01-21 DIAGNOSIS — Z00.00 HEALTHCARE MAINTENANCE: ICD-10-CM

## 2025-01-21 DIAGNOSIS — E78.5 HYPERLIPIDEMIA, UNSPECIFIED HYPERLIPIDEMIA TYPE: ICD-10-CM

## 2025-01-21 DIAGNOSIS — N18.32 STAGE 3B CHRONIC KIDNEY DISEASE: ICD-10-CM

## 2025-01-21 DIAGNOSIS — R06.02 SOB (SHORTNESS OF BREATH): Primary | ICD-10-CM

## 2025-01-21 DIAGNOSIS — I1A.0 RESISTANT HYPERTENSION: ICD-10-CM

## 2025-01-21 DIAGNOSIS — I48.91 ATRIAL FIBRILLATION WITH RVR: Primary | ICD-10-CM

## 2025-01-21 DIAGNOSIS — I16.1 HYPERTENSIVE EMERGENCY: ICD-10-CM

## 2025-01-21 DIAGNOSIS — R06.02 SOB (SHORTNESS OF BREATH): ICD-10-CM

## 2025-01-21 PROBLEM — Z72.0 TOBACCO ABUSE: Status: ACTIVE | Noted: 2022-04-26

## 2025-01-21 PROBLEM — E87.5 HYPERKALEMIA: Status: ACTIVE | Noted: 2022-04-26

## 2025-01-21 PROBLEM — I16.0 HYPERTENSIVE URGENCY: Status: ACTIVE | Noted: 2022-04-26

## 2025-01-21 PROBLEM — N18.9 ACUTE KIDNEY INJURY SUPERIMPOSED ON CHRONIC KIDNEY DISEASE: Status: ACTIVE | Noted: 2022-04-26

## 2025-01-21 PROBLEM — F19.11 HISTORY OF SUBSTANCE ABUSE: Status: ACTIVE | Noted: 2024-07-01

## 2025-01-21 PROCEDURE — 85025 COMPLETE CBC W/AUTO DIFF WBC: CPT | Performed by: NURSE PRACTITIONER

## 2025-01-21 PROCEDURE — 71046 X-RAY EXAM CHEST 2 VIEWS: CPT

## 2025-01-21 PROCEDURE — 3080F DIAST BP >= 90 MM HG: CPT | Performed by: NURSE PRACTITIONER

## 2025-01-21 PROCEDURE — 99215 OFFICE O/P EST HI 40 MIN: CPT | Performed by: NURSE PRACTITIONER

## 2025-01-21 PROCEDURE — 80053 COMPREHEN METABOLIC PANEL: CPT | Performed by: NURSE PRACTITIONER

## 2025-01-21 PROCEDURE — 1160F RVW MEDS BY RX/DR IN RCRD: CPT | Performed by: NURSE PRACTITIONER

## 2025-01-21 PROCEDURE — 83880 ASSAY OF NATRIURETIC PEPTIDE: CPT | Performed by: NURSE PRACTITIONER

## 2025-01-21 PROCEDURE — 1159F MED LIST DOCD IN RCRD: CPT | Performed by: NURSE PRACTITIONER

## 2025-01-21 PROCEDURE — 3077F SYST BP >= 140 MM HG: CPT | Performed by: NURSE PRACTITIONER

## 2025-01-21 RX ORDER — HYDRALAZINE HYDROCHLORIDE 25 MG/1
25 TABLET, FILM COATED ORAL 3 TIMES DAILY
Qty: 90 TABLET | Refills: 0 | Status: SHIPPED | OUTPATIENT
Start: 2025-01-21 | End: 2025-02-20

## 2025-01-21 RX ORDER — ROSUVASTATIN CALCIUM 40 MG/1
40 TABLET, COATED ORAL DAILY
Qty: 90 TABLET | Refills: 0 | Status: SHIPPED | OUTPATIENT
Start: 2025-01-21 | End: 2025-04-21

## 2025-01-21 RX ORDER — LOSARTAN POTASSIUM AND HYDROCHLOROTHIAZIDE 25; 100 MG/1; MG/1
1 TABLET ORAL DAILY
Qty: 30 TABLET | Refills: 0 | Status: SHIPPED | OUTPATIENT
Start: 2025-01-21 | End: 2025-02-20

## 2025-01-21 RX ORDER — AMLODIPINE BESYLATE 5 MG/1
5 TABLET ORAL DAILY
COMMUNITY
Start: 2024-10-02

## 2025-01-21 NOTE — PROGRESS NOTES
"Chief Complaint  Follow-up    Subjective        Scott Gaytan presents to [facility]  History of Present Illness  48 y/o chronically ill male patient with h/o advanced kidney failure, CHF, HTN and Hepatitis C, presents to  office, to follow up from establish care visit and labs.  He reports that he just came from Cardiology f/u appointment.  Shortness of Breath  This is a recurrent problem. The current episode started in the past 7 days. The problem occurs constantly. The problem has been worse. Pertinent negatives include no chest pain, leg pain or leg swelling. Nothing aggravates the symptoms. Risk factors include smoking. He has tried rest for the symptoms. The treatment provided no relief. His past medical history is significant for a heart failure. There has been no fever.       Objective   Vital Signs:  BP (!) 177/105 (BP Location: Left arm, Patient Position: Sitting, Cuff Size: Adult)   Pulse 88   Temp 98.3 °F (36.8 °C) (Infrared)   Resp 16   Ht 172.7 cm (68\")   Wt 81.8 kg (180 lb 4.8 oz)   SpO2 100%   BMI 27.41 kg/m²   Estimated body mass index is 27.41 kg/m² as calculated from the following:    Height as of this encounter: 172.7 cm (68\").    Weight as of this encounter: 81.8 kg (180 lb 4.8 oz).      Physical Exam   Result Review :  The following data was reviewed by: CHADNLER Omalley on 01/21/2025:  CMP          11/25/2024    03:16 11/25/2024    11:25 11/25/2024    19:27 11/26/2024    03:59 11/26/2024    15:49 1/3/2025    15:49   CMP   Glucose 111    97   98    BUN 42    41   55    Creatinine 2.99    2.91   3.06    EGFR 25.1    25.9   24.4    Sodium 140    139   138    Potassium 3.2  3.3  3.0  3.6  3.4  4.5    Chloride 100    99   102    Calcium 8.8    9.1   9.4    Total Protein      7.9    Albumin      4.1    Globulin      3.8    Total Bilirubin      0.5    Alkaline Phosphatase      43    AST (SGOT)      34    ALT (SGPT)      34    Albumin/Globulin Ratio     "  1.1    BUN/Creatinine Ratio 14.0    14.1   18.0    Anion Gap 13.8    12.8   12.1      CBC w/diff          11/25/2024    03:16 11/26/2024    03:59 1/3/2025    15:49   CBC w/Diff   WBC 9.32  9.01  8.65    RBC 3.62  3.97  4.63    Hemoglobin 9.6  11.0  13.5    Hematocrit 31.5  34.9  40.2    MCV 87.0  87.9  86.8    MCH 26.5  27.7  29.2    MCHC 30.5  31.5  33.6    RDW 19.3  19.5  16.8    Platelets 168  176  260    Neutrophil Rel % 68.2  57.4  50.2    Immature Granulocyte Rel % 0.3  0.3  0.2    Lymphocyte Rel % 19.2  25.5  32.0    Monocyte Rel % 10.3  13.5  10.9    Eosinophil Rel % 1.8  3.0  6.0    Basophil Rel % 0.2  0.3  0.7      Lipid Panel          6/17/2024    03:05 11/23/2024    03:42 1/3/2025    15:49   Lipid Panel   Total Cholesterol 184  140  236    Triglycerides 45  77  84    HDL Cholesterol 55  39  66    VLDL Cholesterol 9  15  15    LDL Cholesterol  120  86  155    LDL/HDL Ratio 2.18  2.19  2.32      TSH          11/23/2024    03:42 1/3/2025    15:49   TSH   TSH 1.440  2.350      Most Recent A1C          1/3/2025    15:49   HGBA1C Most Recent   Hemoglobin A1C 5.00      A1C Last 3 Results          4/9/2024    02:59 11/23/2024    01:55 1/3/2025    15:49   HGBA1C Last 3 Results   Hemoglobin A1C 5.4     5.54  5.00       Details          This result is from an external source.               UA          8/21/2024    13:54 11/23/2024    03:19 1/3/2025    15:49   Urinalysis   Squamous Epithelial Cells, UA 0-2  None Seen  0-2    Specific Gravity, UA 1.015  1.017  1.014    Ketones, UA Negative  Negative  Negative    Blood, UA Negative  Negative  Negative    Leukocytes, UA Negative  Trace  Negative    Nitrite, UA Negative  Negative  Negative    RBC, UA 0-2  0-2  0-2    WBC, UA 0-2  0-2  None Seen    Bacteria, UA None Seen  None Seen  None Seen        Data reviewed : Radiologic studies 2 view CXR obtained 1/6/25  XR CHEST PA AND LATERAL     Date of Exam: 1/3/2025 4:41 PM EST     Indication: healthcare maintenance      Comparison: [facility] chest x-ray 11/23/2024, CT abdomen and pelvis 7/18/2023, two-view chest x-ray 12/6/2022, CT chest 12/27/2021     Findings:  Lungs are adequately expanded and appear clear. No pleural effusion is seen. Cardiac silhouette is mildly enlarged. There is severe arthritic change of the right glenohumeral joint. There is height loss of an upper lumbar vertebral body with focal   kyphosis, as seen on 7/18/2023 CT abdomen.     IMPRESSION:  Impression:  No acute cardiopulmonary abnormality.        Electronically Signed: Mary Ching    1/6/2025 11:08 AM EST    Workstation ID: [id number]           The 10-year ASCVD risk score (Mee WEIR, et al., 2019) is: 12%    Values used to calculate the score:      Age: 49 years      Sex: Male      Is Non- : No      Diabetic: No      Tobacco smoker: Yes      Systolic Blood Pressure: 177 mmHg      Is BP treated: Yes      HDL Cholesterol: 66 mg/dL      Total Cholesterol: 236 mg/dL        Assessment and Plan   Diagnoses and all orders for this visit:    1. SOB (shortness of breath) (Primary)  -     BNP  -     Ambulatory Referral to Heart Failure Clinic  -     Comprehensive Metabolic Panel  -     CBC & Differential  -     XR Chest 2 View; Future    2. Resistant hypertension  Assessment & Plan:  Hypertension is uncontrolled  Medication changes per orders.  Dietary sodium restriction.  Stop smoking.  Ambulatory blood pressure monitoring.  F/u [facility], renal, next month.  F/u with Cards, today, prior to arrival to PC appointment.  Blood pressure will be reassessedin 2 weeks.    Orders:  -     losartan-hydrochlorothiazide (Hyzaar) 100-25 MG per tablet; Take 1 tablet by mouth Daily for 30 days.  Dispense: 30 tablet; Refill: 0  -     hydrALAZINE (APRESOLINE) 25 MG tablet; Take 1 tablet by mouth 3 (Three) Times a Day for 30 days.  Dispense: 90 tablet; Refill: 0    3. Hyperlipidemia, unspecified hyperlipidemia type  Assessment & Plan:  Started on statin due to  risk score at 12%.    Orders:  -     rosuvastatin (CRESTOR) 40 MG tablet; Take 1 tablet by mouth Daily for 90 days.  Dispense: 90 tablet; Refill: 0    4. Healthcare maintenance  Assessment & Plan:  Discussed lab results from establish care visit.  Worsening BUN/Creat.  BNP remains elevated, but improved from recent hospitalization.  Return in 10 days to f/u and obtained  CBC, CMP, BNP and 2 view CXR to evaluate CHF.             I spent 40 minutes caring for Scott on this date of service. This time includes time spent by me in the following activities:preparing for the visit, reviewing tests, performing a medically appropriate examination and/or evaluation , counseling and educating the patient/family/caregiver, ordering medications, tests, or procedures, referring and communicating with other health care professionals , documenting information in the medical record, and independently interpreting results and communicating that information with the patient/family/caregiver  Follow Up   Return in about 10 days (around 1/31/2025).  Patient was given instructions and counseling regarding his condition or for health maintenance advice. Please see specific information pulled into the AVS if appropriate.

## 2025-01-21 NOTE — PROGRESS NOTES
Progress note      Name: Scott Gaytan ADMIT: (Not on file)   : 1975  PCP: Vicki Santana APRN    MRN: [id number] LOS: 0 days   AGE/SEX: 49 y.o. male  ROOM: Room/bed info not found     Chief Complaint   Patient presents with    Follow-up     F/u 1 week        Subjective       History of present illness  Scott Gaytan is a 49-year-old male patient who has chronic kidney disease, hepatitis C, history of substance abuse, was admitted to the hospital in 2020 for with shortness of breath and found to be in A-fib with RVR.  His creatinine was elevated at 2.8 and potassium was 6.5.  GYPSY guided cardioversion was performed on 2024 and eventually was discharged home on carvedilol and Eliquis for stroke prevention.    Past Medical History:   Diagnosis Date    CHF (congestive heart failure)     CKD (chronic kidney disease)     Hypertension     Substance abuse     Meth/MJ     Past Surgical History:   Procedure Laterality Date    CARDIAC CATHETERIZATION N/A 2022    ORIF TIBIA/FIBULA FRACTURES Right      Family History   Problem Relation Age of Onset    Diabetes Mother     Arthritis Mother     Hypertension Father     Heart attack Father     Seizures Sister     Heart attack Paternal Grandfather      Social History     Tobacco Use    Smoking status: Every Day     Current packs/day: 0.25     Average packs/day: 1 pack/day for 31.1 years (30.5 ttl pk-yrs)     Types: Cigarettes     Start date:      Passive exposure: Never    Smokeless tobacco: Never   Vaping Use    Vaping status: Never Used   Substance Use Topics    Alcohol use: Not Currently    Drug use: Yes     Types: Methamphetamines, Marijuana, Cocaine(coke)       Current Outpatient Medications:     Albuterol-Budesonide 90-80 MCG/ACT aerosol, Inhale 2 puffs As Needed (shortness of breath and wheezing) for up to 90 days. Max: 12 puffs/day, Disp: 10.7 g, Rfl: 2    amLODIPine (NORVASC) 5 MG tablet, Take 1 tablet by mouth Daily., Disp: , Rfl:      apixaban (ELIQUIS) 5 MG tablet tablet, Take 0.5 tablets by mouth 2 (Two) Times a Day for 90 days., Disp: , Rfl:     aspirin 81 MG EC tablet, Take 1 tablet by mouth Daily for 180 days., Disp: 90 tablet, Rfl: 1    Budeson-Glycopyrrol-Formoterol (BREZTRI) 160-9-4.8 MCG/ACT aerosol inhaler, Inhale 2 puffs 2 (Two) Times a Day for 90 days. Rinse mouth after each use, Disp: 10.7 each, Rfl: 0    bumetanide (BUMEX) 2 MG tablet, Take 1 tablet by mouth 2 (Two) Times a Day for 30 days., Disp: 60 tablet, Rfl: 0    busPIRone (BUSPAR) 10 MG tablet, Take 1 tablet by mouth 2 (Two) Times a Day for 90 days., Disp: 180 tablet, Rfl: 0    carvedilol (COREG) 25 MG tablet, Take 1 tablet by mouth 2 (Two) Times a Day With Meals for 90 days., Disp: 180 tablet, Rfl: 0    isosorbide mononitrate (IMDUR) 30 MG 24 hr tablet, Take 2 tablets by mouth Daily for 30 days., Disp: 60 tablet, Rfl: 0    losartan (COZAAR) 50 MG tablet, Take 1 tablet by mouth Daily., Disp: 30 tablet, Rfl: 5    metOLazone (ZAROXOLYN) 2.5 MG tablet, Take 1 tablet by mouth 3 (Three) Times a Week. Mon, Wed, Fri, Disp: , Rfl:     nicotine (NICODERM CQ) 21 MG/24HR patch, Place 1 patch on the skin as directed by provider Daily for 30 days., Disp: 30 patch, Rfl: 0    NIFEdipine XL (PROCARDIA XL) 60 MG 24 hr tablet, Take 1 tablet by mouth Daily., Disp: , Rfl:     nitroglycerin (NITROSTAT) 0.4 MG SL tablet, Place 1 tablet under the tongue Every 5 (Five) Minutes As Needed for Chest Pain for up to 30 days. Take no more than 3 doses in 15 minutes., Disp: 30 tablet, Rfl: 0    oxyCODONE-acetaminophen (PERCOCET) 7.5-325 MG per tablet, Take 1 tablet by mouth Every 8 (Eight) Hours As Needed for Moderate Pain (Three times a day as needed for pain)., Disp: , Rfl:     pantoprazole (PROTONIX) 40 MG EC tablet, Take 1 tablet by mouth Daily for 180 days., Disp: 180 tablet, Rfl: 0    potassium chloride ER (K-TAB) 20 MEQ tablet controlled-release ER tablet, Take 1 tablet by mouth Daily., Disp: , Rfl:      sevelamer (RENAGEL) 800 MG tablet, Take 1 tablet by mouth 3 (Three) Times a Day With Meals for 90 days., Disp: 270 tablet, Rfl: 0  Allergies:  Morphine      Physical Exam  VITALS REVIEWED    General:      well developed, in no acute distress.    Head:      normocephalic and atraumatic.    Eyes:      PERRL/EOM intact, conjunctiva and sclera clear with out nystagmus.    Neck:      no masses, thyromegaly,  trachea central with normal respiratory effort and PMI displaced laterally  Lungs:      Clear to auscultation bilaterally  Heart:       Regular rate and rhythm  Msk:      no deformity or scoliosis noted of thoracic or lumbar spine.    Pulses:      pulses normal in all 4 extremities.    Extremities:       No lower extremity edema  Neurologic:      no focal deficits.   alert oriented x3  Skin:      intact without lesions or rashes.    Psych:      alert and cooperative; normal mood and affect; normal attention span and concentration.      Result Review :               Pertinent cardiac workup    EKG 11/23/2024 A-fib with left anterior fascicular block, ventricular rate 130 bpm.  Echo 6/18/2024 ejection fraction 55 to 60%.  EKG 1/16/2024 normal sinus rhythm  Heart cath 4/27/2022 no significant CAD.           ECG 12 Lead    Date/Time: 1/21/2025 3:54 PM  Performed by: Cary Espinosa MD    Authorized by: Cary Espinosa MD  Comparison: compared with previous ECG   Similar to previous ECG  Rhythm: sinus rhythm  Rate: normal  BPM: 60  Conduction: 1st degree AV block and non-specific intraventricular conduction delay  ST Segments: ST segments normal  QRS axis: normal  Other findings: non-specific ST-T wave changes              Assessment and Plan      Scott Gaytan is a 49-year-old male patient who has multiple medical issues including chronic kidney disease, current creatinine of 3.0, also history of hepatitis C, substance abuse.  Diagnosed with A-fib in November 2024, GYPSY guided cardioversion was done on  11/25/2024.  Patient is in sinus rhythm on Coreg.  I will decrease Eliquis to 2.5 mg twice a day due to his renal failure.   Currently is not experiencing any anginal symptoms or CHF symptoms.  Will see him in 6 months for follow-up    Diagnoses and all orders for this visit:    1. Atrial fibrillation with RVR (Primary)    2. Healthcare maintenance  -     apixaban (ELIQUIS) 5 MG tablet tablet; Take 0.5 tablets by mouth 2 (Two) Times a Day for 90 days.    3. Hypertensive emergency    4. Stage 3b chronic kidney disease           Return in about 6 months (around 7/21/2025).  Patient was given instructions and counseling regarding his condition or for health maintenance advice. Please see specific information pulled into the AVS if appropriate.       Electronically signed by Cary Espinosa MD, 01/21/25, 3:57 PM EST.

## 2025-01-22 LAB
ALBUMIN SERPL-MCNC: 3.6 G/DL (ref 3.5–5.2)
ALBUMIN/GLOB SERPL: 1 G/DL
ALP SERPL-CCNC: 48 U/L (ref 39–117)
ALT SERPL W P-5'-P-CCNC: 14 U/L (ref 1–41)
ANION GAP SERPL CALCULATED.3IONS-SCNC: 12.9 MMOL/L (ref 5–15)
AST SERPL-CCNC: 22 U/L (ref 1–40)
BASOPHILS # BLD AUTO: 0.04 10*3/MM3 (ref 0–0.2)
BASOPHILS NFR BLD AUTO: 0.5 % (ref 0–1.5)
BILIRUB SERPL-MCNC: 0.5 MG/DL (ref 0–1.2)
BUN SERPL-MCNC: 38 MG/DL (ref 6–20)
BUN/CREAT SERPL: 14 (ref 7–25)
CALCIUM SPEC-SCNC: 8.9 MG/DL (ref 8.6–10.5)
CHLORIDE SERPL-SCNC: 104 MMOL/L (ref 98–107)
CO2 SERPL-SCNC: 27.1 MMOL/L (ref 22–29)
CREAT SERPL-MCNC: 2.71 MG/DL (ref 0.76–1.27)
DEPRECATED RDW RBC AUTO: 51.4 FL (ref 37–54)
EGFRCR SERPLBLD CKD-EPI 2021: 28.2 ML/MIN/1.73
EOSINOPHIL # BLD AUTO: 0.35 10*3/MM3 (ref 0–0.4)
EOSINOPHIL NFR BLD AUTO: 4.2 % (ref 0.3–6.2)
ERYTHROCYTE [DISTWIDTH] IN BLOOD BY AUTOMATED COUNT: 15.7 % (ref 12.3–15.4)
GLOBULIN UR ELPH-MCNC: 3.6 GM/DL
GLUCOSE SERPL-MCNC: 59 MG/DL (ref 65–99)
HCT VFR BLD AUTO: 38.7 % (ref 37.5–51)
HGB BLD-MCNC: 12.6 G/DL (ref 13–17.7)
IMM GRANULOCYTES # BLD AUTO: 0.02 10*3/MM3 (ref 0–0.05)
IMM GRANULOCYTES NFR BLD AUTO: 0.2 % (ref 0–0.5)
LYMPHOCYTES # BLD AUTO: 2.28 10*3/MM3 (ref 0.7–3.1)
LYMPHOCYTES NFR BLD AUTO: 27.4 % (ref 19.6–45.3)
MCH RBC QN AUTO: 29.5 PG (ref 26.6–33)
MCHC RBC AUTO-ENTMCNC: 32.6 G/DL (ref 31.5–35.7)
MCV RBC AUTO: 90.6 FL (ref 79–97)
MONOCYTES # BLD AUTO: 0.72 10*3/MM3 (ref 0.1–0.9)
MONOCYTES NFR BLD AUTO: 8.7 % (ref 5–12)
NEUTROPHILS NFR BLD AUTO: 4.9 10*3/MM3 (ref 1.7–7)
NEUTROPHILS NFR BLD AUTO: 59 % (ref 42.7–76)
NRBC BLD AUTO-RTO: 0 /100 WBC (ref 0–0.2)
NT-PROBNP SERPL-MCNC: ABNORMAL PG/ML (ref 0–450)
PLATELET # BLD AUTO: 193 10*3/MM3 (ref 140–450)
PMV BLD AUTO: 11.7 FL (ref 6–12)
POTASSIUM SERPL-SCNC: 4.2 MMOL/L (ref 3.5–5.2)
PROT SERPL-MCNC: 7.2 G/DL (ref 6–8.5)
RBC # BLD AUTO: 4.27 10*6/MM3 (ref 4.14–5.8)
SODIUM SERPL-SCNC: 144 MMOL/L (ref 136–145)
WBC NRBC COR # BLD AUTO: 8.31 10*3/MM3 (ref 3.4–10.8)

## 2025-01-22 NOTE — ASSESSMENT & PLAN NOTE
Hypertension is uncontrolled  Medication changes per orders.  Dietary sodium restriction.  Stop smoking.  Ambulatory blood pressure monitoring.  F/u [facility], renal, next month.  F/u with Cards, today, prior to arrival to PC appointment.  Blood pressure will be reassessedin 2 weeks.

## 2025-01-22 NOTE — ASSESSMENT & PLAN NOTE
Discussed lab results from establish care visit.  Worsening BUN/Creat.  BNP remains elevated, but improved from recent hospitalization.  Return in 10 days to f/u and obtained  CBC, CMP, BNP and 2 view CXR to evaluate CHF.

## 2025-01-24 NOTE — PROGRESS NOTES
CXR shows increased fluid build up.  Makes sense that you are having increased shortness of breath.  Have you heard from the heart failure clinic yet?  I will call you tomorrow.

## 2025-01-24 NOTE — PROGRESS NOTES
CHF is worsening.  BNP has doubled in past 2 weeks.  Also, your CXR shows some pulmonary congestion, which coincides with your increased shortness of breath.  I will call you tomorrow to discuss further.

## 2025-01-28 ENCOUNTER — TELEPHONE (OUTPATIENT)
Dept: FAMILY MEDICINE CLINIC | Facility: CLINIC | Age: 50
End: 2025-01-28
Payer: MEDICAID

## 2025-01-28 NOTE — TELEPHONE ENCOUNTER
Caller: Scott Gaytan    Relationship to patient: Self    Best call back number: [phone]    Patient is needing:   WOULD LIKE A CALLBACK.     NEEDING HELP RESCHEDULING A 45 MIN APPOINTMENT THAT WAS CANCELLED.

## 2025-01-30 DIAGNOSIS — I50.31 ACUTE DIASTOLIC CHF (CONGESTIVE HEART FAILURE): ICD-10-CM

## 2025-01-30 RX ORDER — BUMETANIDE 2 MG/1
TABLET ORAL
Qty: 60 TABLET | Refills: 0 | Status: SHIPPED | OUTPATIENT
Start: 2025-01-30

## 2025-01-30 RX ORDER — ISOSORBIDE MONONITRATE 30 MG/1
60 TABLET, EXTENDED RELEASE ORAL DAILY
Qty: 60 TABLET | Refills: 0 | Status: SHIPPED | OUTPATIENT
Start: 2025-01-30

## 2025-01-30 NOTE — TELEPHONE ENCOUNTER
Rx Refill Note  Requested Prescriptions     Pending Prescriptions Disp Refills    bumetanide (BUMEX) 2 MG tablet [Pharmacy Med Name: BUMETANIDE 2 MG TABLET] 60 tablet 0     Sig: TAKE 1 TABLET BY MOUTH 2 TIMES A DAY FOR 30 DAYS.     Signed Prescriptions Disp Refills    isosorbide mononitrate (IMDUR) 30 MG 24 hr tablet 60 tablet 0     Sig: TAKE 2 TABLETS BY MOUTH DAILY     Authorizing Provider: FERNANDO HUBBARD     Ordering User: DELMA RAMIRES      Last office visit with prescribing clinician: 1/21/2025   Last telemedicine visit with prescribing clinician: Visit date not found   Next office visit with prescribing clinician: 2/4/2025                         Would you like a call back once the refill request has been completed: [] Yes [] No    If the office needs to give you a call back, can they leave a voicemail: [] Yes [] No    Delma Ramires MA  01/30/25, 11:44 EST

## 2025-02-06 PROBLEM — I5A NONISCHEMIC NONTRAUMATIC MYOCARDIAL INJURY: Chronic | Status: ACTIVE | Noted: 2023-09-25

## 2025-02-06 PROBLEM — N17.9 ACUTE KIDNEY INJURY SUPERIMPOSED ON CHRONIC KIDNEY DISEASE: Status: RESOLVED | Noted: 2022-04-26 | Resolved: 2025-02-06

## 2025-02-06 PROBLEM — I50.9 NEW ONSET OF CONGESTIVE HEART FAILURE: Status: RESOLVED | Noted: 2022-04-26 | Resolved: 2025-02-06

## 2025-02-06 PROBLEM — I38 VALVULAR HEART DISEASE: Chronic | Status: ACTIVE | Noted: 2025-02-06

## 2025-02-06 PROBLEM — N18.9 ACUTE KIDNEY INJURY SUPERIMPOSED ON CHRONIC KIDNEY DISEASE: Status: RESOLVED | Noted: 2022-04-26 | Resolved: 2025-02-06

## 2025-02-06 PROBLEM — N18.30 STAGE 3 CHRONIC KIDNEY DISEASE: Status: RESOLVED | Noted: 2024-03-07 | Resolved: 2025-02-06

## 2025-02-06 PROBLEM — F15.10 AMPHETAMINE ABUSE: Chronic | Status: ACTIVE | Noted: 2023-09-25

## 2025-02-06 PROBLEM — Z91.199 NONCOMPLIANCE: Chronic | Status: ACTIVE | Noted: 2024-04-10

## 2025-02-06 PROBLEM — I50.23 ACUTE ON CHRONIC SYSTOLIC HEART FAILURE: Status: RESOLVED | Noted: 2023-09-25 | Resolved: 2025-02-06

## 2025-02-06 PROBLEM — N18.4 CKD (CHRONIC KIDNEY DISEASE) STAGE 4, GFR 15-29 ML/MIN: Chronic | Status: ACTIVE | Noted: 2025-02-06

## 2025-02-07 PROBLEM — I48.91 ATRIAL FIBRILLATION WITH RVR: Chronic | Status: ACTIVE | Noted: 2024-11-23

## 2025-02-11 ENCOUNTER — OFFICE VISIT (OUTPATIENT)
Dept: FAMILY MEDICINE CLINIC | Facility: CLINIC | Age: 50
End: 2025-02-11
Payer: MEDICAID

## 2025-02-11 VITALS
OXYGEN SATURATION: 97 % | BODY MASS INDEX: 25.98 KG/M2 | DIASTOLIC BLOOD PRESSURE: 82 MMHG | WEIGHT: 171.4 LBS | HEART RATE: 65 BPM | HEIGHT: 68 IN | RESPIRATION RATE: 16 BRPM | SYSTOLIC BLOOD PRESSURE: 125 MMHG | TEMPERATURE: 97.9 F

## 2025-02-11 DIAGNOSIS — Z00.00 HEALTHCARE MAINTENANCE: ICD-10-CM

## 2025-02-11 PROBLEM — N18.6 END STAGE RENAL DISEASE: Status: ACTIVE | Noted: 2025-02-11

## 2025-02-11 PROBLEM — N25.81 SECONDARY HYPERPARATHYROIDISM: Status: ACTIVE | Noted: 2025-02-11

## 2025-02-11 PROBLEM — I13.0 HYPERTENSIVE HEART DISEASE WITH CONGESTIVE HEART FAILURE AND CHRONIC KIDNEY DISEASE: Status: ACTIVE | Noted: 2025-02-11

## 2025-02-11 PROBLEM — D64.9 ANEMIA: Status: ACTIVE | Noted: 2025-02-11

## 2025-02-11 PROBLEM — I77.0 ARTERIOVENOUS FISTULA: Status: ACTIVE | Noted: 2025-02-11

## 2025-02-11 PROCEDURE — 3079F DIAST BP 80-89 MM HG: CPT | Performed by: NURSE PRACTITIONER

## 2025-02-11 PROCEDURE — 1160F RVW MEDS BY RX/DR IN RCRD: CPT | Performed by: NURSE PRACTITIONER

## 2025-02-11 PROCEDURE — 1159F MED LIST DOCD IN RCRD: CPT | Performed by: NURSE PRACTITIONER

## 2025-02-11 PROCEDURE — 3074F SYST BP LT 130 MM HG: CPT | Performed by: NURSE PRACTITIONER

## 2025-02-11 PROCEDURE — 99213 OFFICE O/P EST LOW 20 MIN: CPT | Performed by: NURSE PRACTITIONER

## 2025-02-11 RX ORDER — BUSPIRONE HYDROCHLORIDE 10 MG/1
10 TABLET ORAL 2 TIMES DAILY
Qty: 180 TABLET | Refills: 0 | Status: SHIPPED | OUTPATIENT
Start: 2025-02-11 | End: 2025-05-12

## 2025-02-11 NOTE — PATIENT INSTRUCTIONS
Call Dr Das to schedule f/u appointment.    Nelson Das MD  Nephrologist in [place], [place]  tel:[phone]  [address] [place] [address] · [address]  [phone]  Open · Closes 5 PM

## 2025-02-11 NOTE — PROGRESS NOTES
"Chief Complaint  Follow-up    Subjective        Scott Gaytan presents to [facility]  History of Present Illness  Scott, 49 y/o male patient with h/o CKD-Stage 4, CHF, substance abuse, HTN, Cardiomyopathy, HLD, and Anemia.    Nephrology - [facility].  Scheduled for appointment, 2/3/25; patient reports he did not attend appointment.    Cardiology - [facility].  HTN, Cardiomyopathy - Continue amlodipine 5mg, daily.  Carvedilol 25mg, BID.  Hydralazine 25mg, TID.  Isosorbide mononitrate 30mg, q day.  Losartan-HCTZ 100-25mg, daily.    [facility] - Vascular    [place], APRN - [facility]F Clinic    Review of Systems   Constitutional:  Negative for activity change and appetite change.   Respiratory:  Negative for cough, chest tightness and shortness of breath.    Cardiovascular:  Negative for chest pain, palpitations and leg swelling.   Genitourinary:  Negative for dysuria.   Neurological:  Negative for dizziness, syncope, weakness, light-headedness and headaches.       Objective   Vital Signs:  /82 (BP Location: Right arm, Patient Position: Sitting, Cuff Size: Adult)   Pulse 65   Temp 97.9 °F (36.6 °C) (Infrared)   Resp 16   Ht 172.7 cm (68\")   Wt 77.7 kg (171 lb 6.4 oz)   SpO2 97%   BMI 26.06 kg/m²   Estimated body mass index is 26.06 kg/m² as calculated from the following:    Height as of this encounter: 172.7 cm (68\").    Weight as of this encounter: 77.7 kg (171 lb 6.4 oz).    Physical Exam  Constitutional:       General: He is not in acute distress.     Appearance: Normal appearance. He is not ill-appearing, toxic-appearing or diaphoretic.   Cardiovascular:      Rate and Rhythm: Normal rate.      Pulses: Normal pulses.      Heart sounds: Normal heart sounds.   Pulmonary:      Effort: Pulmonary effort is normal.      Breath sounds: Normal breath sounds. No wheezing, rhonchi or rales.   Skin:     General: Skin is warm and dry.      Capillary Refill: Capillary refill takes less than 2 " seconds.   Neurological:      General: No focal deficit present.      Mental Status: He is alert and oriented to person, place, and time.   Psychiatric:         Mood and Affect: Mood normal.         Behavior: Behavior normal.         Thought Content: Thought content normal.         Judgment: Judgment normal.        Result Review :         Assessment and Plan   Diagnoses and all orders for this visit:    1. Healthcare maintenance  -     busPIRone (BUSPAR) 10 MG tablet; Take 1 tablet by mouth 2 (Two) Times a Day for 90 days.  Dispense: 180 tablet; Refill: 0      Current Outpatient Medications on File Prior to Visit   Medication Sig Dispense Refill    amLODIPine (NORVASC) 5 MG tablet Take 1 tablet by mouth Daily.      apixaban (ELIQUIS) 5 MG tablet tablet Take 0.5 tablets by mouth 2 (Two) Times a Day for 90 days.      aspirin 81 MG EC tablet Take 1 tablet by mouth Daily for 180 days. 90 tablet 1    Budeson-Glycopyrrol-Formoterol (BREZTRI) 160-9-4.8 MCG/ACT aerosol inhaler Inhale 2 puffs 2 (Two) Times a Day for 90 days. Rinse mouth after each use 10.7 each 0    bumetanide (BUMEX) 2 MG tablet TAKE 1 TABLET BY MOUTH 2 TIMES A DAY FOR 30 DAYS. 60 tablet 0    carvedilol (COREG) 25 MG tablet Take 1 tablet by mouth 2 (Two) Times a Day With Meals for 90 days. 180 tablet 0    hydrALAZINE (APRESOLINE) 25 MG tablet Take 1 tablet by mouth 3 (Three) Times a Day for 30 days. 90 tablet 0    isosorbide mononitrate (IMDUR) 30 MG 24 hr tablet TAKE 2 TABLETS BY MOUTH DAILY 60 tablet 0    losartan-hydrochlorothiazide (Hyzaar) 100-25 MG per tablet Take 1 tablet by mouth Daily for 30 days. 30 tablet 0    nitroglycerin (NITROSTAT) 0.4 MG SL tablet Place 1 tablet under the tongue Every 5 (Five) Minutes As Needed for Chest Pain for up to 30 days. Take no more than 3 doses in 15 minutes. 30 tablet 0    pantoprazole (PROTONIX) 40 MG EC tablet Take 1 tablet by mouth Daily for 180 days. 180 tablet 0    potassium chloride ER (K-TAB) 20 MEQ tablet  controlled-release ER tablet Take 1 tablet by mouth Daily.      rosuvastatin (CRESTOR) 40 MG tablet Take 1 tablet by mouth Daily for 90 days. 90 tablet 0    [DISCONTINUED] busPIRone (BUSPAR) 10 MG tablet Take 1 tablet by mouth 2 (Two) Times a Day for 90 days. 180 tablet 0    [DISCONTINUED] sevelamer (RENAGEL) 800 MG tablet Take 1 tablet by mouth 3 (Three) Times a Day With Meals for 90 days. 270 tablet 0     No current facility-administered medications on file prior to visit.           I spent 20 minutes caring for Scott on this date of service. This time includes time spent by me in the following activities:preparing for the visit, counseling and educating the patient/family/caregiver, ordering medications, tests, or procedures, and documenting information in the medical record  Follow Up   Return in about 3 months (around 5/11/2025) for Next scheduled follow up.  Patient was given instructions and counseling regarding his condition or for health maintenance advice. Please see specific information pulled into the AVS if appropriate.

## 2025-02-14 DIAGNOSIS — I1A.0 RESISTANT HYPERTENSION: ICD-10-CM

## 2025-02-14 RX ORDER — LOSARTAN POTASSIUM AND HYDROCHLOROTHIAZIDE 25; 100 MG/1; MG/1
1 TABLET ORAL DAILY
Qty: 30 TABLET | Refills: 0 | Status: SHIPPED | OUTPATIENT
Start: 2025-02-14

## 2025-02-14 NOTE — TELEPHONE ENCOUNTER
Rx Refill Note  Requested Prescriptions     Pending Prescriptions Disp Refills    losartan-hydrochlorothiazide (HYZAAR) 100-25 MG per tablet [Pharmacy Med Name: LOSARTAN-HCTZ 100-25 MG TAB] 30 tablet 0     Sig: TAKE 1 TABLET BY MOUTH EVERY DAY      Last office visit with prescribing clinician: 2/11/2025   Last telemedicine visit with prescribing clinician: Visit date not found   Next office visit with prescribing clinician: 5/13/2025                         Would you like a call back once the refill request has been completed: [] Yes [] No    If the office needs to give you a call back, can they leave a voicemail: [] Yes [] No    Delma Ramires MA  02/14/25, 08:20 EST

## 2025-02-16 DIAGNOSIS — I1A.0 RESISTANT HYPERTENSION: ICD-10-CM

## 2025-02-17 RX ORDER — HYDRALAZINE HYDROCHLORIDE 25 MG/1
TABLET, FILM COATED ORAL
Qty: 90 TABLET | Refills: 0 | Status: SHIPPED | OUTPATIENT
Start: 2025-02-17

## 2025-02-26 ENCOUNTER — LAB (OUTPATIENT)
Dept: LAB | Facility: HOSPITAL | Age: 50
End: 2025-02-26
Payer: MEDICAID

## 2025-02-26 ENCOUNTER — TRANSCRIBE ORDERS (OUTPATIENT)
Dept: ADMINISTRATIVE | Facility: HOSPITAL | Age: 50
End: 2025-02-26
Payer: MEDICAID

## 2025-02-26 DIAGNOSIS — N18.4 CHRONIC KIDNEY DISEASE, STAGE IV (SEVERE): ICD-10-CM

## 2025-02-26 DIAGNOSIS — N18.4 CHRONIC KIDNEY DISEASE, STAGE IV (SEVERE): Primary | ICD-10-CM

## 2025-02-26 LAB
ALBUMIN SERPL-MCNC: 4 G/DL (ref 3.5–5.2)
ALBUMIN/GLOB SERPL: 1.1 G/DL
ALP SERPL-CCNC: 54 U/L (ref 39–117)
ALT SERPL W P-5'-P-CCNC: 18 U/L (ref 1–41)
ANION GAP SERPL CALCULATED.3IONS-SCNC: 12.3 MMOL/L (ref 5–15)
AST SERPL-CCNC: 26 U/L (ref 1–40)
BILIRUB SERPL-MCNC: 0.3 MG/DL (ref 0–1.2)
BILIRUB UR QL STRIP: NEGATIVE
BUN SERPL-MCNC: 86 MG/DL (ref 6–20)
BUN/CREAT SERPL: 22.8 (ref 7–25)
CALCIUM SPEC-SCNC: 9.1 MG/DL (ref 8.6–10.5)
CHLORIDE SERPL-SCNC: 98 MMOL/L (ref 98–107)
CLARITY UR: CLEAR
CO2 SERPL-SCNC: 29.7 MMOL/L (ref 22–29)
COLOR UR: YELLOW
CREAT SERPL-MCNC: 3.78 MG/DL (ref 0.76–1.27)
CREAT UR-MCNC: 23 MG/DL
DEPRECATED RDW RBC AUTO: 49.4 FL (ref 37–54)
EGFRCR SERPLBLD CKD-EPI 2021: 18.8 ML/MIN/1.73
ERYTHROCYTE [DISTWIDTH] IN BLOOD BY AUTOMATED COUNT: 15.3 % (ref 12.3–15.4)
GLOBULIN UR ELPH-MCNC: 3.6 GM/DL
GLUCOSE SERPL-MCNC: 62 MG/DL (ref 65–99)
GLUCOSE UR STRIP-MCNC: NEGATIVE MG/DL
HCT VFR BLD AUTO: 40.7 % (ref 37.5–51)
HGB BLD-MCNC: 13 G/DL (ref 13–17.7)
HGB UR QL STRIP.AUTO: NEGATIVE
HOLD SPECIMEN: NORMAL
KETONES UR QL STRIP: NEGATIVE
LEUKOCYTE ESTERASE UR QL STRIP.AUTO: NEGATIVE
MAGNESIUM SERPL-MCNC: 2.6 MG/DL (ref 1.6–2.6)
MCH RBC QN AUTO: 28.4 PG (ref 26.6–33)
MCHC RBC AUTO-ENTMCNC: 31.9 G/DL (ref 31.5–35.7)
MCV RBC AUTO: 88.9 FL (ref 79–97)
NITRITE UR QL STRIP: NEGATIVE
PH UR STRIP.AUTO: 6 [PH] (ref 5–8)
PHOSPHATE SERPL-MCNC: 4.9 MG/DL (ref 2.5–4.5)
PLATELET # BLD AUTO: 198 10*3/MM3 (ref 140–450)
PMV BLD AUTO: 10.2 FL (ref 6–12)
POTASSIUM SERPL-SCNC: 3.9 MMOL/L (ref 3.5–5.2)
PROT ?TM UR-MCNC: 12.5 MG/DL
PROT SERPL-MCNC: 7.6 G/DL (ref 6–8.5)
PROT UR QL STRIP: ABNORMAL
PROT/CREAT UR: 543.5 MG/G CREA (ref 0–200)
PTH-INTACT SERPL-MCNC: 171 PG/ML (ref 15–65)
RBC # BLD AUTO: 4.58 10*6/MM3 (ref 4.14–5.8)
SODIUM SERPL-SCNC: 140 MMOL/L (ref 136–145)
SP GR UR STRIP: 1.01 (ref 1–1.03)
UROBILINOGEN UR QL STRIP: ABNORMAL
WBC NRBC COR # BLD AUTO: 7.86 10*3/MM3 (ref 3.4–10.8)

## 2025-02-26 PROCEDURE — 85027 COMPLETE CBC AUTOMATED: CPT

## 2025-02-26 PROCEDURE — 83735 ASSAY OF MAGNESIUM: CPT

## 2025-02-26 PROCEDURE — 36415 COLL VENOUS BLD VENIPUNCTURE: CPT

## 2025-02-26 PROCEDURE — 80053 COMPREHEN METABOLIC PANEL: CPT

## 2025-02-26 PROCEDURE — 84100 ASSAY OF PHOSPHORUS: CPT

## 2025-02-26 PROCEDURE — 84156 ASSAY OF PROTEIN URINE: CPT

## 2025-02-26 PROCEDURE — 83970 ASSAY OF PARATHORMONE: CPT

## 2025-02-26 PROCEDURE — 81003 URINALYSIS AUTO W/O SCOPE: CPT

## 2025-02-26 PROCEDURE — 82570 ASSAY OF URINE CREATININE: CPT

## 2025-02-28 DIAGNOSIS — I50.31 ACUTE DIASTOLIC CHF (CONGESTIVE HEART FAILURE): ICD-10-CM

## 2025-02-28 RX ORDER — BUMETANIDE 2 MG/1
TABLET ORAL
Qty: 60 TABLET | Refills: 0 | Status: SHIPPED | OUTPATIENT
Start: 2025-02-28

## 2025-02-28 RX ORDER — ISOSORBIDE MONONITRATE 30 MG/1
60 TABLET, EXTENDED RELEASE ORAL DAILY
Qty: 60 TABLET | Refills: 0 | Status: SHIPPED | OUTPATIENT
Start: 2025-02-28

## 2025-02-28 NOTE — TELEPHONE ENCOUNTER
Rx Refill Note  Requested Prescriptions     Pending Prescriptions Disp Refills    bumetanide (BUMEX) 2 MG tablet [Pharmacy Med Name: BUMETANIDE 2 MG TABLET] 60 tablet 0     Sig: TAKE 1 TABLET BY MOUTH 2 TIMES A DAY FOR 30 DAYS.     Signed Prescriptions Disp Refills    isosorbide mononitrate (IMDUR) 30 MG 24 hr tablet 60 tablet 0     Sig: TAKE 2 TABLETS BY MOUTH DAILY     Authorizing Provider: FERNANDO HUBBARD     Ordering User: DELMA RAMIRES      Last office visit with prescribing clinician: 2/11/2025   Last telemedicine visit with prescribing clinician: Visit date not found   Next office visit with prescribing clinician: 5/13/2025                         Would you like a call back once the refill request has been completed: [] Yes [] No    If the office needs to give you a call back, can they leave a voicemail: [] Yes [] No    Delma Ramires MA  02/28/25, 08:51 EST

## 2025-03-15 DIAGNOSIS — I1A.0 RESISTANT HYPERTENSION: ICD-10-CM

## 2025-03-17 RX ORDER — HYDRALAZINE HYDROCHLORIDE 25 MG/1
TABLET, FILM COATED ORAL
Qty: 90 TABLET | Refills: 0 | Status: SHIPPED | OUTPATIENT
Start: 2025-03-17

## 2025-03-19 DIAGNOSIS — I1A.0 RESISTANT HYPERTENSION: ICD-10-CM

## 2025-03-19 RX ORDER — LOSARTAN POTASSIUM AND HYDROCHLOROTHIAZIDE 25; 100 MG/1; MG/1
1 TABLET ORAL DAILY
Qty: 30 TABLET | Refills: 0 | Status: SHIPPED | OUTPATIENT
Start: 2025-03-19

## 2025-03-19 NOTE — TELEPHONE ENCOUNTER
Rx Refill Note  Requested Prescriptions     Pending Prescriptions Disp Refills    losartan-hydrochlorothiazide (HYZAAR) 100-25 MG per tablet [Pharmacy Med Name: LOSARTAN-HCTZ 100-25 MG TAB] 30 tablet 0     Sig: TAKE 1 TABLET BY MOUTH EVERY DAY      Last office visit with prescribing clinician: 2/11/2025   Last telemedicine visit with prescribing clinician: Visit date not found   Next office visit with prescribing clinician: 5/13/2025                         Would you like a call back once the refill request has been completed: [] Yes [] No    If the office needs to give you a call back, can they leave a voicemail: [] Yes [] No    Delma Ramires MA  03/19/25, 08:32 EDT

## 2025-04-19 DIAGNOSIS — E78.5 HYPERLIPIDEMIA, UNSPECIFIED HYPERLIPIDEMIA TYPE: ICD-10-CM

## 2025-04-23 RX ORDER — ROSUVASTATIN CALCIUM 40 MG/1
40 TABLET, COATED ORAL DAILY
Qty: 90 TABLET | Refills: 0 | Status: SHIPPED | OUTPATIENT
Start: 2025-04-23

## 2025-05-13 DIAGNOSIS — I1A.0 RESISTANT HYPERTENSION: ICD-10-CM

## 2025-05-13 DIAGNOSIS — Z00.00 HEALTHCARE MAINTENANCE: ICD-10-CM

## 2025-05-13 DIAGNOSIS — I50.31 ACUTE DIASTOLIC CHF (CONGESTIVE HEART FAILURE): ICD-10-CM

## 2025-05-13 RX ORDER — HYDRALAZINE HYDROCHLORIDE 25 MG/1
25 TABLET, FILM COATED ORAL 3 TIMES DAILY
Qty: 90 TABLET | Refills: 0 | Status: SHIPPED | OUTPATIENT
Start: 2025-05-13

## 2025-05-13 RX ORDER — POTASSIUM CHLORIDE 1500 MG/1
20 TABLET, EXTENDED RELEASE ORAL DAILY
Qty: 30 TABLET | Refills: 3 | Status: SHIPPED | OUTPATIENT
Start: 2025-05-13

## 2025-05-13 RX ORDER — ASPIRIN 81 MG/1
81 TABLET ORAL DAILY
Qty: 90 TABLET | Refills: 1 | Status: SHIPPED | OUTPATIENT
Start: 2025-05-13 | End: 2025-11-09

## 2025-05-13 RX ORDER — CARVEDILOL 25 MG/1
25 TABLET ORAL 2 TIMES DAILY WITH MEALS
Qty: 180 TABLET | Refills: 0 | Status: SHIPPED | OUTPATIENT
Start: 2025-05-13 | End: 2025-08-11

## 2025-05-13 RX ORDER — BUMETANIDE 2 MG/1
2 TABLET ORAL 2 TIMES DAILY
Qty: 60 TABLET | Refills: 0 | Status: SHIPPED | OUTPATIENT
Start: 2025-05-13

## 2025-05-13 NOTE — TELEPHONE ENCOUNTER
Caller: Scott Gaytan    Relationship: Self    Best call back number:[phone]      What medication are you requesting: ADVAIR      Have you had these symptoms before:    [x] Yes  [] No    Have you been treated for these symptoms before:   [x] Yes  [] No    If a prescription is needed, what is your preferred pharmacy and phone number: [facility]/PHARMACY #[id number] - [place], [place] - [address] [facility] [phone] [facility] [phone] FX     Additional notes: OUT OF MEDICATION

## 2025-05-13 NOTE — TELEPHONE ENCOUNTER
Caller: Scott Gaytan    Relationship: Self    Best call back number:[phone]      Requested Prescriptions:   Requested Prescriptions     Pending Prescriptions Disp Refills    hydrALAZINE (APRESOLINE) 25 MG tablet 90 tablet 0    carvedilol (COREG) 25 MG tablet 180 tablet 0     Sig: Take 1 tablet by mouth 2 (Two) Times a Day With Meals for 90 days.    potassium chloride ER (K-TAB) 20 MEQ tablet controlled-release ER tablet 60 tablet      Sig: Take 1 tablet by mouth Daily.    aspirin 81 MG EC tablet 90 tablet 1     Sig: Take 1 tablet by mouth Daily for 180 days.    bumetanide (BUMEX) 2 MG tablet 60 tablet 0        Pharmacy where request should be sent: [facility]/PHARMACY #[id number] - [place], [place] - [address] [facility] [phone] [facility] [phone]      Last office visit with prescribing clinician: 2/11/2025   Last telemedicine visit with prescribing clinician: Visit date not found   Next office visit with prescribing clinician: 5/20/2025     Additional details provided by patient: OUT OF MEDICATION    Does the patient have less than a 3 day supply:  [x] Yes  [] No    Would you like a call back once the refill request has been completed: [] Yes [x] No    If the office needs to give you a call back, can they leave a voicemail: [] Yes [x] No    Yasmani Horvath Rep   05/13/25 08:38 EDT

## 2025-05-13 NOTE — TELEPHONE ENCOUNTER
Rx Refill Note  Requested Prescriptions     Pending Prescriptions Disp Refills    aspirin 81 MG EC tablet 90 tablet 1     Sig: Take 1 tablet by mouth Daily for 180 days.    bumetanide (BUMEX) 2 MG tablet 60 tablet 0     Sig: Take 1 tablet by mouth 2 (Two) Times a Day.     Signed Prescriptions Disp Refills    hydrALAZINE (APRESOLINE) 25 MG tablet 90 tablet 0     Sig: Take 1 tablet by mouth 3 (Three) Times a Day.     Authorizing Provider: FERNANDO HUBBARD     Ordering User: DELMA RAMIRES    carvedilol (COREG) 25 MG tablet 180 tablet 0     Sig: Take 1 tablet by mouth 2 (Two) Times a Day With Meals for 90 days.     Authorizing Provider: FERNANDO HUBBARD     Ordering User: DELMA RAMIRES    potassium chloride ER (K-TAB) 20 MEQ tablet controlled-release ER tablet 30 tablet 3     Sig: Take 1 tablet by mouth Daily.     Authorizing Provider: FERNANDO HUBBARD     Ordering User: DELMA RAMIRES      Last office visit with prescribing clinician: 2/11/2025   Last telemedicine visit with prescribing clinician: Visit date not found   Next office visit with prescribing clinician: 5/20/2025                         Would you like a call back once the refill request has been completed: [] Yes [] No    If the office needs to give you a call back, can they leave a voicemail: [] Yes [] No    Delma Ramires MA  05/13/25, 13:30 EDT

## 2025-05-14 ENCOUNTER — APPOINTMENT (OUTPATIENT)
Dept: GENERAL RADIOLOGY | Facility: HOSPITAL | Age: 50
End: 2025-05-14
Payer: MEDICAID

## 2025-05-14 ENCOUNTER — HOSPITAL ENCOUNTER (OUTPATIENT)
Facility: HOSPITAL | Age: 50
Discharge: HOME | End: 2025-05-14
Attending: EMERGENCY MEDICINE | Admitting: EMERGENCY MEDICINE
Payer: MEDICAID

## 2025-05-14 VITALS
DIASTOLIC BLOOD PRESSURE: 104 MMHG | OXYGEN SATURATION: 97 % | SYSTOLIC BLOOD PRESSURE: 157 MMHG | HEART RATE: 71 BPM | RESPIRATION RATE: 22 BRPM | BODY MASS INDEX: 27.35 KG/M2 | TEMPERATURE: 97.9 F | HEIGHT: 68 IN | WEIGHT: 180.5 LBS

## 2025-05-14 DIAGNOSIS — I50.9 ACUTE ON CHRONIC CONGESTIVE HEART FAILURE, UNSPECIFIED HEART FAILURE TYPE: Primary | ICD-10-CM

## 2025-05-14 LAB
QT INTERVAL: 489 MS
QTC INTERVAL: 534 MS

## 2025-05-14 PROCEDURE — 93005 ELECTROCARDIOGRAM TRACING: CPT | Performed by: EMERGENCY MEDICINE

## 2025-05-14 PROCEDURE — 71046 X-RAY EXAM CHEST 2 VIEWS: CPT

## 2025-05-14 PROCEDURE — G0463 HOSPITAL OUTPT CLINIC VISIT: HCPCS | Performed by: EMERGENCY MEDICINE

## 2025-05-14 PROCEDURE — 99204 OFFICE O/P NEW MOD 45 MIN: CPT | Performed by: EMERGENCY MEDICINE

## 2025-05-14 PROCEDURE — 93010 ELECTROCARDIOGRAM REPORT: CPT | Performed by: EMERGENCY MEDICINE

## 2025-05-14 NOTE — FSED PROVIDER NOTE
Subjective   History of Present Illness  50-year-old male with a history of congestive heart failure presents complaining of feeling short of breath.  He has been out of his Bumex for a few days and was feeling more short of breath says that September 4, got his Bumex filled this morning took two 2 mg pills has been urinating since then and says he is feeling better but came because his doctor told him to.  He is currently not short of breath currently not having dyspnea with exertion nor is he having orthopnea.  Review of Systems  As noted in HPI  Past Medical History:   Diagnosis Date    CHF (congestive heart failure)     CKD (chronic kidney disease)     Hypertension     Substance abuse     Meth/MJ       Allergies   Allergen Reactions    Morphine Other (See Comments)     Makes it feel like my blood is boiling in my veins       Past Surgical History:   Procedure Laterality Date    CARDIAC CATHETERIZATION N/A 04/27/2022    ORIF TIBIA/FIBULA FRACTURES Right        Family History   Problem Relation Age of Onset    Diabetes Mother     Arthritis Mother     Hypertension Father     Heart attack Father     Seizures Sister     Heart attack Paternal Grandfather        Social History     Socioeconomic History    Marital status: Single   Tobacco Use    Smoking status: Every Day     Current packs/day: 0.25     Average packs/day: 1 pack/day for 31.4 years (30.6 ttl pk-yrs)     Types: Cigarettes     Start date: 1994     Passive exposure: Never    Smokeless tobacco: Never   Vaping Use    Vaping status: Never Used   Substance and Sexual Activity    Alcohol use: Not Currently    Drug use: Yes     Types: Methamphetamines, Marijuana, Cocaine(coke)    Sexual activity: Yes           Objective   Physical Exam    INITIAL VITAL SIGNS: Reviewed by me.  Pulse ox normal  GENERAL: Alert. Well developed and well nourished. No respiratory distress.  HEAD: Normocephalic.   EYES: No conjunctival injection.  ENT: Oral mucosa is moist.  NECK/BACK:  Supple. Full range of motion.  RESPIRATORY: Non-labored respirations.  Good air movement in all lung fields with no significant inspiratory crackles  EXTREMITIES: No deformity.  No edema  SKIN: Warm and dry. No rashes. No diaphoresis.  NEUROLOGIC: Alert. Normal gait    Procedures     EKG         EKG time/Interp time: 1120/1124  Rhythm/Rate: Sinus rhythm rate of 71  P waves and MO: Prolonged MO interval  QRS, axis: Left bundle branch block  ST and T waves: No excessively discordant elevations or depressions no concordant elevations or depressions  Independently interpreted by me contemporaneously with treatment          ED Course  ED Course as of 05/14/25 1241   Wed May 14, 2025   1239 HP as above, sounds like he went ahead and double dosed his Bumex and is having good effect.  He is got a normal O2 sat with no significant Tory crackles, has some vascular congestion on x-ray with trace pleural effusion but no significant pulmonary edema.  Road testing here around the department he stays 94 to 97% with a heart rate in the 70s to 80s.  Says he feels fine and does not want to have any laboratory workup at this point.  Given that he is asymptomatic I think is fine to discharge him home and he can follow-up with his doctor. [RO]      ED Course User Index  [RO] Rodrigo Sanford MD                                           Medical Decision Making  Amount and/or Complexity of Data Reviewed  Radiology: ordered. Decision-making details documented in ED Course.  ECG/medicine tests: ordered and independent interpretation performed. Decision-making details documented in ED Course.        Final diagnoses:   Acute on chronic congestive heart failure, unspecified heart failure type       ED Disposition  ED Disposition       ED Disposition   Discharge    Condition   Good    Comment   --               Vicki Santana S, APRN  [address]  [address]  [place] [address]  [phone]    Call   To schedule follow-up  appointment         Medication List      No changes were made to your prescriptions during this visit.

## 2025-05-14 NOTE — DISCHARGE INSTRUCTIONS
Please return if you develop shortness of breath, chest pain or for any other concerns.  Otherwise please follow-up with your doctor next available appointment.  Continue take medication as prescribed.

## 2025-05-19 ENCOUNTER — HOSPITAL ENCOUNTER (OUTPATIENT)
Facility: HOSPITAL | Age: 50
Setting detail: OBSERVATION
Discharge: HOME | End: 2025-05-21
Attending: EMERGENCY MEDICINE | Admitting: EMERGENCY MEDICINE
Payer: MEDICAID

## 2025-05-19 ENCOUNTER — APPOINTMENT (OUTPATIENT)
Dept: GENERAL RADIOLOGY | Facility: HOSPITAL | Age: 50
End: 2025-05-19
Payer: MEDICAID

## 2025-05-19 DIAGNOSIS — J44.9 CHRONIC OBSTRUCTIVE PULMONARY DISEASE, UNSPECIFIED COPD TYPE: ICD-10-CM

## 2025-05-19 DIAGNOSIS — I50.9 ACUTE ON CHRONIC CONGESTIVE HEART FAILURE, UNSPECIFIED HEART FAILURE TYPE: Primary | ICD-10-CM

## 2025-05-19 DIAGNOSIS — I50.31 ACUTE DIASTOLIC CHF (CONGESTIVE HEART FAILURE): ICD-10-CM

## 2025-05-19 LAB
ALBUMIN SERPL-MCNC: 3.7 G/DL (ref 3.5–5.2)
ALBUMIN/GLOB SERPL: 1.2 G/DL
ALP SERPL-CCNC: 55 U/L (ref 39–117)
ALT SERPL W P-5'-P-CCNC: 26 U/L (ref 1–41)
ANION GAP SERPL CALCULATED.3IONS-SCNC: 13 MMOL/L (ref 5–15)
AST SERPL-CCNC: 32 U/L (ref 1–40)
B PARAPERT DNA SPEC QL NAA+PROBE: NOT DETECTED
B PERT DNA SPEC QL NAA+PROBE: NOT DETECTED
BASOPHILS # BLD AUTO: 0.04 10*3/MM3 (ref 0–0.2)
BASOPHILS NFR BLD AUTO: 0.5 % (ref 0–1.5)
BILIRUB SERPL-MCNC: 1 MG/DL (ref 0–1.2)
BUN SERPL-MCNC: 53 MG/DL (ref 6–20)
BUN/CREAT SERPL: 16 (ref 7–25)
C PNEUM DNA NPH QL NAA+NON-PROBE: NOT DETECTED
CALCIUM SPEC-SCNC: 8.8 MG/DL (ref 8.6–10.5)
CHLORIDE SERPL-SCNC: 98 MMOL/L (ref 98–107)
CO2 SERPL-SCNC: 26 MMOL/L (ref 22–29)
CREAT SERPL-MCNC: 3.31 MG/DL (ref 0.76–1.27)
DEPRECATED RDW RBC AUTO: 55.6 FL (ref 37–54)
EGFRCR SERPLBLD CKD-EPI 2021: 22.1 ML/MIN/1.73
EOSINOPHIL # BLD AUTO: 0.33 10*3/MM3 (ref 0–0.4)
EOSINOPHIL NFR BLD AUTO: 4.2 % (ref 0.3–6.2)
ERYTHROCYTE [DISTWIDTH] IN BLOOD BY AUTOMATED COUNT: 16.4 % (ref 12.3–15.4)
FLUAV SUBTYP SPEC NAA+PROBE: NOT DETECTED
FLUBV RNA ISLT QL NAA+PROBE: NOT DETECTED
GLOBULIN UR ELPH-MCNC: 3.1 GM/DL
GLUCOSE SERPL-MCNC: 116 MG/DL (ref 65–99)
HADV DNA SPEC NAA+PROBE: NOT DETECTED
HCOV 229E RNA SPEC QL NAA+PROBE: NOT DETECTED
HCOV HKU1 RNA SPEC QL NAA+PROBE: NOT DETECTED
HCOV NL63 RNA SPEC QL NAA+PROBE: NOT DETECTED
HCOV OC43 RNA SPEC QL NAA+PROBE: NOT DETECTED
HCT VFR BLD AUTO: 43 % (ref 37.5–51)
HGB BLD-MCNC: 13.9 G/DL (ref 13–17.7)
HMPV RNA NPH QL NAA+NON-PROBE: NOT DETECTED
HOLD SPECIMEN: NORMAL
HOLD SPECIMEN: NORMAL
HPIV1 RNA ISLT QL NAA+PROBE: NOT DETECTED
HPIV2 RNA SPEC QL NAA+PROBE: NOT DETECTED
HPIV3 RNA NPH QL NAA+PROBE: NOT DETECTED
HPIV4 P GENE NPH QL NAA+PROBE: NOT DETECTED
IMM GRANULOCYTES # BLD AUTO: 0.02 10*3/MM3 (ref 0–0.05)
IMM GRANULOCYTES NFR BLD AUTO: 0.3 % (ref 0–0.5)
LYMPHOCYTES # BLD AUTO: 2.51 10*3/MM3 (ref 0.7–3.1)
LYMPHOCYTES NFR BLD AUTO: 32.3 % (ref 19.6–45.3)
M PNEUMO IGG SER IA-ACNC: NOT DETECTED
MCH RBC QN AUTO: 30 PG (ref 26.6–33)
MCHC RBC AUTO-ENTMCNC: 32.3 G/DL (ref 31.5–35.7)
MCV RBC AUTO: 92.7 FL (ref 79–97)
MONOCYTES # BLD AUTO: 0.84 10*3/MM3 (ref 0.1–0.9)
MONOCYTES NFR BLD AUTO: 10.8 % (ref 5–12)
NEUTROPHILS NFR BLD AUTO: 4.04 10*3/MM3 (ref 1.7–7)
NEUTROPHILS NFR BLD AUTO: 51.9 % (ref 42.7–76)
NRBC BLD AUTO-RTO: 0 /100 WBC (ref 0–0.2)
NT-PROBNP SERPL-MCNC: ABNORMAL PG/ML (ref 0–450)
PLATELET # BLD AUTO: 240 10*3/MM3 (ref 140–450)
PMV BLD AUTO: 9.9 FL (ref 6–12)
POTASSIUM SERPL-SCNC: 3.7 MMOL/L (ref 3.5–5.2)
PROT SERPL-MCNC: 6.8 G/DL (ref 6–8.5)
RBC # BLD AUTO: 4.64 10*6/MM3 (ref 4.14–5.8)
RHINOVIRUS RNA SPEC NAA+PROBE: NOT DETECTED
RSV RNA NPH QL NAA+NON-PROBE: NOT DETECTED
SARS-COV-2 RNA RESP QL NAA+PROBE: NOT DETECTED
SODIUM SERPL-SCNC: 137 MMOL/L (ref 136–145)
TROPONIN T SERPL HS-MCNC: 49 NG/L
WBC NRBC COR # BLD AUTO: 7.78 10*3/MM3 (ref 3.4–10.8)
WHOLE BLOOD HOLD COAG: NORMAL
WHOLE BLOOD HOLD SPECIMEN: NORMAL

## 2025-05-19 PROCEDURE — 85025 COMPLETE CBC W/AUTO DIFF WBC: CPT | Performed by: EMERGENCY MEDICINE

## 2025-05-19 PROCEDURE — 94640 AIRWAY INHALATION TREATMENT: CPT

## 2025-05-19 PROCEDURE — 0202U NFCT DS 22 TRGT SARS-COV-2: CPT | Performed by: EMERGENCY MEDICINE

## 2025-05-19 PROCEDURE — 83880 ASSAY OF NATRIURETIC PEPTIDE: CPT | Performed by: EMERGENCY MEDICINE

## 2025-05-19 PROCEDURE — 96374 THER/PROPH/DIAG INJ IV PUSH: CPT

## 2025-05-19 PROCEDURE — 93005 ELECTROCARDIOGRAM TRACING: CPT

## 2025-05-19 PROCEDURE — 71046 X-RAY EXAM CHEST 2 VIEWS: CPT

## 2025-05-19 PROCEDURE — 94799 UNLISTED PULMONARY SVC/PX: CPT

## 2025-05-19 PROCEDURE — 80053 COMPREHEN METABOLIC PANEL: CPT | Performed by: EMERGENCY MEDICINE

## 2025-05-19 PROCEDURE — 99285 EMERGENCY DEPT VISIT HI MDM: CPT

## 2025-05-19 PROCEDURE — 93005 ELECTROCARDIOGRAM TRACING: CPT | Performed by: EMERGENCY MEDICINE

## 2025-05-19 PROCEDURE — 93010 ELECTROCARDIOGRAM REPORT: CPT | Performed by: INTERNAL MEDICINE

## 2025-05-19 PROCEDURE — 84484 ASSAY OF TROPONIN QUANT: CPT | Performed by: EMERGENCY MEDICINE

## 2025-05-19 RX ORDER — SODIUM CHLORIDE 0.9 % (FLUSH) 0.9 %
10 SYRINGE (ML) INJECTION AS NEEDED
Status: DISCONTINUED | OUTPATIENT
Start: 2025-05-19 | End: 2025-05-21 | Stop reason: HOSPADM

## 2025-05-19 RX ORDER — METHYLPREDNISOLONE SODIUM SUCCINATE 125 MG/2ML
125 INJECTION, POWDER, LYOPHILIZED, FOR SOLUTION INTRAMUSCULAR; INTRAVENOUS ONCE
Status: DISCONTINUED | OUTPATIENT
Start: 2025-05-19 | End: 2025-05-21 | Stop reason: HOSPADM

## 2025-05-19 RX ORDER — ALBUTEROL SULFATE 0.83 MG/ML
2.5 SOLUTION RESPIRATORY (INHALATION) ONCE
Status: COMPLETED | OUTPATIENT
Start: 2025-05-19 | End: 2025-05-19

## 2025-05-19 RX ADMIN — ALBUTEROL SULFATE 2.5 MG: 2.5 SOLUTION RESPIRATORY (INHALATION) at 23:06

## 2025-05-20 ENCOUNTER — APPOINTMENT (OUTPATIENT)
Dept: CARDIOLOGY | Facility: HOSPITAL | Age: 50
End: 2025-05-20
Payer: MEDICAID

## 2025-05-20 PROBLEM — I44.7 LBBB (LEFT BUNDLE BRANCH BLOCK): Status: ACTIVE | Noted: 2025-05-20

## 2025-05-20 PROBLEM — F15.929 METHAMPHETAMINE INTOXICATION: Status: RESOLVED | Noted: 2024-06-16 | Resolved: 2025-05-20

## 2025-05-20 PROBLEM — F19.11 HISTORY OF SUBSTANCE ABUSE: Status: ACTIVE | Noted: 2022-04-26

## 2025-05-20 PROBLEM — I38 VALVULAR HEART DISEASE: Status: ACTIVE | Noted: 2025-02-06

## 2025-05-20 PROBLEM — R07.9 CHEST PAIN: Status: RESOLVED | Noted: 2021-12-28 | Resolved: 2025-05-20

## 2025-05-20 PROBLEM — Z00.00 HEALTHCARE MAINTENANCE: Status: RESOLVED | Noted: 2025-01-21 | Resolved: 2025-05-20

## 2025-05-20 PROBLEM — I50.21: Status: ACTIVE | Noted: 2025-05-20

## 2025-05-20 PROBLEM — R42 DIZZINESS: Status: RESOLVED | Noted: 2022-12-06 | Resolved: 2025-05-20

## 2025-05-20 PROBLEM — I10 HYPERTENSION: Chronic | Status: ACTIVE | Noted: 2022-12-07

## 2025-05-20 PROBLEM — I50.33 ACUTE ON CHRONIC DIASTOLIC CONGESTIVE HEART FAILURE: Status: ACTIVE | Noted: 2025-05-20

## 2025-05-20 PROBLEM — E78.5 HYPERLIPIDEMIA: Chronic | Status: ACTIVE | Noted: 2025-01-21

## 2025-05-20 PROBLEM — N18.9 ANEMIA IN CHRONIC KIDNEY DISEASE (CKD): Chronic | Status: ACTIVE | Noted: 2025-02-11

## 2025-05-20 PROBLEM — N25.81 SECONDARY HYPERPARATHYROIDISM: Chronic | Status: ACTIVE | Noted: 2025-02-11

## 2025-05-20 PROBLEM — B19.20 HEPATITIS C: Chronic | Status: ACTIVE | Noted: 2025-01-03

## 2025-05-20 PROBLEM — D63.1 ANEMIA IN CHRONIC KIDNEY DISEASE (CKD): Chronic | Status: ACTIVE | Noted: 2025-02-11

## 2025-05-20 PROBLEM — D89.831 CYTOKINE RELEASE SYNDROME, GRADE 1: Status: RESOLVED | Noted: 2021-12-29 | Resolved: 2025-05-20

## 2025-05-20 PROBLEM — R79.89 ELEVATED TROPONIN: Status: ACTIVE | Noted: 2025-05-20
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
## 2025-05-20 PROBLEM — I48.0 PAF (PAROXYSMAL ATRIAL FIBRILLATION): Chronic | Status: ACTIVE | Noted: 2024-11-23

## 2025-05-20 PROBLEM — I50.9 ACUTE ON CHRONIC CONGESTIVE HEART FAILURE: Status: RESOLVED | Noted: 2025-05-14 | Resolved: 2025-05-20

## 2025-05-20 PROBLEM — I50.9 CHF (CONGESTIVE HEART FAILURE): Status: ACTIVE | Noted: 2025-05-20

## 2025-05-20 PROBLEM — E87.5 HYPERKALEMIA: Status: RESOLVED | Noted: 2022-04-26 | Resolved: 2025-05-20

## 2025-05-20 PROBLEM — I48.0 PAF (PAROXYSMAL ATRIAL FIBRILLATION): Status: ACTIVE | Noted: 2024-11-23

## 2025-05-20 PROBLEM — U07.1 COVID-19 VIRUS DETECTED: Status: RESOLVED | Noted: 2021-12-28 | Resolved: 2025-05-20

## 2025-05-20 PROBLEM — S30.861A TICK BITE OF ABDOMEN: Status: RESOLVED | Noted: 2022-04-26 | Resolved: 2025-05-20

## 2025-05-20 PROBLEM — R06.02 SOB (SHORTNESS OF BREATH): Status: RESOLVED | Noted: 2025-01-21 | Resolved: 2025-05-20

## 2025-05-20 PROBLEM — I16.0 HYPERTENSIVE URGENCY: Status: RESOLVED | Noted: 2022-04-26 | Resolved: 2025-05-20

## 2025-05-20 LAB
AMPHET+METHAMPHET UR QL: NEGATIVE
AMPHETAMINES UR QL: NEGATIVE
ANION GAP SERPL CALCULATED.3IONS-SCNC: 10.2 MMOL/L (ref 5–15)
AORTIC DIMENSIONLESS INDEX: 0.62 (DI)
AV MEAN PRESS GRAD SYS DOP V1V2: 7.6 MMHG
AV VMAX SYS DOP: 188.2 CM/SEC
BARBITURATES UR QL SCN: NEGATIVE
BASOPHILS # BLD AUTO: 0.03 10*3/MM3 (ref 0–0.2)
BASOPHILS NFR BLD AUTO: 0.4 % (ref 0–1.5)
BENZODIAZ UR QL SCN: NEGATIVE
BH CV ECHO LEFT VENTRICLE GLOBAL LONGITUDINAL STRAIN: -13 %
BH CV ECHO MEAS - AO MAX PG: 14.2 MMHG
BH CV ECHO MEAS - AO V2 VTI: 38.7 CM
BH CV ECHO MEAS - AVA(I,D): 2.03 CM2
BH CV ECHO MEAS - EDV(CUBED): 122.1 ML
BH CV ECHO MEAS - EDV(MOD-SP2): 164 ML
BH CV ECHO MEAS - EDV(MOD-SP4): 192 ML
BH CV ECHO MEAS - EF(MOD-SP2): 47 %
BH CV ECHO MEAS - EF(MOD-SP4): 46.4 %
BH CV ECHO MEAS - ESV(CUBED): 47.1 ML
BH CV ECHO MEAS - ESV(MOD-SP2): 87 ML
BH CV ECHO MEAS - ESV(MOD-SP4): 103 ML
BH CV ECHO MEAS - FS: 27.2 %
BH CV ECHO MEAS - IVS/LVPW: 1.09 CM
BH CV ECHO MEAS - IVSD: 1.58 CM
BH CV ECHO MEAS - LA DIMENSION: 5.2 CM
BH CV ECHO MEAS - LAT PEAK E' VEL: 7.8 CM/SEC
BH CV ECHO MEAS - LV DIASTOLIC VOL/BSA (35-75): 99.2 CM2
BH CV ECHO MEAS - LV MASS(C)D: 323.9 GRAMS
BH CV ECHO MEAS - LV MAX PG: 6.3 MMHG
BH CV ECHO MEAS - LV MEAN PG: 3.3 MMHG
BH CV ECHO MEAS - LV SYSTOLIC VOL/BSA (12-30): 53.2 CM2
BH CV ECHO MEAS - LV V1 MAX: 125.5 CM/SEC
BH CV ECHO MEAS - LV V1 VTI: 23.8 CM
BH CV ECHO MEAS - LVIDD: 5 CM
BH CV ECHO MEAS - LVIDS: 3.6 CM
BH CV ECHO MEAS - LVOT AREA: 3.3 CM2
BH CV ECHO MEAS - LVOT DIAM: 2.05 CM
BH CV ECHO MEAS - LVPWD: 1.45 CM
BH CV ECHO MEAS - MED PEAK E' VEL: 4.7 CM/SEC
BH CV ECHO MEAS - MR MAX PG: 122.3 MMHG
BH CV ECHO MEAS - MR MAX VEL: 552.8 CM/SEC
BH CV ECHO MEAS - MR MEAN PG: 80 MMHG
BH CV ECHO MEAS - MR MEAN VEL: 418.2 CM/SEC
BH CV ECHO MEAS - MR VTI: 200.3 CM
BH CV ECHO MEAS - MV A MAX VEL: 43.9 CM/SEC
BH CV ECHO MEAS - MV DEC SLOPE: 413.8 CM/SEC2
BH CV ECHO MEAS - MV DEC TIME: 0.2 SEC
BH CV ECHO MEAS - MV E MAX VEL: 90.3 CM/SEC
BH CV ECHO MEAS - MV E/A: 2.06
BH CV ECHO MEAS - MV MAX PG: 4.9 MMHG
BH CV ECHO MEAS - MV MEAN PG: 1.77 MMHG
BH CV ECHO MEAS - MV P1/2T: 73.7 MSEC
BH CV ECHO MEAS - MV V2 VTI: 30 CM
BH CV ECHO MEAS - MVA(P1/2T): 3 CM2
BH CV ECHO MEAS - MVA(VTI): 2.6 CM2
BH CV ECHO MEAS - PA ACC TIME: 0.1 SEC
BH CV ECHO MEAS - PA V2 MAX: 98.1 CM/SEC
BH CV ECHO MEAS - PULM DIAS VEL: 35.1 CM/SEC
BH CV ECHO MEAS - PULM S/D: 1.07
BH CV ECHO MEAS - PULM SYS VEL: 37.7 CM/SEC
BH CV ECHO MEAS - RAP SYSTOLE: 8 MMHG
BH CV ECHO MEAS - RV MAX PG: 2.8 MMHG
BH CV ECHO MEAS - RV V1 MAX: 84.4 CM/SEC
BH CV ECHO MEAS - RV V1 VTI: 16.4 CM
BH CV ECHO MEAS - RVSP: 35.3 MMHG
BH CV ECHO MEAS - SV(LVOT): 78.6 ML
BH CV ECHO MEAS - SV(MOD-SP2): 77 ML
BH CV ECHO MEAS - SV(MOD-SP4): 89 ML
BH CV ECHO MEAS - SVI(LVOT): 40.6 ML/M2
BH CV ECHO MEAS - SVI(MOD-SP2): 39.8 ML/M2
BH CV ECHO MEAS - SVI(MOD-SP4): 46 ML/M2
BH CV ECHO MEAS - TAPSE (>1.6): 2.2 CM
BH CV ECHO MEAS - TR MAX PG: 27.3 MMHG
BH CV ECHO MEAS - TR MAX VEL: 261.3 CM/SEC
BH CV ECHO MEASUREMENTS AVERAGE E/E' RATIO: 14.45
BH CV XLRA - RV BASE: 5.2 CM
BH CV XLRA - RV MID: 4 CM
BH CV XLRA - TDI S': 11.5 CM/SEC
BUN SERPL-MCNC: 52 MG/DL (ref 6–20)
BUN/CREAT SERPL: 16.5 (ref 7–25)
BUPRENORPHINE SERPL-MCNC: NEGATIVE NG/ML
CALCIUM SPEC-SCNC: 8.4 MG/DL (ref 8.6–10.5)
CANNABINOIDS SERPL QL: NEGATIVE
CHLORIDE SERPL-SCNC: 98 MMOL/L (ref 98–107)
CHOLEST SERPL-MCNC: 177 MG/DL (ref 0–200)
CO2 SERPL-SCNC: 24.8 MMOL/L (ref 22–29)
COCAINE UR QL: NEGATIVE
CREAT SERPL-MCNC: 3.16 MG/DL (ref 0.76–1.27)
DEPRECATED RDW RBC AUTO: 53.9 FL (ref 37–54)
EGFRCR SERPLBLD CKD-EPI 2021: 23.3 ML/MIN/1.73
EOSINOPHIL # BLD AUTO: 0.32 10*3/MM3 (ref 0–0.4)
EOSINOPHIL NFR BLD AUTO: 4.2 % (ref 0.3–6.2)
ERYTHROCYTE [DISTWIDTH] IN BLOOD BY AUTOMATED COUNT: 16.2 % (ref 12.3–15.4)
GEN 5 1HR TROPONIN T REFLEX: 44 NG/L
GLUCOSE SERPL-MCNC: 140 MG/DL (ref 65–99)
HCT VFR BLD AUTO: 39 % (ref 37.5–51)
HDLC SERPL-MCNC: 41 MG/DL (ref 40–60)
HGB BLD-MCNC: 12.7 G/DL (ref 13–17.7)
IMM GRANULOCYTES # BLD AUTO: 0.01 10*3/MM3 (ref 0–0.05)
IMM GRANULOCYTES NFR BLD AUTO: 0.1 % (ref 0–0.5)
IVRT: 114 MS
LDLC SERPL CALC-MCNC: 123 MG/DL (ref 0–100)
LDLC/HDLC SERPL: 2.99 {RATIO}
LEFT ATRIUM VOLUME INDEX: 77.3 ML/M2
LV EF 3D SEGMENTATION: 41 %
LV EF BIPLANE MOD: 45.3 %
LYMPHOCYTES # BLD AUTO: 2.54 10*3/MM3 (ref 0.7–3.1)
LYMPHOCYTES NFR BLD AUTO: 33.7 % (ref 19.6–45.3)
MCH RBC QN AUTO: 30 PG (ref 26.6–33)
MCHC RBC AUTO-ENTMCNC: 32.6 G/DL (ref 31.5–35.7)
MCV RBC AUTO: 92 FL (ref 79–97)
METHADONE UR QL SCN: NEGATIVE
MONOCYTES # BLD AUTO: 0.79 10*3/MM3 (ref 0.1–0.9)
MONOCYTES NFR BLD AUTO: 10.5 % (ref 5–12)
NEUTROPHILS NFR BLD AUTO: 3.84 10*3/MM3 (ref 1.7–7)
NEUTROPHILS NFR BLD AUTO: 51.1 % (ref 42.7–76)
NRBC BLD AUTO-RTO: 0 /100 WBC (ref 0–0.2)
OPIATES UR QL: NEGATIVE
OXYCODONE UR QL SCN: NEGATIVE
PCP UR QL SCN: NEGATIVE
PLATELET # BLD AUTO: 235 10*3/MM3 (ref 140–450)
PMV BLD AUTO: 10.3 FL (ref 6–12)
POTASSIUM SERPL-SCNC: 3.5 MMOL/L (ref 3.5–5.2)
QT INTERVAL: 497 MS
QT INTERVAL: 509 MS
QTC INTERVAL: 524 MS
QTC INTERVAL: 531 MS
RBC # BLD AUTO: 4.24 10*6/MM3 (ref 4.14–5.8)
SINUS: 3.4 CM
SODIUM SERPL-SCNC: 133 MMOL/L (ref 136–145)
STJ: 2.9 CM
TRICYCLICS UR QL SCN: NEGATIVE
TRIGL SERPL-MCNC: 67 MG/DL (ref 0–150)
TROPONIN T % DELTA: -10
TROPONIN T NUMERIC DELTA: -5 NG/L
VLDLC SERPL-MCNC: 13 MG/DL (ref 5–40)
WBC NRBC COR # BLD AUTO: 7.53 10*3/MM3 (ref 3.4–10.8)

## 2025-05-20 PROCEDURE — 93356 MYOCRD STRAIN IMG SPCKL TRCK: CPT

## 2025-05-20 PROCEDURE — 84484 ASSAY OF TROPONIN QUANT: CPT | Performed by: EMERGENCY MEDICINE

## 2025-05-20 PROCEDURE — 99214 OFFICE O/P EST MOD 30 MIN: CPT | Performed by: NURSE PRACTITIONER

## 2025-05-20 PROCEDURE — G0378 HOSPITAL OBSERVATION PER HR: HCPCS

## 2025-05-20 PROCEDURE — 96374 THER/PROPH/DIAG INJ IV PUSH: CPT

## 2025-05-20 PROCEDURE — 25010000002 FUROSEMIDE PER 20 MG: Performed by: EMERGENCY MEDICINE

## 2025-05-20 PROCEDURE — 96376 TX/PRO/DX INJ SAME DRUG ADON: CPT

## 2025-05-20 PROCEDURE — 93306 TTE W/DOPPLER COMPLETE: CPT | Performed by: INTERNAL MEDICINE

## 2025-05-20 PROCEDURE — 93356 MYOCRD STRAIN IMG SPCKL TRCK: CPT | Performed by: INTERNAL MEDICINE

## 2025-05-20 PROCEDURE — 80061 LIPID PANEL: CPT | Performed by: NURSE PRACTITIONER

## 2025-05-20 PROCEDURE — 80048 BASIC METABOLIC PNL TOTAL CA: CPT | Performed by: EMERGENCY MEDICINE

## 2025-05-20 PROCEDURE — 96375 TX/PRO/DX INJ NEW DRUG ADDON: CPT

## 2025-05-20 PROCEDURE — 80306 DRUG TEST PRSMV INSTRMNT: CPT | Performed by: NURSE PRACTITIONER

## 2025-05-20 PROCEDURE — 85025 COMPLETE CBC W/AUTO DIFF WBC: CPT | Performed by: EMERGENCY MEDICINE

## 2025-05-20 PROCEDURE — 93005 ELECTROCARDIOGRAM TRACING: CPT | Performed by: EMERGENCY MEDICINE

## 2025-05-20 PROCEDURE — 93306 TTE W/DOPPLER COMPLETE: CPT

## 2025-05-20 PROCEDURE — 25010000002 BUMETANIDE PER 0.5 MG: Performed by: NURSE PRACTITIONER

## 2025-05-20 PROCEDURE — 36415 COLL VENOUS BLD VENIPUNCTURE: CPT

## 2025-05-20 PROCEDURE — 25010000002 BUMETANIDE PER 0.5 MG: Performed by: INTERNAL MEDICINE

## 2025-05-20 RX ORDER — BUMETANIDE 0.25 MG/ML
2 INJECTION, SOLUTION INTRAMUSCULAR; INTRAVENOUS ONCE
Status: COMPLETED | OUTPATIENT
Start: 2025-05-20 | End: 2025-05-20

## 2025-05-20 RX ORDER — ROSUVASTATIN CALCIUM 10 MG/1
40 TABLET, COATED ORAL NIGHTLY
Status: DISCONTINUED | OUTPATIENT
Start: 2025-05-20 | End: 2025-05-21 | Stop reason: HOSPADM

## 2025-05-20 RX ORDER — SODIUM CHLORIDE 0.9 % (FLUSH) 0.9 %
10 SYRINGE (ML) INJECTION EVERY 12 HOURS SCHEDULED
Status: DISCONTINUED | OUTPATIENT
Start: 2025-05-20 | End: 2025-05-21 | Stop reason: HOSPADM

## 2025-05-20 RX ORDER — CLONIDINE HYDROCHLORIDE 0.1 MG/1
0.1 TABLET ORAL ONCE
Status: COMPLETED | OUTPATIENT
Start: 2025-05-20 | End: 2025-05-20

## 2025-05-20 RX ORDER — ONDANSETRON 2 MG/ML
4 INJECTION INTRAMUSCULAR; INTRAVENOUS EVERY 6 HOURS PRN
Status: DISCONTINUED | OUTPATIENT
Start: 2025-05-20 | End: 2025-05-20

## 2025-05-20 RX ORDER — ACETAMINOPHEN 325 MG/1
650 TABLET ORAL EVERY 6 HOURS PRN
Status: DISCONTINUED | OUTPATIENT
Start: 2025-05-20 | End: 2025-05-20 | Stop reason: SDUPTHER

## 2025-05-20 RX ORDER — LOSARTAN POTASSIUM 50 MG/1
50 TABLET ORAL DAILY
COMMUNITY
End: 2025-05-21 | Stop reason: HOSPADM

## 2025-05-20 RX ORDER — ONDANSETRON 4 MG/1
4 TABLET, ORALLY DISINTEGRATING ORAL EVERY 6 HOURS PRN
Status: DISCONTINUED | OUTPATIENT
Start: 2025-05-20 | End: 2025-05-20

## 2025-05-20 RX ORDER — NICOTINE 21 MG/24HR
1 PATCH, TRANSDERMAL 24 HOURS TRANSDERMAL
Status: DISCONTINUED | OUTPATIENT
Start: 2025-05-20 | End: 2025-05-21 | Stop reason: HOSPADM

## 2025-05-20 RX ORDER — SODIUM CHLORIDE 0.9 % (FLUSH) 0.9 %
10 SYRINGE (ML) INJECTION AS NEEDED
Status: DISCONTINUED | OUTPATIENT
Start: 2025-05-20 | End: 2025-05-21 | Stop reason: HOSPADM

## 2025-05-20 RX ORDER — LOSARTAN POTASSIUM 50 MG/1
50 TABLET ORAL DAILY
Status: DISCONTINUED | OUTPATIENT
Start: 2025-05-20 | End: 2025-05-21

## 2025-05-20 RX ORDER — NITROGLYCERIN 0.4 MG/1
0.4 TABLET SUBLINGUAL
Status: DISCONTINUED | OUTPATIENT
Start: 2025-05-20 | End: 2025-05-21 | Stop reason: HOSPADM

## 2025-05-20 RX ORDER — SODIUM CHLORIDE 9 MG/ML
40 INJECTION, SOLUTION INTRAVENOUS AS NEEDED
Status: DISCONTINUED | OUTPATIENT
Start: 2025-05-20 | End: 2025-05-21 | Stop reason: HOSPADM

## 2025-05-20 RX ORDER — ACETAMINOPHEN 325 MG/1
650 TABLET ORAL EVERY 4 HOURS PRN
Status: DISCONTINUED | OUTPATIENT
Start: 2025-05-20 | End: 2025-05-21 | Stop reason: HOSPADM

## 2025-05-20 RX ORDER — AMOXICILLIN 250 MG
2 CAPSULE ORAL 2 TIMES DAILY PRN
Status: DISCONTINUED | OUTPATIENT
Start: 2025-05-20 | End: 2025-05-21 | Stop reason: HOSPADM

## 2025-05-20 RX ORDER — ASPIRIN 81 MG/1
324 TABLET, CHEWABLE ORAL ONCE
Status: COMPLETED | OUTPATIENT
Start: 2025-05-20 | End: 2025-05-20

## 2025-05-20 RX ORDER — BUMETANIDE 0.25 MG/ML
0.5 INJECTION, SOLUTION INTRAMUSCULAR; INTRAVENOUS EVERY 12 HOURS
Status: DISCONTINUED | OUTPATIENT
Start: 2025-05-20 | End: 2025-05-21 | Stop reason: HOSPADM

## 2025-05-20 RX ORDER — BISACODYL 5 MG/1
5 TABLET, DELAYED RELEASE ORAL DAILY PRN
Status: DISCONTINUED | OUTPATIENT
Start: 2025-05-20 | End: 2025-05-21 | Stop reason: HOSPADM

## 2025-05-20 RX ORDER — BISACODYL 10 MG
10 SUPPOSITORY, RECTAL RECTAL DAILY PRN
Status: DISCONTINUED | OUTPATIENT
Start: 2025-05-20 | End: 2025-05-21 | Stop reason: HOSPADM

## 2025-05-20 RX ORDER — FUROSEMIDE 10 MG/ML
80 INJECTION INTRAMUSCULAR; INTRAVENOUS ONCE
Status: COMPLETED | OUTPATIENT
Start: 2025-05-20 | End: 2025-05-20

## 2025-05-20 RX ORDER — POTASSIUM CHLORIDE 1500 MG/1
20 TABLET, EXTENDED RELEASE ORAL DAILY
Status: DISCONTINUED | OUTPATIENT
Start: 2025-05-20 | End: 2025-05-21 | Stop reason: HOSPADM

## 2025-05-20 RX ORDER — POLYETHYLENE GLYCOL 3350 17 G/17G
17 POWDER, FOR SOLUTION ORAL DAILY PRN
Status: DISCONTINUED | OUTPATIENT
Start: 2025-05-20 | End: 2025-05-21 | Stop reason: HOSPADM

## 2025-05-20 RX ORDER — ACETAMINOPHEN 650 MG/1
650 SUPPOSITORY RECTAL EVERY 4 HOURS PRN
Status: DISCONTINUED | OUTPATIENT
Start: 2025-05-20 | End: 2025-05-21 | Stop reason: HOSPADM

## 2025-05-20 RX ORDER — HYDRALAZINE HYDROCHLORIDE 25 MG/1
25 TABLET, FILM COATED ORAL 3 TIMES DAILY
Status: DISCONTINUED | OUTPATIENT
Start: 2025-05-20 | End: 2025-05-21 | Stop reason: HOSPADM

## 2025-05-20 RX ORDER — CARVEDILOL 25 MG/1
25 TABLET ORAL 2 TIMES DAILY WITH MEALS
Status: DISCONTINUED | OUTPATIENT
Start: 2025-05-20 | End: 2025-05-21 | Stop reason: HOSPADM

## 2025-05-20 RX ORDER — ACETAMINOPHEN 160 MG/5ML
650 SOLUTION ORAL EVERY 4 HOURS PRN
Status: DISCONTINUED | OUTPATIENT
Start: 2025-05-20 | End: 2025-05-21 | Stop reason: HOSPADM

## 2025-05-20 RX ORDER — ASPIRIN 81 MG/1
81 TABLET ORAL DAILY
Status: DISCONTINUED | OUTPATIENT
Start: 2025-05-21 | End: 2025-05-21 | Stop reason: HOSPADM

## 2025-05-20 RX ORDER — POTASSIUM CHLORIDE 1500 MG/1
40 TABLET, EXTENDED RELEASE ORAL ONCE
Status: COMPLETED | OUTPATIENT
Start: 2025-05-20 | End: 2025-05-20

## 2025-05-20 RX ADMIN — HYDRALAZINE HYDROCHLORIDE 25 MG: 25 TABLET ORAL at 20:29

## 2025-05-20 RX ADMIN — POTASSIUM CHLORIDE 20 MEQ: 1500 TABLET, EXTENDED RELEASE ORAL at 09:43

## 2025-05-20 RX ADMIN — CARVEDILOL 25 MG: 25 TABLET, FILM COATED ORAL at 17:58

## 2025-05-20 RX ADMIN — Medication 10 ML: at 20:35

## 2025-05-20 RX ADMIN — BUMETANIDE 0.5 MG: 0.25 INJECTION INTRAMUSCULAR; INTRAVENOUS at 20:29

## 2025-05-20 RX ADMIN — NICOTINE 1 PATCH: 14 PATCH TRANSDERMAL at 04:44

## 2025-05-20 RX ADMIN — BUMETANIDE 0.5 MG: 0.25 INJECTION INTRAMUSCULAR; INTRAVENOUS at 08:29

## 2025-05-20 RX ADMIN — BUMETANIDE 2 MG: 0.25 INJECTION INTRAMUSCULAR; INTRAVENOUS at 13:28

## 2025-05-20 RX ADMIN — ASPIRIN 81 MG CHEWABLE TABLET 324 MG: 81 TABLET CHEWABLE at 01:18

## 2025-05-20 RX ADMIN — Medication 10 ML: at 08:30

## 2025-05-20 RX ADMIN — NITROGLYCERIN 1 INCH: 20 OINTMENT TOPICAL at 01:20

## 2025-05-20 RX ADMIN — LOSARTAN POTASSIUM 50 MG: 50 TABLET, FILM COATED ORAL at 09:43

## 2025-05-20 RX ADMIN — ROSUVASTATIN CALCIUM 40 MG: 10 TABLET, FILM COATED ORAL at 20:29

## 2025-05-20 RX ADMIN — CLONIDINE HYDROCHLORIDE 0.1 MG: 0.1 TABLET ORAL at 04:44

## 2025-05-20 RX ADMIN — ACETAMINOPHEN 650 MG: 325 TABLET, FILM COATED ORAL at 08:30

## 2025-05-20 RX ADMIN — CARVEDILOL 25 MG: 25 TABLET, FILM COATED ORAL at 09:43

## 2025-05-20 RX ADMIN — HYDRALAZINE HYDROCHLORIDE 25 MG: 25 TABLET ORAL at 17:58

## 2025-05-20 RX ADMIN — FUROSEMIDE 80 MG: 10 INJECTION, SOLUTION INTRAMUSCULAR; INTRAVENOUS at 01:17

## 2025-05-20 RX ADMIN — Medication 10 ML: at 09:45

## 2025-05-20 RX ADMIN — POTASSIUM CHLORIDE 40 MEQ: 1500 TABLET, EXTENDED RELEASE ORAL at 13:28

## 2025-05-20 RX ADMIN — HYDRALAZINE HYDROCHLORIDE 25 MG: 25 TABLET ORAL at 09:43

## 2025-05-20 NOTE — PLAN OF CARE
Problem: Adult Inpatient Plan of Care  Goal: Plan of Care Review  5/20/2025 0302 by Rachel Blanco RN  Outcome: Progressing  5/20/2025 0301 by Rachel Blanco RN  Outcome: Progressing  5/20/2025 0301 by Rachel Blanco RN  Outcome: Progressing  Goal: Patient-Specific Goal (Individualized)  5/20/2025 0302 by Rachel Blanco RN  Outcome: Progressing  5/20/2025 0301 by Rachel Blanco RN  Outcome: Progressing  5/20/2025 0301 by Rachel Blanco RN  Outcome: Progressing  Goal: Absence of Hospital-Acquired Illness or Injury  5/20/2025 0302 by Rachel Blanco RN  Outcome: Progressing  5/20/2025 0301 by Rachel Blanco RN  Outcome: Progressing  5/20/2025 0301 by Rachel Blanco RN  Outcome: Progressing  Intervention: Identify and Manage Fall Risk  Intervention: Prevent Skin Injury  Goal: Optimal Comfort and Wellbeing  5/20/2025 0302 by Rachel Blanco RN  Outcome: Progressing  5/20/2025 0301 by Rachel Blanco RN  Outcome: Progressing  5/20/2025 0301 by Rachel Blanco RN  Outcome: Progressing  Goal: Readiness for Transition of Care  5/20/2025 0302 by Rachel Blanco RN  Outcome: Progressing  5/20/2025 0301 by Rachel Blanco RN  Outcome: Progressing  5/20/2025 0301 by Rachel Blanco RN  Outcome: Progressing  Intervention: Mutually Develop Transition Plan   Goal Outcome Evaluation:           Progress: improving  Outcome Evaluation: here monitoring troponin. diuretics given

## 2025-05-20 NOTE — CONSULTS
"Heart Failure Program  Nurse Navigator  Discharge Planning    Patient Name:Scott Gaytan  :1975  Cardiologist:Dr. Espinosa  Current Admission Date: 2025   Previous Admission: 24  Admission frequency: 2 admissions in 6 months    Heart Failure history per record:    Symptoms on admission:c/o SOA, fatigue, MADERA worse prior to arrival pt states he was out of his bumex and potassium for one week due to a pharmacy issue.      Admission Weight:  Flowsheet Rows      Flowsheet Row First Filed Value   Admission Height 172.7 cm (68\") Documented at 2025   Admission Weight 80.2 kg (176 lb 12.9 oz) Documented at 2025              Current Home Medications:  Prior to Admission medications    Medication Sig Start Date End Date Taking? Authorizing Provider   apixaban (ELIQUIS) 5 MG tablet tablet Take 0.5 tablets by mouth 2 (Two) Times a Day.   Yes ProviderIsabel MD   aspirin 81 MG EC tablet Take 1 tablet by mouth Daily for 180 days. 25 Yes Vicki Santana APRN   bumetanide (BUMEX) 2 MG tablet Take 1 tablet by mouth 2 (Two) Times a Day. 25  Yes Vicki Santana APRN   carvedilol (COREG) 25 MG tablet Take 1 tablet by mouth 2 (Two) Times a Day With Meals for 90 days. 25 Yes Vicki Santana APRN   hydrALAZINE (APRESOLINE) 25 MG tablet Take 1 tablet by mouth 3 (Three) Times a Day. 25  Yes Vicki Santana APRN   losartan (COZAAR) 50 MG tablet Take 1 tablet by mouth Daily.   Yes ProviderIsabel MD   potassium chloride ER (K-TAB) 20 MEQ tablet controlled-release ER tablet Take 1 tablet by mouth Daily. 25  Yes Vicki Santana APRN   rosuvastatin (CRESTOR) 40 MG tablet TAKE 1 TABLET BY MOUTH EVERY DAY 25  Yes Vicki Santana APRN   nitroglycerin (NITROSTAT) 0.4 MG SL tablet Place 1 tablet under the tongue Every 5 (Five) Minutes As Needed for Chest Pain for up to 30 days. Take no more than 3 doses in 15 minutes. 1/3/25 2/2/25  " Vicki Santana APRN   amLODIPine (NORVASC) 5 MG tablet Take 1 tablet by mouth Daily. 10/2/24 5/20/25  Provider, MD Isabel   busPIRone (BUSPAR) 10 MG tablet Take 1 tablet by mouth 2 (Two) Times a Day for 90 days. 2/11/25 5/20/25  Vicki Santana APRN   isosorbide mononitrate (IMDUR) 30 MG 24 hr tablet TAKE 2 TABLETS BY MOUTH DAILY 2/28/25 5/20/25  Vicki Santana APRN   losartan-hydrochlorothiazide (HYZAAR) 100-25 MG per tablet TAKE 1 TABLET BY MOUTH EVERY DAY 3/19/25 5/20/25  Lyell, Reggie Duane, MD   pantoprazole (PROTONIX) 40 MG EC tablet Take 1 tablet by mouth Daily for 180 days. 1/3/25 5/20/25  Vicki Santana APRN       Social history:   Pt from home. Pt gets his meds from [employer] in [place].     Smoking status: current    Diagnostics Testing:  proBNP level: 55826    Echocardiogram:Results for orders placed during the hospital encounter of 05/19/25    Adult Transthoracic Echo Complete W/ Cont if Necessary Per Protocol    Interpretation Summary    Left ventricular ejection fraction appears to be 46 - 50%.    Left ventricular diastolic function is consistent with (grade II w/high LAP) pseudonormalization.  Average GLS -13%.    The right ventricular cavity is dilated.    The left atrial cavity is dilated.    Moderate mitral valve regurgitation is present.    Moderate tricuspid valve regurgitation is present.    Estimated right ventricular systolic pressure from tricuspid regurgitation is mildly elevated (35-45 mmHg).        Patient Assessment:   Pt lying in bed, resp even and unlabored. No soa with conversation now  pt states his Soa is better. No pedal edema    Current O2:    Home O2:      Education provided to patient:  yes- Heart Failure disease education  yes -Symptom identification/management  yes -Daily Weights  yes- Diet education  yes- Fluid restriction (if ordered)  yes- Activity education  yes- Medication education  yes- Smoking cessation  yes- Follow-up Appointments   -Provided information on  how to access [facility] My HF Guide/Heart Failure Interactive workbook    Acceptance of learning: acceptance, cooperative    Heart Failure education interactive teaching session time: 20 minutes    GWTG: EF 46-50%    Identified needs/barriers:   Volume status, GDMT - losartan, coreg. Needs 7 day apt and cardiology apt    Intervention:   Education, HF clinic apt = pt requested

## 2025-05-20 NOTE — PLAN OF CARE
Problem: Adult Inpatient Plan of Care  Goal: Plan of Care Review  5/20/2025 0306 by Rachel Blanco RN  Outcome: Progressing  5/20/2025 0303 by Rachel Blanco RN  Outcome: Progressing  5/20/2025 0303 by Rachel Blanco RN  Outcome: Progressing  5/20/2025 0302 by Rachel Blanco RN  Outcome: Progressing  5/20/2025 0301 by Rachel Blanco RN  Outcome: Progressing  5/20/2025 0301 by Rachel Blanco RN  Outcome: Progressing  Goal: Patient-Specific Goal (Individualized)  5/20/2025 0306 by Rachel Blanco RN  Outcome: Progressing  5/20/2025 0303 by Rachel Blanco RN  Outcome: Progressing  5/20/2025 0303 by Rachel Blanco RN  Outcome: Progressing  5/20/2025 0302 by Rachel Blanco RN  Outcome: Progressing  5/20/2025 0301 by Rachel Blanco RN  Outcome: Progressing  5/20/2025 0301 by Rachel Blanco RN  Outcome: Progressing  Goal: Absence of Hospital-Acquired Illness or Injury  5/20/2025 0306 by Rachel Blanco RN  Outcome: Progressing  5/20/2025 0303 by Rachel Blanco RN  Outcome: Progressing  5/20/2025 0303 by Rachel Blanco RN  Outcome: Progressing  5/20/2025 0302 by Rachel Blanco RN  Outcome: Progressing  5/20/2025 0301 by Rachel Blanco RN  Outcome: Progressing  5/20/2025 0301 by Rachel Blanco RN  Outcome: Progressing  Intervention: Identify and Manage Fall Risk  Intervention: Prevent Skin Injury  Goal: Optimal Comfort and Wellbeing  5/20/2025 0306 by Rachel Blanco RN  Outcome: Progressing  5/20/2025 0303 by Rachel Blanco RN  Outcome: Progressing  5/20/2025 0303 by Rachel Blanco RN  Outcome: Progressing  5/20/2025 0302 by Rachel Blanco RN  Outcome: Progressing  5/20/2025 0301 by Rachel Blanco RN  Outcome: Progressing  5/20/2025 0301 by Rachel Blanco RN  Outcome: Progressing  Goal: Readiness for Transition of Care  5/20/2025 0306 by Rachel Blanco RN  Outcome: Progressing  5/20/2025 0303 by  Rachel Blanco RN  Outcome: Progressing  5/20/2025 0303 by Rachel Blanco RN  Outcome: Progressing  5/20/2025 0302 by Rachel Blanco RN  Outcome: Progressing  5/20/2025 0301 by Rachel Blanco RN  Outcome: Progressing  5/20/2025 0301 by Rachel Blanco RN  Outcome: Progressing  Intervention: Mutually Develop Transition Plan   Goal Outcome Evaluation:  Plan of Care Reviewed With: patient        Progress: improving  Outcome Evaluation: here monitoring troponin. diuretics given

## 2025-05-20 NOTE — H&P
[facility] H&P    Patient Name: Scott Gaytan  : 1975  MRN: [id number]  Primary Care Physician: Vicki Santana APRN  Date of admission: 2025     Patient Care Team:  Vicki Santana APRN as PCP - General (Emergency Medicine)  Cary Espinosa MD as Consulting Physician (Cardiology)          Subjective   History Present Illness     Chief Complaint:   Chief Complaint   Patient presents with    Shortness of Breath     Shortness of breath     Mr. Gaytan is a 50 y.o.  presents to [facility] complaining of shortness of breath       History of Present Illness    ED 25: 50-year-old male with dyspnea and fatigue for the past 1 week presents for evaluation. Patient denies any cough or fever but states he has had some intermittent mild chest tightness and wheezing. No calf pain or swelling, no recent trips or travel.     Observation 25: Patient is a 50-year-old male presenting to the hospital with complaints of shortness of breath and fatigue.  Patient states he has been out of some of his medications for about a week.  Patient states he has been following up with specialist outpatient.  Patient states he felt like he had water on his lungs and had some chest tightness as well.  Patient evaluated by cardiology nephrology.    Review of Systems   Constitutional: Positive for malaise/fatigue. Negative for fever.   HENT: Negative.     Eyes: Negative.    Cardiovascular:  Positive for chest pain and dyspnea on exertion.   Respiratory:  Positive for cough and shortness of breath.    Endocrine: Negative.    Skin: Negative.    Musculoskeletal: Negative.    Gastrointestinal: Negative.    Genitourinary: Negative.    Neurological:  Positive for weakness.   Psychiatric/Behavioral: Negative.             Personal History     Past Medical History:   Past Medical History:   Diagnosis Date    Anemia in chronic kidney disease (CKD) 2025    Arteriovenous fistula 2025    Atrial  fibrillation 11/23/2024    Rate controlled with Coreg    Anticoagulated with renal dosed Eliquis 2.5 mg twice daily    Without history of ablations or DCCV         Chronic diastolic heart failure     CKD (chronic kidney disease) stage 4, GFR 15-29 ml/min 02/06/2025    Followed by Dr Das,       COVID-19 virus detected 12/28/2021    Cytokine release syndrome, grade 1 12/29/2021    h/o Medication and medical follow-up noncompliance 04/10/2024    Hepatitis C 01/03/2025    Hyperkalemia 04/26/2022    Hyperlipidemia 01/21/2025    Hypertensive heart disease with congestive heart failure and chronic kidney disease 02/11/2025    Hypertensive urgency 04/26/2022    Methamphetamine abuse 04/26/2022    Methamphetamine intoxication 06/16/2024    Mild to moderate valvular heart disease 02/06/2025    Mild to moderate mitral valve regurgitation      Secondary hyperparathyroidism 02/11/2025    Tick bite of abdomen 04/26/2022    Tobacco abuse 04/26/2022       Surgical History:      Past Surgical History:   Procedure Laterality Date    CARDIAC CATHETERIZATION N/A 04/27/2022    ORIF TIBIA/FIBULA FRACTURES Right            Family History: family history includes Arthritis in his mother; Diabetes in his mother; Heart attack in his father and paternal grandfather; Hypertension in his father; Seizures in his sister. Otherwise pertinent FHx was reviewed and unremarkable.     Social History:  reports that he has been smoking cigarettes. He started smoking about 31 years ago. He has a 30.6 pack-year smoking history. He has never been exposed to tobacco smoke. He has never used smokeless tobacco. He reports that he does not currently use alcohol. He reports current drug use. Drugs: Methamphetamines, Marijuana, and Cocaine(coke).      Medications:  Prior to Admission medications    Medication Sig Start Date End Date Taking? Authorizing Provider   apixaban (ELIQUIS) 5 MG tablet tablet Take 0.5 tablets by mouth 2 (Two) Times a Day.   Yes  Isabel Camp MD   aspirin 81 MG EC tablet Take 1 tablet by mouth Daily for 180 days. 5/13/25 11/9/25 Yes Vicki Santana APRN   bumetanide (BUMEX) 2 MG tablet Take 1 tablet by mouth 2 (Two) Times a Day. 5/13/25  Yes Vicki Santana APRN   carvedilol (COREG) 25 MG tablet Take 1 tablet by mouth 2 (Two) Times a Day With Meals for 90 days. 5/13/25 8/11/25 Yes Vicki Santana APRN   hydrALAZINE (APRESOLINE) 25 MG tablet Take 1 tablet by mouth 3 (Three) Times a Day. 5/13/25  Yes Vicki Santana APRN   losartan (COZAAR) 50 MG tablet Take 1 tablet by mouth Daily.   Yes Isabel Camp MD   potassium chloride ER (K-TAB) 20 MEQ tablet controlled-release ER tablet Take 1 tablet by mouth Daily. 5/13/25  Yes Vicki Santana APRN   rosuvastatin (CRESTOR) 40 MG tablet TAKE 1 TABLET BY MOUTH EVERY DAY 4/23/25  Yes Vicki Santana APRN   nitroglycerin (NITROSTAT) 0.4 MG SL tablet Place 1 tablet under the tongue Every 5 (Five) Minutes As Needed for Chest Pain for up to 30 days. Take no more than 3 doses in 15 minutes. 1/3/25 2/2/25  Vicki Santana APRN   amLODIPine (NORVASC) 5 MG tablet Take 1 tablet by mouth Daily. 10/2/24 5/20/25  Isabel Camp MD   busPIRone (BUSPAR) 10 MG tablet Take 1 tablet by mouth 2 (Two) Times a Day for 90 days. 2/11/25 5/20/25  Vicki Santana APRN   isosorbide mononitrate (IMDUR) 30 MG 24 hr tablet TAKE 2 TABLETS BY MOUTH DAILY 2/28/25 5/20/25  Vicki Santana APRN   losartan-hydrochlorothiazide (HYZAAR) 100-25 MG per tablet TAKE 1 TABLET BY MOUTH EVERY DAY 3/19/25 5/20/25  Lyell, Reggie Duane, MD   pantoprazole (PROTONIX) 40 MG EC tablet Take 1 tablet by mouth Daily for 180 days. 1/3/25 5/20/25  Vicki Santana, APRN       Allergies:    Allergies   Allergen Reactions    Methylprednisolone Swelling     Pt reports severe swelling with steroids    Morphine Other (See Comments)     Makes it feel like my blood is boiling in my veins       Objective   Objective      Vital Signs  Temp:  [97 °F (36.1 °C)-97.8 °F (36.6 °C)] 97.8 °F (36.6 °C)  Heart Rate:  [63-70] 67  Resp:  [16-20] 17  BP: (139-176)/() 145/95  SpO2:  [96 %-100 %] 98 %  on   ;   Device (Oxygen Therapy): room air  Body mass index is 26.88 kg/m².    Physical Exam  Vitals and nursing note reviewed.   Constitutional:       Appearance: Normal appearance.   HENT:      Head: Normocephalic and atraumatic.      Right Ear: External ear normal.      Left Ear: External ear normal.      Nose: Nose normal.      Mouth/Throat:      Pharynx: Oropharynx is clear.   Eyes:      Extraocular Movements: Extraocular movements intact.      Conjunctiva/sclera: Conjunctivae normal.   Cardiovascular:      Rate and Rhythm: Normal rate and regular rhythm.      Pulses: Normal pulses.      Heart sounds: Murmur heard.   Pulmonary:      Effort: Pulmonary effort is normal.      Breath sounds: Wheezing present.   Abdominal:      General: Bowel sounds are normal.   Musculoskeletal:         General: Normal range of motion.      Cervical back: Normal range of motion.   Skin:     General: Skin is warm.      Capillary Refill: Capillary refill takes less than 2 seconds.   Neurological:      Mental Status: He is alert and oriented to person, place, and time.      Motor: Weakness present.   Psychiatric:         Mood and Affect: Mood normal.         Behavior: Behavior normal.         Thought Content: Thought content normal.           Results Review:  I have personally reviewed most recent cardiac tracings, lab results, and radiology images and interpretations and agree with findings, most notably: Cbc, CMP, EKG, CXR, echocardiogram.    Results from last 7 days   Lab Units 05/20/25  0237   WBC 10*3/mm3 7.53   HEMOGLOBIN g/dL 12.7*   HEMATOCRIT % 39.0   PLATELETS 10*3/mm3 235     Results from last 7 days   Lab Units 05/20/25  0237 05/20/25  0003 05/19/25  2257   SODIUM mmol/L 133*  --  137   POTASSIUM mmol/L 3.5  --  3.7   CHLORIDE mmol/L 98  --  98    CO2 mmol/L 24.8  --  26.0   BUN mg/dL 52*  --  53*   CREATININE mg/dL 3.16*  --  3.31*   GLUCOSE mg/dL 140*  --  116*   CALCIUM mg/dL 8.4*  --  8.8   ALK PHOS U/L  --   --  55   ALT (SGPT) U/L  --   --  26   AST (SGOT) U/L  --   --  32   HSTROP T ng/L  --  44* 49*   PROBNP pg/mL  --   --  24,295.0*     Estimated Creatinine Clearance: 32.4 mL/min (A) (by C-G formula based on SCr of 3.16 mg/dL (H)).  Brief Urine Lab Results  (Last result in the past 365 days)        Color   Clarity   Blood   Leuk Est   Nitrite   Protein   CREAT   Urine HCG        02/26/25 1019             23.0         02/26/25 1019 Yellow   Clear   Negative   Negative   Negative   Trace                   Microbiology Results (last 10 days)       Procedure Component Value - Date/Time    Respiratory Panel PCR w/COVID-19(SARS-CoV-2) [facility]ILA/ALONSO/GM/[facility]/WILLI In-House, NP Swab in UTM/VTM, 2 HR TAT - Swab, Nasopharynx [[id number]]  (Normal) Collected: 05/19/25 0676    Lab Status: Final result Specimen: Swab from Nasopharynx Updated: 05/19/25 6491     ADENOVIRUS, PCR Not Detected     Coronavirus 229E Not Detected     Coronavirus HKU1 Not Detected     Coronavirus NL63 Not Detected     Coronavirus OC43 Not Detected     COVID19 Not Detected     Human Metapneumovirus Not Detected     Human Rhinovirus/Enterovirus Not Detected     Influenza A PCR Not Detected     Influenza B PCR Not Detected     Parainfluenza Virus 1 Not Detected     Parainfluenza Virus 2 Not Detected     Parainfluenza Virus 3 Not Detected     Parainfluenza Virus 4 Not Detected     RSV, PCR Not Detected     Bordetella pertussis pcr Not Detected     Bordetella parapertussis PCR Not Detected     Chlamydophila pneumoniae PCR Not Detected     Mycoplasma pneumo by PCR Not Detected    Narrative:      In the setting of a positive respiratory panel with a viral infection PLUS a negative procalcitonin without other underlying concern for bacterial infection, consider observing off antibiotics or  discontinuation of antibiotics and continue supportive care. If the respiratory panel is positive for atypical bacterial infection (Bordetella pertussis, Chlamydophila pneumoniae, or Mycoplasma pneumoniae), consider antibiotic de-escalation to target atypical bacterial infection.            ECG/EMG Results (most recent)       Procedure Component Value Units Date/Time    ECG 12 Lead Dyspnea [[id number]] Collected: 05/19/25 2153     Updated: 05/19/25 2154     QT Interval 497 ms      QTC Interval 531 ms     Narrative:      HEART RATE=70  bpm  RR Slmgnvfr=657  ms  MI Jpkbxjud=191  ms  P Horizontal Axis=-18  deg  P Front Axis=27  deg  QRSD Fearihmg=043  ms  QT Nrxuhauc=010  ms  QHpL=240  ms  QRS Axis=-80  deg  T Wave Axis=95  deg  - ABNORMAL ECG -  Sinus rhythm  Atrial premature complex  Prolonged MI interval  Left atrial enlargement  Left bundle branch block  Date and Time of Study:2025-05-19 21:53:24    Telemetry Scan [[id number]] Resulted: 05/19/25     Updated: 05/20/25 0252    Telemetry Scan [[id number]] Resulted: 05/19/25     Updated: 05/20/25 0327    Telemetry Scan [[id number]] Resulted: 05/19/25     Updated: 05/20/25 0743    ECG 12 Lead Chest Pain [[id number]] Collected: 05/20/25 0010     Updated: 05/20/25 0810     QT Interval 509 ms      QTC Interval 524 ms     Narrative:      HEART RATE=64  bpm  RR Pufibahf=470  ms  MI Ajlsnqkt=622  ms  P Horizontal Axis=-19  deg  P Front Axis=42  deg  QRSD Qsikkupf=427  ms  QT Ckslrowa=137  ms  BZcO=651  ms  QRS Axis=-57  deg  T Wave Axis=101  deg  - ABNORMAL ECG -  Sinus rhythm  Prolonged MI interval  Left atrial enlargement  Left bundle branch block  When compared with ECG of 19-May-2025 21:54:44,  No significant change  Electronically Signed By: Carlos Manuel Sotomayor (Lei) 2025-05-20 08:10:28  Date and Time of Study:2025-05-20 00:10:28    Adult Transthoracic Echo Complete W/ Cont if Necessary Per Protocol [[id number]] Resulted: 05/20/25 0937     Updated: 05/20/25 0937     EF(MOD-bp) 45.3 %       EF_3D-VOL 41.0 %      LV GLOBAL STRAIN  -13.0 %      LVIDd 5.0 cm      LVIDs 3.6 cm      IVSd 1.58 cm      LVPWd 1.45 cm      FS 27.2 %      IVS/LVPW 1.09 cm      ESV(cubed) 47.1 ml      LV Sys Vol (BSA corrected) 53.2 cm2      EDV(cubed) 122.1 ml      LV Luo Vol (BSA corrected) 99.2 cm2      LV mass(C)d 323.9 grams      LVOT area 3.3 cm2      LVOT diam 2.05 cm      EDV(MOD-sp2) 164.0 ml      EDV(MOD-sp4) 192.0 ml      ESV(MOD-sp2) 87.0 ml      ESV(MOD-sp4) 103.0 ml      SV(MOD-sp2) 77.0 ml      SV(MOD-sp4) 89.0 ml      SVi(MOD-SP2) 39.8 ml/m2      SVi(MOD-SP4) 46.0 ml/m2      SVi (LVOT) 40.6 ml/m2      EF(MOD-sp2) 47.0 %      EF(MOD-sp4) 46.4 %      MV E max tobias 90.3 cm/sec      MV A max tobias 43.9 cm/sec      MV dec time 0.20 sec      MV E/A 2.06     IVRT 114.0 ms      LA ESV Index (BP) 77.3 ml/m2      Med Peak E' Tobias 4.7 cm/sec      Lat Peak E' Tobias 7.8 cm/sec      TR max tobias 261.3 cm/sec      Avg E/e' ratio 14.45     SV(LVOT) 78.6 ml      RV Base 5.2 cm      RV Mid 4.0 cm      TAPSE (>1.6) 2.20 cm      RV S' 11.5 cm/sec      LA dimension (2D)  5.2 cm      Pulm Sys Tobias 37.7 cm/sec      Pulm Luo Tobias 35.1 cm/sec      Pulm S/D 1.07     LV V1 max 125.5 cm/sec      LV V1 max PG 6.3 mmHg      LV V1 mean PG 3.3 mmHg      LV V1 VTI 23.8 cm      Ao pk tobias 188.2 cm/sec      Ao max PG 14.2 mmHg      Ao mean PG 7.6 mmHg      Ao V2 VTI 38.7 cm      BRIAN(I,D) 2.03 cm2      Dimensionless Index 0.62 (DI)      MV max PG 4.9 mmHg      MV mean PG 1.77 mmHg      MV V2 VTI 30.0 cm      MV P1/2t 73.7 msec      MVA(P1/2t) 3.0 cm2      MVA(VTI) 2.6 cm2      MV dec slope 413.8 cm/sec2      MR max tobias 552.8 cm/sec      MR max .3 mmHg      MR mean tobias 418.2 cm/sec      MR mean PG 80.0 mmHg      MR .3 cm      TR max PG 27.3 mmHg      RVSP(TR) 35.3 mmHg      RAP systole 8.0 mmHg      RV V1 max PG 2.8 mmHg      RV V1 max 84.4 cm/sec      RV V1 VTI 16.4 cm      PA V2 max 98.1 cm/sec      PA acc time 0.10 sec      Sinus 3.4 cm       STJ 2.9 cm     Narrative:        Estimated right ventricular systolic pressure from tricuspid   regurgitation is mildly elevated (35-45 mmHg).      Impression:                      Results for orders placed during the hospital encounter of 11/23/24    Adult Transesophageal Echo (GYPSY) W/ Cont if Necessary Per Protocol (Cardiology Department)    Interpretation Summary    Left ventricular ejection fraction appears to be 51 - 55%.    Saline test results are negative.    No thrombus was visualized in the left atrial appendage.      Electronically signed by Cary Espinosa MD, 11/26/24, 5:37 PM EST.      XR Chest 2 View  Result Date: 5/19/2025  Impression: Stable exam. Findings suggest mild congestive heart failure. Electronically Signed: Jake Farnsworth MD  5/19/2025 10:57 PM EDT  Workstation ID: [id number]    XR Chest 2 View  Result Date: 5/14/2025  Impression: Pulmonary vascular congestion with trace pulmonary edema. Small bilateral pleural effusions. Electronically Signed: Michael Rose MD  5/14/2025 12:05 PM EDT  Workstation ID: [id number]        Estimated Creatinine Clearance: 32.4 mL/min (A) (by C-G formula based on SCr of 3.16 mg/dL (H)).    Assessment & Plan   Assessment/Plan       Active Hospital Problems    Diagnosis  POA    **Acute on chronic diastolic congestive heart failure [I50.33]  Yes    Elevated troponin [R79.89]  Yes    LBBB (left bundle branch block) [I44.7]  Yes    Anemia in chronic kidney disease (CKD) [N18.9, D63.1]  Yes    Mild to moderate valvular heart disease [I38]  Yes    CKD (chronic kidney disease) stage 4, GFR 15-29 ml/min [N18.4]  Yes    Hyperlipidemia [E78.5]  Yes    Hepatitis C [B19.20]  Yes    PAF (paroxysmal atrial fibrillation) [I48.0]  Yes    Hypertension [I10]  Yes    Methamphetamine abuse [F15.10]  Yes    Tobacco abuse [Z72.0]  Yes      Resolved Hospital Problems   No resolved problems to display.     Chest pain  Lab Results   Component Value Date    TROPONINT 44 (H) 05/20/2025     TROPONINT 49 (H) 05/19/2025    TROPONINT 32 (H) 01/03/2025   -Continue aspirin statin and beta-blocker  -EKG: Sinus rhythm LBBB  -Telemetry  - Cardiology consulted    Heart failure with reduced ejection fraction   Lab Results   Component Value Date    TROPONINT 44 (H) 05/20/2025    TROPONINT 49 (H) 05/19/2025    PROBNP 24,295.0 (H) 05/19/2025    PROBNP 18,267.0 (H) 01/21/2025     (L) 05/20/2025   -Echocardiogram: LVEF 46 to 50% with grade 2 diastolic dysfunction  -Repeat echo in 3 months  -Continue beta-blocker and ARB  - IV Bumex initiated  -Monitor I's and O's and daily weights  -2 g sodium diet    Diabetes mellitus type II  Lab Results   Component Value Date    GLUCOSE 140 (H) 05/20/2025    GLUCOSE 116 (H) 05/19/2025    GLUCOSE 62 (L) 02/26/2025    GLUCOSE 59 (L) 01/21/2025   - Sliding scale insulin  -Diabetic diet  -Monitor before meals and at bedtime    CKD (Stage IV)  Lab Results   Component Value Date    CREATININE 3.16 (H) 05/20/2025    BUN 52 (H) 05/20/2025    BCR 16.5 05/20/2025    EGFR 23.3 (L) 05/20/2025    -Avoid nephrotoxic medication IV dye unless urgently needed  -Monitor BMP and I's and O's while admitted  - Nephrology consulted    History of substance abuse  - History of hepatitis C  - UDS unremarkable    Hypertension  BP Readings from Last 1 Encounters:   05/20/25 114/78   - Continue Coreg, hydralazine   - Monitor while admitted            VTE Prophylaxis - Active VTE Prophylaxis  Pharmacologic:        Start     Dose Route Frequency Stop    05/20/25 0930  [Held by provider]  apixaban (ELIQUIS) tablet 2.5 mg        (On hold since today at 0831 until manually unheld; held by Leta Ocampo APRNHold Reason: Hold For Procedure)   On hold since today at 0831 until manually unheld   Hold reason: Hold For Procedure    2.5 mg PO 2 Times Daily --    05/20/25 0831  Pharmacy to Dose enoxaparin (LOVENOX)        Question:  Indication of use  Answer:  VTE Prophylaxis    -- XX Continuous PRN --                   Mechanical:        Start        05/20/25 0833  Maintain Sequential Compression Device  Continuous                              CODE STATUS:    Code Status and Medical Interventions: CPR (Attempt to Resuscitate); Full Support   Ordered at: 05/20/25 0831     Code Status (Patient has no pulse and is not breathing):    CPR (Attempt to Resuscitate)     Medical Interventions (Patient has pulse or is breathing):    Full Support       This patient has been examined wearing personal protective equipment.     I discussed the patient's findings and my recommendations with patient, family, nursing staff, primary care team, and consulting provider.      Signature:Electronically signed by CHANDLER Mares, 05/20/25, 10:19 AM EDT.      I spent 35 minutes caring for Scott on this date of service. This time includes time spent by me in the following activities: reviewing tests, performing a medically appropriate examination and/or evaluation, counseling and educating the patient/family/caregiver, referring and communicating with other health care professionals, documenting information in the medical record, independently interpreting results and communicating that information with the patient/family/caregiver, care coordination, ordering medications, ordering test(s), ordering procedure(s), obtaining a separately obtained history, and reviewing a separately obtained history.

## 2025-05-20 NOTE — CONSULTS
"  Referring Provider: Carlos Manuel Sotomayor MD    Reason for Consultation:  chest pain, CHF      Patient Care Team:  Vicki Santana APRN as PCP - General (Emergency Medicine)  Cary Espinosa MD as Consulting Physician (Cardiology)      SUBJECTIVE     Chief Complaint:  shortness of breath, chest tightness    History of present illness:  Scott Gaytan is a 50 y.o. male with a history of substance abuse, hepatitis C, tobacco abuse, noncompliance with medication and follow-up, paroxysmal atrial fibrillation, chronic HFpEF, hypertension, and chronic kidney disease stage 4 who presented to the ER at [facility] on 5/19/2025 complaining of shortness of breath and chest tightness. The patient states he ran out of his Bumex and was without it for 1 week. He states he normally takes 2 mg twice daily. He states he \"felt like his lungs were filling up with fluid.\" He states his chest felt tight, but no pain. Workup in the ER revealed acute CHF exacerbation with elevated proBNP of 24,295, elevated high sensitivity troponin of 49, and new left bundle branch block. He was given IV Bumex and admitted. Cardiology was consulted for further evaluation and treatment.         Review of systems:    Constitutional: + fatigue.  No fever, rigors, chills   Eyes: No vision changes, eye pain   ENT/oropharynx: No difficulty swallowing, sore throat, epistaxis, changes in hearing   Cardiovascular: + chest tightness.  No chest pain, palpitations, paroxysmal nocturnal dyspnea, diaphoresis, dizziness / syncopal episode   Respiratory: + shortness of breath, dyspnea on exertion, cough, wheezing.  No hemoptysis   Gastrointestinal: No abdominal pain, nausea, vomiting, diarrhea, bloody stools   Genitourinary: No hematuria, dysuria   Neurological: No headache, tremors, numbness, one-sided weakness    Musculoskeletal: No cramps, myalgias, joint pain, joint swelling   Integument: No rash        Personal History:      Past Medical History: "   Diagnosis Date    Anemia in chronic kidney disease (CKD) 02/11/2025    Arteriovenous fistula 02/11/2025    Atrial fibrillation 11/23/2024    Rate controlled with Coreg    Anticoagulated with renal dosed Eliquis 2.5 mg twice daily    Without history of ablations or DCCV         Chronic diastolic heart failure     CKD (chronic kidney disease) stage 4, GFR 15-29 ml/min 02/06/2025    Followed by Dr Das,       COVID-19 virus detected 12/28/2021    Cytokine release syndrome, grade 1 12/29/2021    h/o Medication and medical follow-up noncompliance 04/10/2024    Hepatitis C 01/03/2025    Hyperkalemia 04/26/2022    Hyperlipidemia 01/21/2025    Hypertensive heart disease with congestive heart failure and chronic kidney disease 02/11/2025    Hypertensive urgency 04/26/2022    Methamphetamine abuse 04/26/2022    Methamphetamine intoxication 06/16/2024    Mild to moderate valvular heart disease 02/06/2025    Mild to moderate mitral valve regurgitation      Secondary hyperparathyroidism 02/11/2025    Tick bite of abdomen 04/26/2022    Tobacco abuse 04/26/2022       Past Surgical History:   Procedure Laterality Date    CARDIAC CATHETERIZATION N/A 04/27/2022    ORIF TIBIA/FIBULA FRACTURES Right        Family History   Problem Relation Age of Onset    Diabetes Mother     Arthritis Mother     Hypertension Father     Heart attack Father     Seizures Sister     Heart attack Paternal Grandfather        Social History     Tobacco Use    Smoking status: Every Day     Current packs/day: 0.25     Average packs/day: 1 pack/day for 31.4 years (30.6 ttl pk-yrs)     Types: Cigarettes     Start date: 1994     Passive exposure: Never    Smokeless tobacco: Never   Vaping Use    Vaping status: Never Used   Substance Use Topics    Alcohol use: Not Currently    Drug use: Yes     Types: Methamphetamines, Marijuana, Cocaine(coke)        Home meds:  Prior to Admission medications    Medication Sig Start Date End Date Taking? Authorizing Provider    apixaban (ELIQUIS) 5 MG tablet tablet Take 0.5 tablets by mouth 2 (Two) Times a Day.   Yes Isabel Camp MD   aspirin 81 MG EC tablet Take 1 tablet by mouth Daily for 180 days. 5/13/25 11/9/25 Yes Vicki Santana APRN   bumetanide (BUMEX) 2 MG tablet Take 1 tablet by mouth 2 (Two) Times a Day. 5/13/25  Yes Vicki Santana APRN   carvedilol (COREG) 25 MG tablet Take 1 tablet by mouth 2 (Two) Times a Day With Meals for 90 days. 5/13/25 8/11/25 Yes Vicki Santana APRN   hydrALAZINE (APRESOLINE) 25 MG tablet Take 1 tablet by mouth 3 (Three) Times a Day. 5/13/25  Yes Vicki Santana APRN   losartan (COZAAR) 50 MG tablet Take 1 tablet by mouth Daily.   Yes Isabel Camp MD   potassium chloride ER (K-TAB) 20 MEQ tablet controlled-release ER tablet Take 1 tablet by mouth Daily. 5/13/25  Yes Vicki Santana APRN   rosuvastatin (CRESTOR) 40 MG tablet TAKE 1 TABLET BY MOUTH EVERY DAY 4/23/25  Yes Vicki Santana APRN   nitroglycerin (NITROSTAT) 0.4 MG SL tablet Place 1 tablet under the tongue Every 5 (Five) Minutes As Needed for Chest Pain for up to 30 days. Take no more than 3 doses in 15 minutes. 1/3/25 2/2/25  Vicki Santana APRN   amLODIPine (NORVASC) 5 MG tablet Take 1 tablet by mouth Daily. 10/2/24 5/20/25  Isabel Camp MD   busPIRone (BUSPAR) 10 MG tablet Take 1 tablet by mouth 2 (Two) Times a Day for 90 days. 2/11/25 5/20/25  Vicki Santana APRN   isosorbide mononitrate (IMDUR) 30 MG 24 hr tablet TAKE 2 TABLETS BY MOUTH DAILY 2/28/25 5/20/25  Vicki Santana APRN   losartan-hydrochlorothiazide (HYZAAR) 100-25 MG per tablet TAKE 1 TABLET BY MOUTH EVERY DAY 3/19/25 5/20/25  Lyell, Reggie Duane, MD   pantoprazole (PROTONIX) 40 MG EC tablet Take 1 tablet by mouth Daily for 180 days. 1/3/25 5/20/25  Vicki Santana APRN       Allergies:     Methylprednisolone and Morphine    Scheduled Meds:[Held by provider] apixaban, 2.5 mg, Oral, BID  [START ON 5/21/2025] aspirin, 81 mg,  "Oral, Daily  bumetanide, 0.5 mg, Intravenous, Q12H  carvedilol, 25 mg, Oral, BID With Meals  hydrALAZINE, 25 mg, Oral, TID  losartan, 50 mg, Oral, Daily  methylPREDNISolone sodium succinate, 125 mg, Intravenous, Once  nicotine, 1 patch, Transdermal, Q24H  potassium chloride, 20 mEq, Oral, Daily  rosuvastatin, 40 mg, Oral, Nightly  sodium chloride, 10 mL, Intravenous, Q12H      Continuous Infusions:     PRN Meds:  acetaminophen **OR** acetaminophen **OR** acetaminophen    senna-docusate sodium **AND** polyethylene glycol **AND** bisacodyl **AND** bisacodyl    nitroglycerin    polyethyl glycol-propyl glycol    sodium chloride    sodium chloride    sodium chloride      OBJECTIVE    Vital Signs  Vitals:    05/20/25 0450 05/20/25 0755 05/20/25 0943 05/20/25 1130   BP: 172/90 (!) 176/111 145/95 126/80   BP Location: Right arm Right arm  Right arm   Patient Position: Lying Lying  Lying   Pulse:  63 67 61   Resp: 20 17  15   Temp:  97.8 °F (36.6 °C)  97.9 °F (36.6 °C)   TempSrc: Oral Oral  Oral   SpO2: 98% 98%  97%   Weight:  80.2 kg (176 lb 12.9 oz)     Height:  172.7 cm (68\")         Flowsheet Rows      Flowsheet Row First Filed Value   Admission Height 172.7 cm (68\") Documented at 05/19/2025 2146   Admission Weight 80.2 kg (176 lb 12.9 oz) Documented at 05/19/2025 2146              Intake/Output Summary (Last 24 hours) at 5/20/2025 1152  Last data filed at 5/20/2025 1130  Gross per 24 hour   Intake 0 ml   Output 1100 ml   Net -1100 ml        Telemetry:  sinus rhythm with 1st degree AV block and left bundle branch block    Physical Exam:  The patient is alert, oriented and in no distress.  Vital signs as noted above.  Head and neck revealed no carotid bruits or jugular venous distention.  No thyromegaly or lymphadenopathy is present  Lungs with scattered rhonchi.  No wheezing.  On room air.   Heart: Normal first and second heart sounds. + murmur.    Abdomen: Soft and nontender.  No organomegaly is present.  Extremities " with good peripheral pulses without any pedal edema.  Skin: Warm and dry.  Musculoskeletal system is grossly normal.  CNS grossly normal.       Results Review:  I have personally reviewed the results from the time of this admission to 5/20/2025 11:52 EDT and agree with these findings:  [x]  Laboratory  []  Microbiology  [x]  Radiology  [x]  EKG/Telemetry   [x]  Cardiology/Vascular   []  Pathology  [x]  Old records  []  Other:    Most notable findings include:     Lab Results (last 24 hours)       Procedure Component Value Units Date/Time    Urine Drug Screen - Urine, Clean Catch [[id number]]  (Normal) Collected: 05/20/25 0948    Specimen: Urine, Clean Catch Updated: 05/20/25 1036     THC, Screen, Urine Negative     Phencyclidine (PCP), Urine Negative     Cocaine Screen, Urine Negative     Methamphetamine, Ur Negative     Opiate Screen Negative     Amphetamine Screen, Urine Negative     Benzodiazepine Screen, Urine Negative     Tricyclic Antidepressants Screen Negative     Methadone Screen, Urine Negative     Barbiturates Screen, Urine Negative     Oxycodone Screen, Urine Negative     Buprenorphine, Screen, Urine Negative    Narrative:      Cutoff For Drugs Screened:    Amphetamines               500 ng/ml  Barbiturates               200 ng/ml  Benzodiazepines            150 ng/ml  Cocaine                    150 ng/ml  Methadone                  200 ng/ml  Opiates                    100 ng/ml  Phencyclidine               25 ng/ml  THC                         50 ng/ml  Methamphetamine            500 ng/ml  Tricyclic Antidepressants  300 ng/ml  Oxycodone                  100 ng/ml  Buprenorphine               10 ng/ml    The normal value for all drugs tested is negative. This report includes unconfirmed screening results, with the cutoff values listed, to be used for medical treatment purposes only.  Unconfirmed results must not be used for non-medical purposes such as employment or legal testing.  Clinical  consideration should be applied to any drug of abuse test, particularly when unconfirmed results are used.    All urine drugs of abuse requests without chain of custody are for medical screening purposes only.  False positives are possible.      Basic Metabolic Panel [[id number]]  (Abnormal) Collected: 05/20/25 0237    Specimen: Blood from Arm, Right Updated: 05/20/25 0314     Glucose 140 mg/dL      BUN 52 mg/dL      Creatinine 3.16 mg/dL      Sodium 133 mmol/L      Potassium 3.5 mmol/L      Chloride 98 mmol/L      CO2 24.8 mmol/L      Calcium 8.4 mg/dL      BUN/Creatinine Ratio 16.5     Anion Gap 10.2 mmol/L      eGFR 23.3 mL/min/1.73     Narrative:      GFR Categories in Chronic Kidney Disease (CKD)              GFR Category          GFR (mL/min/1.73)    Interpretation  G1                    90 or greater        Normal or high (1)  G2                    60-89                Mild decrease (1)  G3a                   45-59                Mild to moderate decrease  G3b                   30-44                Moderate to severe decrease  G4                    15-29                Severe decrease  G5                    14 or less           Kidney failure    (1)In the absence of evidence of kidney disease, neither GFR category G1 or G2 fulfill the criteria for CKD.    eGFR calculation 2021 CKD-EPI creatinine equation, which does not include race as a factor    CBC & Differential [[id number]]  (Abnormal) Collected: 05/20/25 0237    Specimen: Blood from Arm, Right Updated: 05/20/25 0254    Narrative:      The following orders were created for panel order CBC & Differential.  Procedure                               Abnormality         Status                     ---------                               -----------         ------                     CBC Auto Differential[[id number]]        Abnormal            Final result                 Please view results for these tests on the individual orders.    CBC Auto Differential  [[id number]]  (Abnormal) Collected: 05/20/25 0237    Specimen: Blood from Arm, Right Updated: 05/20/25 0254     WBC 7.53 10*3/mm3      RBC 4.24 10*6/mm3      Hemoglobin 12.7 g/dL      Hematocrit 39.0 %      MCV 92.0 fL      MCH 30.0 pg      MCHC 32.6 g/dL      RDW 16.2 %      RDW-SD 53.9 fl      MPV 10.3 fL      Platelets 235 10*3/mm3      Neutrophil % 51.1 %      Lymphocyte % 33.7 %      Monocyte % 10.5 %      Eosinophil % 4.2 %      Basophil % 0.4 %      Immature Grans % 0.1 %      Neutrophils, Absolute 3.84 10*3/mm3      Lymphocytes, Absolute 2.54 10*3/mm3      Monocytes, Absolute 0.79 10*3/mm3      Eosinophils, Absolute 0.32 10*3/mm3      Basophils, Absolute 0.03 10*3/mm3      Immature Grans, Absolute 0.01 10*3/mm3      nRBC 0.0 /100 WBC     High Sensitivity Troponin T 1Hr [[id number]]  (Abnormal) Collected: 05/20/25 0003    Specimen: Blood Updated: 05/20/25 0026     HS Troponin T 44 ng/L      Troponin T Numeric Delta -5 ng/L      Troponin T % Delta -10    Narrative:      High Sensitive Troponin T Reference Range:  <14.0 ng/L- Negative Female for AMI  <22.0 ng/L- Negative Male for AMI  >=14 - Abnormal Female indicating possible myocardial injury.  >=22 - Abnormal Male indicating possible myocardial injury.   Clinicians would have to utilize clinical acumen, EKG, Troponin, and serial changes to determine if it is an Acute Myocardial Infarction or myocardial injury due to an underlying chronic condition.         Respiratory Panel PCR w/COVID-19(SARS-CoV-2) RAMILA/ALONSO/GM/PAD/COR/WILLI In-House, NP Swab in UTM/VTM, 2 HR TAT - Swab, Nasopharynx [[id number]]  (Normal) Collected: 05/19/25 5502    Specimen: Swab from Nasopharynx Updated: 05/19/25 7556     ADENOVIRUS, PCR Not Detected     Coronavirus 229E Not Detected     Coronavirus HKU1 Not Detected     Coronavirus NL63 Not Detected     Coronavirus OC43 Not Detected     COVID19 Not Detected     Human Metapneumovirus Not Detected     Human Rhinovirus/Enterovirus Not Detected      Influenza A PCR Not Detected     Influenza B PCR Not Detected     Parainfluenza Virus 1 Not Detected     Parainfluenza Virus 2 Not Detected     Parainfluenza Virus 3 Not Detected     Parainfluenza Virus 4 Not Detected     RSV, PCR Not Detected     Bordetella pertussis pcr Not Detected     Bordetella parapertussis PCR Not Detected     Chlamydophila pneumoniae PCR Not Detected     Mycoplasma pneumo by PCR Not Detected    Narrative:      In the setting of a positive respiratory panel with a viral infection PLUS a negative procalcitonin without other underlying concern for bacterial infection, consider observing off antibiotics or discontinuation of antibiotics and continue supportive care. If the respiratory panel is positive for atypical bacterial infection (Bordetella pertussis, Chlamydophila pneumoniae, or Mycoplasma pneumoniae), consider antibiotic de-escalation to target atypical bacterial infection.    Comprehensive Metabolic Panel [[id number]]  (Abnormal) Collected: 05/19/25 7024    Specimen: Blood Updated: 05/19/25 4802     Glucose 116 mg/dL      BUN 53 mg/dL      Creatinine 3.31 mg/dL      Sodium 137 mmol/L      Potassium 3.7 mmol/L      Chloride 98 mmol/L      CO2 26.0 mmol/L      Calcium 8.8 mg/dL      Total Protein 6.8 g/dL      Albumin 3.7 g/dL      ALT (SGPT) 26 U/L      AST (SGOT) 32 U/L      Alkaline Phosphatase 55 U/L      Total Bilirubin 1.0 mg/dL      Globulin 3.1 gm/dL      A/G Ratio 1.2 g/dL      BUN/Creatinine Ratio 16.0     Anion Gap 13.0 mmol/L      eGFR 22.1 mL/min/1.73     Narrative:      GFR Categories in Chronic Kidney Disease (CKD)              GFR Category          GFR (mL/min/1.73)    Interpretation  G1                    90 or greater        Normal or high (1)  G2                    60-89                Mild decrease (1)  G3a                   45-59                Mild to moderate decrease  G3b                   30-44                Moderate to severe decrease  G4                     15-29                Severe decrease  G5                    14 or less           Kidney failure    (1)In the absence of evidence of kidney disease, neither GFR category G1 or G2 fulfill the criteria for CKD.    eGFR calculation 2021 CKD-EPI creatinine equation, which does not include race as a factor    BNP [[id number]]  (Abnormal) Collected: 05/19/25 2257    Specimen: Blood Updated: 05/19/25 2332     proBNP 24,295.0 pg/mL     Narrative:      This assay is used as an aid in the diagnosis of individuals suspected of having heart failure. It can be used as an aid in the diagnosis of acute decompensated heart failure (ADHF) in patients presenting with signs and symptoms of ADHF to the emergency department (ED). In addition, NT-proBNP of <300 pg/mL indicates ADHF is not likely.    Age Range Result Interpretation  NT-proBNP Concentration (pg/mL:      <50             Positive            >450                   Gray                 300-450                    Negative             <300    50-75           Positive            >900                  Gray                300-900                  Negative            <300      >75             Positive            >1800                  Gray                300-1800                  Negative            <300    High Sensitivity Troponin T [[id number]]  (Abnormal) Collected: 05/19/25 2257    Specimen: Blood Updated: 05/19/25 2332     HS Troponin T 49 ng/L     Narrative:      High Sensitive Troponin T Reference Range:  <14.0 ng/L- Negative Female for AMI  <22.0 ng/L- Negative Male for AMI  >=14 - Abnormal Female indicating possible myocardial injury.  >=22 - Abnormal Male indicating possible myocardial injury.   Clinicians would have to utilize clinical acumen, EKG, Troponin, and serial changes to determine if it is an Acute Myocardial Infarction or myocardial injury due to an underlying chronic condition.         [place] Draw [[id number]] Collected: 05/19/25 2257    Specimen: Blood Updated:  05/19/25 2315    Narrative:      The following orders were created for panel order Protection Draw.  Procedure                               Abnormality         Status                     ---------                               -----------         ------                     Green Top (Gel)[[id number]]                                  Final result               Lavender Top[[id number]]                                     Final result               Gold Top - SST[[id number]]                                   Final result               Light Blue Top[[id number]]                                   Final result                 Please view results for these tests on the individual orders.    Green Top (Gel) [[id number]] Collected: 05/19/25 2257    Specimen: Blood Updated: 05/19/25 2315     Extra Tube Hold for add-ons.     Comment: Auto resulted.       Lavender Top [[id number]] Collected: 05/19/25 2257    Specimen: Blood Updated: 05/19/25 2315     Extra Tube hold for add-on     Comment: Auto resulted       Gold Top - SST [[id number]] Collected: 05/19/25 2257    Specimen: Blood Updated: 05/19/25 2315     Extra Tube Hold for add-ons.     Comment: Auto resulted.       Light Blue Top [[id number]] Collected: 05/19/25 2257    Specimen: Blood Updated: 05/19/25 2315     Extra Tube Hold for add-ons.     Comment: Auto resulted       CBC & Differential [[id number]]  (Abnormal) Collected: 05/19/25 2257    Specimen: Blood Updated: 05/19/25 2305    Narrative:      The following orders were created for panel order CBC & Differential.  Procedure                               Abnormality         Status                     ---------                               -----------         ------                     CBC Auto Differential[[id number]]        Abnormal            Final result                 Please view results for these tests on the individual orders.    CBC Auto Differential [[id number]]  (Abnormal) Collected: 05/19/25 2257    Specimen: Blood  Updated: 05/19/25 2305     WBC 7.78 10*3/mm3      RBC 4.64 10*6/mm3      Hemoglobin 13.9 g/dL      Hematocrit 43.0 %      MCV 92.7 fL      MCH 30.0 pg      MCHC 32.3 g/dL      RDW 16.4 %      RDW-SD 55.6 fl      MPV 9.9 fL      Platelets 240 10*3/mm3      Neutrophil % 51.9 %      Lymphocyte % 32.3 %      Monocyte % 10.8 %      Eosinophil % 4.2 %      Basophil % 0.5 %      Immature Grans % 0.3 %      Neutrophils, Absolute 4.04 10*3/mm3      Lymphocytes, Absolute 2.51 10*3/mm3      Monocytes, Absolute 0.84 10*3/mm3      Eosinophils, Absolute 0.33 10*3/mm3      Basophils, Absolute 0.04 10*3/mm3      Immature Grans, Absolute 0.02 10*3/mm3      nRBC 0.0 /100 WBC             Imaging Results (Last 24 Hours)       Procedure Component Value Units Date/Time    XR Chest 2 View [[id number]] Collected: 05/19/25 2255     Updated: 05/19/25 2259    Narrative:      XR CHEST 2 VW    Date of Exam: 5/19/2025 10:45 PM EDT    Indication: SOA Triage Protocol    Comparison: 5/14/2025    Findings:  Stable cardiomegaly. No evidence of pneumothorax. There are no dense areas of airspace consolidation. There are advanced degenerative changes in the right glenohumeral joint. Mild pulmonary edema appears stable. Stable small right pleural effusion.      Impression:      Impression:  Stable exam. Findings suggest mild congestive heart failure.      Electronically Signed: Jake Farnsworth MD    5/19/2025 10:57 PM EDT    Workstation ID: [id number]            LAB RESULTS (LAST 7 DAYS)    CBC  Results from last 7 days   Lab Units 05/20/25  0237 05/19/25 2257   WBC 10*3/mm3 7.53 7.78   RBC 10*6/mm3 4.24 4.64   HEMOGLOBIN g/dL 12.7* 13.9   HEMATOCRIT % 39.0 43.0   MCV fL 92.0 92.7   PLATELETS 10*3/mm3 235 240       BMP  Results from last 7 days   Lab Units 05/20/25 0237 05/19/25 2257   SODIUM mmol/L 133* 137   POTASSIUM mmol/L 3.5 3.7   CHLORIDE mmol/L 98 98   CO2 mmol/L 24.8 26.0   BUN mg/dL 52* 53*   CREATININE mg/dL 3.16* 3.31*   GLUCOSE mg/dL 140* 116*        CMP   Results from last 7 days   Lab Units 05/20/25  0237 05/19/25  2257   SODIUM mmol/L 133* 137   POTASSIUM mmol/L 3.5 3.7   CHLORIDE mmol/L 98 98   CO2 mmol/L 24.8 26.0   BUN mg/dL 52* 53*   CREATININE mg/dL 3.16* 3.31*   GLUCOSE mg/dL 140* 116*   ALBUMIN g/dL  --  3.7   BILIRUBIN mg/dL  --  1.0   ALK PHOS U/L  --  55   AST (SGOT) U/L  --  32   ALT (SGPT) U/L  --  26       BNP        TROPONIN  Results from last 7 days   Lab Units 05/20/25  0003   HSTROP T ng/L 44*       CoAg        Creatinine Clearance  Estimated Creatinine Clearance: 32.4 mL/min (A) (by C-G formula based on SCr of 3.16 mg/dL (H)).    ABG          Radiology  XR Chest 2 View  Result Date: 5/19/2025  Impression: Stable exam. Findings suggest mild congestive heart failure. Electronically Signed: Jake Farnsworth MD  5/19/2025 10:57 PM EDT  Workstation ID: [id number]        EKG  I personally viewed and interpreted the patient's EKG/Telemetry data:  ECG 12 Lead Chest Pain   Final Result   HEART RATE=64  bpm   RR Zlwihsth=061  ms   ID Cergjbkg=793  ms   P Horizontal Axis=-19  deg   P Front Axis=42  deg   QRSD Qjuzcnde=628  ms   QT Fkdnftmn=110  ms   PNyN=550  ms   QRS Axis=-57  deg   T Wave Axis=101  deg   - ABNORMAL ECG -   Sinus rhythm   Prolonged ID interval   Left atrial enlargement   Left bundle branch block   When compared with ECG of 19-May-2025 21:54:44,   No significant change   Electronically Signed By: Carlos Manuel Sotomayor (Lei) 2025-05-20 08:10:28   Date and Time of Study:2025-05-20 00:10:28      ECG 12 Lead Dyspnea   Preliminary Result   HEART RATE=70  bpm   RR Nsszfeva=550  ms   ID Oezgzbcg=533  ms   P Horizontal Axis=-18  deg   P Front Axis=27  deg   QRSD Llsrwmko=031  ms   QT Hftrkenx=261  ms   YFvL=584  ms   QRS Axis=-80  deg   T Wave Axis=95  deg   - ABNORMAL ECG -   Sinus rhythm   Atrial premature complex   Prolonged ID interval   Left atrial enlargement   Left bundle branch block   Date and Time of Study:2025-05-19 21:53:24      Telemetry  Scan   Final Result      Telemetry Scan   Final Result      Telemetry Scan   Final Result            Echocardiogram:    Results for orders placed during the hospital encounter of 25    Adult Transthoracic Echo Complete W/ Cont if Necessary Per Protocol    Interpretation Summary    Left ventricular ejection fraction appears to be 46 - 50%.    Left ventricular diastolic function is consistent with (grade II w/high LAP) pseudonormalization.  Average GLS -13%.    The right ventricular cavity is dilated.    The left atrial cavity is dilated.    Moderate mitral valve regurgitation is present.    Moderate tricuspid valve regurgitation is present.    Estimated right ventricular systolic pressure from tricuspid regurgitation is mildly elevated (35-45 mmHg).        Stress Test:        Cardiac Catheterization:  Results for orders placed during the hospital encounter of 22    Cardiac Catheterization/Vascular Study    Narrative   Malik Devlin MD, PhD  [facility] cardiology  Date of service 2022    Procedure  1.  Left heart catheterization with coronary angiography left ventriculography in GUEVARA position    Indication  Elevated troponin, non-ST elevation microinfarction  Family history of early CAD with  father and grandfather    After informed consent patient was brought to the Cath Lab sterilely prepped and draped in usual fashion with exposure of the right groin for right common femoral arterial access via micropuncture modified center technique with placement of a 6 Irish sheath which was aspirated flushed with epimysium.  A 3 5 guidewire was advanced to the aortic valve followed by diagnostic JL 4 and JR4 catheters for selective left and right coronary angiography respectively.  The JR4 was used across aortic valve followed by hand-injection for LV gram, EDP assessment and pullback assessment of the transaortic valve gradient.  There was no obstructive CAD with borderline reduced  LV systolic function 45 to 50% globally, all catheters equipment removed, Angio-Seal was used to close right common femoral arteriotomy with immediate hemostasis and means of distal pulses.  There were no complications and left Cath Lab chest pain-free hemodynamically electrically stable alert talkative staff neurologic grossly intact bilaterally    Complications none  Blood loss less than 5 cc  Contrast 65 cc  Moderate conscious sedation time 30 minutes with IV Versed and fentanyl administered by registered nurse with complete ECG pulse oximetry and hemodynamic monitoring throughout the entirety of the case observed by me    Findings  1.  Opening aortic pressure was 141/96  2.  Closing pressure was unchanged  3.  LVEDP elevated at 22 with diastolic waveform consistent with likely is grade 2 diastolic dysfunction  4.  LV systolic function borderline reduced 45 to 50%  5.  No transaortic valve gradient seen on pullback    Angiography  Left main normal no disease  2.  The LAD is a large-caliber vessel coursing to around the apex which has mild diffuse luminal regularities, diagonals also have diffuse luminal irregularities only  3.  Circumflex is a large-caliber nondominant with obtuse marginal branches, nonobstructive luminal irregularities only  4.  RCA small, likely codominant, anterior takeoff, no angiographic disease, tapers dramatically in the midportion with very small RPDA and PLV branch    Conclusions recommendations  1 minimal epicardial coronary artery disease with anterior takeoff of the RCA, borderline reduced LV systolic function 45 to 50%, no transaortic valve gradient, high LVEDP at 18-22 with evidence of diastolic dysfunction  2.  Cessation of all substance abuse including methamphetamine clearly taking its toll, counseling provided again today  3.  Hypertension control, Coreg on board along with amlodipine, start losartan ultimately without LV systolic dysfunction in combination with Lasix if  needed  4.  Patient can be discharged was discharged criteria met with hypertension controlled and stable oral medications with outpatient follow-up    Malik Devlin MD, PhD        Other:      ASSESSMENT & PLAN:    Principal Problem:    Acute heart failure with mildly reduced ejection fraction (HFmrEF, 41-49%)  Active Problems:    History of substance abuse    Tobacco abuse    Elevated troponin    Hypertension    PAF (paroxysmal atrial fibrillation)    Hepatitis C    Hyperlipidemia    Valvular heart disease    CKD (chronic kidney disease) stage 4, GFR 15-29 ml/min    Anemia in chronic kidney disease (CKD)    LBBB (left bundle branch block)      Acute heart failure with mid-range ejection fraction   The patient states he was without his Bumex for 1 week.   GYPSY done in November 2024 showed LVEF of 51-55%  Cardiac catheterization in 2022 showed nonobstructive coronary artery disease.   Repeat echocardiogram shows LVEF of 46-50% with grade II diastolic dysfunction.  proBNP 24,295 on admission   Continue Bumex 0.5 mg IV twice daily  Continue beta blocker and ARB  Not on SGLT2-I due to renal dysfunction   Compliance with medication encouraged.   Recommend repeat echocardiogram after 3 months of GDMT.  Heart failure pathway initiated  Heart failure nurse navigator consulted  Strict I&Os and daily weight  2 g Na diet    Valvular heart disease  Moderate mitral and tricuspid valve regurgitation per echocardiogram.  On GDMT as above  Recommend repeat echocardiogram after 3 months of GDMT.     Elevated troponin / Left bundle branch block  Serial high sensitivity troponin 49, 44  EKG with new left bundle branch block.  The patient states his chest tightness improved after he received a breathing treatment.  He is currently angina-free.  Cardiac catheterization in 2022 showed nonobstructive coronary artery disease.   Likely type II MI secondary to acute CHF exacerbation and chronic kidney disease.   Continue aspirin, high  intensity statin and beta blocker  Consider outpatient ischemic workup    Hypertension, chronic  Blood pressure is elevated  Restart Coreg 25 mg twice daily, hydralazine 25 mg three times daily, and losartan 50 mg daily  Closely monitor blood pressure   Compliance with medications and follow-up recommended     Hyperlipidemia   in Jan. 2025  Repeat lipid panel  Continue rosuvastatin     PAF (paroxysmal atrial fibrillation)  Status post successful cardioversion on 11/25/2024.   Continue beta blocker for rate control  [id number] score is 3  Continue Eliquis 5 mg twice daily     CKD (chronic kidney disease) stage 4, GFR 15-29 ml/min  Baseline creatinine around 3  Creatinine on admission 3.31  Consult nephrology for diuresis management  Monitor BMP    Anemia in chronic kidney disease (CKD)  Hemoglobin is stable at 12.7  Monitor H&H    Hepatitis C  History of substance abuse  Urine drug screen is negative     Tobacco abuse  Smoking cessation recommended  Nicotine patch       Electronically signed by CHANDLER Rhodes, 05/20/25, 2:12 PM EDT.

## 2025-05-20 NOTE — PLAN OF CARE
Goal Outcome Evaluation:  Plan of Care Reviewed With: patient        Progress: improving  Outcome Evaluation: here monitoring troponin. diuretics given

## 2025-05-20 NOTE — PLAN OF CARE
Problem: Adult Inpatient Plan of Care  Goal: Plan of Care Review  5/20/2025 0301 by Rachel Blanco RN  Outcome: Progressing  5/20/2025 0301 by Rachel Blanco RN  Outcome: Progressing  Goal: Patient-Specific Goal (Individualized)  5/20/2025 0301 by Rachel Blanco RN  Outcome: Progressing  5/20/2025 0301 by Rachel Blanco RN  Outcome: Progressing  Goal: Absence of Hospital-Acquired Illness or Injury  5/20/2025 0301 by Rachel Blanco RN  Outcome: Progressing  5/20/2025 0301 by Rachel Blanco RN  Outcome: Progressing  Intervention: Identify and Manage Fall Risk  Intervention: Prevent Skin Injury  Goal: Optimal Comfort and Wellbeing  5/20/2025 0301 by Rachel Blanco RN  Outcome: Progressing  5/20/2025 0301 by Rachel Blanco RN  Outcome: Progressing  Goal: Readiness for Transition of Care  5/20/2025 0301 by Rachel Blanco RN  Outcome: Progressing  5/20/2025 0301 by Rachel Blanco RN  Outcome: Progressing  Intervention: Mutually Develop Transition Plan   Goal Outcome Evaluation:           Progress: improving  Outcome Evaluation: here monitoring troponin. diuretics given

## 2025-05-20 NOTE — PLAN OF CARE
Problem: Adult Inpatient Plan of Care  Goal: Plan of Care Review  5/20/2025 0303 by Rachel Blanco RN  Outcome: Progressing  5/20/2025 0303 by Rachel Blanco RN  Outcome: Progressing  5/20/2025 0302 by Rachel Blanco RN  Outcome: Progressing  5/20/2025 0301 by Rachel Blanco RN  Outcome: Progressing  5/20/2025 0301 by Rachel Blanco RN  Outcome: Progressing  Goal: Patient-Specific Goal (Individualized)  5/20/2025 0303 by Rachel Blanco RN  Outcome: Progressing  5/20/2025 0303 by Rachel Blanco RN  Outcome: Progressing  5/20/2025 0302 by Rachel Blanco RN  Outcome: Progressing  5/20/2025 0301 by Rachel Blanco RN  Outcome: Progressing  5/20/2025 0301 by Rachel Blanco RN  Outcome: Progressing  Goal: Absence of Hospital-Acquired Illness or Injury  5/20/2025 0303 by Rachel Blanco RN  Outcome: Progressing  5/20/2025 0303 by Rachel Blanco RN  Outcome: Progressing  5/20/2025 0302 by Rachel Blanco RN  Outcome: Progressing  5/20/2025 0301 by Rachel Blanco RN  Outcome: Progressing  5/20/2025 0301 by Rachel Blanco RN  Outcome: Progressing  Intervention: Identify and Manage Fall Risk  Intervention: Prevent Skin Injury  Goal: Optimal Comfort and Wellbeing  5/20/2025 0303 by Rachel Blanco RN  Outcome: Progressing  5/20/2025 0303 by Rachel Blanco RN  Outcome: Progressing  5/20/2025 0302 by Rachel Blanco RN  Outcome: Progressing  5/20/2025 0301 by Rachel Blanco RN  Outcome: Progressing  5/20/2025 0301 by Rachel Blanco RN  Outcome: Progressing  Goal: Readiness for Transition of Care  5/20/2025 0303 by Rachel Blanco RN  Outcome: Progressing  5/20/2025 0303 by Rachel Blanco RN  Outcome: Progressing  5/20/2025 0302 by Rachel Blanco RN  Outcome: Progressing  5/20/2025 0301 by Rachel Blanco RN  Outcome: Progressing  5/20/2025 0301 by Rachel Blanco RN  Outcome: Progressing  Intervention: Mutually  Develop Transition Plan   Goal Outcome Evaluation:  Plan of Care Reviewed With: patient        Progress: improving  Outcome Evaluation: here monitoring troponin. diuretics given

## 2025-05-20 NOTE — CONSULTS
INITIAL CONSULT NOTE      Patient Name: Scott Gaytan  : 1975  MRN: [id number]  Primary Care Physician: Vicki Santana APRN  Date of admission: 2025    Patient Care Team:  Vicki Santana APRN as PCP - General (Emergency Medicine)  ChemchirianCary MD as Consulting Physician (Cardiology)        Reason for Consult:       Chronic kidney disease, stage IV, hyperkalemia.  Subjective   History of Present Illness:   Chief Complaint:   Chief Complaint   Patient presents with    Shortness of Breath     History of present illness    This patient is a 50-year-old gentleman, very well-known to us, came with shortness of breath not feeling well as he ran out his medications tremaine Bumex and was not taking it, received 0.5 mg Bumex already better , CXR c/w [place] edema and pt feeling better, Edema jessie better     Review of systems:    Constitutional: No fever, no chills, no lethargy, no weakness.  HEENT:  No headache, otalgia, itchy eyes, nasal discharge or sore throat.  Cardiac:  + Shortness of breath   chest:              No cough, phlegm or wheezing.  Abdomen:  No abdominal pain, nausea or vomiting.  Neuro:  No focal weakness, abnormal movements or seizure-like activity.  :   No hematuria, no pyuria, no dysuria, no flank pain.  ROS was otherwise negative except as mentioned in the [place].       Personal History:     Past Medical History:   Past Medical History:   Diagnosis Date    Arteriovenous fistula 2025    Atrial fibrillation 2024    Rate controlled with Coreg    Anticoagulated with renal dosed Eliquis 2.5 mg twice daily    Without history of ablations or DCCV         CHF (congestive heart failure)     CKD (chronic kidney disease)     CKD (chronic kidney disease) stage 4, GFR 15-29 ml/min 2025    Followed by Dr Das,       COVID-19 virus detected 2021    Cytokine release syndrome, grade 1 2021    h/o Medication and medical follow-up noncompliance 04/10/2024    Hepatitis C  01/03/2025    Hyperkalemia 04/26/2022    Hyperlipidemia 01/21/2025    Hypertension     Hypertensive heart disease with congestive heart failure and chronic kidney disease 02/11/2025    Hypertensive urgency 04/26/2022    Methamphetamine abuse 04/26/2022    Methamphetamine intoxication 06/16/2024    Mild to moderate valvular heart disease 02/06/2025    Mild to moderate mitral valve regurgitation      Secondary hyperparathyroidism 02/11/2025    Substance abuse     Meth/MJ    Tick bite of abdomen 04/26/2022    Tobacco abuse 04/26/2022       Surgical History:      Past Surgical History:   Procedure Laterality Date    CARDIAC CATHETERIZATION N/A 04/27/2022    ORIF TIBIA/FIBULA FRACTURES Right        Family History: family history includes Arthritis in his mother; Diabetes in his mother; Heart attack in his father and paternal grandfather; Hypertension in his father; Seizures in his sister. Otherwise pertinent FHx was reviewed and unremarkable.     Social History:  reports that he has been smoking cigarettes. He started smoking about 31 years ago. He has a 30.6 pack-year smoking history. He has never been exposed to tobacco smoke. He has never used smokeless tobacco. He reports that he does not currently use alcohol. He reports current drug use. Drugs: Methamphetamines, Marijuana, and Cocaine(coke).    Medications:  Prior to Admission medications    Medication Sig Start Date End Date Taking? Authorizing Provider   albuterol sulfate  (90 Base) MCG/ACT inhaler Inhale 2 puffs Every 6 (Six) Hours As Needed for Wheezing.   Yes ProviderIsabel MD   apixaban (ELIQUIS) 5 MG tablet tablet Take 1 tablet by mouth 2 (Two) Times a Day.   Yes ProviderIsabel MD   aspirin 81 MG EC tablet Take 1 tablet by mouth Daily.   Yes ProviderIsabel MD   budesonide-formoterol (SYMBICORT) 80-4.5 MCG/ACT inhaler Inhale 2 puffs 2 (Two) Times a Day.   Yes ProviderIsabel MD   bumetanide (BUMEX) 1 MG tablet Take 3 tablets  by mouth 3 times a day.   Yes Isabel Camp MD   busPIRone (BUSPAR) 10 MG tablet Take 1 tablet by mouth 2 (Two) Times a Day.   Yes Isabel Camp MD   carvedilol (COREG) 25 MG tablet Take 1 tablet by mouth 2 (Two) Times a Day With Meals.   Yes Isabel Camp MD   Fluticasone-Salmeterol (Advair Diskus) 250-50 MCG/ACT DISKUS Inhale 1 puff 2 (Two) Times a Day.   Yes Isabel Camp MD   isosorbide mononitrate (IMDUR) 30 MG 24 hr tablet Take 2 tablets by mouth Daily.   Yes Isabel Camp MD   metOLazone (ZAROXOLYN) 2.5 MG tablet Take 1 tablet by mouth 3 (Three) Times a Week. Mon, Wed, Fri   Yes Isabel Camp MD   NIFEdipine XL (PROCARDIA XL) 60 MG 24 hr tablet Take 1 tablet by mouth Daily.   Yes Isabel Camp MD   nitroglycerin (NITROSTAT) 0.4 MG SL tablet Place 1 tablet under the tongue Every 5 (Five) Minutes As Needed for Chest Pain. Take no more than 3 doses in 15 minutes.   Yes Isabel Camp MD   pantoprazole (PROTONIX) 40 MG EC tablet Take 1 tablet by mouth Daily.   Yes Isabel Camp MD   potassium chloride (KLOR-CON M20) 20 MEQ CR tablet Take 1 tablet by mouth 3 (Three) Times a Day.   Yes Isabel Camp MD   sevelamer (RENAGEL) 800 MG tablet Take 1 tablet by mouth 3 (Three) Times a Day With Meals.   Yes Isabel Camp MD   sacubitril-valsartan (ENTRESTO) 24-26 MG tablet Take 1 tablet by mouth 2 (Two) Times a Day.  11/23/24  Isabel Camp MD     Scheduled Meds:[Held by provider] apixaban, 2.5 mg, Oral, BID  [START ON 5/21/2025] aspirin, 81 mg, Oral, Daily  bumetanide, 0.5 mg, Intravenous, Q12H  carvedilol, 25 mg, Oral, BID With Meals  hydrALAZINE, 25 mg, Oral, TID  losartan, 50 mg, Oral, Daily  methylPREDNISolone sodium succinate, 125 mg, Intravenous, Once  nicotine, 1 patch, Transdermal, Q24H  potassium chloride, 20 mEq, Oral, Daily  rosuvastatin, 40 mg, Oral, Nightly  sodium chloride, 10 mL, Intravenous, Q12H      Continuous  Infusions:Pharmacy to Dose enoxaparin (LOVENOX),       PRN Meds:  acetaminophen **OR** acetaminophen **OR** acetaminophen    senna-docusate sodium **AND** polyethylene glycol **AND** bisacodyl **AND** bisacodyl    nitroglycerin    ondansetron ODT **OR** ondansetron    Pharmacy to Dose enoxaparin (LOVENOX)    polyethyl glycol-propyl glycol    sodium chloride    sodium chloride    sodium chloride  Allergies:    Allergies   Allergen Reactions    Methylprednisolone Swelling     Pt reports severe swelling with steroids    Morphine Other (See Comments)     Makes it feel like my blood is boiling in my veins       Objective   Exam:     Vital Signs  Temp:  [97 °F (36.1 °C)-97.8 °F (36.6 °C)] 97.8 °F (36.6 °C)  Heart Rate:  [63-70] 63  Resp:  [16-20] 17  BP: (139-176)/() 176/111  SpO2:  [96 %-100 %] 98 %  on   ;   Device (Oxygen Therapy): room air  Body mass index is 26.88 kg/m².  EXAM  General: No acute distress  Head:      Normocephalic and atraumatic.    Eyes:      PERRL/EOM intact, conjunctivae and sclerae clear without nystagmus.    Neck:      No masses, thyromegaly,  trachea central   Lungs:    basal rales  to auscultation.    Heart:      Regular rate and rhythm, no murmur no gallop  Abd:        Soft, nontender, not distended, bowel sounds positive, no shifting dullness.  Msk:        No deformity or scoliosis noted of thoracic or lumbar spine.    Pulses:   Pulses normal in all 4 extremities.    Extremities:        No cyanosis or clubbing--trace to mild  edema.    Neuro:    No focal deficits.   alert oriented x3  Skin:       Intact without lesions or rashes.    Psych:    Alert and cooperative; normal mood and affect; normal attention span       Results Review:  I have personally reviewed most recent Data :  BMP @LABRCNT(creatinine:10)  CBC    Results from last 7 days   Lab Units 05/20/25  0237 05/19/25  2257   WBC 10*3/mm3 7.53 7.78   HEMOGLOBIN g/dL 12.7* 13.9   PLATELETS 10*3/mm3 235 240     CMP   Results from  last 7 days   Lab Units 05/20/25  0237 05/19/25  2257   SODIUM mmol/L 133* 137   POTASSIUM mmol/L 3.5 3.7   CHLORIDE mmol/L 98 98   CO2 mmol/L 24.8 26.0   BUN mg/dL 52* 53*   CREATININE mg/dL 3.16* 3.31*   GLUCOSE mg/dL 140* 116*   ALBUMIN g/dL  --  3.7   BILIRUBIN mg/dL  --  1.0   ALK PHOS U/L  --  55   AST (SGOT) U/L  --  32   ALT (SGPT) U/L  --  26     ABG      XR Chest 2 View  Result Date: 5/19/2025  Impression: Stable exam. Findings suggest mild congestive heart failure. Electronically Signed: Jake Farnsworth MD  5/19/2025 10:57 PM EDT  Workstation ID: [id number]    XR Chest 2 View  Result Date: 5/14/2025  Impression: Pulmonary vascular congestion with trace pulmonary edema. Small bilateral pleural effusions. Electronically Signed: Michael Rose MD  5/14/2025 12:05 PM EDT  Workstation ID: [id number]      Results for orders placed during the hospital encounter of 11/23/24    Adult Transesophageal Echo (GYPSY) W/ Cont if Necessary Per Protocol (Cardiology Department)    Interpretation Summary    Left ventricular ejection fraction appears to be 51 - 55%.    Saline test results are negative.    No thrombus was visualized in the left atrial appendage.      Electronically signed by Cary Espinosa MD, 11/26/24, 5:37 PM EST.        Assessment & Plan   Assessment and Plan:         Acute on chronic diastolic congestive heart failure    Methamphetamine abuse    Tobacco abuse    Elevated troponin    Hypertension    PAF (paroxysmal atrial fibrillation)    Hepatitis C    Hyperlipidemia    Mild to moderate valvular heart disease    CKD (chronic kidney disease) stage 4, GFR 15-29 ml/min    ASSESSMENT:  Chronic kidney disease stage IV, with kidney biopsy consistent with hypertensive nephrosclerosis with significant glomerulosclerosis and fibrosis, s/p AVF creation.  August 5, 2024, subsequently underwent transposition of brachiobasilic AV fistula on November 11, 2023 baseline creatinine variable but roughly 3 mg/dL  Acute on Chronic  Hypertension  History of atrial fibrillation  Coronary artery disease  History of polysubstance abuse  Anemia of chronic kidney disease  History of hepatitis C    Echocardiogram from 10/20/2024 revealed ejection fraction of 61%      PLAN :     Renal function continue to be better close to baseline that is 3   Hyperkalemia, acceptable   Follow up with card   As Pt on Bumex 2 mg BID at home will give 2 mg IVP than give 2 mg PO bid   Follow up with volume closely   Monitor potassium, now improved  No need of dialysis  Follow-up as an outpatient as already scheduled  Follow-up with card   Will continue to follow  Thanks.          Nelson Das MD  [facility]  5/20/2025  08:40 EDT

## 2025-05-20 NOTE — ED PROVIDER NOTES
Subjective   History of Present Illness  50-year-old male with dyspnea and fatigue for the past 1 week presents for evaluation.  Patient denies any cough or fever but states he has had some intermittent mild chest tightness and wheezing.  No calf pain or swelling, no recent trips or travel.      Review of Systems   Constitutional:  Positive for fatigue.   Respiratory:  Positive for shortness of breath and wheezing.    Cardiovascular:  Positive for chest pain.       Past Medical History:   Diagnosis Date    CHF (congestive heart failure)     CKD (chronic kidney disease)     Hypertension     Substance abuse     Meth/MJ       Allergies   Allergen Reactions    Methylprednisolone Swelling     Pt reports severe swelling with steroids    Morphine Other (See Comments)     Makes it feel like my blood is boiling in my veins       Past Surgical History:   Procedure Laterality Date    CARDIAC CATHETERIZATION N/A 04/27/2022    ORIF TIBIA/FIBULA FRACTURES Right        Family History   Problem Relation Age of Onset    Diabetes Mother     Arthritis Mother     Hypertension Father     Heart attack Father     Seizures Sister     Heart attack Paternal Grandfather        Social History     Socioeconomic History    Marital status: Single   Tobacco Use    Smoking status: Every Day     Current packs/day: 0.25     Average packs/day: 1 pack/day for 31.4 years (30.6 ttl pk-yrs)     Types: Cigarettes     Start date: 1994     Passive exposure: Never    Smokeless tobacco: Never   Vaping Use    Vaping status: Never Used   Substance and Sexual Activity    Alcohol use: Not Currently    Drug use: Yes     Types: Methamphetamines, Marijuana, Cocaine(coke)    Sexual activity: Yes           Objective   Physical Exam  Constitutional:       Appearance: He is well-developed.   HENT:      Head: Normocephalic and atraumatic.      Mouth/Throat:      Mouth: Mucous membranes are moist.      Pharynx: Oropharynx is clear.   Eyes:      Pupils: Pupils are equal,  round, and reactive to light.   Cardiovascular:      Rate and Rhythm: Normal rate and regular rhythm.   Pulmonary:      Effort: Pulmonary effort is normal.      Comments: Moderate wheeze bilaterally  Abdominal:      General: Bowel sounds are normal. There is no distension.      Palpations: Abdomen is soft.      Tenderness: There is no abdominal tenderness.   Musculoskeletal:         General: No swelling or tenderness. Normal range of motion.   Skin:     General: Skin is warm and dry.      Capillary Refill: Capillary refill takes less than 2 seconds.   Neurological:      Mental Status: He is alert.   Psychiatric:         Mood and Affect: Mood normal.         Behavior: Behavior normal.         Procedures           ED Course                                                       Medical Decision Making  Patient with worsening chest pain per nursing, EKG repeated, interpretation: Sinus rhythm, rate 64, left bundle branch block, no acute interval change appreciated, repeat troponin with troponin trending down 49/44, chest x-ray with mild CHF, negative PCR panel    Patient reported chest pain after albuterol neb.  Will hold albuterol for now, diuresis, nitrates for blood pressure, a.m. cardiac consultation, placed in ED observation.    Problems Addressed:  Acute on chronic congestive heart failure, unspecified heart failure type: complicated acute illness or injury    Amount and/or Complexity of Data Reviewed  External Data Reviewed: ECG.     Details: As below  Labs: ordered.     Details: As above  Radiology: ordered and independent interpretation performed.     Details: Mild CHF  ECG/medicine tests: ordered and independent interpretation performed.     Details: EKG interpretation: Normal sinus rhythm, rate 67, left bundle branch block, compared with tracing done on 5/14/2025, no acute interval change appreciated    Risk  OTC drugs.  Prescription drug management.  Decision regarding hospitalization.        Final diagnoses:    Acute on chronic congestive heart failure, unspecified heart failure type       ED Disposition  ED Disposition       ED Disposition   Decision to Admit    Condition   --    Comment   --               No follow-up provider specified.       Medication List      No changes were made to your prescriptions during this visit.            Carlos Manuel Sotomayor MD  05/20/25 0045       Carlos Manuel Sotomayor MD  05/20/25 0045

## 2025-05-20 NOTE — CASE MANAGEMENT/SOCIAL WORK
Discharge Planning Assessment   [facility]     Patient Name: Scott Gaytan  MRN: [id number]  Today's Date: 5/20/2025    Admit Date: 5/19/2025    Plan: Routine home. Sister Ann Marie for transport.   Discharge Needs Assessment       Row Name 05/20/25 1503       Living Environment    People in Home sibling(s)    Name(s) of People in Home lives with sister Ann Marie    Current Living Arrangements home    Potentially Unsafe Housing Conditions none    In the past 12 months has the electric, gas, oil, or water company threatened to shut off services in your home? No    Primary Care Provided by self    Provides Primary Care For no one    Family Caregiver if Needed sibling(s)    Family Caregiver Names sister Ann Marie    Quality of Family Relationships helpful;involved;supportive    Able to Return to Prior Arrangements yes       Resource/Environmental Concerns    Resource/Environmental Concerns none    Transportation Concerns none       Transportation Needs    In the past 12 months, has lack of transportation kept you from medical appointments or from getting medications? no    In the past 12 months, has lack of transportation kept you from meetings, work, or from getting things needed for daily living? No       Food Insecurity    Within the past 12 months, you worried that your food would run out before you got the money to buy more. Never true    Within the past 12 months, the food you bought just didn't last and you didn't have money to get more. Never true       Transition Planning    Patient/Family Anticipates Transition to home with family    Patient/Family Anticipated Services at Transition none    Transportation Anticipated family or friend will provide       Discharge Needs Assessment    Readmission Within the Last 30 Days no previous admission in last 30 days    Equipment Currently Used at Home bp cuff    Concerns to be Addressed denies needs/concerns at this time    Do you want help finding or keeping work or a job? I do  not need or want help    Do you want help with school or training? For example, starting or completing job training or getting a high school diploma, GED or equivalent No    Anticipated Changes Related to Illness none    Equipment Needed After Discharge none                   Discharge Plan       Row Name 05/20/25 1503       Plan    Plan Routine home. Sister Ann Marie for transport.    Plan Comments CM met with patient at the bedside. Confirmed PCP, insurance, and pharmacy. Patient agreeable in M2B. Patient denies any difficulty affording medications. Patient is not current with any HHC/OPPT/OT services. Patient lives at home with his sister Ann Marie, is independent with ADLS/IADLS, and does not drive. Sister Ann Marie for DC transport. DC Barriers: CHF APRN, Nephrology, and Cardiology following, echo today, IV solu-medrol, IV bumex.                    Expected Discharge Date and Time       Expected Discharge Date Expected Discharge Time    May 20, 2025            Demographic Summary       Row Name 05/20/25 1501       General Information    Admission Type observation    Arrived From emergency department    Referral Source admission list    Reason for Consult discharge planning    Preferred Language English       Contact Information    Permission Granted to Share Info With     Contact Information Obtained for                    Functional Status       Row Name 05/20/25 1508       Functional Status    Usual Activity Tolerance good    Current Activity Tolerance good       Functional Status, IADL    Medications independent    Meal Preparation independent    Housekeeping independent    Laundry independent    Shopping independent;assistive person    If for any reason you need help with day-to-day activities such as bathing, preparing meals, shopping, managing finances, etc., do you get the help you need? I get all the help I need           Ramiro Dickson RN     Cell number  [phone]  Office number [phone]

## 2025-05-20 NOTE — SIGNIFICANT NOTE
05/20/25 1503   Living Situation   Current Living Arrangements home   Potentially Unsafe Housing Conditions none   Food Insecurity   Within the past 12 months, you worried that your food would run out before you got the money to buy more. Never true   Within the past 12 months, the food you bought just didn't last and you didn't have money to get more. Never true   Transportation Needs   In the past 12 months, has lack of transportation kept you from medical appointments or from getting medications? no   In the past 12 months, has lack of transportation kept you from meetings, work, or from getting things needed for daily living? No   Utilities   In the past 12 months has the electric, gas, oil, or water company threatened to shut off services in your home? No   Financial Resource Strain   How hard is it for you to pay for the very basics like food, housing, medical care, and heating? Not very   Employment   Do you want help finding or keeping work or a job? I do not need or want help   Family and Community Support   If for any reason you need help with day-to-day activities such as bathing, preparing meals, shopping, managing finances, etc., do you get the help you need? I get all the help I need   How often do you feel lonely or isolated from those around you? Rarely   Education   Preferred Language English   Do you want help with school or training? For example, starting or completing job training or getting a high school diploma, GED or equivalent No   Physical Activity   On average, how many days per week do you engage in moderate to strenuous exercise (like a brisk walk)? 0 days   On average, how many minutes do you engage in exercise at this level? 0 min   Number of minutes of exercise per week (!) 0   Stress   Do you feel stress - tense, restless, nervous, or anxious, or unable to sleep at night because your mind is troubled all the time - these days? To some exte   Mental Health   Little interest or  pleasure in doing things Not at all   Feeling down, depressed, or hopeless Not at all   Disabilities   Difficulty Concentrating, Remembering or Making Decisions no   Difficulty Managing Errands Independently no

## 2025-05-20 NOTE — PLAN OF CARE
Goal Outcome Evaluation:  Plan of Care Reviewed With: patient        Progress: improving  Outcome Evaluation: here monitoring troponin. diuretics given                              Former smoker

## 2025-05-21 ENCOUNTER — READMISSION MANAGEMENT (OUTPATIENT)
Dept: CALL CENTER | Facility: HOSPITAL | Age: 50
End: 2025-05-21
Payer: MEDICAID

## 2025-05-21 VITALS
RESPIRATION RATE: 21 BRPM | HEART RATE: 61 BPM | OXYGEN SATURATION: 98 % | SYSTOLIC BLOOD PRESSURE: 142 MMHG | WEIGHT: 176.81 LBS | BODY MASS INDEX: 26.8 KG/M2 | HEIGHT: 68 IN | DIASTOLIC BLOOD PRESSURE: 95 MMHG | TEMPERATURE: 97.5 F

## 2025-05-21 LAB
ANION GAP SERPL CALCULATED.3IONS-SCNC: 11.1 MMOL/L (ref 5–15)
BASOPHILS # BLD AUTO: 0.03 10*3/MM3 (ref 0–0.2)
BASOPHILS NFR BLD AUTO: 0.4 % (ref 0–1.5)
BUN SERPL-MCNC: 54 MG/DL (ref 6–20)
BUN/CREAT SERPL: 16.9 (ref 7–25)
CALCIUM SPEC-SCNC: 8 MG/DL (ref 8.6–10.5)
CHLORIDE SERPL-SCNC: 105 MMOL/L (ref 98–107)
CO2 SERPL-SCNC: 24.9 MMOL/L (ref 22–29)
CREAT SERPL-MCNC: 3.2 MG/DL (ref 0.76–1.27)
DEPRECATED RDW RBC AUTO: 57.2 FL (ref 37–54)
EGFRCR SERPLBLD CKD-EPI 2021: 23 ML/MIN/1.73
EOSINOPHIL # BLD AUTO: 0.28 10*3/MM3 (ref 0–0.4)
EOSINOPHIL NFR BLD AUTO: 4.1 % (ref 0.3–6.2)
ERYTHROCYTE [DISTWIDTH] IN BLOOD BY AUTOMATED COUNT: 16.6 % (ref 12.3–15.4)
GLUCOSE SERPL-MCNC: 119 MG/DL (ref 65–99)
HCT VFR BLD AUTO: 38.3 % (ref 37.5–51)
HGB BLD-MCNC: 12.1 G/DL (ref 13–17.7)
IMM GRANULOCYTES # BLD AUTO: 0.01 10*3/MM3 (ref 0–0.05)
IMM GRANULOCYTES NFR BLD AUTO: 0.1 % (ref 0–0.5)
LYMPHOCYTES # BLD AUTO: 2.23 10*3/MM3 (ref 0.7–3.1)
LYMPHOCYTES NFR BLD AUTO: 33 % (ref 19.6–45.3)
MCH RBC QN AUTO: 29.8 PG (ref 26.6–33)
MCHC RBC AUTO-ENTMCNC: 31.6 G/DL (ref 31.5–35.7)
MCV RBC AUTO: 94.3 FL (ref 79–97)
MONOCYTES # BLD AUTO: 0.71 10*3/MM3 (ref 0.1–0.9)
MONOCYTES NFR BLD AUTO: 10.5 % (ref 5–12)
NEUTROPHILS NFR BLD AUTO: 3.49 10*3/MM3 (ref 1.7–7)
NEUTROPHILS NFR BLD AUTO: 51.9 % (ref 42.7–76)
NRBC BLD AUTO-RTO: 0 /100 WBC (ref 0–0.2)
PLATELET # BLD AUTO: 202 10*3/MM3 (ref 140–450)
PMV BLD AUTO: 10.4 FL (ref 6–12)
POTASSIUM SERPL-SCNC: 3.7 MMOL/L (ref 3.5–5.2)
RBC # BLD AUTO: 4.06 10*6/MM3 (ref 4.14–5.8)
SODIUM SERPL-SCNC: 141 MMOL/L (ref 136–145)
WBC NRBC COR # BLD AUTO: 6.75 10*3/MM3 (ref 3.4–10.8)

## 2025-05-21 PROCEDURE — 85025 COMPLETE CBC W/AUTO DIFF WBC: CPT | Performed by: NURSE PRACTITIONER

## 2025-05-21 PROCEDURE — 25010000002 BUMETANIDE PER 0.5 MG: Performed by: NURSE PRACTITIONER

## 2025-05-21 PROCEDURE — 96376 TX/PRO/DX INJ SAME DRUG ADON: CPT

## 2025-05-21 PROCEDURE — 80048 BASIC METABOLIC PNL TOTAL CA: CPT | Performed by: NURSE PRACTITIONER

## 2025-05-21 PROCEDURE — G0378 HOSPITAL OBSERVATION PER HR: HCPCS

## 2025-05-21 PROCEDURE — 99214 OFFICE O/P EST MOD 30 MIN: CPT | Performed by: INTERNAL MEDICINE

## 2025-05-21 RX ORDER — BUMETANIDE 2 MG/1
2 TABLET ORAL 2 TIMES DAILY
Qty: 60 TABLET | Refills: 2 | Status: SHIPPED | OUTPATIENT
Start: 2025-05-21 | End: 2025-05-21

## 2025-05-21 RX ORDER — NICOTINE 21 MG/24HR
1 PATCH, TRANSDERMAL 24 HOURS TRANSDERMAL
Qty: 30 PATCH | Refills: 0 | Status: SHIPPED | OUTPATIENT
Start: 2025-05-22 | End: 2025-06-21

## 2025-05-21 RX ORDER — ALBUTEROL SULFATE 90 UG/1
2 INHALANT RESPIRATORY (INHALATION) EVERY 4 HOURS PRN
Qty: 18 G | Refills: 0 | Status: SHIPPED | OUTPATIENT
Start: 2025-05-21

## 2025-05-21 RX ORDER — BUMETANIDE 2 MG/1
2 TABLET ORAL 2 TIMES DAILY
Qty: 180 TABLET | Refills: 0 | Status: SHIPPED | OUTPATIENT
Start: 2025-05-21 | End: 2025-08-19

## 2025-05-21 RX ORDER — POTASSIUM CHLORIDE 1500 MG/1
20 TABLET, EXTENDED RELEASE ORAL DAILY
Qty: 30 TABLET | Refills: 2 | Status: SHIPPED | OUTPATIENT
Start: 2025-05-21 | End: 2025-08-19

## 2025-05-21 RX ADMIN — APIXABAN 2.5 MG: 2.5 TABLET, FILM COATED ORAL at 08:58

## 2025-05-21 RX ADMIN — BUMETANIDE 0.5 MG: 0.25 INJECTION INTRAMUSCULAR; INTRAVENOUS at 08:58

## 2025-05-21 RX ADMIN — POTASSIUM CHLORIDE 20 MEQ: 1500 TABLET, EXTENDED RELEASE ORAL at 08:58

## 2025-05-21 RX ADMIN — Medication 10 ML: at 08:59

## 2025-05-21 RX ADMIN — ASPIRIN 81 MG: 81 TABLET, COATED ORAL at 08:58

## 2025-05-21 RX ADMIN — NICOTINE 1 PATCH: 14 PATCH TRANSDERMAL at 08:59

## 2025-05-21 RX ADMIN — NICOTINE 1 PATCH: 14 PATCH TRANSDERMAL at 04:18

## 2025-05-21 RX ADMIN — CARVEDILOL 25 MG: 25 TABLET, FILM COATED ORAL at 08:58

## 2025-05-21 RX ADMIN — HYDRALAZINE HYDROCHLORIDE 25 MG: 25 TABLET ORAL at 08:58

## 2025-05-21 RX ADMIN — LOSARTAN POTASSIUM 50 MG: 50 TABLET, FILM COATED ORAL at 08:58

## 2025-05-21 NOTE — OUTREACH NOTE
Prep Survey      Flowsheet Row Responses   [facility] patient discharged from? [facility]   Is LACE score < 7 ? No   Eligibility [facility]   Date of Admission 05/19/25   Date of Discharge 05/21/25   Discharge Disposition Home or Self Care   Discharge diagnosis Acute CHF   Does the patient have one of the following disease processes/diagnoses(primary or secondary)? CHF   Does the patient have Home health ordered? No   Is there a DME ordered? No   Prep survey completed? Yes            Erica RENO - Registered Nurse

## 2025-05-21 NOTE — CASE MANAGEMENT/SOCIAL WORK
Continued Stay Note  KATHE Gutierres     Patient Name: Scott Gaytan  MRN: [id number]  Today's Date: 5/21/2025    Admit Date: 5/19/2025    Plan: Routine home. Sister Ann Marie for transport.   Discharge Plan       Row Name 05/21/25 1015       Plan    Plan Comments DC barriers: CHF APRN, Cardiology, and Nephrology following, IV bumex, IV steroids, elevated BUN and creat           Ramiro Dickson RN     Cell number [phone]  Office number [phone]

## 2025-05-21 NOTE — PLAN OF CARE
Goal Outcome Evaluation:      No new concerns overnight. Shower per self. Able to make needs known. Call light in reach.

## 2025-05-21 NOTE — PROGRESS NOTES
Patient Name: Scott Gaytan  : 1975  MRN: [id number]  Primary Care Physician: Vicki Santana APRN  Date of admission: 2025    Patient Care Team:  Vicki Santana APRN as PCP - General (Emergency Medicine)  Alicechimercy, MD [place] as Consulting Physician (Cardiology)        Subjective   Subjective:     Patient is feeling better shortness of breath is better lying flat on the bed  Review of systems:  All review of system unremarkable      Allergies:    Allergies   Allergen Reactions    Methylprednisolone Swelling     Pt reports severe swelling with steroids    Morphine Other (See Comments)     Makes it feel like my blood is boiling in my veins       Objective   Exam:     Vital Signs  Temp:  [97.5 °F (36.4 °C)-97.8 °F (36.6 °C)] 97.5 °F (36.4 °C)  Heart Rate:  [56-65] 61  Resp:  [16-21] 21  BP: (114-143)/(78-95) 142/95  SpO2:  [98 %-99 %] 98 %  on   ;   Device (Oxygen Therapy): room air  Body mass index is 26.88 kg/m².    General: Young white male in no acute distress.    Head:      Normocephalic and atraumatic.    Eyes:      PERRL/EOM intact, conjunctivae and sclerae clear without nystagmus.    Neck:      No masses, thyromegaly,  trachea central with normal respiratory effort   Lungs:   Mild crackles bilaterally to auscultation.    Heart:      IrRegular rate and rhythm, no murmur no gallop  Abd:        Soft, nontender, not distended, bowel sounds positive, no shifting dullness   Pulses:   Pulses palpable  Extr:        No cyanosis or clubbing--mild edema.    Neuro:    No focal deficits.   alert oriented x3  Skin:       Intact without lesions or rashes.    Psych:    Alert and cooperative; normal mood and affect; .      Results Review:  I have personally reviewed most recent Data :  CBC    Results from last 7 days   Lab Units 25  0348 25  0237 25  2257   WBC 10*3/mm3 6.75 7.53 7.78   HEMOGLOBIN g/dL 12.1* 12.7* 13.9   PLATELETS 10*3/mm3 202 235 240     CMP   Results from last  7 days   Lab Units 05/21/25  0348 05/20/25  0237 05/19/25  2257   SODIUM mmol/L 141 133* 137   POTASSIUM mmol/L 3.7 3.5 3.7   CHLORIDE mmol/L 105 98 98   CO2 mmol/L 24.9 24.8 26.0   BUN mg/dL 54* 52* 53*   CREATININE mg/dL 3.20* 3.16* 3.31*   GLUCOSE mg/dL 119* 140* 116*   ALBUMIN g/dL  --   --  3.7   BILIRUBIN mg/dL  --   --  1.0   ALK PHOS U/L  --   --  55   AST (SGOT) U/L  --   --  32   ALT (SGPT) U/L  --   --  26     ABG      XR Chest 2 View  Result Date: 5/19/2025  Impression: Stable exam. Findings suggest mild congestive heart failure. Electronically Signed: Jake Farnsworth MD  5/19/2025 10:57 PM EDT  Workstation ID: [id number]      Results for orders placed during the hospital encounter of 05/19/25    Adult Transthoracic Echo Complete W/ Cont if Necessary Per Protocol    Interpretation Summary    Left ventricular ejection fraction appears to be 46 - 50%.    Left ventricular diastolic function is consistent with (grade II w/high LAP) pseudonormalization.  Average GLS -13%.    The right ventricular cavity is dilated.    The left atrial cavity is dilated.    Moderate mitral valve regurgitation is present.    Moderate tricuspid valve regurgitation is present.    Estimated right ventricular systolic pressure from tricuspid regurgitation is mildly elevated (35-45 mmHg).    Scheduled Meds:apixaban, 2.5 mg, Oral, BID  aspirin, 81 mg, Oral, Daily  bumetanide, 0.5 mg, Intravenous, Q12H  carvedilol, 25 mg, Oral, BID With Meals  hydrALAZINE, 25 mg, Oral, TID  methylPREDNISolone sodium succinate, 125 mg, Intravenous, Once  nicotine, 1 patch, Transdermal, Q24H  potassium chloride, 20 mEq, Oral, Daily  rosuvastatin, 40 mg, Oral, Nightly  sacubitril-valsartan, 1 tablet, Oral, Q12H  sodium chloride, 10 mL, Intravenous, Q12H      Continuous Infusions:   PRN Meds:  acetaminophen **OR** acetaminophen **OR** acetaminophen    senna-docusate sodium **AND** polyethylene glycol **AND** bisacodyl **AND** bisacodyl    nitroglycerin     polyethyl glycol-propyl glycol    sodium chloride    sodium chloride    sodium chloride    Assessment & Plan   Assessment and Plan:         Acute heart failure with mildly reduced ejection fraction (HFmrEF, 41-49%)    History of substance abuse    Tobacco abuse    Elevated troponin    Hypertension    PAF (paroxysmal atrial fibrillation)    Hepatitis C    Hyperlipidemia    Valvular heart disease    CKD (chronic kidney disease) stage 4, GFR 15-29 ml/min    Anemia in chronic kidney disease (CKD)    LBBB (left bundle branch block)    ASSESSMENT:  Chronic kidney disease stage IV, with kidney biopsy consistent with hypertensive nephrosclerosis with significant glomerulosclerosis and fibrosis, s/p AVF creation.  August 5, 2024, subsequently underwent transposition of brachiobasilic AV fistula on November 11, 2023 baseline creatinine variable but roughly 3 mg/dL  Acute on Chronic Hypertension  History of atrial fibrillation  Coronary artery disease  History of polysubstance abuse  Anemia of chronic kidney disease  History of hepatitis C    Echocardiogram from 10/20/2024 revealed ejection fraction of 61%        PLAN :      Urine output improving electrolytes acceptable sodium level is acceptable  Extra Bumex IV given yesterday  Continue Bumex 2 mg twice daily okay to be discharged  Follow-up in the renal clinic in 2 weeks  Thank you for letting me evaluate  Okay to be discharged if okay with cardiology and primary          Electronically signed by Nelson Das MD,   [facility] kidney consultant  [phone]  5/21/2025  15:49 EDT

## 2025-05-21 NOTE — PROCEDURES
Exercise Oximetry    Patient Name:Scott Gaytan   MRN: [id number]   Date: 05/21/25             ROOM AIR BASELINE   SpO2% 94   Heart Rate    Blood Pressure      EXERCISE ON ROOM AIR SpO2% EXERCISE ON O2 @  LPM SpO2%   1 MINUTE 94 1 MINUTE    2 MINUTES 95 2 MINUTES    3 MINUTES 97 3 MINUTES    4 MINUTES 96 4 MINUTES    5 MINUTES 94 5 MINUTES    6 MINUTES 95 6 MINUTES               Distance Walked   Distance Walked   Dyspnea (Josette Scale)   Dyspnea (Josette Scale)   Fatigue (Josette Scale)   Fatigue (Josette Scale)   SpO2% Post Exercise   SpO2% Post Exercise   HR Post Exercise   HR Post Exercise   Time to Recovery   Time to Recovery     Comments: walked pt and pts sats stayed above 90% while ambulating.  Pt does not need home o2 @ this time.

## 2025-05-21 NOTE — PROGRESS NOTES
Heart Failure Program  Nurse Navigator  Discharge Planning: Follow-up Note    Patient Name:Scott Gaytan  :1975  Current Admission Date: 2025       Last 3 Weights:  Wt Readings from Last 3 Encounters:   25 80.2 kg (176 lb 12.9 oz)   25 81.9 kg (180 lb 8 oz)   25 77.7 kg (171 lb 6.4 oz)       Intake and Output totals: I/O last 3 completed shifts:  In: 476 [P.O.:476]  Out: 2600 [Urine:2600]  I/O this shift:  In: -   Out: 400 [Urine:400]          Patient Assessment:   Pt lying in bed, resp even and unlabored. No soa with conversation. Pt states he is less soa and feeling better.       Patient Education:   Review HF s/s, importance of medication adherence and follow up. Discussion on smoking cessation and pt expresses interest in cessation     Review HF Education provided to patient:  yes-Symptoms worsening  yes-Prescribed medications  yes-HF self-care  yes-Follow-up Appointments       Acceptance of learning: acceptance, cooperative    Heart Failure education interactive teaching session time: 15 minutes      Identified needs/barriers:   Volume status improved, GDMT - coreg, entresto on hold. Needs 7 day follow up and cardiology follow up    Intervention follow-up:  Education, HF clinic apt set for pt next week. Requested nicotine patch to help him quit smoking - provider notified.

## 2025-05-21 NOTE — PROGRESS NOTES
"    Reason for follow-up: CHF     Patient Care Team:  Vicki Santana APRN as PCP - General (Emergency Medicine)  Cary Espinosa MD as Consulting Physician (Cardiology)    Subjective .   Scott Gaytan is doing better today     ROS    Methylprednisolone and Morphine    Scheduled Meds:apixaban, 2.5 mg, Oral, BID  aspirin, 81 mg, Oral, Daily  bumetanide, 0.5 mg, Intravenous, Q12H  carvedilol, 25 mg, Oral, BID With Meals  hydrALAZINE, 25 mg, Oral, TID  methylPREDNISolone sodium succinate, 125 mg, Intravenous, Once  nicotine, 1 patch, Transdermal, Q24H  potassium chloride, 20 mEq, Oral, Daily  rosuvastatin, 40 mg, Oral, Nightly  sacubitril-valsartan, 1 tablet, Oral, Q12H  sodium chloride, 10 mL, Intravenous, Q12H      Continuous Infusions:   PRN Meds:.  acetaminophen **OR** acetaminophen **OR** acetaminophen    senna-docusate sodium **AND** polyethylene glycol **AND** bisacodyl **AND** bisacodyl    nitroglycerin    polyethyl glycol-propyl glycol    sodium chloride    sodium chloride    sodium chloride      VITAL SIGNS  Vitals:    05/20/25 2239 05/21/25 0320 05/21/25 0735 05/21/25 1137   BP: 136/85 143/93 139/95 142/95   BP Location: Right arm Right arm Right arm Right arm   Patient Position: Lying Lying Lying Lying   Pulse:   56 61   Resp: 18 18 18 21   Temp: 97.8 °F (36.6 °C) 97.5 °F (36.4 °C) 97.8 °F (36.6 °C) 97.5 °F (36.4 °C)   TempSrc: Oral Oral Oral Oral   SpO2: 98% 99% 99% 98%   Weight:  80.2 kg (176 lb 12.9 oz)     Height:           Flowsheet Rows      Flowsheet Row First Filed Value   Admission Height 172.7 cm (68\") Documented at 05/19/2025 2146   Admission Weight 80.2 kg (176 lb 12.9 oz) Documented at 05/19/2025 2146               Physical Exam  VITALS REVIEWED    General:      well developed, in no acute distress.    Head:      normocephalic and atraumatic.    Eyes:      PERRL/EOM intact, conjunctiva and sclera clear with out nystagmus.    Neck:      no masses, thyromegaly,  trachea central with normal " respiratory effort and PMI displaced laterally  Lungs:      Clear  Heart:       Regular rate and rhythm  Msk:      no deformity or scoliosis noted of thoracic or lumbar spine.    Pulses:      pulses normal in all 4 extremities.    Extremities:       No lower extremity edema  Neurologic:      no focal deficits.   alert oriented x3  Skin:      intact without lesions or rashes.    Psych:      alert and cooperative; normal mood and affect; normal attention span and concentration.          LAB RESULTS (LAST 7 DAYS)    CBC  Results from last 7 days   Lab Units 05/21/25 0348 05/20/25 0237 05/19/25  2257   WBC 10*3/mm3 6.75 7.53 7.78   RBC 10*6/mm3 4.06* 4.24 4.64   HEMOGLOBIN g/dL 12.1* 12.7* 13.9   HEMATOCRIT % 38.3 39.0 43.0   MCV fL 94.3 92.0 92.7   PLATELETS 10*3/mm3 202 235 240       BMP  Results from last 7 days   Lab Units 05/21/25 0348 05/20/25 0237 05/19/25  2257   SODIUM mmol/L 141 133* 137   POTASSIUM mmol/L 3.7 3.5 3.7   CHLORIDE mmol/L 105 98 98   CO2 mmol/L 24.9 24.8 26.0   BUN mg/dL 54* 52* 53*   CREATININE mg/dL 3.20* 3.16* 3.31*   GLUCOSE mg/dL 119* 140* 116*       CMP   Results from last 7 days   Lab Units 05/21/25 0348 05/20/25 0237 05/19/25  2257   SODIUM mmol/L 141 133* 137   POTASSIUM mmol/L 3.7 3.5 3.7   CHLORIDE mmol/L 105 98 98   CO2 mmol/L 24.9 24.8 26.0   BUN mg/dL 54* 52* 53*   CREATININE mg/dL 3.20* 3.16* 3.31*   GLUCOSE mg/dL 119* 140* 116*   ALBUMIN g/dL  --   --  3.7   BILIRUBIN mg/dL  --   --  1.0   ALK PHOS U/L  --   --  55   AST (SGOT) U/L  --   --  32   ALT (SGPT) U/L  --   --  26         BNP        TROPONIN  Results from last 7 days   Lab Units 05/20/25  0003   HSTROP T ng/L 44*       CoAg        Creatinine Clearance  Estimated Creatinine Clearance: 32 mL/min (A) (by C-G formula based on SCr of 3.2 mg/dL (H)).    ABG          EKG    I personally reviewed the patient's EKG/Telemetry data: Sinus rhythm with left bundle branch block      Assessment & Plan       Acute heart failure  with mildly reduced ejection fraction (HFmrEF, 41-49%)    History of substance abuse    Tobacco abuse    Elevated troponin    Hypertension    PAF (paroxysmal atrial fibrillation)    Hepatitis C    Hyperlipidemia    Valvular heart disease    CKD (chronic kidney disease) stage 4, GFR 15-29 ml/min    Anemia in chronic kidney disease (CKD)    LBBB (left bundle branch block)      Scott Gaytan is a 50-year-old male patient who has chronic kidney disease, creatinine around 3, history of hepatitis C, substance abuse, A-fib in November 2020 for GYPSY guided cardioversion on 11/25/2024, moderate cardiomyopathy.  Patient was admitted to the hospital due to CHF exacerbation.  He is in sinus rhythm but now has a left bundle branch block, echocardiogram showed ejection fraction of 45%.  Patient said that he had ran out of his Bumex.  He did receive IV Bumex with good diuresis and is feeling better.  Will switch losartan over to Entresto, nephrologist was okay with that.    I discussed the patients findings and my recommendations with patient and agrees with outlined plan.    Cary Espinosa MD  05/21/25  13:00 EDT      Electronically signed by Cary Espinosa MD, 05/21/25, 1:09 PM EDT.

## 2025-05-21 NOTE — DISCHARGE SUMMARY
[facility]    Vicki Santana APRN    CHIEF COMPLAINT:     Shortness of breath     HISTORY OF PRESENT ILLNESS:    Shortness of Breath      ED 5/19/25: 50-year-old male with dyspnea and fatigue for the past 1 week presents for evaluation. Patient denies any cough or fever but states he has had some intermittent mild chest tightness and wheezing. No calf pain or swelling, no recent trips or travel.      Observation 5/20/25: Patient is a 50-year-old male presenting to the hospital with complaints of shortness of breath and fatigue.  Patient states he has been out of some of his medications for about a week.  Patient states he has been following up with specialist outpatient.  Patient states he felt like he had water on his lungs and had some chest tightness as well.  Patient evaluated by cardiology nephrology.       Observation 5/21/25  Patient is feeling better today. He still c/o shortness of breath but feels like he is at his baseline. Room air, able to lay almost flat. Walk oximetry completed and he did not require O2. He is wheezing on auscultation but is reports he has a known hx of COPD but does not f/u with pulmonologist. Unfortunately he is allergic to steroids.  He still smokes but would like to try a nicotine patch. I discussed importance of not smoking while on nicotine patch and risks associated with that. He verbalizes understanding.     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    h/o Medication and medical  follow-up noncompliance 04/10/2024    Hepatitis C 01/03/2025    Hyperkalemia 04/26/2022    Hyperlipidemia 01/21/2025    Hypertensive heart disease with congestive heart failure and chronic kidney disease 02/11/2025    Hypertensive urgency 04/26/2022    Methamphetamine abuse 04/26/2022    Methamphetamine intoxication 06/16/2024    Mild to moderate valvular heart disease 02/06/2025    Mild to moderate mitral valve regurgitation      Secondary hyperparathyroidism 02/11/2025    Tick bite of abdomen 04/26/2022    Tobacco abuse 04/26/2022     Past Surgical History:   Procedure Laterality Date    CARDIAC CATHETERIZATION N/A 04/27/2022    ORIF TIBIA/FIBULA FRACTURES Right      Family History   Problem Relation Age of Onset    Diabetes Mother     Arthritis Mother     Hypertension Father     Heart attack Father     Seizures Sister     Heart attack Paternal Grandfather      Social History     Tobacco Use    Smoking status: Every Day     Current packs/day: 0.25     Average packs/day: 1 pack/day for 31.4 years (30.6 ttl pk-yrs)     Types: Cigarettes     Start date: 1994     Passive exposure: Never    Smokeless tobacco: Never   Vaping Use    Vaping status: Never Used   Substance Use Topics    Alcohol use: Not Currently    Drug use: Yes     Types: Methamphetamines, Marijuana, Cocaine(coke)     Medications Prior to Admission   Medication Sig Dispense Refill Last Dose/Taking    apixaban (ELIQUIS) 5 MG tablet tablet Take 0.5 tablets by mouth 2 (Two) Times a Day.   5/19/2025 Evening    aspirin 81 MG EC tablet Take 1 tablet by mouth Daily for 180 days. 90 tablet 1 5/19/2025 Morning    carvedilol (COREG) 25 MG tablet Take 1 tablet by mouth 2 (Two) Times a Day With Meals for 90 days. 180 tablet 0 5/19/2025 Evening    hydrALAZINE (APRESOLINE) 25 MG tablet Take 1 tablet by mouth 3 (Three) Times a Day. 90 tablet 0 5/19/2025 Evening    losartan (COZAAR) 50 MG tablet Take 1 tablet by mouth Daily.   5/19/2025 Morning    rosuvastatin (CRESTOR)  40 MG tablet TAKE 1 TABLET BY MOUTH EVERY DAY 90 tablet 0 5/19/2025    [DISCONTINUED] bumetanide (BUMEX) 2 MG tablet Take 1 tablet by mouth 2 (Two) Times a Day. 60 tablet 0 5/19/2025 Evening    [DISCONTINUED] potassium chloride ER (K-TAB) 20 MEQ tablet controlled-release ER tablet Take 1 tablet by mouth Daily. 30 tablet 3 5/19/2025    nitroglycerin (NITROSTAT) 0.4 MG SL tablet Place 1 tablet under the tongue Every 5 (Five) Minutes As Needed for Chest Pain for up to 30 days. Take no more than 3 doses in 15 minutes. 30 tablet 0      Allergies:  Methylprednisolone and Morphine    Immunization History   Administered Date(s) Administered    Hepatitis A 05/24/2018           REVIEW OF SYSTEMS:    Review of Systems   Respiratory:  Positive for shortness of breath.        Review of Systems   Constitutional: Positive for malaise/fatigue. Negative for fever.   HENT: Negative.     Eyes: Negative.    Cardiovascular:  Positive for chest pain and dyspnea on exertion.   Respiratory:  Positive for cough and shortness of breath.    Endocrine: Negative.    Skin: Negative.    Musculoskeletal: Negative.    Gastrointestinal: Negative.    Genitourinary: Negative.    Neurological:  Positive for weakness.   Psychiatric/Behavioral: Negative.       Vital Signs  Temp:  [97.5 °F (36.4 °C)-97.8 °F (36.6 °C)] 97.5 °F (36.4 °C)  Heart Rate:  [56-65] 61  Resp:  [16-21] 21  BP: (114-143)/(78-95) 142/95          Physical Exam:  Physical Exam  Vitals and nursing note reviewed.   Constitutional:       Appearance: Normal appearance.   HENT:      Head: Normocephalic and atraumatic.      Right Ear: External ear normal.      Left Ear: External ear normal.      Nose: Nose normal.      Mouth/Throat:      Mouth: Mucous membranes are moist.   Eyes:      Extraocular Movements: Extraocular movements intact.   Cardiovascular:      Rate and Rhythm: Normal rate and regular rhythm.      Pulses: Normal pulses.      Heart sounds: Murmur heard.   Pulmonary:       Effort: Pulmonary effort is normal.      Breath sounds: Wheezing present.   Abdominal:      General: Abdomen is flat. Bowel sounds are normal.      Palpations: Abdomen is soft.   Musculoskeletal:         General: Normal range of motion.      Cervical back: Normal range of motion.   Skin:     General: Skin is warm.   Neurological:      Mental Status: He is alert and oriented to person, place, and time.   Psychiatric:         Behavior: Behavior normal.         Thought Content: Thought content normal.         Judgment: Judgment normal.           Emotional Behavior:    Normal   Debilities:   None  Results Review:    I reviewed the patient's new clinical results.  Lab Results (most recent)       Procedure Component Value Units Date/Time    Basic Metabolic Panel [[id number]]  (Abnormal) Collected: 05/21/25 0348    Specimen: Blood Updated: 05/21/25 0439     Glucose 119 mg/dL      BUN 54 mg/dL      Creatinine 3.20 mg/dL      Sodium 141 mmol/L      Potassium 3.7 mmol/L      Chloride 105 mmol/L      CO2 24.9 mmol/L      Calcium 8.0 mg/dL      BUN/Creatinine Ratio 16.9     Anion Gap 11.1 mmol/L      eGFR 23.0 mL/min/1.73     Narrative:      GFR Categories in Chronic Kidney Disease (CKD)              GFR Category          GFR (mL/min/1.73)    Interpretation  G1                    90 or greater        Normal or high (1)  G2                    60-89                Mild decrease (1)  G3a                   45-59                Mild to moderate decrease  G3b                   30-44                Moderate to severe decrease  G4                    15-29                Severe decrease  G5                    14 or less           Kidney failure    (1)In the absence of evidence of kidney disease, neither GFR category G1 or G2 fulfill the criteria for CKD.    eGFR calculation 2021 CKD-EPI creatinine equation, which does not include race as a factor    CBC & Differential [[id number]]  (Abnormal) Collected: 05/21/25 0348    Specimen: Blood  Updated: 05/21/25 0411    Narrative:      The following orders were created for panel order CBC & Differential.  Procedure                               Abnormality         Status                     ---------                               -----------         ------                     CBC Auto Differential[[id number]]        Abnormal            Final result                 Please view results for these tests on the individual orders.    CBC Auto Differential [[id number]]  (Abnormal) Collected: 05/21/25 0348    Specimen: Blood Updated: 05/21/25 0411     WBC 6.75 10*3/mm3      RBC 4.06 10*6/mm3      Hemoglobin 12.1 g/dL      Hematocrit 38.3 %      MCV 94.3 fL      MCH 29.8 pg      MCHC 31.6 g/dL      RDW 16.6 %      RDW-SD 57.2 fl      MPV 10.4 fL      Platelets 202 10*3/mm3      Neutrophil % 51.9 %      Lymphocyte % 33.0 %      Monocyte % 10.5 %      Eosinophil % 4.1 %      Basophil % 0.4 %      Immature Grans % 0.1 %      Neutrophils, Absolute 3.49 10*3/mm3      Lymphocytes, Absolute 2.23 10*3/mm3      Monocytes, Absolute 0.71 10*3/mm3      Eosinophils, Absolute 0.28 10*3/mm3      Basophils, Absolute 0.03 10*3/mm3      Immature Grans, Absolute 0.01 10*3/mm3      nRBC 0.0 /100 WBC     Lipid Panel [[id number]]  (Abnormal) Collected: 05/20/25 0237    Specimen: Blood from Arm, Right Updated: 05/20/25 1422     Total Cholesterol 177 mg/dL      Triglycerides 67 mg/dL      HDL Cholesterol 41 mg/dL      LDL Cholesterol  123 mg/dL      VLDL Cholesterol 13 mg/dL      LDL/HDL Ratio 2.99    Narrative:      Cholesterol Reference Ranges  (U.S. Department of Health and Human Services ATP III Classifications)    Desirable          <200 mg/dL  Borderline High    200-239 mg/dL  High Risk          >240 mg/dL      Triglyceride Reference Ranges  (U.S. Department of Health and Human Services ATP III Classifications)    Normal           <150 mg/dL  Borderline High  150-199 mg/dL  High             200-499 mg/dL  Very High        >500  mg/dL    HDL Reference Ranges  (U.S. Department of Health and Human Services ATP III Classifications)    Low     <40 mg/dl (major risk factor for CHD)  High    >60 mg/dl ('negative' risk factor for CHD)        LDL Reference Ranges  (U.S. Department of Health and Human Services ATP III Classifications)    Optimal          <100 mg/dL  Near Optimal     100-129 mg/dL  Borderline High  130-159 mg/dL  High             160-189 mg/dL  Very High        >189 mg/dL    LDL is calculated using the NIH LDL-C calculation.      Urine Drug Screen - Urine, Clean Catch [[id number]]  (Normal) Collected: 05/20/25 0948    Specimen: Urine, Clean Catch Updated: 05/20/25 1036     THC, Screen, Urine Negative     Phencyclidine (PCP), Urine Negative     Cocaine Screen, Urine Negative     Methamphetamine, Ur Negative     Opiate Screen Negative     Amphetamine Screen, Urine Negative     Benzodiazepine Screen, Urine Negative     Tricyclic Antidepressants Screen Negative     Methadone Screen, Urine Negative     Barbiturates Screen, Urine Negative     Oxycodone Screen, Urine Negative     Buprenorphine, Screen, Urine Negative    Narrative:      Cutoff For Drugs Screened:    Amphetamines               500 ng/ml  Barbiturates               200 ng/ml  Benzodiazepines            150 ng/ml  Cocaine                    150 ng/ml  Methadone                  200 ng/ml  Opiates                    100 ng/ml  Phencyclidine               25 ng/ml  THC                         50 ng/ml  Methamphetamine            500 ng/ml  Tricyclic Antidepressants  300 ng/ml  Oxycodone                  100 ng/ml  Buprenorphine               10 ng/ml    The normal value for all drugs tested is negative. This report includes unconfirmed screening results, with the cutoff values listed, to be used for medical treatment purposes only.  Unconfirmed results must not be used for non-medical purposes such as employment or legal testing.  Clinical consideration should be applied to any  drug of abuse test, particularly when unconfirmed results are used.    All urine drugs of abuse requests without chain of custody are for medical screening purposes only.  False positives are possible.      Basic Metabolic Panel [[id number]]  (Abnormal) Collected: 05/20/25 0237    Specimen: Blood from Arm, Right Updated: 05/20/25 0314     Glucose 140 mg/dL      BUN 52 mg/dL      Creatinine 3.16 mg/dL      Sodium 133 mmol/L      Potassium 3.5 mmol/L      Chloride 98 mmol/L      CO2 24.8 mmol/L      Calcium 8.4 mg/dL      BUN/Creatinine Ratio 16.5     Anion Gap 10.2 mmol/L      eGFR 23.3 mL/min/1.73     Narrative:      GFR Categories in Chronic Kidney Disease (CKD)              GFR Category          GFR (mL/min/1.73)    Interpretation  G1                    90 or greater        Normal or high (1)  G2                    60-89                Mild decrease (1)  G3a                   45-59                Mild to moderate decrease  G3b                   30-44                Moderate to severe decrease  G4                    15-29                Severe decrease  G5                    14 or less           Kidney failure    (1)In the absence of evidence of kidney disease, neither GFR category G1 or G2 fulfill the criteria for CKD.    eGFR calculation 2021 CKD-EPI creatinine equation, which does not include race as a factor    CBC & Differential [[id number]]  (Abnormal) Collected: 05/20/25 0237    Specimen: Blood from Arm, Right Updated: 05/20/25 0254    Narrative:      The following orders were created for panel order CBC & Differential.  Procedure                               Abnormality         Status                     ---------                               -----------         ------                     CBC Auto Differential[[id number]]        Abnormal            Final result                 Please view results for these tests on the individual orders.    CBC Auto Differential [[id number]]  (Abnormal) Collected: 05/20/25  0237    Specimen: Blood from Arm, Right Updated: 05/20/25 0254     WBC 7.53 10*3/mm3      RBC 4.24 10*6/mm3      Hemoglobin 12.7 g/dL      Hematocrit 39.0 %      MCV 92.0 fL      MCH 30.0 pg      MCHC 32.6 g/dL      RDW 16.2 %      RDW-SD 53.9 fl      MPV 10.3 fL      Platelets 235 10*3/mm3      Neutrophil % 51.1 %      Lymphocyte % 33.7 %      Monocyte % 10.5 %      Eosinophil % 4.2 %      Basophil % 0.4 %      Immature Grans % 0.1 %      Neutrophils, Absolute 3.84 10*3/mm3      Lymphocytes, Absolute 2.54 10*3/mm3      Monocytes, Absolute 0.79 10*3/mm3      Eosinophils, Absolute 0.32 10*3/mm3      Basophils, Absolute 0.03 10*3/mm3      Immature Grans, Absolute 0.01 10*3/mm3      nRBC 0.0 /100 WBC     High Sensitivity Troponin T 1Hr [[id number]]  (Abnormal) Collected: 05/20/25 0003    Specimen: Blood Updated: 05/20/25 0026     HS Troponin T 44 ng/L      Troponin T Numeric Delta -5 ng/L      Troponin T % Delta -10    Narrative:      High Sensitive Troponin T Reference Range:  <14.0 ng/L- Negative Female for AMI  <22.0 ng/L- Negative Male for AMI  >=14 - Abnormal Female indicating possible myocardial injury.  >=22 - Abnormal Male indicating possible myocardial injury.   Clinicians would have to utilize clinical acumen, EKG, Troponin, and serial changes to determine if it is an Acute Myocardial Infarction or myocardial injury due to an underlying chronic condition.         Respiratory Panel PCR w/COVID-19(SARS-CoV-2) RAMILA/ALONSO/GM/PAD/[facility]/WILLI In-House, NP Swab in UTM/VTM, 2 HR TAT - Swab, Nasopharynx [[id number]]  (Normal) Collected: 05/19/25 5896    Specimen: Swab from Nasopharynx Updated: 05/19/25 4791     ADENOVIRUS, PCR Not Detected     Coronavirus 229E Not Detected     Coronavirus HKU1 Not Detected     Coronavirus NL63 Not Detected     Coronavirus OC43 Not Detected     COVID19 Not Detected     Human Metapneumovirus Not Detected     Human Rhinovirus/Enterovirus Not Detected     Influenza A PCR Not Detected      Influenza B PCR Not Detected     Parainfluenza Virus 1 Not Detected     Parainfluenza Virus 2 Not Detected     Parainfluenza Virus 3 Not Detected     Parainfluenza Virus 4 Not Detected     RSV, PCR Not Detected     Bordetella pertussis pcr Not Detected     Bordetella parapertussis PCR Not Detected     Chlamydophila pneumoniae PCR Not Detected     Mycoplasma pneumo by PCR Not Detected    Narrative:      In the setting of a positive respiratory panel with a viral infection PLUS a negative procalcitonin without other underlying concern for bacterial infection, consider observing off antibiotics or discontinuation of antibiotics and continue supportive care. If the respiratory panel is positive for atypical bacterial infection (Bordetella pertussis, Chlamydophila pneumoniae, or Mycoplasma pneumoniae), consider antibiotic de-escalation to target atypical bacterial infection.    Comprehensive Metabolic Panel [[id number]]  (Abnormal) Collected: 05/19/25 2257    Specimen: Blood Updated: 05/19/25 2332     Glucose 116 mg/dL      BUN 53 mg/dL      Creatinine 3.31 mg/dL      Sodium 137 mmol/L      Potassium 3.7 mmol/L      Chloride 98 mmol/L      CO2 26.0 mmol/L      Calcium 8.8 mg/dL      Total Protein 6.8 g/dL      Albumin 3.7 g/dL      ALT (SGPT) 26 U/L      AST (SGOT) 32 U/L      Alkaline Phosphatase 55 U/L      Total Bilirubin 1.0 mg/dL      Globulin 3.1 gm/dL      A/G Ratio 1.2 g/dL      BUN/Creatinine Ratio 16.0     Anion Gap 13.0 mmol/L      eGFR 22.1 mL/min/1.73     Narrative:      GFR Categories in Chronic Kidney Disease (CKD)              GFR Category          GFR (mL/min/1.73)    Interpretation  G1                    90 or greater        Normal or high (1)  G2                    60-89                Mild decrease (1)  G3a                   45-59                Mild to moderate decrease  G3b                   30-44                Moderate to severe decrease  G4                    15-29                Severe  decrease  G5                    14 or less           Kidney failure    (1)In the absence of evidence of kidney disease, neither GFR category G1 or G2 fulfill the criteria for CKD.    eGFR calculation 2021 CKD-EPI creatinine equation, which does not include race as a factor    BNP [[id number]]  (Abnormal) Collected: 05/19/25 2257    Specimen: Blood Updated: 05/19/25 2332     proBNP 24,295.0 pg/mL     Narrative:      This assay is used as an aid in the diagnosis of individuals suspected of having heart failure. It can be used as an aid in the diagnosis of acute decompensated heart failure (ADHF) in patients presenting with signs and symptoms of ADHF to the emergency department (ED). In addition, NT-proBNP of <300 pg/mL indicates ADHF is not likely.    Age Range Result Interpretation  NT-proBNP Concentration (pg/mL:      <50             Positive            >450                   Gray                 300-450                    Negative             <300    50-75           Positive            >900                  Gray                300-900                  Negative            <300      >75             Positive            >1800                  Gray                300-1800                  Negative            <300    High Sensitivity Troponin T [[id number]]  (Abnormal) Collected: 05/19/25 2257    Specimen: Blood Updated: 05/19/25 2332     HS Troponin T 49 ng/L     Narrative:      High Sensitive Troponin T Reference Range:  <14.0 ng/L- Negative Female for AMI  <22.0 ng/L- Negative Male for AMI  >=14 - Abnormal Female indicating possible myocardial injury.  >=22 - Abnormal Male indicating possible myocardial injury.   Clinicians would have to utilize clinical acumen, EKG, Troponin, and serial changes to determine if it is an Acute Myocardial Infarction or myocardial injury due to an underlying chronic condition.         [place] Draw [[id number]] Collected: 05/19/25 2257    Specimen: Blood Updated: 05/19/25 2315    Narrative:       The following orders were created for panel order [place] Draw.  Procedure                               Abnormality         Status                     ---------                               -----------         ------                     Green Top (Gel)[[id number]]                                  Final result               Lavender Top[[id number]]                                     Final result               Gold Top - SST[[id number]]                                   Final result               Light Blue Top[[id number]]                                   Final result                 Please view results for these tests on the individual orders.    Green Top (Gel) [[id number]] Collected: 05/19/25 2257    Specimen: Blood Updated: 05/19/25 2315     Extra Tube Hold for add-ons.     Comment: Auto resulted.       Lavender Top [[id number]] Collected: 05/19/25 2257    Specimen: Blood Updated: 05/19/25 2315     Extra Tube hold for add-on     Comment: Auto resulted       Gold Top - SST [[id number]] Collected: 05/19/25 2257    Specimen: Blood Updated: 05/19/25 2315     Extra Tube Hold for add-ons.     Comment: Auto resulted.       Light Blue Top [[id number]] Collected: 05/19/25 2257    Specimen: Blood Updated: 05/19/25 2315     Extra Tube Hold for add-ons.     Comment: Auto resulted               Imaging Results (Most Recent)       Procedure Component Value Units Date/Time    XR Chest 2 View [[id number]] Collected: 05/19/25 2255     Updated: 05/19/25 2259    Narrative:      XR CHEST 2 VW    Date of Exam: 5/19/2025 10:45 PM EDT    Indication: SOA Triage Protocol    Comparison: 5/14/2025    Findings:  Stable cardiomegaly. No evidence of pneumothorax. There are no dense areas of airspace consolidation. There are advanced degenerative changes in the right glenohumeral joint. Mild pulmonary edema appears stable. Stable small right pleural effusion.      Impression:      Impression:  Stable exam. Findings suggest mild congestive heart  failure.      Electronically Signed: Jake Farnsworth MD    5/19/2025 10:57 PM EDT    Workstation ID: [id number]          reviewed    ECG/EMG Results (most recent)       Procedure Component Value Units Date/Time    Telemetry Scan [[id number]] Resulted: 05/19/25     Updated: 05/20/25 0252    Telemetry Scan [[id number]] Resulted: 05/19/25     Updated: 05/20/25 0327    Telemetry Scan [[id number]] Resulted: 05/19/25     Updated: 05/20/25 0743    ECG 12 Lead Chest Pain [[id number]] Collected: 05/20/25 0010     Updated: 05/20/25 0810     QT Interval 509 ms      QTC Interval 524 ms     Narrative:      HEART RATE=64  bpm  RR Ryebuezk=912  ms  DC Pwwtacch=253  ms  P Horizontal Axis=-19  deg  P Front Axis=42  deg  QRSD Gplsfmcr=250  ms  QT Yexrxplq=974  ms  PSbS=370  ms  QRS Axis=-57  deg  T Wave Axis=101  deg  - ABNORMAL ECG -  Sinus rhythm  Prolonged DC interval  Left atrial enlargement  Left bundle branch block  When compared with ECG of 19-May-2025 21:54:44,  No significant change  Electronically Signed By: Carlos Manuel Sotomayor (Lei) 2025-05-20 08:10:28  Date and Time of Study:2025-05-20 00:10:28    Adult Transthoracic Echo Complete W/ Cont if Necessary Per Protocol [[id number]] Resulted: 05/20/25 1112     Updated: 05/20/25 1112     EF(MOD-bp) 45.3 %      EF_3D-VOL 41.0 %      LV GLOBAL STRAIN  -13.0 %      LVIDd 5.0 cm      LVIDs 3.6 cm      IVSd 1.58 cm      LVPWd 1.45 cm      FS 27.2 %      IVS/LVPW 1.09 cm      ESV(cubed) 47.1 ml      LV Sys Vol (BSA corrected) 53.2 cm2      EDV(cubed) 122.1 ml      LV Luo Vol (BSA corrected) 99.2 cm2      LV mass(C)d 323.9 grams      LVOT area 3.3 cm2      LVOT diam 2.05 cm      EDV(MOD-sp2) 164.0 ml      EDV(MOD-sp4) 192.0 ml      ESV(MOD-sp2) 87.0 ml      ESV(MOD-sp4) 103.0 ml      SV(MOD-sp2) 77.0 ml      SV(MOD-sp4) 89.0 ml      SVi(MOD-SP2) 39.8 ml/m2      SVi(MOD-SP4) 46.0 ml/m2      SVi (LVOT) 40.6 ml/m2      EF(MOD-sp2) 47.0 %      EF(MOD-sp4) 46.4 %      MV E max joesph 90.3 cm/sec      MV A  max tobias 43.9 cm/sec      MV dec time 0.20 sec      MV E/A 2.06     IVRT 114.0 ms      LA ESV Index (BP) 77.3 ml/m2      Med Peak E' Tobias 4.7 cm/sec      Lat Peak E' Tobias 7.8 cm/sec      TR max tobias 261.3 cm/sec      Avg E/e' ratio 14.45     SV(LVOT) 78.6 ml      RV Base 5.2 cm      RV Mid 4.0 cm      TAPSE (>1.6) 2.20 cm      RV S' 11.5 cm/sec      LA dimension (2D)  5.2 cm      Pulm Sys Tobias 37.7 cm/sec      Pulm Luo Tobias 35.1 cm/sec      Pulm S/D 1.07     LV V1 max 125.5 cm/sec      LV V1 max PG 6.3 mmHg      LV V1 mean PG 3.3 mmHg      LV V1 VTI 23.8 cm      Ao pk tobias 188.2 cm/sec      Ao max PG 14.2 mmHg      Ao mean PG 7.6 mmHg      Ao V2 VTI 38.7 cm      BRIAN(I,D) 2.03 cm2      Dimensionless Index 0.62 (DI)      MV max PG 4.9 mmHg      MV mean PG 1.77 mmHg      MV V2 VTI 30.0 cm      MV P1/2t 73.7 msec      MVA(P1/2t) 3.0 cm2      MVA(VTI) 2.6 cm2      MV dec slope 413.8 cm/sec2      MR max tobias 552.8 cm/sec      MR max .3 mmHg      MR mean tobias 418.2 cm/sec      MR mean PG 80.0 mmHg      MR .3 cm      TR max PG 27.3 mmHg      RVSP(TR) 35.3 mmHg      RAP systole 8.0 mmHg      RV V1 max PG 2.8 mmHg      RV V1 max 84.4 cm/sec      RV V1 VTI 16.4 cm      PA V2 max 98.1 cm/sec      PA acc time 0.10 sec      Sinus 3.4 cm      STJ 2.9 cm     Narrative:        Left ventricular ejection fraction appears to be 46 - 50%.    Left ventricular diastolic function is consistent with (grade II w/high   LAP) pseudonormalization.  Average GLS -13%.    The right ventricular cavity is dilated.    The left atrial cavity is dilated.    Moderate mitral valve regurgitation is present.    Moderate tricuspid valve regurgitation is present.    Estimated right ventricular systolic pressure from tricuspid   regurgitation is mildly elevated (35-45 mmHg).      Telemetry Scan [[id number]] Resulted: 05/19/25     Updated: 05/20/25 1631    ECG 12 Lead Dyspnea [[id number]] Collected: 05/19/25 2153     Updated: 05/20/25 1731     QT Interval  497 ms      QTC Interval 531 ms     Narrative:      HEART RATE=70  bpm  RR Erthmfki=244  ms  HI Tauajhjf=858  ms  P Horizontal Axis=-18  deg  P Front Axis=27  deg  QRSD Utchmwsu=310  ms  QT Vsbarfgx=666  ms  LEiE=330  ms  QRS Axis=-80  deg  T Wave Axis=95  deg  - ABNORMAL ECG -  Sinus rhythm  Prolonged HI interval  Left atrial enlargement  Left bundle branch block  When compared with ECG of 14-May-2025 11:20:35,  Significant repolarization change  Electronically Signed By: Shilo Longoria ([facility]) 2025-05-20 17:30:35  Date and Time of Study:2025-05-19 21:53:24    Telemetry Scan [[id number]] Resulted: 05/19/25     Updated: 05/20/25 2244    Telemetry Scan [[id number]] Resulted: 05/19/25     Updated: 05/20/25 2340    Telemetry Scan [[id number]] Resulted: 05/19/25     Updated: 05/21/25 0429    Telemetry Scan [[id number]] Resulted: 05/19/25     Updated: 05/21/25 0743    Telemetry Scan [[id number]] Resulted: 05/19/25     Updated: 05/21/25 1132          reviewed        Results for orders placed during the hospital encounter of 05/19/25    Adult Transthoracic Echo Complete W/ Cont if Necessary Per Protocol    Interpretation Summary    Left ventricular ejection fraction appears to be 46 - 50%.    Left ventricular diastolic function is consistent with (grade II w/high LAP) pseudonormalization.  Average GLS -13%.    The right ventricular cavity is dilated.    The left atrial cavity is dilated.    Moderate mitral valve regurgitation is present.    Moderate tricuspid valve regurgitation is present.    Estimated right ventricular systolic pressure from tricuspid regurgitation is mildly elevated (35-45 mmHg).      Microbiology Results (last 10 days)       Procedure Component Value - Date/Time    Respiratory Panel PCR w/COVID-19(SARS-CoV-2) RAMILA/ALONSO/[facility]/WILLI In-House, NP Swab in UTM/[facility], 2 HR TAT - Swab, Nasopharynx [[id number]]  (Normal) Collected: 05/19/25 0787    Lab Status: Final result Specimen: Swab from Nasopharynx Updated:  05/19/25 2356     ADENOVIRUS, PCR Not Detected     Coronavirus 229E Not Detected     Coronavirus HKU1 Not Detected     Coronavirus NL63 Not Detected     Coronavirus OC43 Not Detected     COVID19 Not Detected     Human Metapneumovirus Not Detected     Human Rhinovirus/Enterovirus Not Detected     Influenza A PCR Not Detected     Influenza B PCR Not Detected     Parainfluenza Virus 1 Not Detected     Parainfluenza Virus 2 Not Detected     Parainfluenza Virus 3 Not Detected     Parainfluenza Virus 4 Not Detected     RSV, PCR Not Detected     Bordetella pertussis pcr Not Detected     Bordetella parapertussis PCR Not Detected     Chlamydophila pneumoniae PCR Not Detected     Mycoplasma pneumo by PCR Not Detected    Narrative:      In the setting of a positive respiratory panel with a viral infection PLUS a negative procalcitonin without other underlying concern for bacterial infection, consider observing off antibiotics or discontinuation of antibiotics and continue supportive care. If the respiratory panel is positive for atypical bacterial infection (Bordetella pertussis, Chlamydophila pneumoniae, or Mycoplasma pneumoniae), consider antibiotic de-escalation to target atypical bacterial infection.            Assessment & Plan     Acute heart failure with mildly reduced ejection fraction (HFmrEF, 41-49%)    History of substance abuse    Tobacco abuse    Elevated troponin    Hypertension    PAF (paroxysmal atrial fibrillation)    Hepatitis C    Hyperlipidemia    Valvular heart disease    CKD (chronic kidney disease) stage 4, GFR 15-29 ml/min    Anemia in chronic kidney disease (CKD)    LBBB (left bundle branch block)          Chest pain        Lab Results   Component Value Date     TROPONINT 44 (H) 05/20/2025     TROPONINT 49 (H) 05/19/2025     TROPONINT 32 (H) 01/03/2025   -Continue aspirin statin and beta-blocker  -EKG: Sinus rhythm LBBB  -Telemetry  - Cardiology consulted and cleared patient for dc 5/21     Heart  failure with reduced ejection fraction         Lab Results   Component Value Date     TROPONINT 44 (H) 05/20/2025     TROPONINT 49 (H) 05/19/2025     PROBNP 24,295.0 (H) 05/19/2025     PROBNP 18,267.0 (H) 01/21/2025      (L) 05/20/2025   -Echocardiogram: LVEF 46 to 50% with grade 2 diastolic dysfunction  -Repeat echo in 3 months  -Continue beta-blocker. Arb dc'ed by cardiologist. Patient started on Entresto.  90-day supply given  - IV Bumex given and now switch back to bumex 2mg po twice daily.  90-day refill given  -Monitor I's and O's and daily weights  -2 g sodium diet  -F/u at Heart failure clinic     Diabetes mellitus type II        Lab Results   Component Value Date     GLUCOSE 140 (H) 05/20/2025     GLUCOSE 116 (H) 05/19/2025     GLUCOSE 62 (L) 02/26/2025     GLUCOSE 59 (L) 01/21/2025   - Sliding scale insulin  -Diabetic diet  -Monitor before meals and at bedtime     CKD (Stage IV)  Lab Results   Component Value Date    CREATININE 3.20 (H) 05/21/2025    BUN 54 (H) 05/21/2025    BCR 16.9 05/21/2025    EGFR 23.0 (L) 05/21/2025     -Avoid nephrotoxic medication IV dye unless urgently needed  -Monitor BMP and I's and O's while admitted  - Nephrology consulted and cleared patient for dc 5/21     History of substance abuse  - History of hepatitis C  - UDS unremarkable     Hypertension  BP Readings from Last 1 Encounters:   05/21/25 142/95   - Continue Coreg, hydralazine   - Monitor while admitted    COPD?  Current Smoker  -F/u with pulmonologist outpatient, referral given  -Nicotine patch ordered per patient's request  - Walk oximetry completed and did not require O2 at discharge.      I discussed the patients findings and my recommendations with patient and nursing staff.     Discharge Diagnosis:      Acute heart failure with mildly reduced ejection fraction (HFmrEF, 41-49%)    History of substance abuse    Tobacco abuse    Elevated troponin    Hypertension    PAF (paroxysmal atrial fibrillation)     Hepatitis C    Hyperlipidemia    Valvular heart disease    CKD (chronic kidney disease) stage 4, GFR 15-29 ml/min    Anemia in chronic kidney disease (CKD)    LBBB (left bundle branch block)      Hospital Course  Patient is a 50 y.o. male presented with dyspnea as noted in HPI above.  Patient has a known history of CHF with reduced EF, CKD stage IV and he also reports a history of COPD but does not follow-up with pulmonologist outpatient and is allergic to steroid.  He was admitted to the observation unit with a cardiologist and nephrologist consult.  He admitted to have been out of p.o. Bumex for a week.  He received IV Bumex and reported improvement of symptoms.  Walk oximetry completed and did not require O2 at discharge.  He was discharged on Entresto and he was also given a refill of 90 days on Bumex and potassium.  He was instructed to follow-up with PCP, cardiologist, nephrologist, pulmonologist and at heart failure clinic. At this time, patient felt to be in good condition for discharge with close follow up with PCP. Instructed to take all medications as prescribed and to return to ED if any concerning signs/symptoms. All test/lab results were discussed with patient. All questions were answered and patient verbalizes understanding.         Past Medical History:     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    h/o Medication and medical follow-up noncompliance 04/10/2024    Hepatitis C 01/03/2025    Hyperkalemia 04/26/2022    Hyperlipidemia  01/21/2025    Hypertensive heart disease with congestive heart failure and chronic kidney disease 02/11/2025    Hypertensive urgency 04/26/2022    Methamphetamine abuse 04/26/2022    Methamphetamine intoxication 06/16/2024    Mild to moderate valvular heart disease 02/06/2025    Mild to moderate mitral valve regurgitation      Secondary hyperparathyroidism 02/11/2025    Tick bite of abdomen 04/26/2022    Tobacco abuse 04/26/2022       Past Surgical History:     Past Surgical History:   Procedure Laterality Date    CARDIAC CATHETERIZATION N/A 04/27/2022    ORIF TIBIA/FIBULA FRACTURES Right        Social History:   Social History     Socioeconomic History    Marital status: Single   Tobacco Use    Smoking status: Every Day     Current packs/day: 0.25     Average packs/day: 1 pack/day for 31.4 years (30.6 ttl pk-yrs)     Types: Cigarettes     Start date: 1994     Passive exposure: Never    Smokeless tobacco: Never   Vaping Use    Vaping status: Never Used   Substance and Sexual Activity    Alcohol use: Not Currently    Drug use: Yes     Types: Methamphetamines, Marijuana, Cocaine(coke)    Sexual activity: Yes       Procedures Performed    05/21 1222 Note By: Nena Brownlee, CRT    Consults:   Consults       Date and Time Order Name Status Description    5/20/2025  8:30 AM Inpatient Nephrology Consult      5/20/2025 12:45 AM Inpatient Cardiology Consult Completed             Condition on Discharge:     Stable    Discharge Disposition  Home or Self Care    Discharge Medications     Discharge Medications        New Medications        Instructions Start Date   albuterol sulfate  (90 Base) MCG/ACT inhaler  Commonly known as: PROVENTIL HFA;VENTOLIN HFA;PROAIR HFA   2 puffs, Inhalation, Every 4 Hours PRN      nicotine 14 MG/24HR patch  Commonly known as: NICODERM CQ   1 patch, Transdermal, Every 24 Hours Scheduled   Start Date: May 22, 2025     sacubitril-valsartan 24-26 MG tablet  Commonly known as: ENTRESTO   1  tablet, Oral, Every 12 Hours Scheduled             Continue These Medications        Instructions Start Date   apixaban 5 MG tablet tablet  Commonly known as: ELIQUIS   2.5 mg, 2 Times Daily      aspirin 81 MG EC tablet   81 mg, Oral, Daily      bumetanide 2 MG tablet  Commonly known as: BUMEX   2 mg, Oral, 2 Times Daily      carvedilol 25 MG tablet  Commonly known as: COREG   25 mg, Oral, 2 Times Daily With Meals      hydrALAZINE 25 MG tablet  Commonly known as: APRESOLINE   25 mg, Oral, 3 Times Daily      nitroglycerin 0.4 MG SL tablet  Commonly known as: NITROSTAT   0.4 mg, Sublingual, Every 5 Minutes PRN, Take no more than 3 doses in 15 minutes.      potassium chloride ER 20 MEQ tablet controlled-release ER tablet  Commonly known as: K-TAB   20 mEq, Oral, Daily      rosuvastatin 40 MG tablet  Commonly known as: CRESTOR   40 mg, Oral, Daily             Stop These Medications      losartan 50 MG tablet  Commonly known as: COZAAR              Discharge Diet:   Diet Instructions       Diet: Cardiac Diets; Healthy Heart (2-3 Na+); Regular (IDDSI 7); Thin (IDDSI 0)      Discharge Diet: Cardiac Diets    Cardiac Diet: Healthy Heart (2-3 Na+)    Texture: Regular (IDDSI 7)    Fluid Consistency: Thin (IDDSI 0)            Activity at Discharge:     Follow-up Appointments  Future Appointments   Date Time Provider Department Center   5/28/2025  2:00 PM Clarence Boucher APRN  [facility] None   7/22/2025  1:45 PM Cary Espinosa MD [facility]     Additional Instructions for the Follow-ups that You Need to Schedule       Discharge Follow-up with PCP   As directed       Currently Documented PCP:    Vicki Santana APRN    PCP Phone Number:    [phone]     Follow Up Details: 5-7 days        Discharge Follow-up with Specified Provider: Heart failture clinic; 1 Week   As directed      To: Heart failture clinic   Follow Up: 1 Week        Discharge Follow-up with Specified Provider: Nephrologist, cardiologist and  pulmonologist; 2 Weeks   As directed      To: Nephrologist, cardiologist and pulmonologist   Follow Up: 2 Weeks                Test Results Pending at Discharge  Pending Results       None             Risk for Readmission (LACE) Score: 9 (5/21/2025  6:00 AM)      Greater than 30 minutes spent in discharge activities for this patient    Signature:Electronically signed by CHANDLER Murdock, 05/21/25, 12:51 PM EDT.

## 2025-05-22 ENCOUNTER — TRANSITIONAL CARE MANAGEMENT TELEPHONE ENCOUNTER (OUTPATIENT)
Dept: CALL CENTER | Facility: HOSPITAL | Age: 50
End: 2025-05-22
Payer: MEDICAID

## 2025-05-22 ENCOUNTER — TELEPHONE (OUTPATIENT)
Dept: CARDIOLOGY | Facility: CLINIC | Age: 50
End: 2025-05-22

## 2025-05-22 NOTE — OUTREACH NOTE
Call Center TCM Note      Flowsheet Row Responses   [facility] patient discharged from? [facility]   Does the patient have one of the following disease processes/diagnoses(primary or secondary)? CHF   TCM attempt successful? No  [Ann Marie, sister]   Unsuccessful attempts Attempt 1   Call Status Left message   Comments [facility] 5/28/25             Lyric RENO - Registered Nurse    5/22/2025, 12:44 EDT

## 2025-05-22 NOTE — TELEPHONE ENCOUNTER
Caller: Scott Gaytan    Relationship to patient: Self    Best call back number: [phone]     Patient is needing: PT WAS ADVISED A 2 WEEK FU FROM ED. NO OPENINGS WITHIN TIMEFRAME, SO SCHEDULED PT FOR FIRST OPENING 6/17 & ADDED TO WAITLIST. IF OFFICE CAN WORK HIM IN SOONER PLS CALL.

## 2025-05-22 NOTE — CASE MANAGEMENT/SOCIAL WORK
Case Management Discharge Note      Final Note: Routine home         Selected Continued Care - Discharged on 5/21/2025 Admission date: 5/19/2025 - Discharge disposition: Home or Self Care       Transportation Services  Private: Car    Final Discharge Disposition Code: 01 - home or self-care

## 2025-05-22 NOTE — OUTREACH NOTE
Call Center TCM Note      Flowsheet Row Responses   [facility] facility patient discharged from? [facility]   Does the patient have one of the following disease processes/diagnoses(primary or secondary)? CHF   TCM attempt successful? No   Unsuccessful attempts Attempt 2   Call Status Left message             Lyric Cazares Registered Nurse    5/22/2025, 16:24 EDT

## 2025-05-23 ENCOUNTER — TRANSITIONAL CARE MANAGEMENT TELEPHONE ENCOUNTER (OUTPATIENT)
Dept: CALL CENTER | Facility: HOSPITAL | Age: 50
End: 2025-05-23
Payer: MEDICAID

## 2025-05-23 ENCOUNTER — OFFICE VISIT (OUTPATIENT)
Dept: FAMILY MEDICINE CLINIC | Facility: CLINIC | Age: 50
End: 2025-05-23
Payer: MEDICAID

## 2025-05-23 VITALS
WEIGHT: 175.8 LBS | SYSTOLIC BLOOD PRESSURE: 152 MMHG | TEMPERATURE: 98.2 F | DIASTOLIC BLOOD PRESSURE: 92 MMHG | BODY MASS INDEX: 26.64 KG/M2 | HEIGHT: 68 IN | OXYGEN SATURATION: 100 % | RESPIRATION RATE: 16 BRPM

## 2025-05-23 DIAGNOSIS — R10.12 ACUTE LUQ PAIN: Primary | ICD-10-CM

## 2025-05-23 DIAGNOSIS — Z00.00 HEALTHCARE MAINTENANCE: ICD-10-CM

## 2025-05-23 DIAGNOSIS — F41.9 ANXIETY: ICD-10-CM

## 2025-05-23 RX ORDER — BUSPIRONE HYDROCHLORIDE 5 MG/1
5 TABLET ORAL 3 TIMES DAILY
Qty: 90 TABLET | Refills: 0 | Status: SHIPPED | OUTPATIENT
Start: 2025-05-23 | End: 2025-06-22

## 2025-05-23 NOTE — LETTER
May 25, 2025     Patient: Scott Gaytan   YOB: 1975   Date of Visit: 5/23/2025       To Whom It May Concern:    It is my medical opinion that Scott Gaytan may return to work on Tuesday, May 27, 2025.  Must be on light duty and work no more than 6 hours per day.         Sincerely,        CHANDLER Omalley    CC: No Recipients

## 2025-05-23 NOTE — OUTREACH NOTE
Call Center TCM Note      Flowsheet Row Responses   [facility] facility patient discharged from? [facility]   Does the patient have one of the following disease processes/diagnoses(primary or secondary)? CHF   TCM attempt successful? No   Unsuccessful attempts Attempt 3             RONAK BAUER - Registered Nurse    5/23/2025, 12:13 EDT

## 2025-05-23 NOTE — PROGRESS NOTES
Transitional Care Follow Up Visit  Subjective     Scott MARK Gaytan is a 50 y.o. male who presents for a transitional care management visit.    Within 48 business hours after discharge our office contacted him via telephone to coordinate his care and needs.      I reviewed and discussed the details of that call along with the discharge summary, hospital problems, inpatient lab results, inpatient diagnostic studies, and consultation reports with Scott.     Current outpatient and discharge medications have been reconciled for the patient.  Reviewed by: CHANDLER Omalley          5/21/2025     7:53 PM   Date of TCM Phone Call   [facility]   Date of Admission 5/19/2025   Date of Discharge 5/21/2025   Discharge Disposition Home or Self Care     Risk for Readmission (LACE) Score: 9 (5/21/2025  6:00 AM)      History of Present Illness   Course During Hospital Stay:  5/19/250*05/21/250 - copied and pasted from hospitalization note:    [facility]     Vicki Santana APRN     CHIEF COMPLAINT:      Shortness of breath      HISTORY OF PRESENT ILLNESS:     Shortness of Breath       ED 5/19/25: 50-year-old male with dyspnea and fatigue for the past 1 week presents for evaluation. Patient denies any cough or fever but states he has had some intermittent mild chest tightness and wheezing. No calf pain or swelling, no recent trips or travel.      Observation 5/20/25: Patient is a 50-year-old male presenting to the hospital with complaints of shortness of breath and fatigue.  Patient states he has been out of some of his medications for about a week.  Patient states he has been following up with specialist outpatient.  Patient states he felt like he had water on his lungs and had some chest tightness as well.  Patient evaluated by cardiology nephrology.        Observation 5/21/25  Patient is feeling better today. He still c/o shortness of breath but feels like he is at his baseline. Room  air, able to lay almost flat. Walk oximetry completed and he did not require O2. He is wheezing on auscultation but is reports he has a known hx of COPD but does not f/u with pulmonologist. Unfortunately he is allergic to steroids.  He still smokes but would like to try a nicotine patch. I discussed importance of not smoking while on nicotine patch and risks associated with that. He verbalizes understanding.    Discharge Diagnosis:       Acute heart failure with mildly reduced ejection fraction (HFmrEF, 41-49%)    History of substance abuse    Tobacco abuse    Elevated troponin    Hypertension    PAF (paroxysmal atrial fibrillation)    Hepatitis C    Hyperlipidemia    Valvular heart disease    CKD (chronic kidney disease) stage 4, GFR 15-29 ml/min    Anemia in chronic kidney disease (CKD)    LBBB (left bundle branch block)        Hospital Course  Patient is a 50 y.o. male presented with dyspnea as noted in HPI above.  Patient has a known history of CHF with reduced EF, CKD stage IV and he also reports a history of COPD but does not follow-up with pulmonologist outpatient and is allergic to steroid.  He was admitted to the observation unit with a cardiologist and nephrologist consult.  He admitted to have been out of p.o. Bumex for a week.  He received IV Bumex and reported improvement of symptoms.  Walk oximetry completed and did not require O2 at discharge.  He was discharged on Entresto and he was also given a refill of 90 days on Bumex and potassium.  He was instructed to follow-up with PCP, cardiologist, nephrologist, pulmonologist and at heart failure clinic. At this time, patient felt to be in good condition for discharge with close follow up with PCP. Instructed to take all medications as prescribed and to return to ED if any concerning signs/symptoms. All test/lab results were discussed with patient. All questions were answered and patient verbalizes understanding.        The following portions of the  "patient's history were reviewed and updated as appropriate: allergies, current medications, past family history, past medical history, past social history, past surgical history, and problem list.    Review of Systems   Constitutional:  Negative for activity change, appetite change, fatigue and fever.   Respiratory:  Positive for cough and shortness of breath. Negative for wheezing.    Cardiovascular:  Negative for chest pain, palpitations and leg swelling.   Gastrointestinal:  Negative for nausea and vomiting.   Genitourinary:  Negative for dysuria.   Skin:  Negative for rash.   Neurological:  Negative for weakness.   Psychiatric/Behavioral: Negative.         Objective   /92 (BP Location: Left arm, Patient Position: Sitting, Cuff Size: Adult)   Temp 98.2 °F (36.8 °C) (Infrared)   Resp 16   Ht 172.7 cm (68\")   Wt 79.7 kg (175 lb 12.8 oz)   SpO2 100%   BMI 26.73 kg/m²   Physical Exam  Constitutional:       General: He is not in acute distress.     Appearance: Normal appearance. He is not ill-appearing, toxic-appearing or diaphoretic.   HENT:      Head: Normocephalic and atraumatic.   Cardiovascular:      Rate and Rhythm: Normal rate.      Pulses: Normal pulses.   Pulmonary:      Effort: Pulmonary effort is normal.   Abdominal:      Palpations: Abdomen is soft.      Tenderness: There is abdominal tenderness in the left upper quadrant.       Skin:     General: Skin is warm and dry.      Capillary Refill: Capillary refill takes less than 2 seconds.   Neurological:      General: No focal deficit present.      Mental Status: He is alert and oriented to person, place, and time.      Motor: No weakness.      Gait: Gait normal.   Psychiatric:         Mood and Affect: Mood normal.         Behavior: Behavior normal.         Thought Content: Thought content normal.         Judgment: Judgment normal.         Assessment & Plan   Diagnoses and all orders for this visit:    1. Acute LUQ pain (Primary)  Assessment & " Plan:  CT of abdomen without contrast ordered for edema and pain in LUQ.    Orders:  -     Cancel: CT Abdomen Pelvis With Contrast; Future  -     CT Abdomen Pelvis Without Contrast; Future    2. Anxiety  -     busPIRone (BUSPAR) 5 MG tablet; Take 1 tablet by mouth 3 (Three) Times a Day for 30 days.  Dispense: 90 tablet; Refill: 0    3. Healthcare maintenance  Assessment & Plan:  Noncompliance with Heart Failure Clinic. Admits to being Out of Bumex for x1 week prior recent hospitalization.  Patient established PC with me in February, 2025.  Bumex refill written by myself on 2/28/25.    Orders:  -     Ambulatory Referral to Case Management Barriers to Care, Medication Adherence, MyChart Home Monitoring  -     Ambulatory Referral to Social Care Services (Amb Case Mgmt)             Current Outpatient Medications on File Prior to Visit   Medication Sig Dispense Refill    albuterol sulfate  (90 Base) MCG/ACT inhaler Inhale 2 puffs Every 4 (Four) Hours As Needed for Wheezing or Shortness of Air. 18 g 0    apixaban (ELIQUIS) 5 MG tablet tablet Take 0.5 tablets by mouth 2 (Two) Times a Day.      aspirin 81 MG EC tablet Take 1 tablet by mouth Daily for 180 days. 90 tablet 1    bumetanide (BUMEX) 2 MG tablet Take 1 tablet by mouth 2 (Two) Times a Day for 90 days. 180 tablet 0    carvedilol (COREG) 25 MG tablet Take 1 tablet by mouth 2 (Two) Times a Day With Meals for 90 days. 180 tablet 0    hydrALAZINE (APRESOLINE) 25 MG tablet Take 1 tablet by mouth 3 (Three) Times a Day. 90 tablet 0    nicotine (NICODERM CQ) 14 MG/24HR patch Place 1 patch on the skin as directed by provider Daily for 30 days. 30 patch 0    potassium chloride ER (K-TAB) 20 MEQ tablet controlled-release ER tablet Take 1 tablet by mouth Daily for 90 days. 30 tablet 2    rosuvastatin (CRESTOR) 40 MG tablet TAKE 1 TABLET BY MOUTH EVERY DAY 90 tablet 0    sacubitril-valsartan (ENTRESTO) 24-26 MG tablet Take 1 tablet by mouth Every 12 (Twelve) Hours for 90  days. 60 tablet 2    nitroglycerin (NITROSTAT) 0.4 MG SL tablet Place 1 tablet under the tongue Every 5 (Five) Minutes As Needed for Chest Pain for up to 30 days. Take no more than 3 doses in 15 minutes. 30 tablet 0     No current facility-administered medications on file prior to visit.

## 2025-05-25 PROBLEM — F41.9 ANXIETY: Status: ACTIVE | Noted: 2025-05-25

## 2025-05-25 PROBLEM — R10.12 ACUTE LUQ PAIN: Status: ACTIVE | Noted: 2025-05-25

## 2025-05-25 PROBLEM — Z00.00 HEALTHCARE MAINTENANCE: Status: ACTIVE | Noted: 2025-05-25

## 2025-05-25 NOTE — ASSESSMENT & PLAN NOTE
Noncompliance with [facility]. Admits to being Out of Bumex for x1 week prior recent hospitalization.  Patient established PC with me in February, 2025.  Bumex refill written by myself on 2/28/25.

## 2025-05-27 ENCOUNTER — REFERRAL TRIAGE (OUTPATIENT)
Age: 50
End: 2025-05-27
Payer: MEDICAID

## 2025-05-27 ENCOUNTER — REFERRAL TRIAGE (OUTPATIENT)
Dept: CASE MANAGEMENT | Facility: OTHER | Age: 50
End: 2025-05-27
Payer: MEDICAID

## 2025-05-27 PROBLEM — I51.89 GRADE II DIASTOLIC DYSFUNCTION: Status: ACTIVE | Noted: 2025-05-27

## 2025-05-27 PROBLEM — I27.20 PULMONARY HYPERTENSION: Chronic | Status: ACTIVE | Noted: 2025-05-27

## 2025-05-27 PROBLEM — I13.0 HYPERTENSIVE HEART DISEASE WITH CONGESTIVE HEART FAILURE AND CHRONIC KIDNEY DISEASE: Status: RESOLVED | Noted: 2025-02-11 | Resolved: 2025-05-27

## 2025-05-27 PROBLEM — E88.810 METABOLIC SYNDROME X: Chronic | Status: ACTIVE | Noted: 2025-05-27

## 2025-05-27 NOTE — PROGRESS NOTES
"Cardiology Heart Failure Clinic Note  Clarence \"Tay\" Citlaly ARTEAGA [facility]  Patient ID: Scott Gaytan  is a 50 y.o. male.    Encounter Date:05/28/2025    HPI:  50-year-old male patient of Dr. Espinosa's with a past medical history of grade 2 diastolic dysfunction and relatively preserved LV systolic function (TTE 5/20/25) EF 46 to 50% with grade 2 diastolic dysfunction, valvular heart disease with moderate MR and TR pulmonary hypertension which is mild with an RVSP of 35 to 45 mmHg.  Known left bundle branch block, paroxysmal atrial fibrillation, stage IV chronic kidney disease last eGFR 23 cc/min, history of hepatitis C, secondary hyperparathyroidism, questionable COPD not followed by pulmonologist with allergy to steroids with ongoing tobacco abuse, history of substance abuse which includes methamphetamines, cannabis, cocaine, metabolic syndrome components would include hypertension, dyslipidemia, and an elevated BMI at 26.7 kg/m² admitted on 5/19/2025 with generalized fatigue x 1 week after having ran out of his medications as well as MADERA/SOA.  Comes in for initial heart failure visit 5/28/2025 since recently discharge he notes that other than the pain from his hernia on the left upper quadrant of his abdomen, he has done pretty well.  Of note he has pinpoint pupils mildly slurred speech and trouble focusing today and did test positive for methamphetamines and THC       Assessment:    Diagnoses and all orders for this visit:    1. CKD (chronic kidney disease) stage 4, GFR 15-29 ml/min (Primary)  Overview:  Followed by Dr Das,     Orders:  -     Basic Metabolic Panel; Standing  -     CBC & Differential; Future  -     Magnesium; Future  -     proBNP; Standing  -     TSH; Future  -     Basic Metabolic Panel; Standing  -     Basic Metabolic Panel  -     CBC & Differential; Standing  -     CBC & Differential  -     Magnesium; Standing  -     Magnesium  -     proBNP; Standing  -     proBNP  -     TSH; Standing  -     " TSH    2. h/o Nonischemic nontraumatic myocardial injury  Overview:  LVIDd 5  Followed at [facility] heart failure clinic by Dr. Doty   (TTE 5/20/25) EF 46 to 50% with grade 2 diastolic dysfunction  (GYPSY 11/25/24) EF 51-55% w/o DD  (TTE 5/15/23) LVEF 37%        3. Valvular heart disease  Overview:  Mild to moderate mitral valve regurgitation    Orders:  -     Basic Metabolic Panel; Standing  -     proBNP; Standing  -     Basic Metabolic Panel; Standing  -     Basic Metabolic Panel  -     proBNP; Standing  -     proBNP    4. Mild pulmonary hypertension RVSP 35-45 mmHg    5. Grade II diastolic dysfunction    6. PAF (paroxysmal atrial fibrillation)  Overview:  Rate controlled with Coreg  Anticoagulated with renal dosed Eliquis 2.5 mg twice daily  Without history of ablations or DCCV      Orders:  -     ECG 12 Lead; Future    7. Anemia in stage 4 chronic kidney disease  -     CBC & Differential; Future  -     CBC & Differential; Standing  -     CBC & Differential    8. History of substance abuse  Overview:  Methamphetamines  Cannabis  Cocaine  Tobacco    Orders:  -     Urine Drug Screen - Urine, Clean Catch; Future  -     Urine Drug Screen - Urine, Clean Catch; Standing  -     Urine Drug Screen - Urine, Clean Catch    9. LBBB (left bundle branch block)  -     ECG 12 Lead; Future    10. Tobacco abuse w/ presumed COPD    11. h/o Medication and medical follow-up noncompliance    12. Hepatitis C virus infection without hepatic coma, unspecified chronicity    13. SOB (shortness of breath)    14. Secondary hyperparathyroidism    15. Metabolic syndrome X  Overview:  A.  Benign essential hypertension  B.  Mixed dyslipidemia  C.  Elevated BMI at 26.7 kg/m²      16. Healthcare maintenance  -     carvedilol (COREG) 25 MG tablet; Take 1.5 tablets by mouth 2 (Two) Times a Day With Meals for 90 days. Hold for systolic blood pressure less than 100 and and/or heart rate less than 60 unless you have a ICD or permanent pacemaker   Dispense: 270 tablet; Refill: 2    Other orders  -     furosemide (LASIX) injection 80 mg  -     potassium chloride (KLOR-CON M10) CR tablet 10 mEq  -     metOLazone (ZAROXOLYN) 2.5 MG tablet; Take 1 tablet by mouth 3 (Three) Times a Week. Take until you see your nephrologist on Mondays Wednesdays and Fridays  Dispense: 16 tablet; Refill: 0              Plan/discussion    Volume overload    Heart Failure Core Measures addressed    Type of Overload predominantly renal  H/o NIDCM  Grade 2 diastolic dysfunction  VHD  Stage IV CKD    Most recent EF:  (TTE 5/20/25) EF 46 to 50% with grade 2 diastolic dysfunction,    [place] Heart association Class & Stage : 2b    HF Meds Pre Visit    Beta Blocker: Coreg 25 mg every 12 hours  ARNI/ACE/ARB: Entresto 24/26 mg every 12 hours (not been taking)  SGLT 2 inhibitors: None  Diuretics: Bumex 2 mg twice daily  Metalizone 2.5mg MWF  Kdur 20 meq QD  Aldosterone Antagonist: None likely 2/2 stage IV CKD  Digitalis: N/A  Vasodilators & Nitrates: Nitrostat 0.4 SL as needed CP    HF Meds Post Visit    Beta Blocker: Coreg being increased to 37.5 mg every 12 hours  ARNI/ACE/ARB: None due to his stage IV CKD  SGLT 2 inhibitors: Await renal's input regarding initiation of any SGLT2  Diuretics: Bumex 2 mg twice daily  Metolazone 2.5 mg on Mondays Wednesdays and Fridays  K-Dur 20 mill equivalents daily  Furoscix: Unsure of his compliance therefore will not order  Aldosterone Antagonist: None due to his stage IV CKD  Digitalis: N/A  Nitrates: Nitrostat 0.4 SL as needed CP    Crestor 40 mg at bedtime  Apresoline 25 mg 3 times daily  ECASA 81 mg QD  Eliquis 2.5mg BID not quite sure if he is taking or not    Cardiac medicines reviewed with risk, benefits, and necessity of each discussed.       _________________________________________________________    Discussion    Former patient of the advanced heart failure team at [facility] with what appears to be recovered ejection fraction and grade  2 diastolic dysfunction is already on heart failure core measures including beta-blocker, ARNI, diuretic,    Must be very leery given his former history of cocaine use with the beta-blocker given the possibility of coronary spasms  He does tell me he has not used cocaine in several years  Therefore we will go ahead and increase his Coreg up to 37.5 to help control his blood pressure which is bordering on hypertensive urgency 5/28/2025  We did discuss the risk associated with utilization of cocaine and carvedilol and the fact it can cause coronary spasms which can lead to immediate death and he assures me that he will not utilize any cocaine  Patient says he will not go back to [facility] heart failure team as he feels they are the ones that ruined his kidneys and they gave him Entresto which is also why he refuses to take that drug that was prescribed at discharge  Patient appears somewhat out of sorts and to shelved likely due to the positive methamphetamine found in his urine drug screen he also tested positive for amphetamines as well as THC  Tells me that he smoked since 10 years of age and defers any smoking cessation drugs and/or further conversation regarding the subject at this juncture  By physical exam 5/28/2025 he is mildly volume overloaded with a proBNP consistent with most recent hospitalization 24,198  We will go ahead and give the Lasix 80 mg IV today with 10 mill equivalents of potassium  EF appears to recovered somewhere between 46 to 50% on most recent echo and he did have notably grade 2 diastolic dysfunction  There is also moderate amount of valvular heart disease would defer that to Dr. Espinosa to see if he would like him assessed by the valve team however given the patient's track record for compliance, continued to methamphetamine utilization, that might not be a feasible alternative but will defer to Dr. Espinosa's judgment  Will obviously need nephrology follow-up given his stage IV  CKD  He has an appointment coming up on board June 2025 (about a week way)  Given his precarious renal status would defer to nephrology for diuresis  In the interim we will go ahead and place him back on his metolazone to help prevent readmission for volume overload I did prescribe only 16 tablets without a refill  I will likely see him about 2 weeks after he sees his nephrologist and follow-up so I can get labs once again   I am unsure as to his compliance  He is presenting some communication challenges and giving his current state of methamphetamine use apparently relatively recently  would add typical volume overload nursing interventions including:        A. Fluid restriction at less than 2000 cc daily       B. Daily weights        C.  1 g low-sodium diet        I did the following activities:preparing for the visit, with Scott Gaytan  including reviewing tests, once pt arrived in clinic I also performed a medically appropriate examination and/or evaluation , I personally spent considerable time counseling and educating the patient/family/caregiver, ordering medications, tests, or procedures, referring and communicating with other health care professionals , and documenting information in the medical record. I estimate including preparation 45 minutes             Subjective:     Chief Complaint   Patient presents with    Congestive Heart Failure       ROS:  Constitutional: + weakness, + fatigue cyst but difficult to quantify given the patient's current inebriation, No fever, rigors, chills   Eyes: No recent vision changes, eye pain   ENT/oropharynx: No recent difficulty swallowing, sore throat, epistaxis, changes in hearing   Cardiovascular: No recent chest pain, chest tightness, palpitations except as noted in HPI, paroxysmal nocturnal dyspnea, orthopnea, diaphoresis, dizziness & no pre or hector syncopal episodes   Respiratory: Says he has no issues with at rest shortness of breath, but that he is + with  dyspnea on exertion, no significant productive cough, no wheezing and no hemoptysis   Gastrointestinal: No abdominal pain (except for noted left upper quadrant herniation area), no significant nausea, vomiting, diarrhea, bloody stools.  He has noted increasing abdominal girth and feels like it is where he is holding his water   Genitourinary: No hematuria, dysuria other than increased frequency   Neurological: No headache, tremors, numbness,  or hemiparesis    Musculoskeletal: No change in typical cramps, myalgias, no new joint pain, joint swelling   Integument: No recent rash, minimal lower extremity edema      Patient Active Problem List   Diagnosis    History of substance abuse    Tobacco abuse w/ presumed COPD    h/o  Elevated troponin    Hypertension    PAF (paroxysmal atrial fibrillation)    Hepatitis C    h/o Nonischemic nontraumatic myocardial injury    h/o Medication and medical follow-up noncompliance    Hyperlipidemia    Valvular heart disease    CKD (chronic kidney disease) stage 4, GFR 15-29 ml/min    Anemia in chronic kidney disease (CKD)    Arteriovenous fistula    Secondary hyperparathyroidism    Acute heart failure with mildly reduced ejection fraction (HFmrEF, 41-49%)    LBBB (left bundle branch block)    Acute LUQ pain    Anxiety    Healthcare maintenance    Metabolic syndrome X    Grade II diastolic dysfunction    Mild pulmonary hypertension RVSP 35-45 mmHg       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    h/o Medication and  medical follow-up noncompliance 04/10/2024    Hepatitis C 01/03/2025    Hyperkalemia 04/26/2022    Hyperlipidemia 01/21/2025    Hypertensive heart disease with congestive heart failure and chronic kidney disease 02/11/2025    Hypertensive urgency 04/26/2022    Methamphetamine abuse 04/26/2022    Methamphetamine intoxication 06/16/2024    Mild to moderate valvular heart disease 02/06/2025    Mild to moderate mitral valve regurgitation      Secondary hyperparathyroidism 02/11/2025    Tick bite of abdomen 04/26/2022    Tobacco abuse 04/26/2022       Past Surgical History:   Procedure Laterality Date    CARDIAC CATHETERIZATION N/A 04/27/2022    ORIF TIBIA/FIBULA FRACTURES Right        Social History     Socioeconomic History    Marital status: Single   Tobacco Use    Smoking status: Every Day     Current packs/day: 0.25     Average packs/day: 1 pack/day for 31.4 years (30.6 ttl pk-yrs)     Types: Cigarettes     Start date: 1994     Passive exposure: Never    Smokeless tobacco: Never   Vaping Use    Vaping status: Never Used   Substance and Sexual Activity    Alcohol use: Not Currently    Drug use: Yes     Types: Methamphetamines, Marijuana, Cocaine(coke)    Sexual activity: Yes       Allergies   Allergen Reactions    Methylprednisolone Swelling     Pt reports severe swelling with steroids    Morphine Other (See Comments)     Makes it feel like my blood is boiling in my veins         Current Outpatient Medications:     albuterol sulfate  (90 Base) MCG/ACT inhaler, Inhale 2 puffs Every 4 (Four) Hours As Needed for Wheezing or Shortness of Air., Disp: 18 g, Rfl: 0    apixaban (ELIQUIS) 5 MG tablet tablet, Take 0.5 tablets by mouth 2 (Two) Times a Day., Disp: , Rfl:     aspirin 81 MG EC tablet, Take 1 tablet by mouth Daily for 180 days., Disp: 90 tablet, Rfl: 1    bumetanide (BUMEX) 2 MG tablet, Take 1 tablet by mouth 2 (Two) Times a Day for 90 days., Disp: 180 tablet, Rfl: 0    busPIRone (BUSPAR) 5 MG tablet, Take 1  tablet by mouth 3 (Three) Times a Day for 30 days., Disp: 90 tablet, Rfl: 0    carvedilol (COREG) 25 MG tablet, Take 1.5 tablets by mouth 2 (Two) Times a Day With Meals for 90 days. Hold for systolic blood pressure less than 100 and and/or heart rate less than 60 unless you have a ICD or permanent pacemaker, Disp: 270 tablet, Rfl: 2    hydrALAZINE (APRESOLINE) 25 MG tablet, Take 1 tablet by mouth 3 (Three) Times a Day., Disp: 90 tablet, Rfl: 0    nicotine (NICODERM CQ) 14 MG/24HR patch, Place 1 patch on the skin as directed by provider Daily for 30 days., Disp: 30 patch, Rfl: 0    nitroglycerin (NITROSTAT) 0.4 MG SL tablet, Place 1 tablet under the tongue Every 5 (Five) Minutes As Needed for Chest Pain for up to 30 days. Take no more than 3 doses in 15 minutes., Disp: 30 tablet, Rfl: 0    potassium chloride ER (K-TAB) 20 MEQ tablet controlled-release ER tablet, Take 1 tablet by mouth Daily for 90 days., Disp: 30 tablet, Rfl: 2    rosuvastatin (CRESTOR) 40 MG tablet, TAKE 1 TABLET BY MOUTH EVERY DAY, Disp: 90 tablet, Rfl: 0    metOLazone (ZAROXOLYN) 2.5 MG tablet, Take 1 tablet by mouth 3 (Three) Times a Week. Take until you see your nephrologist on Mondays Wednesdays and Fridays, Disp: 16 tablet, Rfl: 0  No current facility-administered medications for this encounter.    Immunization History   Administered Date(s) Administered    Hepatitis A 05/24/2018       Most recent EKG as reviewed:  Procedures     Most recent echo as reviewed:  Results for orders placed during the hospital encounter of 05/19/25    Adult Transthoracic Echo Complete W/ Cont if Necessary Per Protocol    Interpretation Summary    Left ventricular ejection fraction appears to be 46 - 50%.    Left ventricular diastolic function is consistent with (grade II w/high LAP) pseudonormalization.  Average GLS -13%.    The right ventricular cavity is dilated.    The left atrial cavity is dilated.    Moderate mitral valve regurgitation is present.    Moderate  tricuspid valve regurgitation is present.    Estimated right ventricular systolic pressure from tricuspid regurgitation is mildly elevated (35-45 mmHg).      Most recent stress test results:      Most recent cardiac catheterization results:  Results for orders placed during the hospital encounter of 22    Cardiac Catheterization/Vascular Study    Narrative   Malik Devlin MD, PhD  [facility] cardiology  Date of service 2022    Procedure  1.  Left heart catheterization with coronary angiography left ventriculography in GUEVARA position    Indication  Elevated troponin, non-ST elevation microinfarction  Family history of early CAD with  father and grandfather    After informed consent patient was brought to the Cath Lab sterilely prepped and draped in usual fashion with exposure of the right groin for right common femoral arterial access via micropuncture modified center technique with placement of a 6 [place] sheath which was aspirated flushed with epimysium.  A 3 5 guidewire was advanced to the aortic valve followed by diagnostic JL 4 and JR4 catheters for selective left and right coronary angiography respectively.  The JR4 was used across aortic valve followed by hand-injection for LV gram, EDP assessment and pullback assessment of the transaortic valve gradient.  There was no obstructive CAD with borderline reduced LV systolic function 45 to 50% globally, all catheters equipment removed, Angio-Seal was used to close right common femoral arteriotomy with immediate hemostasis and means of distal pulses.  There were no complications and left Cath Lab chest pain-free hemodynamically electrically stable alert talkative staff neurologic grossly intact bilaterally    Complications none  Blood loss less than 5 cc  Contrast 65 cc  Moderate conscious sedation time 30 minutes with IV Versed and fentanyl administered by registered nurse with complete ECG pulse oximetry and hemodynamic  monitoring throughout the entirety of the case observed by me    Findings  1.  Opening aortic pressure was 141/96  2.  Closing pressure was unchanged  3.  LVEDP elevated at 22 with diastolic waveform consistent with likely is grade 2 diastolic dysfunction  4.  LV systolic function borderline reduced 45 to 50%  5.  No transaortic valve gradient seen on pullback    Angiography  Left main normal no disease  2.  The LAD is a large-caliber vessel coursing to around the apex which has mild diffuse luminal regularities, diagonals also have diffuse luminal irregularities only  3.  Circumflex is a large-caliber nondominant with obtuse marginal branches, nonobstructive luminal irregularities only  4.  RCA small, likely codominant, anterior takeoff, no angiographic disease, tapers dramatically in the midportion with very small RPDA and PLV branch    Conclusions recommendations  1 minimal epicardial coronary artery disease with anterior takeoff of the RCA, borderline reduced LV systolic function 45 to 50%, no transaortic valve gradient, high LVEDP at 18-22 with evidence of diastolic dysfunction  2.  Cessation of all substance abuse including methamphetamine clearly taking its toll, counseling provided again today  3.  Hypertension control, Coreg on board along with amlodipine, start losartan ultimately without LV systolic dysfunction in combination with Lasix if needed  4.  Patient can be discharged was discharged criteria met with hypertension controlled and stable oral medications with outpatient follow-up    Malik Devlin MD, PhD        Imaging:    Results for orders placed during the hospital encounter of 05/19/25    XR Chest 2 View    Narrative  XR CHEST 2 VW    Date of Exam: 5/19/2025 10:45 PM EDT    Indication: SOA Triage Protocol    Comparison: 5/14/2025    Findings:  Stable cardiomegaly. No evidence of pneumothorax. There are no dense areas of airspace consolidation. There are advanced degenerative changes in the  right glenohumeral joint. Mild pulmonary edema appears stable. Stable small right pleural effusion.    Impression  Impression:  Stable exam. Findings suggest mild congestive heart failure.      Electronically Signed: Jake Farnsworth MD  5/19/2025 10:57 PM EDT  Workstation ID: [id number]       Results for orders placed during the hospital encounter of 07/16/23    CT Abdomen Pelvis Without Contrast    Narrative  CT ABDOMEN PELVIS WO CONTRAST    Date of Exam: 7/18/2023 12:14 PM EDT    Indication: Abdominal pain, acute, nonlocalized.    Comparison: CT of the abdomen and pelvis dated 12/27/2021    Technique: Axial CT images were obtained of the abdomen and pelvis without the administration of contrast. Sagittal and coronal reconstructions were performed.  Automated exposure control and iterative reconstruction methods were used.    Findings:  Examination is limited due to motion artifact and due to lack of IV contrast administration.    Liver: The liver is unremarkable in morphology. Evaluation for focal liver lesions is limited without IV contrast. No biliary dilation is seen.    Gallbladder: The gallbladder is decompressed which limits its evaluation    Pancreas: Unremarkable.    Spleen: Unremarkable.    Adrenal glands: Unremarkable.    Genitourinary tract: Kidneys are unremarkable. No hydronephrosis is seen. No urinary tract calculi are seen. Visualized portions of the ureters are unremarkable. The urinary bladder and prostate gland appear unremarkable.    Gastrointestinal tract: The visualized hollow viscera demonstrate no focal lesion. There is no evidence of bowel obstruction. Moderate colonic stool is seen    Appendix: No findings to suggest acute appendicitis.    Other findings: No free air or free fluid is identified. No pathologically enlarged lymph nodes are seen. Vascular calcifications are present.    Bones and soft tissues: No acute osseous lesion is identified. There are degenerative changes within the spine.  Chronic superior endplate defects of L2 and L4. 6 mm retrolisthesis at L2-L3.    Lung bases: The visualized lung bases are clear.    Impression  Impression:  1.Examination is limited due to motion artifact and due to lack of IV contrast administration.  2.Given these limitations, no acute abnormality identified within the abdomen or pelvis.  3.Moderate colonic stool.  4.Additional findings as detailed above    Electronically Signed: Mateus Lund  7/18/2023 12:28 PM EDT  Workstation ID: [id number]      Results for orders placed during the hospital encounter of 07/16/23    CT Abdomen Pelvis Without Contrast    Narrative  CT ABDOMEN PELVIS WO CONTRAST    Date of Exam: 7/18/2023 12:14 PM EDT    Indication: Abdominal pain, acute, nonlocalized.    Comparison: CT of the abdomen and pelvis dated 12/27/2021    Technique: Axial CT images were obtained of the abdomen and pelvis without the administration of contrast. Sagittal and coronal reconstructions were performed.  Automated exposure control and iterative reconstruction methods were used.    Findings:  Examination is limited due to motion artifact and due to lack of IV contrast administration.    Liver: The liver is unremarkable in morphology. Evaluation for focal liver lesions is limited without IV contrast. No biliary dilation is seen.    Gallbladder: The gallbladder is decompressed which limits its evaluation    Pancreas: Unremarkable.    Spleen: Unremarkable.    Adrenal glands: Unremarkable.    Genitourinary tract: Kidneys are unremarkable. No hydronephrosis is seen. No urinary tract calculi are seen. Visualized portions of the ureters are unremarkable. The urinary bladder and prostate gland appear unremarkable.    Gastrointestinal tract: The visualized hollow viscera demonstrate no focal lesion. There is no evidence of bowel obstruction. Moderate colonic stool is seen    Appendix: No findings to suggest acute appendicitis.    Other findings: No free air or free fluid  is identified. No pathologically enlarged lymph nodes are seen. Vascular calcifications are present.    Bones and soft tissues: No acute osseous lesion is identified. There are degenerative changes within the spine. Chronic superior endplate defects of L2 and L4. 6 mm retrolisthesis at L2-L3.    Lung bases: The visualized lung bases are clear.    Impression  Impression:  1.Examination is limited due to motion artifact and due to lack of IV contrast administration.  2.Given these limitations, no acute abnormality identified within the abdomen or pelvis.  3.Moderate colonic stool.  4.Additional findings as detailed above    Electronically Signed: Mateus Lund  7/18/2023 12:28 PM EDT  Workstation ID: [id number]      Historical data copied forward from previous encounters in EMR including the history, exam, and assessment/plan has been reviewed and is unchanged except as I have noted and otherwise indicated.      Objective:         BP (!) 167/109   Pulse 71   Resp 20   Wt 82.8 kg (182 lb 9.6 oz)   SpO2 100%   BMI 27.76 kg/m²     Physical Examination    General:  Well-developed, very mildly overly well-nourished, semi-cooperative, no acute distress, appears stated age if not slightly older , pinpoint pupils and slurred speech   Neuro:  A&O x3. No significantly obvious focal neuro deficet    Psych:  Pleasant pleasant near manic affect    HENT:  Normocephalic, atraumatic, moist mucous membranes , Normal ear placement,Throat not injected   Eyes:  PERRLA, Conjunctivae not injected, EOM's intact, conjunctiva does not appear significantly injected   Neck:  Supple, Mildly thickened, no lymph adenopathy nor thyromegaly no JVD or bruit    Lungs:    Symmetrical rise & fall of chest with baseline respiratory pattern. To auscultation lungs are Clear bilaterally, there are audible expiratory wheezes noted on the exam, no rhonchi or rales are noted, bases bilaterally are mildly reduced more on the right than the left base   Chest  wall:  No significant tenderness when palpated. PMI @  fifth ICS MCL   Heart:  Rate in the mid 70s and regular rhythm, S1 and S2 normal, no S3 or S4, Gr I/VI systolic ejection murmur best heard at the apex , no obvious rub, click or gallop.    Abdomen:  non-distended, non-tender, bowel sounds noted in the 4 quadrants of the abdomen, significant adipose tissue identified, notable pouch present left upper quadrant of the abdomen that is tender to palpation   Extremities:  Equal color motion temperature and sensitivity, Rapid capillary refill noted within the nailbeds of fingers and toes bilaterally 1+ pitting lower extremity edema.    Pulses:  2+ and symmetric all extremities, rapid capillary refill noted within the nail beds of the available fingers and toes.   Skin:  No obvious rashes, significant lesions identified, warm dry and of normal turgor      In-Office Procedure(s):  No orders to display        Lab Review:   Hospital Outpatient Visit on 05/28/2025   Component Date Value    QT Interval 05/28/2025 492     QTC Interval 05/28/2025 515    Hospital Outpatient Visit on 05/28/2025   Component Date Value    THC, Screen, Urine 05/28/2025 Positive (A)     Phencyclidine (PCP), Uri* 05/28/2025 Negative     Cocaine Screen, Urine 05/28/2025 Negative     Methamphetamine, Ur 05/28/2025 Positive (A)     Opiate Screen 05/28/2025 Negative     Amphetamine Screen, Urine 05/28/2025 Positive (A)     Benzodiazepine Screen, U* 05/28/2025 Negative     Tricyclic Antidepressant* 05/28/2025 Negative     Methadone Screen, Urine 05/28/2025 Negative     Barbiturates Screen, Uri* 05/28/2025 Negative     Oxycodone Screen, Urine 05/28/2025 Negative     Buprenorphine, Screen, U* 05/28/2025 Negative     Glucose 05/28/2025 135 (H)     BUN 05/28/2025 56.6 (H)     Creatinine 05/28/2025 3.03 (H)     Sodium 05/28/2025 137     Potassium 05/28/2025 4.2     Chloride 05/28/2025 98     CO2 05/28/2025 26.2     Calcium 05/28/2025 8.7     BUN/Creatinine  Ratio 05/28/2025 18.7     Anion Gap 05/28/2025 12.8     eGFR 05/28/2025 24.5 (L)     Magnesium 05/28/2025 2.5     proBNP 05/28/2025 24,198.0 (H)     TSH 05/28/2025 2.420     WBC 05/28/2025 7.30     RBC 05/28/2025 4.55     Hemoglobin 05/28/2025 13.7     Hematocrit 05/28/2025 41.9     MCV 05/28/2025 92.1     MCH 05/28/2025 30.1     MCHC 05/28/2025 32.7     RDW 05/28/2025 15.7 (H)     RDW-SD 05/28/2025 52.5     MPV 05/28/2025 10.4     Platelets 05/28/2025 229     Neutrophil % 05/28/2025 62.3     Lymphocyte % 05/28/2025 23.7     Monocyte % 05/28/2025 11.1     Eosinophil % 05/28/2025 1.9     Basophil % 05/28/2025 0.7     Immature Grans % 05/28/2025 0.3     Neutrophils, Absolute 05/28/2025 4.55     Lymphocytes, Absolute 05/28/2025 1.73     Monocytes, Absolute 05/28/2025 0.81     Eosinophils, Absolute 05/28/2025 0.14     Basophils, Absolute 05/28/2025 0.05     Immature Grans, Absolute 05/28/2025 0.02     nRBC 05/28/2025 0.0    No results displayed because visit has over 200 results.      Admission on 05/14/2025, Discharged on 05/14/2025   Component Date Value    QT Interval 05/14/2025 489     QTC Interval 05/14/2025 534    Lab on 02/26/2025   Component Date Value    WBC 02/26/2025 7.86     RBC 02/26/2025 4.58     Hemoglobin 02/26/2025 13.0     Hematocrit 02/26/2025 40.7     MCV 02/26/2025 88.9     MCH 02/26/2025 28.4     MCHC 02/26/2025 31.9     RDW 02/26/2025 15.3     RDW-SD 02/26/2025 49.4     MPV 02/26/2025 10.2     Platelets 02/26/2025 198     Glucose 02/26/2025 62 (L)     BUN 02/26/2025 86 (H)     Creatinine 02/26/2025 3.78 (H)     Sodium 02/26/2025 140     Potassium 02/26/2025 3.9     Chloride 02/26/2025 98     CO2 02/26/2025 29.7 (H)     Calcium 02/26/2025 9.1     Total Protein 02/26/2025 7.6     Albumin 02/26/2025 4.0     ALT (SGPT) 02/26/2025 18     AST (SGOT) 02/26/2025 26     Alkaline Phosphatase 02/26/2025 54     Total Bilirubin 02/26/2025 0.3     Globulin 02/26/2025 3.6     A/G Ratio 02/26/2025 1.1      BUN/Creatinine Ratio 02/26/2025 22.8     Anion Gap 02/26/2025 12.3     eGFR 02/26/2025 18.8 (L)     PTH, Intact 02/26/2025 171.0 (H)     Magnesium 02/26/2025 2.6     Phosphorus 02/26/2025 4.9 (H)     Protein/Creatinine Ratio* 02/26/2025 543.5 (H)     Creatinine, Urine 02/26/2025 23.0     Total Protein, Urine 02/26/2025 12.5     Color, UA 02/26/2025 Yellow     Appearance, UA 02/26/2025 Clear     pH, UA 02/26/2025 6.0     Specific Gravity, UA 02/26/2025 1.010     Glucose, UA 02/26/2025 Negative     Ketones, UA 02/26/2025 Negative     Bilirubin, UA 02/26/2025 Negative     Blood, UA 02/26/2025 Negative     Protein, UA 02/26/2025 Trace (A)     Leuk Esterase, UA 02/26/2025 Negative     Nitrite, UA 02/26/2025 Negative     Urobilinogen, UA 02/26/2025 0.2 E.U./dL     Extra Tube 02/26/2025 Hold for add-ons.    Office Visit on 01/21/2025   Component Date Value    proBNP 01/21/2025 18,267.0 (H)     Glucose 01/21/2025 59 (L)     BUN 01/21/2025 38 (H)     Creatinine 01/21/2025 2.71 (H)     Sodium 01/21/2025 144     Potassium 01/21/2025 4.2     Chloride 01/21/2025 104     CO2 01/21/2025 27.1     Calcium 01/21/2025 8.9     Total Protein 01/21/2025 7.2     Albumin 01/21/2025 3.6     ALT (SGPT) 01/21/2025 14     AST (SGOT) 01/21/2025 22     Alkaline Phosphatase 01/21/2025 48     Total Bilirubin 01/21/2025 0.5     Globulin 01/21/2025 3.6     A/G Ratio 01/21/2025 1.0     BUN/Creatinine Ratio 01/21/2025 14.0     Anion Gap 01/21/2025 12.9     eGFR 01/21/2025 28.2 (L)     WBC 01/21/2025 8.31     RBC 01/21/2025 4.27     Hemoglobin 01/21/2025 12.6 (L)     Hematocrit 01/21/2025 38.7     MCV 01/21/2025 90.6     MCH 01/21/2025 29.5     MCHC 01/21/2025 32.6     RDW 01/21/2025 15.7 (H)     RDW-SD 01/21/2025 51.4     MPV 01/21/2025 11.7     Platelets 01/21/2025 193     Neutrophil % 01/21/2025 59.0     Lymphocyte % 01/21/2025 27.4     Monocyte % 01/21/2025 8.7     Eosinophil % 01/21/2025 4.2     Basophil % 01/21/2025 0.5     Immature Grans %  01/21/2025 0.2     Neutrophils, Absolute 01/21/2025 4.90     Lymphocytes, Absolute 01/21/2025 2.28     Monocytes, Absolute 01/21/2025 0.72     Eosinophils, Absolute 01/21/2025 0.35     Basophils, Absolute 01/21/2025 0.04     Immature Grans, Absolute 01/21/2025 0.02     nRBC 01/21/2025 0.0    Lab on 01/03/2025   Component Date Value    Color, UA 01/03/2025 Yellow     Appearance, UA 01/03/2025 Clear     pH, UA 01/03/2025 6.0     Specific Gravity, UA 01/03/2025 1.014     Glucose, UA 01/03/2025 Negative     Ketones, UA 01/03/2025 Negative     Bilirubin, UA 01/03/2025 Negative     Blood, UA 01/03/2025 Negative     Protein, UA 01/03/2025 >=300 mg/dL (3+) (A)     Leuk Esterase, UA 01/03/2025 Negative     Nitrite, UA 01/03/2025 Negative     Urobilinogen, UA 01/03/2025 0.2 E.U./dL     Hemoglobin A1C 01/03/2025 5.00     HS Troponin T 01/03/2025 32 (H)     Extra Tube 01/03/2025 Hold for add-ons.     RBC, UA 01/03/2025 0-2     WBC, UA 01/03/2025 None Seen     Bacteria, UA 01/03/2025 None Seen     Squamous Epithelial Cell* 01/03/2025 0-2     Hyaline Casts, UA 01/03/2025 0-2     Methodology 01/03/2025 Automated Microscopy    Office Visit on 01/03/2025   Component Date Value    Glucose 01/03/2025 98     BUN 01/03/2025 55 (H)     Creatinine 01/03/2025 3.06 (H)     Sodium 01/03/2025 138     Potassium 01/03/2025 4.5     Chloride 01/03/2025 102     CO2 01/03/2025 23.9     Calcium 01/03/2025 9.4     Total Protein 01/03/2025 7.9     Albumin 01/03/2025 4.1     ALT (SGPT) 01/03/2025 34     AST (SGOT) 01/03/2025 34     Alkaline Phosphatase 01/03/2025 43     Total Bilirubin 01/03/2025 0.5     Globulin 01/03/2025 3.8     A/G Ratio 01/03/2025 1.1     BUN/Creatinine Ratio 01/03/2025 18.0     Anion Gap 01/03/2025 12.1     eGFR 01/03/2025 24.4 (L)     Total Cholesterol 01/03/2025 236 (H)     Triglycerides 01/03/2025 84     HDL Cholesterol 01/03/2025 66 (H)     LDL Cholesterol  01/03/2025 155 (H)     VLDL Cholesterol 01/03/2025 15     LDL/HDL  "Ratio 01/03/2025 2.32     TSH 01/03/2025 2.350     Hepatitis C Quantitation 01/03/2025 1850     HCV log10 01/03/2025 3.267     Test Information 01/03/2025 Comment     proBNP 01/03/2025 9,528.0 (H)     Ferritin 01/03/2025 274.00     Iron 01/03/2025 54 (L)     Iron Saturation (TSAT) 01/03/2025 16 (L)     Transferrin 01/03/2025 227     TIBC 01/03/2025 338     WBC 01/03/2025 8.65     RBC 01/03/2025 4.63     Hemoglobin 01/03/2025 13.5     Hematocrit 01/03/2025 40.2     MCV 01/03/2025 86.8     MCH 01/03/2025 29.2     MCHC 01/03/2025 33.6     RDW 01/03/2025 16.8 (H)     RDW-SD 01/03/2025 53.4     MPV 01/03/2025 10.5     Platelets 01/03/2025 260     Neutrophil % 01/03/2025 50.2     Lymphocyte % 01/03/2025 32.0     Monocyte % 01/03/2025 10.9     Eosinophil % 01/03/2025 6.0     Basophil % 01/03/2025 0.7     Immature Grans % 01/03/2025 0.2     Neutrophils, Absolute 01/03/2025 4.34     Lymphocytes, Absolute 01/03/2025 2.77     Monocytes, Absolute 01/03/2025 0.94 (H)     Eosinophils, Absolute 01/03/2025 0.52 (H)     Basophils, Absolute 01/03/2025 0.06     Immature Grans, Absolute 01/03/2025 0.02     nRBC 01/03/2025 0.0      Recent labs reviewed and interpreted for clinical significance and application    Scott and I went over each of his labs were taken today while he was awaiting his diuresis here in heart failure clinic            It is a pleasure to be involved in this patient's cardiovascular care relating to their heart failure.  Please feel free to call me with any questions or concerns.    Clarence \"Tay\" Citlaly ARTEAGA, [facility]  Heart failure program clinical provider    CHANDLER Aranda   05/27/25  .  "

## 2025-05-28 ENCOUNTER — HOSPITAL ENCOUNTER (OUTPATIENT)
Facility: HOSPITAL | Age: 50
Discharge: HOME | End: 2025-05-28
Payer: MEDICAID

## 2025-05-28 ENCOUNTER — DISEASE STATE MANAGEMENT VISIT (OUTPATIENT)
Facility: HOSPITAL | Age: 50
End: 2025-05-28
Payer: MEDICAID

## 2025-05-28 ENCOUNTER — HOSPITAL ENCOUNTER (OUTPATIENT)
Dept: CARDIOLOGY | Facility: HOSPITAL | Age: 50
Discharge: HOME | End: 2025-05-28
Payer: MEDICAID

## 2025-05-28 VITALS
BODY MASS INDEX: 27.76 KG/M2 | OXYGEN SATURATION: 100 % | WEIGHT: 182.6 LBS | HEART RATE: 66 BPM | DIASTOLIC BLOOD PRESSURE: 108 MMHG | RESPIRATION RATE: 20 BRPM | SYSTOLIC BLOOD PRESSURE: 165 MMHG

## 2025-05-28 DIAGNOSIS — Z72.0 TOBACCO ABUSE: Chronic | ICD-10-CM

## 2025-05-28 DIAGNOSIS — I51.89 GRADE II DIASTOLIC DYSFUNCTION: ICD-10-CM

## 2025-05-28 DIAGNOSIS — I5A NONISCHEMIC NONTRAUMATIC MYOCARDIAL INJURY: Chronic | ICD-10-CM

## 2025-05-28 DIAGNOSIS — B19.20 HEPATITIS C VIRUS INFECTION WITHOUT HEPATIC COMA, UNSPECIFIED CHRONICITY: Chronic | ICD-10-CM

## 2025-05-28 DIAGNOSIS — E88.810 METABOLIC SYNDROME X: Chronic | ICD-10-CM

## 2025-05-28 DIAGNOSIS — I27.20 PULMONARY HYPERTENSION: Chronic | ICD-10-CM

## 2025-05-28 DIAGNOSIS — D63.1 ANEMIA IN STAGE 4 CHRONIC KIDNEY DISEASE: ICD-10-CM

## 2025-05-28 DIAGNOSIS — I48.0 PAF (PAROXYSMAL ATRIAL FIBRILLATION): Chronic | ICD-10-CM

## 2025-05-28 DIAGNOSIS — N18.4 CKD (CHRONIC KIDNEY DISEASE) STAGE 4, GFR 15-29 ML/MIN: Primary | ICD-10-CM

## 2025-05-28 DIAGNOSIS — I44.7 LBBB (LEFT BUNDLE BRANCH BLOCK): ICD-10-CM

## 2025-05-28 DIAGNOSIS — R06.02 SOB (SHORTNESS OF BREATH): ICD-10-CM

## 2025-05-28 DIAGNOSIS — I38 VALVULAR HEART DISEASE: ICD-10-CM

## 2025-05-28 DIAGNOSIS — Z91.199 NONCOMPLIANCE: Chronic | ICD-10-CM

## 2025-05-28 DIAGNOSIS — F19.11 HISTORY OF SUBSTANCE ABUSE: ICD-10-CM

## 2025-05-28 DIAGNOSIS — N18.4 ANEMIA IN STAGE 4 CHRONIC KIDNEY DISEASE: ICD-10-CM

## 2025-05-28 DIAGNOSIS — N25.81 SECONDARY HYPERPARATHYROIDISM: Chronic | ICD-10-CM

## 2025-05-28 DIAGNOSIS — Z00.00 HEALTHCARE MAINTENANCE: ICD-10-CM

## 2025-05-28 LAB
AMPHET+METHAMPHET UR QL: POSITIVE
AMPHETAMINES UR QL: POSITIVE
ANION GAP SERPL CALCULATED.3IONS-SCNC: 12.8 MMOL/L (ref 5–15)
BARBITURATES UR QL SCN: NEGATIVE
BASOPHILS # BLD AUTO: 0.05 10*3/MM3 (ref 0–0.2)
BASOPHILS NFR BLD AUTO: 0.7 % (ref 0–1.5)
BENZODIAZ UR QL SCN: NEGATIVE
BUN SERPL-MCNC: 56.6 MG/DL (ref 6–20)
BUN/CREAT SERPL: 18.7 (ref 7–25)
BUPRENORPHINE SERPL-MCNC: NEGATIVE NG/ML
CALCIUM SPEC-SCNC: 8.7 MG/DL (ref 8.6–10.5)
CANNABINOIDS SERPL QL: POSITIVE
CHLORIDE SERPL-SCNC: 98 MMOL/L (ref 98–107)
CO2 SERPL-SCNC: 26.2 MMOL/L (ref 22–29)
COCAINE UR QL: NEGATIVE
CREAT SERPL-MCNC: 3.03 MG/DL (ref 0.76–1.27)
DEPRECATED RDW RBC AUTO: 52.5 FL (ref 37–54)
EGFRCR SERPLBLD CKD-EPI 2021: 24.5 ML/MIN/1.73
EOSINOPHIL # BLD AUTO: 0.14 10*3/MM3 (ref 0–0.4)
EOSINOPHIL NFR BLD AUTO: 1.9 % (ref 0.3–6.2)
ERYTHROCYTE [DISTWIDTH] IN BLOOD BY AUTOMATED COUNT: 15.7 % (ref 12.3–15.4)
GLUCOSE SERPL-MCNC: 135 MG/DL (ref 65–99)
HCT VFR BLD AUTO: 41.9 % (ref 37.5–51)
HGB BLD-MCNC: 13.7 G/DL (ref 13–17.7)
IMM GRANULOCYTES # BLD AUTO: 0.02 10*3/MM3 (ref 0–0.05)
IMM GRANULOCYTES NFR BLD AUTO: 0.3 % (ref 0–0.5)
LYMPHOCYTES # BLD AUTO: 1.73 10*3/MM3 (ref 0.7–3.1)
LYMPHOCYTES NFR BLD AUTO: 23.7 % (ref 19.6–45.3)
MAGNESIUM SERPL-MCNC: 2.5 MG/DL (ref 1.6–2.6)
MCH RBC QN AUTO: 30.1 PG (ref 26.6–33)
MCHC RBC AUTO-ENTMCNC: 32.7 G/DL (ref 31.5–35.7)
MCV RBC AUTO: 92.1 FL (ref 79–97)
METHADONE UR QL SCN: NEGATIVE
MONOCYTES # BLD AUTO: 0.81 10*3/MM3 (ref 0.1–0.9)
MONOCYTES NFR BLD AUTO: 11.1 % (ref 5–12)
NEUTROPHILS NFR BLD AUTO: 4.55 10*3/MM3 (ref 1.7–7)
NEUTROPHILS NFR BLD AUTO: 62.3 % (ref 42.7–76)
NRBC BLD AUTO-RTO: 0 /100 WBC (ref 0–0.2)
NT-PROBNP SERPL-MCNC: ABNORMAL PG/ML (ref 0–450)
OPIATES UR QL: NEGATIVE
OXYCODONE UR QL SCN: NEGATIVE
PCP UR QL SCN: NEGATIVE
PLATELET # BLD AUTO: 229 10*3/MM3 (ref 140–450)
PMV BLD AUTO: 10.4 FL (ref 6–12)
POTASSIUM SERPL-SCNC: 4.2 MMOL/L (ref 3.5–5.2)
QT INTERVAL: 492 MS
QTC INTERVAL: 515 MS
RBC # BLD AUTO: 4.55 10*6/MM3 (ref 4.14–5.8)
SODIUM SERPL-SCNC: 137 MMOL/L (ref 136–145)
TRICYCLICS UR QL SCN: NEGATIVE
TSH SERPL DL<=0.05 MIU/L-ACNC: 2.42 UIU/ML (ref 0.27–4.2)
WBC NRBC COR # BLD AUTO: 7.3 10*3/MM3 (ref 3.4–10.8)

## 2025-05-28 PROCEDURE — 80048 BASIC METABOLIC PNL TOTAL CA: CPT | Performed by: NURSE PRACTITIONER

## 2025-05-28 PROCEDURE — 80306 DRUG TEST PRSMV INSTRMNT: CPT | Performed by: NURSE PRACTITIONER

## 2025-05-28 PROCEDURE — 93005 ELECTROCARDIOGRAM TRACING: CPT | Performed by: NURSE PRACTITIONER

## 2025-05-28 PROCEDURE — 83735 ASSAY OF MAGNESIUM: CPT | Performed by: NURSE PRACTITIONER

## 2025-05-28 PROCEDURE — 83880 ASSAY OF NATRIURETIC PEPTIDE: CPT | Performed by: NURSE PRACTITIONER

## 2025-05-28 PROCEDURE — 25010000002 FUROSEMIDE PER 20 MG: Performed by: NURSE PRACTITIONER

## 2025-05-28 PROCEDURE — 84443 ASSAY THYROID STIM HORMONE: CPT | Performed by: NURSE PRACTITIONER

## 2025-05-28 PROCEDURE — 85025 COMPLETE CBC W/AUTO DIFF WBC: CPT | Performed by: NURSE PRACTITIONER

## 2025-05-28 PROCEDURE — 96374 THER/PROPH/DIAG INJ IV PUSH: CPT

## 2025-05-28 RX ORDER — METOLAZONE 2.5 MG/1
2.5 TABLET ORAL 3 TIMES WEEKLY
Qty: 16 TABLET | Refills: 0 | Status: SHIPPED | OUTPATIENT
Start: 2025-05-28

## 2025-05-28 RX ORDER — POTASSIUM CHLORIDE 750 MG/1
10 TABLET, EXTENDED RELEASE ORAL ONCE
Status: COMPLETED | OUTPATIENT
Start: 2025-05-28 | End: 2025-05-28

## 2025-05-28 RX ORDER — FUROSEMIDE 10 MG/ML
80 INJECTION INTRAMUSCULAR; INTRAVENOUS ONCE
Status: COMPLETED | OUTPATIENT
Start: 2025-05-28 | End: 2025-05-28

## 2025-05-28 RX ORDER — CARVEDILOL 25 MG/1
37.5 TABLET ORAL 2 TIMES DAILY WITH MEALS
Qty: 270 TABLET | Refills: 2 | Status: SHIPPED | OUTPATIENT
Start: 2025-05-28 | End: 2025-08-26

## 2025-05-28 RX ADMIN — POTASSIUM CHLORIDE 10 MEQ: 750 TABLET, EXTENDED RELEASE ORAL at 16:01

## 2025-05-28 RX ADMIN — FUROSEMIDE 80 MG: 10 INJECTION, SOLUTION INTRAMUSCULAR; INTRAVENOUS at 16:00

## 2025-05-28 NOTE — LETTER
"May 28, 2025     Cary Espinosa MD  [address] [facility] [place] [address]    Patient: Scott Gaytan   YOB: 1975   Date of Visit: 5/28/2025     Dear Cary Espinosa MD:       The hospitalist have referred to me a Scott Gaytan a former patient of [facility] a patient of Dr. Nagy for evaluation. Below are the relevant portions of my assessment and plan of care.    If you have questions, please do not hesitate to call me. I look forward to following Scott along with you.         Sincerely,        CHANDLER Aranda        CC: CHANDLER Omalley Paul T, APRN  05/28/25 [id number]  Signed  Cardiology Heart Failure Clinic Note  Clarence \"Tay\" Citlaly ARTEAGA, [facility]  Patient ID: Scott Gaytan  is a 50 y.o. male.    Encounter Date:05/28/2025    HPI:  50-year-old male patient of Dr. Spivey with a past medical history of grade 2 diastolic dysfunction and relatively preserved LV systolic function (TTE 5/20/25) EF 46 to 50% with grade 2 diastolic dysfunction, valvular heart disease with moderate MR and TR pulmonary hypertension which is mild with an RVSP of 35 to 45 mmHg.  Known left bundle branch block, paroxysmal atrial fibrillation, stage IV chronic kidney disease last eGFR 23 cc/min, history of hepatitis C, secondary hyperparathyroidism, questionable COPD not followed by pulmonologist with allergy to steroids with ongoing tobacco abuse, history of substance abuse which includes methamphetamines, cannabis, cocaine, metabolic syndrome components would include hypertension, dyslipidemia, and an elevated BMI at 26.7 kg/m² admitted on 5/19/2025 with generalized fatigue x 1 week after having ran out of his medications as well as MADERA/SOA.  Comes in for initial heart failure visit 5/28/2025 since recently discharge he notes that other than the pain from his hernia on the left upper quadrant of his abdomen, he has done pretty well.  Of note he has pinpoint pupils mildly " slurred speech and trouble focusing today and did test positive for methamphetamines and THC       Assessment:    Diagnoses and all orders for this visit:    1. CKD (chronic kidney disease) stage 4, GFR 15-29 ml/min (Primary)  Overview:  Followed by Dr Das,     Orders:  -     Basic Metabolic Panel; Standing  -     CBC & Differential; Future  -     Magnesium; Future  -     proBNP; Standing  -     TSH; Future  -     Basic Metabolic Panel; Standing  -     Basic Metabolic Panel  -     CBC & Differential; Standing  -     CBC & Differential  -     Magnesium; Standing  -     Magnesium  -     proBNP; Standing  -     proBNP  -     TSH; Standing  -     TSH    2. h/o Nonischemic nontraumatic myocardial injury  Overview:  LVIDd 5  Followed at [facility] heart failure clinic by Dr. Doty   (TTE 5/20/25) EF 46 to 50% with grade 2 diastolic dysfunction  (GYPSY 11/25/24) EF 51-55% w/o DD  (TTE 5/15/23) LVEF 37%        3. Valvular heart disease  Overview:  Mild to moderate mitral valve regurgitation    Orders:  -     Basic Metabolic Panel; Standing  -     proBNP; Standing  -     Basic Metabolic Panel; Standing  -     Basic Metabolic Panel  -     proBNP; Standing  -     proBNP    4. Mild pulmonary hypertension RVSP 35-45 mmHg    5. Grade II diastolic dysfunction    6. PAF (paroxysmal atrial fibrillation)  Overview:  Rate controlled with Coreg  Anticoagulated with renal dosed Eliquis 2.5 mg twice daily  Without history of ablations or DCCV      Orders:  -     ECG 12 Lead; Future    7. Anemia in stage 4 chronic kidney disease  -     CBC & Differential; Future  -     CBC & Differential; Standing  -     CBC & Differential    8. History of substance abuse  Overview:  Methamphetamines  Cannabis  Cocaine  Tobacco    Orders:  -     Urine Drug Screen - Urine, Clean Catch; Future  -     Urine Drug Screen - Urine, Clean Catch; Standing  -     Urine Drug Screen - Urine, Clean Catch    9. LBBB (left bundle branch block)  -     ECG 12 Lead;  Future    10. Tobacco abuse w/ presumed COPD    11. h/o Medication and medical follow-up noncompliance    12. Hepatitis C virus infection without hepatic coma, unspecified chronicity    13. SOB (shortness of breath)    14. Secondary hyperparathyroidism    15. Metabolic syndrome X  Overview:  A.  Benign essential hypertension  B.  Mixed dyslipidemia  C.  Elevated BMI at 26.7 kg/m²      16. Healthcare maintenance  -     carvedilol (COREG) 25 MG tablet; Take 1.5 tablets by mouth 2 (Two) Times a Day With Meals for 90 days. Hold for systolic blood pressure less than 100 and and/or heart rate less than 60 unless you have a ICD or permanent pacemaker  Dispense: 270 tablet; Refill: 2    Other orders  -     furosemide (LASIX) injection 80 mg  -     potassium chloride (KLOR-CON M10) CR tablet 10 mEq  -     metOLazone (ZAROXOLYN) 2.5 MG tablet; Take 1 tablet by mouth 3 (Three) Times a Week. Take until you see your nephrologist on Mondays Wednesdays and Fridays  Dispense: 16 tablet; Refill: 0              Plan/discussion    Volume overload    Heart Failure Core Measures addressed    Type of Overload predominantly renal  H/o NIDCM  Grade 2 diastolic dysfunction  VHD  Stage IV CKD    Most recent EF:  (TTE 5/20/25) EF 46 to 50% with grade 2 diastolic dysfunction,    [place] Heart association Class & Stage : 2b    HF Meds Pre Visit    Beta Blocker: Coreg 25 mg every 12 hours  ARNI/ACE/ARB: Entresto 24/26 mg every 12 hours (not been taking)  SGLT 2 inhibitors: None  Diuretics: Bumex 2 mg twice daily  Metalizone 2.5mg MWF  Kdur 20 meq QD  Aldosterone Antagonist: None likely 2/2 stage IV CKD  Digitalis: N/A  Vasodilators & Nitrates: Nitrostat 0.4 SL as needed CP    HF Meds Post Visit    Beta Blocker: Coreg being increased to 37.5 mg every 12 hours  ARNI/ACE/ARB: None due to his stage IV CKD  SGLT 2 inhibitors: Await renal's input regarding initiation of any SGLT2  Diuretics: Bumex 2 mg twice daily  Metolazone 2.5 mg on Mondays  Wednesdays and Fridays  K-Dur 20 mill equivalents daily  Furoscix: Unsure of his compliance therefore will not order  Aldosterone Antagonist: None due to his stage IV CKD  Digitalis: N/A  Nitrates: Nitrostat 0.4 SL as needed CP    Crestor 40 mg at bedtime  Apresoline 25 mg 3 times daily  ECASA 81 mg QD  Eliquis 2.5mg BID not quite sure if he is taking or not    Cardiac medicines reviewed with risk, benefits, and necessity of each discussed.       _________________________________________________________    Discussion    Former patient of the advanced heart failure team at [facility] with what appears to be recovered ejection fraction and grade 2 diastolic dysfunction is already on heart failure core measures including beta-blocker, ARNI, diuretic,    Must be very leery given his former history of cocaine use with the beta-blocker given the possibility of coronary spasms  He does tell me he has not used cocaine in several years  Therefore we will go ahead and increase his Coreg up to 37.5 to help control his blood pressure which is bordering on hypertensive urgency 5/28/2025  We did discuss the risk associated with utilization of cocaine and carvedilol and the fact it can cause coronary spasms which can lead to immediate death and he assures me that he will not utilize any cocaine  Patient says he will not go back to [place]'s heart failure team as he feels they are the ones that ruined his kidneys and they gave him Entresto which is also why he refuses to take that drug that was prescribed at discharge  Patient appears somewhat out of sorts and to shelved likely due to the positive methamphetamine found in his urine drug screen he also tested positive for amphetamines as well as THC  Tells me that he smoked since 10 years of age and defers any smoking cessation drugs and/or further conversation regarding the subject at this juncture  By physical exam 5/28/2025 he is mildly volume overloaded with a proBNP  consistent with most recent hospitalization 24,198  We will go ahead and give the Lasix 80 mg IV today with 10 mill equivalents of potassium  EF appears to recovered somewhere between 46 to 50% on most recent echo and he did have notably grade 2 diastolic dysfunction  There is also moderate amount of valvular heart disease would defer that to Dr. Espinosa to see if he would like him assessed by the valve team however given the patient's track record for compliance, continued to methamphetamine utilization, that might not be a feasible alternative but will defer to Dr. Espinosa's judgment  Will obviously need nephrology follow-up given his stage IV CKD  He has an appointment coming up on board June 2025 (about a week way)  Given his precarious renal status would defer to nephrology for diuresis  In the interim we will go ahead and place him back on his metolazone to help prevent readmission for volume overload I did prescribe only 16 tablets without a refill  I will likely see him about 2 weeks after he sees his nephrologist and follow-up so I can get labs once again   I am unsure as to his compliance  He is presenting some communication challenges and giving his current state of methamphetamine use apparently relatively recently  would add typical volume overload nursing interventions including:        A. Fluid restriction at less than 2000 cc daily       B. Daily weights        C.  1 g low-sodium diet        I did the following activities:preparing for the visit, with Scott Gaytan  including reviewing tests, once pt arrived in clinic I also performed a medically appropriate examination and/or evaluation , I personally spent considerable time counseling and educating the patient/family/caregiver, ordering medications, tests, or procedures, referring and communicating with other health care professionals , and documenting information in the medical record. I estimate including preparation 45 minutes              Subjective:     Chief Complaint   Patient presents with   • Congestive Heart Failure       ROS:  Constitutional: + weakness, + fatigue cyst but difficult to quantify given the patient's current inebriation, No fever, rigors, chills   Eyes: No recent vision changes, eye pain   ENT/oropharynx: No recent difficulty swallowing, sore throat, epistaxis, changes in hearing   Cardiovascular: No recent chest pain, chest tightness, palpitations except as noted in HPI, paroxysmal nocturnal dyspnea, orthopnea, diaphoresis, dizziness & no pre or hector syncopal episodes   Respiratory: Says he has no issues with at rest shortness of breath, but that he is + with dyspnea on exertion, no significant productive cough, no wheezing and no hemoptysis   Gastrointestinal: No abdominal pain (except for noted left upper quadrant herniation area), no significant nausea, vomiting, diarrhea, bloody stools.  He has noted increasing abdominal girth and feels like it is where he is holding his water   Genitourinary: No hematuria, dysuria other than increased frequency   Neurological: No headache, tremors, numbness,  or hemiparesis    Musculoskeletal: No change in typical cramps, myalgias, no new joint pain, joint swelling   Integument: No recent rash, minimal lower extremity edema      Patient Active Problem List   Diagnosis   • History of substance abuse   • Tobacco abuse w/ presumed COPD   • h/o  Elevated troponin   • Hypertension   • PAF (paroxysmal atrial fibrillation)   • Hepatitis C   • h/o Nonischemic nontraumatic myocardial injury   • h/o Medication and medical follow-up noncompliance   • Hyperlipidemia   • Valvular heart disease   • CKD (chronic kidney disease) stage 4, GFR 15-29 ml/min   • Anemia in chronic kidney disease (CKD)   • Arteriovenous fistula   • Secondary hyperparathyroidism   • Acute heart failure with mildly reduced ejection fraction (HFmrEF, 41-49%)   • LBBB (left bundle branch block)   • Acute LUQ pain   • Anxiety   •  Healthcare maintenance   • Metabolic syndrome X   • Grade II diastolic dysfunction   • Mild pulmonary hypertension RVSP 35-45 mmHg       Past Medical History:   Diagnosis Date   • Anemia in chronic kidney disease (CKD) 02/11/2025   • Arteriovenous fistula 02/11/2025   • Atrial fibrillation 11/23/2024    Rate controlled with Coreg    Anticoagulated with renal dosed Eliquis 2.5 mg twice daily    Without history of ablations or DCCV        • Chronic diastolic heart failure    • CKD (chronic kidney disease) stage 4, GFR 15-29 ml/min 02/06/2025    Followed by Dr Das,      • COVID-19 virus detected 12/28/2021   • Cytokine release syndrome, grade 1 12/29/2021   • h/o Medication and medical follow-up noncompliance 04/10/2024   • Hepatitis C 01/03/2025   • Hyperkalemia 04/26/2022   • Hyperlipidemia 01/21/2025   • Hypertensive heart disease with congestive heart failure and chronic kidney disease 02/11/2025   • Hypertensive urgency 04/26/2022   • Methamphetamine abuse 04/26/2022   • Methamphetamine intoxication 06/16/2024   • Mild to moderate valvular heart disease 02/06/2025    Mild to moderate mitral valve regurgitation     • Secondary hyperparathyroidism 02/11/2025   • Tick bite of abdomen 04/26/2022   • Tobacco abuse 04/26/2022       Past Surgical History:   Procedure Laterality Date   • CARDIAC CATHETERIZATION N/A 04/27/2022   • ORIF TIBIA/FIBULA FRACTURES Right        Social History     Socioeconomic History   • Marital status: Single   Tobacco Use   • Smoking status: Every Day     Current packs/day: 0.25     Average packs/day: 1 pack/day for 31.4 years (30.6 ttl pk-yrs)     Types: Cigarettes     Start date: 1994     Passive exposure: Never   • Smokeless tobacco: Never   Vaping Use   • Vaping status: Never Used   Substance and Sexual Activity   • Alcohol use: Not Currently   • Drug use: Yes     Types: Methamphetamines, Marijuana, Cocaine(coke)   • Sexual activity: Yes       Allergies   Allergen Reactions   •  Methylprednisolone Swelling     Pt reports severe swelling with steroids   • Morphine Other (See Comments)     Makes it feel like my blood is boiling in my veins         Current Outpatient Medications:   •  albuterol sulfate  (90 Base) MCG/ACT inhaler, Inhale 2 puffs Every 4 (Four) Hours As Needed for Wheezing or Shortness of Air., Disp: 18 g, Rfl: 0  •  apixaban (ELIQUIS) 5 MG tablet tablet, Take 0.5 tablets by mouth 2 (Two) Times a Day., Disp: , Rfl:   •  aspirin 81 MG EC tablet, Take 1 tablet by mouth Daily for 180 days., Disp: 90 tablet, Rfl: 1  •  bumetanide (BUMEX) 2 MG tablet, Take 1 tablet by mouth 2 (Two) Times a Day for 90 days., Disp: 180 tablet, Rfl: 0  •  busPIRone (BUSPAR) 5 MG tablet, Take 1 tablet by mouth 3 (Three) Times a Day for 30 days., Disp: 90 tablet, Rfl: 0  •  carvedilol (COREG) 25 MG tablet, Take 1.5 tablets by mouth 2 (Two) Times a Day With Meals for 90 days. Hold for systolic blood pressure less than 100 and and/or heart rate less than 60 unless you have a ICD or permanent pacemaker, Disp: 270 tablet, Rfl: 2  •  hydrALAZINE (APRESOLINE) 25 MG tablet, Take 1 tablet by mouth 3 (Three) Times a Day., Disp: 90 tablet, Rfl: 0  •  nicotine (NICODERM CQ) 14 MG/24HR patch, Place 1 patch on the skin as directed by provider Daily for 30 days., Disp: 30 patch, Rfl: 0  •  nitroglycerin (NITROSTAT) 0.4 MG SL tablet, Place 1 tablet under the tongue Every 5 (Five) Minutes As Needed for Chest Pain for up to 30 days. Take no more than 3 doses in 15 minutes., Disp: 30 tablet, Rfl: 0  •  potassium chloride ER (K-TAB) 20 MEQ tablet controlled-release ER tablet, Take 1 tablet by mouth Daily for 90 days., Disp: 30 tablet, Rfl: 2  •  rosuvastatin (CRESTOR) 40 MG tablet, TAKE 1 TABLET BY MOUTH EVERY DAY, Disp: 90 tablet, Rfl: 0  •  metOLazone (ZAROXOLYN) 2.5 MG tablet, Take 1 tablet by mouth 3 (Three) Times a Week. Take until you see your nephrologist on Mondays Wednesdays and Fridays, Disp: 16 tablet,  Rfl: 0  No current facility-administered medications for this encounter.    Immunization History   Administered Date(s) Administered   • Hepatitis A 2018       Most recent EKG as reviewed:  Procedures     Most recent echo as reviewed:  Results for orders placed during the hospital encounter of 25    Adult Transthoracic Echo Complete W/ Cont if Necessary Per Protocol    Interpretation Summary  •  Left ventricular ejection fraction appears to be 46 - 50%.  •  Left ventricular diastolic function is consistent with (grade II w/high LAP) pseudonormalization.  Average GLS -13%.  •  The right ventricular cavity is dilated.  •  The left atrial cavity is dilated.  •  Moderate mitral valve regurgitation is present.  •  Moderate tricuspid valve regurgitation is present.  •  Estimated right ventricular systolic pressure from tricuspid regurgitation is mildly elevated (35-45 mmHg).      Most recent stress test results:      Most recent cardiac catheterization results:  Results for orders placed during the hospital encounter of 22    Cardiac Catheterization/Vascular Study    Narrative   Malik Devlin MD, PhD  [facility] cardiology  Date of service 2022    Procedure  1.  Left heart catheterization with coronary angiography left ventriculography in GUEVARA position    Indication  Elevated troponin, non-ST elevation microinfarction  Family history of early CAD with  father and grandfather    After informed consent patient was brought to the Cath Lab sterilely prepped and draped in usual fashion with exposure of the right groin for right common femoral arterial access via micropuncture modified center technique with placement of a 6 Indonesian sheath which was aspirated flushed with epimysium.  A 3 5 guidewire was advanced to the aortic valve followed by diagnostic JL 4 and JR4 catheters for selective left and right coronary angiography respectively.  The JR4 was used across aortic valve  followed by hand-injection for LV gram, EDP assessment and pullback assessment of the transaortic valve gradient.  There was no obstructive CAD with borderline reduced LV systolic function 45 to 50% globally, all catheters equipment removed, Angio-Seal was used to close right common femoral arteriotomy with immediate hemostasis and means of distal pulses.  There were no complications and left Cath Lab chest pain-free hemodynamically electrically stable alert talkative staff neurologic grossly intact bilaterally    Complications none  Blood loss less than 5 cc  Contrast 65 cc  Moderate conscious sedation time 30 minutes with IV Versed and fentanyl administered by registered nurse with complete ECG pulse oximetry and hemodynamic monitoring throughout the entirety of the case observed by me    Findings  1.  Opening aortic pressure was 141/96  2.  Closing pressure was unchanged  3.  LVEDP elevated at 22 with diastolic waveform consistent with likely is grade 2 diastolic dysfunction  4.  LV systolic function borderline reduced 45 to 50%  5.  No transaortic valve gradient seen on pullback    Angiography  Left main normal no disease  2.  The LAD is a large-caliber vessel coursing to around the apex which has mild diffuse luminal regularities, diagonals also have diffuse luminal irregularities only  3.  Circumflex is a large-caliber nondominant with obtuse marginal branches, nonobstructive luminal irregularities only  4.  RCA small, likely codominant, anterior takeoff, no angiographic disease, tapers dramatically in the midportion with very small RPDA and PLV branch    Conclusions recommendations  1 minimal epicardial coronary artery disease with anterior takeoff of the RCA, borderline reduced LV systolic function 45 to 50%, no transaortic valve gradient, high LVEDP at 18-22 with evidence of diastolic dysfunction  2.  Cessation of all substance abuse including methamphetamine clearly taking its toll, counseling provided  again today  3.  Hypertension control, Coreg on board along with amlodipine, start losartan ultimately without LV systolic dysfunction in combination with Lasix if needed  4.  Patient can be discharged was discharged criteria met with hypertension controlled and stable oral medications with outpatient follow-up    Malik Devlin MD, PhD        Imaging:    Results for orders placed during the hospital encounter of 05/19/25    XR Chest 2 View    Narrative  XR CHEST 2 VW    Date of Exam: 5/19/2025 10:45 PM EDT    Indication: SOA Triage Protocol    Comparison: 5/14/2025    Findings:  Stable cardiomegaly. No evidence of pneumothorax. There are no dense areas of airspace consolidation. There are advanced degenerative changes in the right glenohumeral joint. Mild pulmonary edema appears stable. Stable small right pleural effusion.    Impression  Impression:  Stable exam. Findings suggest mild congestive heart failure.      Electronically Signed: Jake Farnsworth MD  5/19/2025 10:57 PM EDT  Workstation ID: [id number]       Results for orders placed during the hospital encounter of 07/16/23    CT Abdomen Pelvis Without Contrast    Narrative  CT ABDOMEN PELVIS WO CONTRAST    Date of Exam: 7/18/2023 12:14 PM EDT    Indication: Abdominal pain, acute, nonlocalized.    Comparison: CT of the abdomen and pelvis dated 12/27/2021    Technique: Axial CT images were obtained of the abdomen and pelvis without the administration of contrast. Sagittal and coronal reconstructions were performed.  Automated exposure control and iterative reconstruction methods were used.    Findings:  Examination is limited due to motion artifact and due to lack of IV contrast administration.    Liver: The liver is unremarkable in morphology. Evaluation for focal liver lesions is limited without IV contrast. No biliary dilation is seen.    Gallbladder: The gallbladder is decompressed which limits its evaluation    Pancreas: Unremarkable.    Spleen:  Unremarkable.    Adrenal glands: Unremarkable.    Genitourinary tract: Kidneys are unremarkable. No hydronephrosis is seen. No urinary tract calculi are seen. Visualized portions of the ureters are unremarkable. The urinary bladder and prostate gland appear unremarkable.    Gastrointestinal tract: The visualized hollow viscera demonstrate no focal lesion. There is no evidence of bowel obstruction. Moderate colonic stool is seen    Appendix: No findings to suggest acute appendicitis.    Other findings: No free air or free fluid is identified. No pathologically enlarged lymph nodes are seen. Vascular calcifications are present.    Bones and soft tissues: No acute osseous lesion is identified. There are degenerative changes within the spine. Chronic superior endplate defects of L2 and L4. 6 mm retrolisthesis at L2-L3.    Lung bases: The visualized lung bases are clear.    Impression  Impression:  1.Examination is limited due to motion artifact and due to lack of IV contrast administration.  2.Given these limitations, no acute abnormality identified within the abdomen or pelvis.  3.Moderate colonic stool.  4.Additional findings as detailed above    Electronically Signed: Mateus Lund  7/18/2023 12:28 PM EDT  Workstation ID: [id number]      Results for orders placed during the hospital encounter of 07/16/23    CT Abdomen Pelvis Without Contrast    Narrative  CT ABDOMEN PELVIS WO CONTRAST    Date of Exam: 7/18/2023 12:14 PM EDT    Indication: Abdominal pain, acute, nonlocalized.    Comparison: CT of the abdomen and pelvis dated 12/27/2021    Technique: Axial CT images were obtained of the abdomen and pelvis without the administration of contrast. Sagittal and coronal reconstructions were performed.  Automated exposure control and iterative reconstruction methods were used.    Findings:  Examination is limited due to motion artifact and due to lack of IV contrast administration.    Liver: The liver is unremarkable in  morphology. Evaluation for focal liver lesions is limited without IV contrast. No biliary dilation is seen.    Gallbladder: The gallbladder is decompressed which limits its evaluation    Pancreas: Unremarkable.    Spleen: Unremarkable.    Adrenal glands: Unremarkable.    Genitourinary tract: Kidneys are unremarkable. No hydronephrosis is seen. No urinary tract calculi are seen. Visualized portions of the ureters are unremarkable. The urinary bladder and prostate gland appear unremarkable.    Gastrointestinal tract: The visualized hollow viscera demonstrate no focal lesion. There is no evidence of bowel obstruction. Moderate colonic stool is seen    Appendix: No findings to suggest acute appendicitis.    Other findings: No free air or free fluid is identified. No pathologically enlarged lymph nodes are seen. Vascular calcifications are present.    Bones and soft tissues: No acute osseous lesion is identified. There are degenerative changes within the spine. Chronic superior endplate defects of L2 and L4. 6 mm retrolisthesis at L2-L3.    Lung bases: The visualized lung bases are clear.    Impression  Impression:  1.Examination is limited due to motion artifact and due to lack of IV contrast administration.  2.Given these limitations, no acute abnormality identified within the abdomen or pelvis.  3.Moderate colonic stool.  4.Additional findings as detailed above    Electronically Signed: Mateus Lund  7/18/2023 12:28 PM EDT  Workstation ID: [id number]      Historical data copied forward from previous encounters in EMR including the history, exam, and assessment/plan has been reviewed and is unchanged except as I have noted and otherwise indicated.      Objective:         BP (!) 167/109   Pulse 71   Resp 20   Wt 82.8 kg (182 lb 9.6 oz)   SpO2 100%   BMI 27.76 kg/m²     Physical Examination    General:  Well-developed, very mildly overly well-nourished, semi-cooperative, no acute distress, appears stated age if not  slightly older , pinpoint pupils and slurred speech   Neuro:  A&O x3. No significantly obvious focal neuro deficet    Psych:  Pleasant pleasant near manic affect    HENT:  Normocephalic, atraumatic, moist mucous membranes , Normal ear placement,Throat not injected   Eyes:  PERRLA, Conjunctivae not injected, EOM's intact, conjunctiva does not appear significantly injected   Neck:  Supple, Mildly thickened, no lymph adenopathy nor thyromegaly no JVD or bruit    Lungs:    Symmetrical rise & fall of chest with baseline respiratory pattern. To auscultation lungs are Clear bilaterally, there are audible expiratory wheezes noted on the exam, no rhonchi or rales are noted, bases bilaterally are mildly reduced more on the right than the left base   Chest wall:  No significant tenderness when palpated. PMI @  fifth ICS MCL   Heart:  Rate in the mid 70s and regular rhythm, S1 and S2 normal, no S3 or S4, Gr I/VI systolic ejection murmur best heard at the apex , no obvious rub, click or gallop.    Abdomen:  non-distended, non-tender, bowel sounds noted in the 4 quadrants of the abdomen, significant adipose tissue identified, notable pouch present left upper quadrant of the abdomen that is tender to palpation   Extremities:  Equal color motion temperature and sensitivity, Rapid capillary refill noted within the nailbeds of fingers and toes bilaterally 1+ pitting lower extremity edema.    Pulses:  2+ and symmetric all extremities, rapid capillary refill noted within the nail beds of the available fingers and toes.   Skin:  No obvious rashes, significant lesions identified, warm dry and of normal turgor      In-Office Procedure(s):  No orders to display        Lab Review:   Hospital Outpatient Visit on 05/28/2025   Component Date Value   • QT Interval 05/28/2025 492    • QTC Interval 05/28/2025 515    Hospital Outpatient Visit on 05/28/2025   Component Date Value   • THC, Screen, Urine 05/28/2025 Positive (A)    • Phencyclidine  (PCP), Uri* 05/28/2025 Negative    • Cocaine Screen, Urine 05/28/2025 Negative    • Methamphetamine, Ur 05/28/2025 Positive (A)    • Opiate Screen 05/28/2025 Negative    • Amphetamine Screen, Urine 05/28/2025 Positive (A)    • Benzodiazepine Screen, U* 05/28/2025 Negative    • Tricyclic Antidepressant* 05/28/2025 Negative    • Methadone Screen, Urine 05/28/2025 Negative    • Barbiturates Screen, Uri* 05/28/2025 Negative    • Oxycodone Screen, Urine 05/28/2025 Negative    • Buprenorphine, Screen, U* 05/28/2025 Negative    • Glucose 05/28/2025 135 (H)    • BUN 05/28/2025 56.6 (H)    • Creatinine 05/28/2025 3.03 (H)    • Sodium 05/28/2025 137    • Potassium 05/28/2025 4.2    • Chloride 05/28/2025 98    • CO2 05/28/2025 26.2    • Calcium 05/28/2025 8.7    • BUN/Creatinine Ratio 05/28/2025 18.7    • Anion Gap 05/28/2025 12.8    • eGFR 05/28/2025 24.5 (L)    • Magnesium 05/28/2025 2.5    • proBNP 05/28/2025 24,198.0 (H)    • TSH 05/28/2025 2.420    • WBC 05/28/2025 7.30    • RBC 05/28/2025 4.55    • Hemoglobin 05/28/2025 13.7    • Hematocrit 05/28/2025 41.9    • MCV 05/28/2025 92.1    • MCH 05/28/2025 30.1    • MCHC 05/28/2025 32.7    • RDW 05/28/2025 15.7 (H)    • RDW-SD 05/28/2025 52.5    • MPV 05/28/2025 10.4    • Platelets 05/28/2025 229    • Neutrophil % 05/28/2025 62.3    • Lymphocyte % 05/28/2025 23.7    • Monocyte % 05/28/2025 11.1    • Eosinophil % 05/28/2025 1.9    • Basophil % 05/28/2025 0.7    • Immature Grans % 05/28/2025 0.3    • Neutrophils, Absolute 05/28/2025 4.55    • Lymphocytes, Absolute 05/28/2025 1.73    • Monocytes, Absolute 05/28/2025 0.81    • Eosinophils, Absolute 05/28/2025 0.14    • Basophils, Absolute 05/28/2025 0.05    • Immature Grans, Absolute 05/28/2025 0.02    • nRBC 05/28/2025 0.0    No results displayed because visit has over 200 results.      Admission on 05/14/2025, Discharged on 05/14/2025   Component Date Value   • QT Interval 05/14/2025 489    • QTC Interval 05/14/2025 534    Lab  on 02/26/2025   Component Date Value   • WBC 02/26/2025 7.86    • RBC 02/26/2025 4.58    • Hemoglobin 02/26/2025 13.0    • Hematocrit 02/26/2025 40.7    • MCV 02/26/2025 88.9    • MCH 02/26/2025 28.4    • MCHC 02/26/2025 31.9    • RDW 02/26/2025 15.3    • RDW-SD 02/26/2025 49.4    • MPV 02/26/2025 10.2    • Platelets 02/26/2025 198    • Glucose 02/26/2025 62 (L)    • BUN 02/26/2025 86 (H)    • Creatinine 02/26/2025 3.78 (H)    • Sodium 02/26/2025 140    • Potassium 02/26/2025 3.9    • Chloride 02/26/2025 98    • CO2 02/26/2025 29.7 (H)    • Calcium 02/26/2025 9.1    • Total Protein 02/26/2025 7.6    • Albumin 02/26/2025 4.0    • ALT (SGPT) 02/26/2025 18    • AST (SGOT) 02/26/2025 26    • Alkaline Phosphatase 02/26/2025 54    • Total Bilirubin 02/26/2025 0.3    • Globulin 02/26/2025 3.6    • A/G Ratio 02/26/2025 1.1    • BUN/Creatinine Ratio 02/26/2025 22.8    • Anion Gap 02/26/2025 12.3    • eGFR 02/26/2025 18.8 (L)    • PTH, Intact 02/26/2025 171.0 (H)    • Magnesium 02/26/2025 2.6    • Phosphorus 02/26/2025 4.9 (H)    • Protein/Creatinine Ratio* 02/26/2025 543.5 (H)    • Creatinine, Urine 02/26/2025 23.0    • Total Protein, Urine 02/26/2025 12.5    • Color, UA 02/26/2025 Yellow    • Appearance, UA 02/26/2025 Clear    • pH, UA 02/26/2025 6.0    • Specific Gravity, UA 02/26/2025 1.010    • Glucose, UA 02/26/2025 Negative    • Ketones, UA 02/26/2025 Negative    • Bilirubin, UA 02/26/2025 Negative    • Blood, UA 02/26/2025 Negative    • Protein, UA 02/26/2025 Trace (A)    • Leuk Esterase, UA 02/26/2025 Negative    • Nitrite, UA 02/26/2025 Negative    • Urobilinogen, UA 02/26/2025 0.2 E.U./dL    • Extra Tube 02/26/2025 Hold for add-ons.    Office Visit on 01/21/2025   Component Date Value   • proBNP 01/21/2025 18,267.0 (H)    • Glucose 01/21/2025 59 (L)    • BUN 01/21/2025 38 (H)    • Creatinine 01/21/2025 2.71 (H)    • Sodium 01/21/2025 144    • Potassium 01/21/2025 4.2    • Chloride 01/21/2025 104    • CO2 01/21/2025  27.1    • Calcium 01/21/2025 8.9    • Total Protein 01/21/2025 7.2    • Albumin 01/21/2025 3.6    • ALT (SGPT) 01/21/2025 14    • AST (SGOT) 01/21/2025 22    • Alkaline Phosphatase 01/21/2025 48    • Total Bilirubin 01/21/2025 0.5    • Globulin 01/21/2025 3.6    • A/G Ratio 01/21/2025 1.0    • BUN/Creatinine Ratio 01/21/2025 14.0    • Anion Gap 01/21/2025 12.9    • eGFR 01/21/2025 28.2 (L)    • WBC 01/21/2025 8.31    • RBC 01/21/2025 4.27    • Hemoglobin 01/21/2025 12.6 (L)    • Hematocrit 01/21/2025 38.7    • MCV 01/21/2025 90.6    • MCH 01/21/2025 29.5    • MCHC 01/21/2025 32.6    • RDW 01/21/2025 15.7 (H)    • RDW-SD 01/21/2025 51.4    • MPV 01/21/2025 11.7    • Platelets 01/21/2025 193    • Neutrophil % 01/21/2025 59.0    • Lymphocyte % 01/21/2025 27.4    • Monocyte % 01/21/2025 8.7    • Eosinophil % 01/21/2025 4.2    • Basophil % 01/21/2025 0.5    • Immature Grans % 01/21/2025 0.2    • Neutrophils, Absolute 01/21/2025 4.90    • Lymphocytes, Absolute 01/21/2025 2.28    • Monocytes, Absolute 01/21/2025 0.72    • Eosinophils, Absolute 01/21/2025 0.35    • Basophils, Absolute 01/21/2025 0.04    • Immature Grans, Absolute 01/21/2025 0.02    • nRBC 01/21/2025 0.0    Lab on 01/03/2025   Component Date Value   • Color, UA 01/03/2025 Yellow    • Appearance, UA 01/03/2025 Clear    • pH, UA 01/03/2025 6.0    • Specific Gravity, UA 01/03/2025 1.014    • Glucose, UA 01/03/2025 Negative    • Ketones, UA 01/03/2025 Negative    • Bilirubin, UA 01/03/2025 Negative    • Blood, UA 01/03/2025 Negative    • Protein, UA 01/03/2025 >=300 mg/dL (3+) (A)    • Leuk Esterase, UA 01/03/2025 Negative    • Nitrite, UA 01/03/2025 Negative    • Urobilinogen, UA 01/03/2025 0.2 E.U./dL    • Hemoglobin A1C 01/03/2025 5.00    • HS Troponin T 01/03/2025 32 (H)    • Extra Tube 01/03/2025 Hold for add-ons.    • RBC, UA 01/03/2025 0-2    • WBC, UA 01/03/2025 None Seen    • Bacteria, UA 01/03/2025 None Seen    • Squamous Epithelial Cell* 01/03/2025  0-2    • Hyaline Casts, UA 01/03/2025 0-2    • Methodology 01/03/2025 Automated Microscopy    Office Visit on 01/03/2025   Component Date Value   • Glucose 01/03/2025 98    • BUN 01/03/2025 55 (H)    • Creatinine 01/03/2025 3.06 (H)    • Sodium 01/03/2025 138    • Potassium 01/03/2025 4.5    • Chloride 01/03/2025 102    • CO2 01/03/2025 23.9    • Calcium 01/03/2025 9.4    • Total Protein 01/03/2025 7.9    • Albumin 01/03/2025 4.1    • ALT (SGPT) 01/03/2025 34    • AST (SGOT) 01/03/2025 34    • Alkaline Phosphatase 01/03/2025 43    • Total Bilirubin 01/03/2025 0.5    • Globulin 01/03/2025 3.8    • A/G Ratio 01/03/2025 1.1    • BUN/Creatinine Ratio 01/03/2025 18.0    • Anion Gap 01/03/2025 12.1    • eGFR 01/03/2025 24.4 (L)    • Total Cholesterol 01/03/2025 236 (H)    • Triglycerides 01/03/2025 84    • HDL Cholesterol 01/03/2025 66 (H)    • LDL Cholesterol  01/03/2025 155 (H)    • VLDL Cholesterol 01/03/2025 15    • LDL/HDL Ratio 01/03/2025 2.32    • TSH 01/03/2025 2.350    • Hepatitis C Quantitation 01/03/2025 1850    • HCV log10 01/03/2025 3.267    • Test Information 01/03/2025 Comment    • proBNP 01/03/2025 9,528.0 (H)    • Ferritin 01/03/2025 274.00    • Iron 01/03/2025 54 (L)    • Iron Saturation (TSAT) 01/03/2025 16 (L)    • Transferrin 01/03/2025 227    • TIBC 01/03/2025 338    • WBC 01/03/2025 8.65    • RBC 01/03/2025 4.63    • Hemoglobin 01/03/2025 13.5    • Hematocrit 01/03/2025 40.2    • MCV 01/03/2025 86.8    • MCH 01/03/2025 29.2    • MCHC 01/03/2025 33.6    • RDW 01/03/2025 16.8 (H)    • RDW-SD 01/03/2025 53.4    • MPV 01/03/2025 10.5    • Platelets 01/03/2025 260    • Neutrophil % 01/03/2025 50.2    • Lymphocyte % 01/03/2025 32.0    • Monocyte % 01/03/2025 10.9    • Eosinophil % 01/03/2025 6.0    • Basophil % 01/03/2025 0.7    • Immature Grans % 01/03/2025 0.2    • Neutrophils, Absolute 01/03/2025 4.34    • Lymphocytes, Absolute 01/03/2025 2.77    • Monocytes, Absolute 01/03/2025 0.94 (H)    •  "Eosinophils, Absolute 01/03/2025 0.52 (H)    • Basophils, Absolute 01/03/2025 0.06    • Immature Grans, Absolute 01/03/2025 0.02    • nRBC 01/03/2025 0.0      Recent labs reviewed and interpreted for clinical significance and application    Scott and I went over each of his labs were taken today while he was awaiting his diuresis here in heart failure clinic            It is a pleasure to be involved in this patient's cardiovascular care relating to their heart failure.  Please feel free to call me with any questions or concerns.    Clarence \"Tay\" Citlaly ARTEAGA, [facility]  Heart failure program clinical provider    Clarence Boucher, CHANDLER   05/27/25  .  "

## 2025-05-28 NOTE — PROGRESS NOTES
[facility] HEART FAILURE CLINIC - PHARMACY SERVICE    Patient Name: Scott Gaytan  :1975  Age: 50 y.o.  Sex: male  Primary Cardiologist: Jesús  Nephrology: Pippa  PCP: Max Mayen with EF 46-50% (Last Echo: 25).       ECHO:    Results for orders placed during the hospital encounter of 25    Adult Transthoracic Echo Complete W/ Cont if Necessary Per Protocol    Interpretation Summary    Left ventricular ejection fraction appears to be 46 - 50%.    Left ventricular diastolic function is consistent with (grade II w/high LAP) pseudonormalization.  Average GLS -13%.    The right ventricular cavity is dilated.    The left atrial cavity is dilated.    Moderate mitral valve regurgitation is present.    Moderate tricuspid valve regurgitation is present.    Estimated right ventricular systolic pressure from tricuspid regurgitation is mildly elevated (35-45 mmHg).      The patient's last EKG was reviewed from 25 and shows a QTcB of 515 ms.     ICD: no    CKD: Stage 4 eGFR 15-29 mL/min    BMP          2025    02:37 2025    03:48 2025    14:40   BMP   BUN 52  54  56.6    Creatinine 3.16  3.20  3.03    Sodium 133  141  137    Potassium 3.5  3.7  4.2    Chloride 98  105  98    CO2 24.8  24.9  26.2    Calcium 8.4  8.0  8.7        Lab Results   Component Value Date    EGFR 24.5 (L) 2025    EGFR 23.0 (L) 2025    EGFR 23.3 (L) 2025       Lab Results   Component Value Date    PROBNP 24,198.0 (H) 2025    PROBNP 24,295.0 (H) 2025    PROBNP 18,267.0 (H) 2025       Recent Vitals         2025       BP: 139/95 142/95 152/92     Pulse: 56 61 --     Temp: 97.8 °F (36.6 °C) 97.5 °F (36.4 °C) 98.2 °F (36.8 °C)     Weight: -- -- 79.7 kg (175 lb 12.8 oz)     BMI (Calculated): -- -- 26.7              -CHF-specific BB:      Pre Visit Dose: Carvedilol 25 mg PO BID    Post Visit Dose: Carvedilol 37.5 mg PO BID     - Target Dose:  Carvedilol 25 mg PO BID (<85 kg) or 50 mg PO BID (>85 kg).     - Goal HR of 50s to 60s.     - Patient should be seen every 10 to 14 days for a pulse check with plans for up-titration until target heart rate is achieved.        -ACE/ARB/ARNI:     Pre Visit Dose: Sacubitril/valsartan 24/26 mg BID ( prescribed but not taking until he sees Dr. Das due to in the past had JOSE ANGEL with Entresto)    Post Visit Dose: None           -SGLT2 inhibitor therapy:    Pre Visit Dose: None    Post Visit Dose: None        -DIURETIC:     Pre Visit Dose: Bumetanide 2 mg BID + KCL 20 mEq daily + metalozone 2.5 mg MWF ( Ran out 1 week ago)    Post Visit Dose: Bumetanide 2 mg BID + KCL 20 mEq daily + metalozone 2.5 mg MWF     -MRA:     Pre Visit Dose: None    Post Visit Dose: None      - K is < 5 mEq/L and SCr is not </= 2.5 mg/dL in male and eGFR > 30 mL/min/1.73m3    Lab Results   Component Value Date    K 4.2 05/28/2025       Lab Results   Component Value Date    CREATININE 3.03 (H) 05/28/2025         -MAGNESIUM:     Mag is > 2 mg/dL    Lab Results   Component Value Date    MG 2.5 05/28/2025    MG 2.6 02/26/2025    MG 2.1 11/26/2024       Pre Visit Dose: None    Post Visit Dose: None      -ANTICOAGULATION:     None    -OTHER CV MEDS:     Pre Visit Dose: hydralazine 25 mg TID, ASA 81 mg daily, rosuvastatin 40 mg daily, and nitroglycerin PRN    Post Visit Dose:  hydralazine 25 mg TID, ASA 81 mg daily, rosuvastatin 40 mg daily, and nitroglycerin PRN    -Clinic Administered Medications:     Furosemide 80 mg IV + KCL 10 mEq         Patient Assistance:      None            PLAN:  Initiation/Discontinuation/Dose Adjustment: Increase carvedilol  Education provided: med changes  Coordination of Care: Pippa 6/4  Refills: none  Follow-up: 1 month      Thank you for allowing me to participate in the care of your patient.    Marilyn Ruvalcaba, PharmD  [facility] Heart Failure Clinic  05/28/25  14:35 EDT

## 2025-05-30 ENCOUNTER — PATIENT OUTREACH (OUTPATIENT)
Age: 50
End: 2025-05-30
Payer: MEDICAID

## 2025-05-30 NOTE — OUTREACH NOTE
SW received referral via PCP re: compliance concerns and education. SW attempted to reach patient. No Answer. LVM. Next outreach scheduled x 2 days.       Marissa WELSH -   Ambulatory Case Management    5/30/2025, 10:31 EDT

## 2025-06-03 ENCOUNTER — PATIENT OUTREACH (OUTPATIENT)
Dept: CASE MANAGEMENT | Facility: OTHER | Age: 50
End: 2025-06-03
Payer: OTHER GOVERNMENT

## 2025-06-03 NOTE — PLAN OF CARE
Problem: Heart Failure  Goal: Track and Manage Fluids and Swelling  Outcome: Progressing  Intervention: My Fluids and Swelling To Do List  Description: Why is this important?It is important to check your weight at home every day.Also, check for swelling in your feet and legs every day.Keeping your legs up while you are sitting and doing ankle pump exercises will help with the swelling.  Flowsheets (Taken 6/3/2025 1101)  My Fluids and Swelling To Do List:   call office if I gain more than 2 pounds in 1 day or 5 pounds in 1 week   weigh myself every day   use salt in moderation  Goal: Optimal Care Coordination of a Patient Experiencing Heart Failure  Outcome: Progressing  Intervention: Identify and Minimize Risk of Heart Failure Exacerbation  Flowsheets (Taken 6/3/2025 1101)  Identify and Minimize Risk of Heart Failure Exacerbation: healthy lifestyle encouraged  Intervention: Identify and Manage Comorbidities and Complications  Flowsheets (Taken 6/3/2025 1101)  Identify and Manage Comorbidities and Complications: healthy lifestyle encouraged  Intervention: Alleviate Barriers to Optimal Nutrition and Fluid Status  Flowsheets (Taken 6/3/2025 1101)  Alleviate Barriers to Optimal Nutrition and Fluid Status:   fluid modification encouraged   home monitoring of weight gain or loss encouraged  Intervention: Maintain Strength and Functional Ability  Flowsheets (Taken 6/3/2025 1101)  Maintain Strength and Functional Ability: self-care encouraged

## 2025-06-03 NOTE — OUTREACH NOTE
AMBULATORY CASE MANAGEMENT NOTE    Names and Relationships of Patient/Support Persons: Contact: Scott Gaytan; Relationship: Self -     Patient Outreach    Received provider referral for pt, dx health maintenance. RN-ACM outreach call made to pt. Explained role of RN-ACM and reason for call. Pt reports to be doing well, denies any symptoms or concerns at this time. Reviewed AVS with pt from recent HF clinic appt. Disease education provided. Pt reports to be compliant with medications since hospital discharge. He has next HF clinic and PCP appt scheduled. Reviewed SDOH. Pt denies any needs at this time, states he has a phone number through his insurance company to call for transportation. Advised pt to reach out if he needs additional transportation resources. Pt reports he has not been able to access [employer], states he has difficulty with his email. Contact info provided for [employer] Help Desk. No questions per pt. Pt engaged in HRCM services. Care plan created this call. Advised pt to call RN-ACM or [facility] 24 hour nurse line with any needs. Follow up outreach scheduled for 3-4 weeks.     Adult Patient Profile  Questions/Answers      Flowsheet Row Most Recent Value   Symptoms/Conditions Managed at Home cardiovascular   Cardiovascular Symptoms/Conditions heart failure   Cardiovascular Management Strategies diet modification, activity, medication therapy, routine screening   Barriers to Taking Medication as Prescribed none  [pt reports no issues with med compliance since hospital D/C]   Primary Source of Support/Comfort sibling(s)   People in Home alone   Current Living Arrangements home          Send Education  Questions/Answers      Flowsheet Row Most Recent Value   Other Patient Education/Resources  24/7 [facility] Nurse Call Line   24/7 Nurse Call Line Education Method Verbal   Advanced Directives: --  [resources provided]          SDOH updated and reviewed with the patient during this  program:  Financial Resource Strain: Low Risk  (6/3/2025)    Overall Financial Resource Strain (CARDIA)     Difficulty of Paying Living Expenses: Not very hard      --     Food Insecurity: No Food Insecurity (6/3/2025)    Hunger Vital Sign     Worried About Running Out of Food in the Last Year: Never true     Ran Out of Food in the Last Year: Never true      --     Transportation Needs: No Transportation Needs (6/3/2025)    PRAPARE - Transportation     Lack of Transportation (Medical): No     Lack of Transportation (Non-Medical): No       Education Documentation  Weight Monitoring, taught by Lorena Cote, RN at 6/3/2025 11:03 AM.  Learner: Patient  Readiness: Acceptance  Method: Explanation  Response: Verbalizes Understanding    Self-Care, taught by Lorena Cote RN at 6/3/2025 11:03 AM.  Learner: Patient  Readiness: Acceptance  Method: Explanation  Response: Verbalizes Understanding    Provider Follow-Up, taught by Lorena Cote, RN at 6/3/2025 11:03 AM.  Learner: Patient  Readiness: Acceptance  Method: Explanation  Response: Verbalizes Understanding    Medication Management, taught by Lorena Cote, RN at 6/3/2025 11:03 AM.  Learner: Patient  Readiness: Acceptance  Method: Explanation  Response: Verbalizes Understanding    Diet Modification, taught by Lorena Cote, RN at 6/3/2025 11:03 AM.  Learner: Patient  Readiness: Acceptance  Method: Explanation  Response: Verbalizes Understanding          Lorena DOAN  Ambulatory Case Management    6/3/2025, 10:56 EDT

## 2025-06-05 ENCOUNTER — HOSPITAL ENCOUNTER (OUTPATIENT)
Dept: PET IMAGING | Facility: HOSPITAL | Age: 50
Discharge: HOME | End: 2025-06-05
Admitting: NURSE PRACTITIONER
Payer: OTHER GOVERNMENT

## 2025-06-05 DIAGNOSIS — R10.12 ACUTE LUQ PAIN: ICD-10-CM

## 2025-06-05 PROCEDURE — 74176 CT ABD & PELVIS W/O CONTRAST: CPT

## 2025-06-09 ENCOUNTER — PATIENT OUTREACH (OUTPATIENT)
Age: 50
End: 2025-06-09
Payer: OTHER GOVERNMENT

## 2025-06-09 NOTE — OUTREACH NOTE
Care Coordination    Per chart review, SW noted RN-ACM outreach, Per RN-ACM, no social needs present. SW to discharge. Please re consult SW if additional needs arise.       Marissa WELSH -   Ambulatory Case Management    6/9/2025, 11:53 EDT

## 2025-06-10 ENCOUNTER — OFFICE VISIT (OUTPATIENT)
Dept: FAMILY MEDICINE CLINIC | Facility: CLINIC | Age: 50
End: 2025-06-10
Payer: OTHER GOVERNMENT

## 2025-06-10 VITALS
DIASTOLIC BLOOD PRESSURE: 106 MMHG | TEMPERATURE: 98 F | SYSTOLIC BLOOD PRESSURE: 169 MMHG | HEIGHT: 68 IN | BODY MASS INDEX: 26.19 KG/M2 | OXYGEN SATURATION: 98 % | RESPIRATION RATE: 16 BRPM | HEART RATE: 69 BPM | WEIGHT: 172.8 LBS

## 2025-06-10 DIAGNOSIS — I1A.0 RESISTANT HYPERTENSION: ICD-10-CM

## 2025-06-10 DIAGNOSIS — Z00.00 HEALTHCARE MAINTENANCE: Primary | ICD-10-CM

## 2025-06-10 DIAGNOSIS — I10 HYPERTENSION, UNSPECIFIED TYPE: Chronic | ICD-10-CM

## 2025-06-10 DIAGNOSIS — I50.31 ACUTE DIASTOLIC CHF (CONGESTIVE HEART FAILURE): ICD-10-CM

## 2025-06-10 RX ORDER — AMLODIPINE BESYLATE 5 MG/1
5 TABLET ORAL DAILY
Qty: 30 TABLET | Refills: 5 | Status: SHIPPED | OUTPATIENT
Start: 2025-06-10

## 2025-06-10 RX ORDER — BUMETANIDE 2 MG/1
2 TABLET ORAL 2 TIMES DAILY
Qty: 180 TABLET | Refills: 0 | Status: SHIPPED | OUTPATIENT
Start: 2025-06-10 | End: 2025-09-08

## 2025-06-11 PROBLEM — I50.31 ACUTE DIASTOLIC CHF (CONGESTIVE HEART FAILURE): Status: ACTIVE | Noted: 2025-06-11

## 2025-06-11 NOTE — PROGRESS NOTES
"Chief Complaint  Follow-up (2 week f/u)    Subjective        Scott Gaytan presents to [facility]  History of Present Illness  Scott, 49 y/o male patient presents for 2 week f/u visit.  Patient has no complaints.  He reports that he has f/u with [facility].  He states he needs refills of his Bumex.      Objective   Vital Signs:  BP (!) 169/106 (BP Location: Right arm, Patient Position: Sitting, Cuff Size: Adult)   Pulse 69   Temp 98 °F (36.7 °C) (Infrared)   Resp 16   Ht 172.7 cm (68\")   Wt 78.4 kg (172 lb 12.8 oz)   SpO2 98%   BMI 26.27 kg/m²   Estimated body mass index is 26.27 kg/m² as calculated from the following:    Height as of this encounter: 172.7 cm (68\").    Weight as of this encounter: 78.4 kg (172 lb 12.8 oz).    Physical Exam  Constitutional:       General: He is not in acute distress.     Appearance: Normal appearance. He is not ill-appearing, toxic-appearing or diaphoretic.   HENT:      Head: Normocephalic and atraumatic.   Cardiovascular:      Rate and Rhythm: Normal rate.   Pulmonary:      Effort: Pulmonary effort is normal.   Musculoskeletal:      Right lower leg: No edema.      Left lower leg: No edema.   Neurological:      Mental Status: He is alert.   Psychiatric:         Mood and Affect: Mood normal.         Behavior: Behavior normal.         Thought Content: Thought content normal.         Judgment: Judgment normal.        Result Review :  The following data was reviewed by: CHANDLER Omalley on 06/10/2025:  Common labs          5/20/2025    02:37 5/21/2025    03:48 5/28/2025    14:40   Common Labs   Glucose 140  119  135    BUN 52  54  56.6    Creatinine 3.16  3.20  3.03    Sodium 133  141  137    Potassium 3.5  3.7  4.2    Chloride 98  105  98    Calcium 8.4  8.0  8.7    WBC 7.53  6.75  7.30    Hemoglobin 12.7  12.1  13.7    Hematocrit 39.0  38.3  41.9    Platelets 235  202  229    Total Cholesterol 177      Triglycerides 67      HDL Cholesterol " 41      LDL Cholesterol  123        Data reviewed : Radiologic studies 6/11/25 CT ABDOMEN PELVIS WO CONTRAST     Date of Exam: 6/5/2025 7:59 AM EDT     Indication: LUQ pain and swelling.     Comparison: 7/18/2023     Technique: Axial CT images were obtained of the abdomen and pelvis without the administration of contrast. Sagittal and coronal reconstructions were performed.  Automated exposure control and iterative reconstruction methods were used.     Findings:  Lung bases clear. Cardiomegaly partially imaged. Trace pericardial effusion. No basilar pleural effusion.     Lack of contrast limits assessment of abdominal organs and vasculature. Liver, spleen, adrenal glands, and pancreas are without acute abnormality. No radiodense gallstone or pericholecystic inflammation. Negative for common bile duct dilatation.     The kidneys are without hydronephrosis. No renal calculus or ureteral calculus. Negative for obstructive uropathy. Urinary bladder unremarkable.     Negative for pneumoperitoneum or pneumatosis intestinalis. No findings of bowel obstruction. Normal appendix. Fecalized distal ileal small bowel loops which may relate to delayed transit/stasis. Moderate atherosclerotic calcifications of the infrarenal   aorta without aortic aneurysm. No inguinal adenopathy. No retroperitoneal or central mesenteric adenopathy.     There is a mild to moderate convex left levocurvature of the lumbar spine centered at the L2 level. No aggressive osseous lesion or acute fracture. Superior endplate mild height loss at L2 and L4 unchanged from the prior study. Chronic mild   retrolisthesis of L2 on L3 measuring 6 mm. Multilevel lumbar disc narrowing and facet arthritis.     IMPRESSION:  Impression:  1. No acute abnormality in the abdomen or pelvis.  2. Fecalized distal ileal small bowel loops may relate to stasis and/or delayed transit.  3. Cardiomegaly and additional incidental findings above.                       Electronically  Signed: Jono Chapman MD    6/5/2025 8:21 AM EDT    Workstation ID: [id number]         Assessment and Plan   Diagnoses and all orders for this visit:    1. Healthcare maintenance (Primary)  -     CBC & Differential; Future  -     Comprehensive Metabolic Panel; Future  -     Hemoglobin A1c; Future  -     Lipid Panel; Future  -     Urinalysis With Culture If Indicated - Urine, Clean Catch; Future    2. Acute diastolic CHF (congestive heart failure)  Assessment & Plan:  Congestive heart failure due to coronary artery disease (CAD) and hypertension.  Heart failure is improving with treatment.  NYHA Class IV.  Continue current treatment regimen.  Dietary sodium restriction.  Encouraged daily monitoring of the patient's weight.  Regular aerobic exercise.  Continue current medications.  Heart failure will be reassessed at the next regular appointment.        Orders:  -     bumetanide (BUMEX) 2 MG tablet; Take 1 tablet by mouth 2 (Two) Times a Day for 90 days.  Dispense: 180 tablet; Refill: 0    3. Hypertension, unspecified type  Assessment & Plan:  Hypertension is uncontrolled  Medication changes per orders.  Continue current treatment regimen.  Dietary sodium restriction.  Regular aerobic exercise.  Ambulatory blood pressure monitoring.  Blood pressure will be reassessedin 3 months.    Orders:  -     amLODIPine (NORVASC) 5 MG tablet; Take 1 tablet by mouth Daily.  Dispense: 30 tablet; Refill: 5           I spent 30 minutes caring for Scott on this date of service. This time includes time spent by me in the following activities:preparing for the visit, reviewing tests, obtaining and/or reviewing a separately obtained history, performing a medically appropriate examination and/or evaluation , counseling and educating the patient/family/caregiver, ordering medications, tests, or procedures, documenting information in the medical record, and independently interpreting results and communicating that information with the  patient/family/caregiver  Follow Up   Return in about 3 months (around 9/10/2025) for Next scheduled follow up, discuss lab results.  Patient was given instructions and counseling regarding his condition or for health maintenance advice. Please see specific information pulled into the AVS if appropriate.

## 2025-06-11 NOTE — ASSESSMENT & PLAN NOTE
Congestive heart failure due to coronary artery disease (CAD) and hypertension.  Heart failure is improving with treatment.  NYHA Class IV.  Continue current treatment regimen.  Dietary sodium restriction.  Encouraged daily monitoring of the patient's weight.  Regular aerobic exercise.  Continue current medications.  Heart failure will be reassessed at the next regular appointment.

## 2025-06-11 NOTE — ASSESSMENT & PLAN NOTE
Hypertension is uncontrolled  Medication changes per orders.  Continue current treatment regimen.  Dietary sodium restriction.  Regular aerobic exercise.  Ambulatory blood pressure monitoring.  Blood pressure will be reassessedin 3 months.

## 2025-06-12 RX ORDER — HYDRALAZINE HYDROCHLORIDE 25 MG/1
25 TABLET, FILM COATED ORAL 3 TIMES DAILY
Qty: 90 TABLET | Refills: 0 | Status: SHIPPED | OUTPATIENT
Start: 2025-06-12

## 2025-06-20 DIAGNOSIS — F41.9 ANXIETY: ICD-10-CM

## 2025-06-20 RX ORDER — BUSPIRONE HYDROCHLORIDE 5 MG/1
5 TABLET ORAL 3 TIMES DAILY
Qty: 90 TABLET | Refills: 0 | Status: SHIPPED | OUTPATIENT
Start: 2025-06-20 | End: 2025-07-20

## 2025-06-20 NOTE — TELEPHONE ENCOUNTER
Rx Refill Note  Requested Prescriptions     Pending Prescriptions Disp Refills    busPIRone (BUSPAR) 5 MG tablet [Pharmacy Med Name: BUSPIRONE HCL 5 MG TABLET] 90 tablet 0     Sig: TAKE 1 TABLET BY MOUTH 3 TIMES A DAY FOR 30 DAYS.      Last office visit with prescribing clinician: 6/10/2025   Last telemedicine visit with prescribing clinician: Visit date not found   Next office visit with prescribing clinician: 9/10/2025                         Would you like a call back once the refill request has been completed: [] Yes [] No    If the office needs to give you a call back, can they leave a voicemail: [] Yes [] No    Delma Ramires MA  06/20/25, 14:55 EDT

## 2025-06-24 ENCOUNTER — OFFICE VISIT (OUTPATIENT)
Dept: CARDIOLOGY | Facility: CLINIC | Age: 50
End: 2025-06-24
Payer: OTHER GOVERNMENT

## 2025-06-24 VITALS
WEIGHT: 166.75 LBS | DIASTOLIC BLOOD PRESSURE: 73 MMHG | SYSTOLIC BLOOD PRESSURE: 110 MMHG | OXYGEN SATURATION: 99 % | HEART RATE: 66 BPM | HEIGHT: 68 IN | BODY MASS INDEX: 25.27 KG/M2

## 2025-06-24 DIAGNOSIS — I10 HYPERTENSION, UNSPECIFIED TYPE: Chronic | ICD-10-CM

## 2025-06-24 DIAGNOSIS — I44.7 LBBB (LEFT BUNDLE BRANCH BLOCK): ICD-10-CM

## 2025-06-24 DIAGNOSIS — I48.0 PAF (PAROXYSMAL ATRIAL FIBRILLATION): Primary | Chronic | ICD-10-CM

## 2025-06-24 PROCEDURE — 1160F RVW MEDS BY RX/DR IN RCRD: CPT | Performed by: INTERNAL MEDICINE

## 2025-06-24 PROCEDURE — 3074F SYST BP LT 130 MM HG: CPT | Performed by: INTERNAL MEDICINE

## 2025-06-24 PROCEDURE — 1159F MED LIST DOCD IN RCRD: CPT | Performed by: INTERNAL MEDICINE

## 2025-06-24 PROCEDURE — 99214 OFFICE O/P EST MOD 30 MIN: CPT | Performed by: INTERNAL MEDICINE

## 2025-06-24 PROCEDURE — 3078F DIAST BP <80 MM HG: CPT | Performed by: INTERNAL MEDICINE

## 2025-06-24 RX ORDER — AMLODIPINE BESYLATE 5 MG/1
5 TABLET ORAL DAILY
Qty: 90 TABLET | Refills: 3 | Status: SHIPPED | OUTPATIENT
Start: 2025-06-24

## 2025-06-24 NOTE — PROGRESS NOTES
Progress note      Name: Scott Gaytan ADMIT: (Not on file)   : 1975  PCP: Vicki Santana APRN    MRN: [id number] LOS: 0 days   AGE/SEX: 50 y.o. male  ROOM: Room/bed info not found     Chief Complaint   Patient presents with    Follow-up     2 week f/u - CHF       Subjective       History of present illness  Scott Gaytan is a 50-year-old male patient, history of substance abuse, renal insufficiency, no history of CAD, A-fib with RVR onset 2024, at that time he was hospitalized for CHF, high potassium of 6.5.  Patient was cardioverted on 2024.  He is here today for follow-up, he is feeling better, no lower extremity edema no shortness of breath no chest pain.    Past Medical History:   Diagnosis Date    Anemia in chronic kidney disease (CKD) 2025    Arteriovenous fistula 2025    Atrial fibrillation 2024    Rate controlled with Coreg    Anticoagulated with renal dosed Eliquis 2.5 mg twice daily    Without history of ablations or DCCV         Chronic diastolic heart failure     CKD (chronic kidney disease) stage 4, GFR 15-29 ml/min 2025    Followed by Dr Das,       COVID-19 virus detected 2021    Cytokine release syndrome, grade 1 2021    h/o Medication and medical follow-up noncompliance 04/10/2024    Hepatitis C 2025    Hyperkalemia 2022    Hyperlipidemia 2025    Hypertensive heart disease with congestive heart failure and chronic kidney disease 2025    Hypertensive urgency 2022    Methamphetamine abuse 2022    Methamphetamine intoxication 2024    Mild to moderate valvular heart disease 2025    Mild to moderate mitral valve regurgitation      Secondary hyperparathyroidism 2025    Tick bite of abdomen 2022    Tobacco abuse 2022     Past Surgical History:   Procedure Laterality Date    CARDIAC CATHETERIZATION N/A 2022    ORIF TIBIA/FIBULA FRACTURES Right      Family History    Problem Relation Age of Onset    Diabetes Mother     Arthritis Mother     Hypertension Father     Heart attack Father     Seizures Sister     Heart attack Paternal Grandfather      Social History     Tobacco Use    Smoking status: Every Day     Current packs/day: 0.25     Average packs/day: 1 pack/day for 31.5 years (30.6 ttl pk-yrs)     Types: Cigarettes     Start date: 1994     Passive exposure: Never    Smokeless tobacco: Never    Tobacco comments:     Down to 1/2 pack every 3 days, wearing a nicotine patch - 6/24/25   Vaping Use    Vaping status: Never Used   Substance Use Topics    Alcohol use: Not Currently    Drug use: Yes     Types: Methamphetamines, Marijuana, Cocaine(coke)       Current Outpatient Medications:     albuterol sulfate  (90 Base) MCG/ACT inhaler, Inhale 2 puffs Every 4 (Four) Hours As Needed for Wheezing or Shortness of Air., Disp: 18 g, Rfl: 0    amLODIPine (NORVASC) 5 MG tablet, Take 1 tablet by mouth Daily., Disp: 90 tablet, Rfl: 3    apixaban (ELIQUIS) 2.5 MG tablet tablet, Take 1 tablet by mouth 2 (Two) Times a Day., Disp: 180 tablet, Rfl: 3    aspirin 81 MG EC tablet, Take 1 tablet by mouth Daily for 180 days., Disp: 90 tablet, Rfl: 1    bumetanide (BUMEX) 2 MG tablet, Take 1 tablet by mouth 2 (Two) Times a Day for 90 days., Disp: 180 tablet, Rfl: 0    busPIRone (BUSPAR) 5 MG tablet, TAKE 1 TABLET BY MOUTH 3 TIMES A DAY FOR 30 DAYS., Disp: 90 tablet, Rfl: 0    carvedilol (COREG) 25 MG tablet, Take 1.5 tablets by mouth 2 (Two) Times a Day With Meals for 90 days. Hold for systolic blood pressure less than 100 and and/or heart rate less than 60 unless you have a ICD or permanent pacemaker, Disp: 270 tablet, Rfl: 2    hydrALAZINE (APRESOLINE) 25 MG tablet, TAKE 1 TABLET BY MOUTH THREE TIMES A DAY, Disp: 90 tablet, Rfl: 0    metOLazone (ZAROXOLYN) 2.5 MG tablet, Take 1 tablet by mouth 3 (Three) Times a Week. Take until you see your nephrologist on Mondays Wednesdays and Fridays,  Disp: 16 tablet, Rfl: 0    potassium chloride ER (K-TAB) 20 MEQ tablet controlled-release ER tablet, Take 1 tablet by mouth Daily for 90 days., Disp: 30 tablet, Rfl: 2    rosuvastatin (CRESTOR) 40 MG tablet, TAKE 1 TABLET BY MOUTH EVERY DAY, Disp: 90 tablet, Rfl: 0    nitroglycerin (NITROSTAT) 0.4 MG SL tablet, Place 1 tablet under the tongue Every 5 (Five) Minutes As Needed for Chest Pain for up to 30 days. Take no more than 3 doses in 15 minutes., Disp: 30 tablet, Rfl: 0  Allergies:  Methylprednisolone and Morphine      Physical Exam  VITALS REVIEWED    General:      well developed, in no acute distress.    Head:      normocephalic and atraumatic.    Eyes:      PERRL/EOM intact, conjunctiva and sclera clear with out nystagmus.    Neck:      no masses, thyromegaly,  trachea central with normal respiratory effort and PMI displaced laterally  Lungs:      Clear to auscultation bilaterally  Heart:       Regular rate and rhythm  Msk:      no deformity or scoliosis noted of thoracic or lumbar spine.    Pulses:      pulses normal in all 4 extremities.    Extremities:       No lower extremity edema  Neurologic:      no focal deficits.   alert oriented x3  Skin:      intact without lesions or rashes.    Psych:      alert and cooperative; normal mood and affect; normal attention span and concentration.      Result Review :               Pertinent cardiac workup    EKG 11/23/2024 atrial fibrillation left anterior fascicular block  Echo 5/20/2025 ejection fraction 45 to 50%, moderate MR.  EKG 5/28/2025 sinus rhythm left bundle branch block,  ms.        Procedures        Assessment and Plan      Scott Gaytan is a 50-year-old male patient who has chronic kidney insufficiency, baseline creatinine of around 3 at this time, atrial fibrillation onset in November 2024, at that time he was hospitalized for A-fib with RVR, high potassium and CHF.  He was cardioverted with return to sinus rhythm.  Patient was kept on Coreg and  renal adjusted Eliquis.  He is not in CHF now, rhythm is sinus.  He is feeling better.  His blood pressure was elevated and therefore amlodipine was added and is okay now.  For now we will continue same therapy.  Hopefully he stays in sinus rhythm.  He might need ischemic workup at some point but with his creatinine being elevated even if stress test is abnormal, I do not think heart cath would be advisable now.  Will wait.    Diagnoses and all orders for this visit:    1. PAF (paroxysmal atrial fibrillation) (Primary)  Overview:  Rate controlled with Coreg  Anticoagulated with renal dosed Eliquis 2.5 mg twice daily  Without history of ablations or DCCV        2. Hypertension, unspecified type  -     amLODIPine (NORVASC) 5 MG tablet; Take 1 tablet by mouth Daily.  Dispense: 90 tablet; Refill: 3    3. LBBB (left bundle branch block)    Other orders  -     apixaban (ELIQUIS) 2.5 MG tablet tablet; Take 1 tablet by mouth 2 (Two) Times a Day.  Dispense: 180 tablet; Refill: 3           Return in about 6 months (around 12/24/2025), or ekg.  Patient was given instructions and counseling regarding his condition or for health maintenance advice. Please see specific information pulled into the AVS if appropriate.     Electronically signed by Cary Espinosa MD, 06/24/25, 9:19 AM EDT.

## 2025-06-30 RX ORDER — METOLAZONE 2.5 MG/1
2.5 TABLET ORAL 3 TIMES WEEKLY
Qty: 16 TABLET | Refills: 0 | Status: SHIPPED | OUTPATIENT
Start: 2025-06-30

## 2025-07-10 ENCOUNTER — HOSPITAL ENCOUNTER (EMERGENCY)
Facility: HOSPITAL | Age: 50
Discharge: LEFT BEFORE DISCHARGE | End: 2025-07-10
Attending: EMERGENCY MEDICINE
Payer: OTHER GOVERNMENT

## 2025-07-10 VITALS
OXYGEN SATURATION: 97 % | SYSTOLIC BLOOD PRESSURE: 132 MMHG | HEIGHT: 68 IN | HEART RATE: 72 BPM | RESPIRATION RATE: 18 BRPM | DIASTOLIC BLOOD PRESSURE: 92 MMHG | WEIGHT: 167.77 LBS | TEMPERATURE: 97.5 F | BODY MASS INDEX: 25.43 KG/M2

## 2025-07-10 PROCEDURE — 99211 OFF/OP EST MAY X REQ PHY/QHP: CPT | Performed by: EMERGENCY MEDICINE

## 2025-07-11 ENCOUNTER — HOSPITAL ENCOUNTER (EMERGENCY)
Facility: HOSPITAL | Age: 50
Discharge: HOME | End: 2025-07-11
Payer: OTHER GOVERNMENT

## 2025-07-11 ENCOUNTER — APPOINTMENT (OUTPATIENT)
Dept: GENERAL RADIOLOGY | Facility: HOSPITAL | Age: 50
End: 2025-07-11
Payer: OTHER GOVERNMENT

## 2025-07-11 VITALS
HEIGHT: 68 IN | SYSTOLIC BLOOD PRESSURE: 123 MMHG | WEIGHT: 171.52 LBS | RESPIRATION RATE: 20 BRPM | BODY MASS INDEX: 25.99 KG/M2 | OXYGEN SATURATION: 97 % | TEMPERATURE: 94.1 F | HEART RATE: 70 BPM | DIASTOLIC BLOOD PRESSURE: 93 MMHG

## 2025-07-11 DIAGNOSIS — E79.0 ELEVATED BLOOD URIC ACID LEVEL: ICD-10-CM

## 2025-07-11 DIAGNOSIS — N18.4 STAGE 4 CHRONIC KIDNEY DISEASE: ICD-10-CM

## 2025-07-11 DIAGNOSIS — M25.562 LEFT KNEE PAIN, UNSPECIFIED CHRONICITY: Primary | ICD-10-CM

## 2025-07-11 LAB
ALBUMIN SERPL-MCNC: 4 G/DL (ref 3.5–5.2)
ALBUMIN/GLOB SERPL: 1.2 G/DL
ALP SERPL-CCNC: 43 U/L (ref 39–117)
ALT SERPL W P-5'-P-CCNC: 18 U/L (ref 1–41)
ANION GAP SERPL CALCULATED.3IONS-SCNC: 15.6 MMOL/L (ref 5–15)
AST SERPL-CCNC: 25 U/L (ref 1–40)
BASOPHILS # BLD AUTO: 0.03 10*3/MM3 (ref 0–0.2)
BASOPHILS NFR BLD AUTO: 0.3 % (ref 0–1.5)
BILIRUB SERPL-MCNC: 0.5 MG/DL (ref 0–1.2)
BUN SERPL-MCNC: 63.1 MG/DL (ref 6–20)
BUN/CREAT SERPL: 20.4 (ref 7–25)
CALCIUM SPEC-SCNC: 9.1 MG/DL (ref 8.6–10.5)
CHLORIDE SERPL-SCNC: 98 MMOL/L (ref 98–107)
CO2 SERPL-SCNC: 24.4 MMOL/L (ref 22–29)
CREAT SERPL-MCNC: 3.1 MG/DL (ref 0.76–1.27)
DEPRECATED RDW RBC AUTO: 46.4 FL (ref 37–54)
EGFRCR SERPLBLD CKD-EPI 2021: 23.9 ML/MIN/1.73
EOSINOPHIL # BLD AUTO: 0.22 10*3/MM3 (ref 0–0.4)
EOSINOPHIL NFR BLD AUTO: 2.5 % (ref 0.3–6.2)
ERYTHROCYTE [DISTWIDTH] IN BLOOD BY AUTOMATED COUNT: 14.5 % (ref 12.3–15.4)
GLOBULIN UR ELPH-MCNC: 3.4 GM/DL
GLUCOSE SERPL-MCNC: 150 MG/DL (ref 65–99)
HCT VFR BLD AUTO: 42.4 % (ref 37.5–51)
HGB BLD-MCNC: 13.6 G/DL (ref 13–17.7)
HOLD SPECIMEN: NORMAL
IMM GRANULOCYTES # BLD AUTO: 0.01 10*3/MM3 (ref 0–0.05)
IMM GRANULOCYTES NFR BLD AUTO: 0.1 % (ref 0–0.5)
LYMPHOCYTES # BLD AUTO: 1.59 10*3/MM3 (ref 0.7–3.1)
LYMPHOCYTES NFR BLD AUTO: 18.4 % (ref 19.6–45.3)
MCH RBC QN AUTO: 27.8 PG (ref 26.6–33)
MCHC RBC AUTO-ENTMCNC: 32.1 G/DL (ref 31.5–35.7)
MCV RBC AUTO: 86.5 FL (ref 79–97)
MONOCYTES # BLD AUTO: 0.79 10*3/MM3 (ref 0.1–0.9)
MONOCYTES NFR BLD AUTO: 9.1 % (ref 5–12)
NEUTROPHILS NFR BLD AUTO: 6.02 10*3/MM3 (ref 1.7–7)
NEUTROPHILS NFR BLD AUTO: 69.6 % (ref 42.7–76)
NRBC BLD AUTO-RTO: 0 /100 WBC (ref 0–0.2)
PLATELET # BLD AUTO: 203 10*3/MM3 (ref 140–450)
PMV BLD AUTO: 9.4 FL (ref 6–12)
POTASSIUM SERPL-SCNC: 3.1 MMOL/L (ref 3.5–5.2)
PROT SERPL-MCNC: 7.4 G/DL (ref 6–8.5)
RBC # BLD AUTO: 4.9 10*6/MM3 (ref 4.14–5.8)
SODIUM SERPL-SCNC: 138 MMOL/L (ref 136–145)
URATE SERPL-MCNC: 11.6 MG/DL (ref 3.4–7)
WBC NRBC COR # BLD AUTO: 8.66 10*3/MM3 (ref 3.4–10.8)
WHOLE BLOOD HOLD COAG: NORMAL

## 2025-07-11 PROCEDURE — 36415 COLL VENOUS BLD VENIPUNCTURE: CPT

## 2025-07-11 PROCEDURE — 84550 ASSAY OF BLOOD/URIC ACID: CPT

## 2025-07-11 PROCEDURE — 99283 EMERGENCY DEPT VISIT LOW MDM: CPT

## 2025-07-11 PROCEDURE — 97161 PT EVAL LOW COMPLEX 20 MIN: CPT | Performed by: PHYSICAL THERAPIST

## 2025-07-11 PROCEDURE — 80053 COMPREHEN METABOLIC PANEL: CPT

## 2025-07-11 PROCEDURE — 73562 X-RAY EXAM OF KNEE 3: CPT

## 2025-07-11 PROCEDURE — 85025 COMPLETE CBC W/AUTO DIFF WBC: CPT

## 2025-07-11 RX ORDER — HYDROCODONE BITARTRATE AND ACETAMINOPHEN 5; 325 MG/1; MG/1
1 TABLET ORAL ONCE
Refills: 0 | Status: COMPLETED | OUTPATIENT
Start: 2025-07-11 | End: 2025-07-11

## 2025-07-11 RX ADMIN — HYDROCODONE BITARTRATE AND ACETAMINOPHEN 1 TABLET: 5; 325 TABLET ORAL at 08:54

## 2025-07-11 NOTE — PLAN OF CARE
ASSESSMENT:   Pt presents with a PT diagnosis of L knee pain and has pain, edema, and ROM deficits that are limiting his ability to ambulate, transfer safely without pain. The patient demonstrated tenderness along L medial knee joint line and patella and severe ROM deficits due to pain. Patient demonstrated positive varus testing on L. Patient was given compression with ace wrap and educated on ROM exercises, use of ice, and compression wrapping technique. PT recommends patient d/c home after ED d/c.     Goals:   LTG 1: The patient will be independent in HEP in order to decrease pain and improve tolerance to functional activities.  STATUS: Met    Interventions:   Education: HEP, use of ice, compression wrapping technique      PLAN:  home

## 2025-07-11 NOTE — DISCHARGE INSTRUCTIONS
Rest.  Drink plenty of fluids.  Avoid processed meats and alcohol.  May use ice 20 minutes at a time several times a day, do not place the ice directly on the skin.  May use Tylenol, do not exceed 4000 mg/day of Tylenol.  Use 8 Ace wrap for discomfort, utilize the exercises physical therapy provided to you.  Follow-up with your primary care, call or today or Monday to schedule an appointment.  Return to the ER for any new or worsening symptoms.

## 2025-07-11 NOTE — ED PROVIDER NOTES
Subjective   History of Present Illness  Chief complaint: Left knee pain for the past several days.      Context: Patient is a 50-year-old male with history of hypertension, CKD, hyperlipidemia, CHF, A-fib, anemia, hep C, hyper per kalemia who presents to the ER with complaint of left knee pain for the past several days.  Patient reports the pain started in his left foot, and now he is in his left knee, denies any injury or trauma to the knee.  Patient reports in the past he had some left leg and foot pain, and then it went to his right foot, denies any history of gout.  Denies any chest pain, shortness of air, fever, abdominal pain, nausea/vomiting/diarrhea or urinary symptoms.  Patient also denies any redness warmth swelling to the left or right lower extremities.      PCP: Vicki Santana              Review of Systems   Constitutional:  Negative for fever.       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    h/o Medication and medical follow-up noncompliance 04/10/2024    Hepatitis C 01/03/2025    Hyperkalemia 04/26/2022    Hyperlipidemia 01/21/2025    Hypertensive heart disease with congestive heart failure and chronic kidney disease 02/11/2025    Hypertensive urgency 04/26/2022    Methamphetamine abuse 04/26/2022    Methamphetamine intoxication 06/16/2024    Mild to moderate valvular heart disease 02/06/2025    Mild to moderate mitral valve regurgitation      Secondary hyperparathyroidism 02/11/2025    Tick bite of abdomen 04/26/2022    Tobacco  abuse 04/26/2022       Allergies   Allergen Reactions    Methylprednisolone Swelling     Pt reports severe swelling with steroids    Morphine Other (See Comments)     Makes it feel like my blood is boiling in my veins       Past Surgical History:   Procedure Laterality Date    CARDIAC CATHETERIZATION N/A 04/27/2022    ORIF TIBIA/FIBULA FRACTURES Right        Family History   Problem Relation Age of Onset    Diabetes Mother     Arthritis Mother     Hypertension Father     Heart attack Father     Seizures Sister     Heart attack Paternal Grandfather        Social History     Socioeconomic History    Marital status: Single   Tobacco Use    Smoking status: Every Day     Current packs/day: 0.25     Average packs/day: 1 pack/day for 31.5 years (30.7 ttl pk-yrs)     Types: Cigarettes     Start date: 1994     Passive exposure: Never    Smokeless tobacco: Never    Tobacco comments:     Down to 1/2 pack every 3 days, wearing a nicotine patch - 6/24/25   Vaping Use    Vaping status: Never Used   Substance and Sexual Activity    Alcohol use: Not Currently    Drug use: Yes     Types: Methamphetamines, Marijuana, Cocaine(coke)    Sexual activity: Yes           Objective   Physical Exam  Vitals and nursing note reviewed.   Constitutional:       Appearance: Normal appearance.   HENT:      Head: Normocephalic.      Nose: Nose normal.      Mouth/Throat:      Mouth: Mucous membranes are moist.   Eyes:      Extraocular Movements: Extraocular movements intact.   Cardiovascular:      Rate and Rhythm: Normal rate and regular rhythm.      Pulses: Normal pulses.      Heart sounds: Normal heart sounds.   Pulmonary:      Effort: Pulmonary effort is normal.      Breath sounds: Normal breath sounds. No wheezing.   Abdominal:      Palpations: Abdomen is soft.      Tenderness: There is no abdominal tenderness.   Musculoskeletal:         General: Swelling and tenderness present. No deformity or signs of injury.      Cervical back: Normal range of  "motion.      Right knee: Normal.      Left knee: Swelling present. No erythema. Decreased range of motion. Tenderness present. No LCL laxity, MCL laxity, ACL laxity or PCL laxity.     Instability Tests: Anterior drawer test negative. Posterior drawer test negative. Anterior Lachman test negative. Medial Yovani test negative and lateral Yovani test negative.      Comments: Mild swelling to left anterior knee, some decreased range of motion due to pain noted, tenderness noted to the medial aspect of the left knee.  Negative anterior and posterior drawer, negative varus/valgus.  No laxity noted to ACL PCL MCL or LCL.  Distal pulses present, neurovascularly intact, cap refill less than 2.   Skin:     General: Skin is warm and dry.      Capillary Refill: Capillary refill takes less than 2 seconds.      Findings: No erythema.   Neurological:      General: No focal deficit present.      Mental Status: He is alert and oriented to person, place, and time.   Psychiatric:         Mood and Affect: Mood normal.         Behavior: Behavior normal.         Procedures           ED Course            /93   Pulse 70   Temp 94.1 °F (34.5 °C)   Resp 20   Ht 172 cm (67.72\")   Wt 77.8 kg (171 lb 8.3 oz)   SpO2 97%   BMI 26.30 kg/m²   Labs Reviewed   COMPREHENSIVE METABOLIC PANEL - Abnormal; Notable for the following components:       Result Value    Glucose 150 (*)     BUN 63.1 (*)     Creatinine 3.10 (*)     Potassium 3.1 (*)     Anion Gap 15.6 (*)     eGFR 23.9 (*)     All other components within normal limits    Narrative:     GFR Categories in Chronic Kidney Disease (CKD)              GFR Category          GFR (mL/min/1.73)    Interpretation  G1                    90 or greater        Normal or high (1)  G2                    60-89                Mild decrease (1)  G3a                   45-59                Mild to moderate decrease  G3b                   30-44                Moderate to severe decrease  G4             "        15-29                Severe decrease  G5                    14 or less           Kidney failure    (1)In the absence of evidence of kidney disease, neither GFR category G1 or G2 fulfill the criteria for CKD.    eGFR calculation 2021 CKD-EPI creatinine equation, which does not include race as a factor   URIC ACID - Abnormal; Notable for the following components:    Uric Acid 11.6 (*)     All other components within normal limits   CBC WITH AUTO DIFFERENTIAL - Abnormal; Notable for the following components:    Lymphocyte % 18.4 (*)     All other components within normal limits   CBC AND DIFFERENTIAL    Narrative:     The following orders were created for panel order CBC & Differential.  Procedure                               Abnormality         Status                     ---------                               -----------         ------                     CBC Auto Differential[[id number]]        Abnormal            Final result                 Please view results for these tests on the individual orders.   EXTRA TUBES    Narrative:     The following orders were created for panel order Extra Tubes.  Procedure                               Abnormality         Status                     ---------                               -----------         ------                     Gold Top - SST[[id number]]                                   Final result               Light Blue Top[[id number]]                                   Final result                 Please view results for these tests on the individual orders.   GOLD TOP - SST   LIGHT BLUE TOP     Medications   HYDROcodone-acetaminophen (NORCO) 5-325 MG per tablet 1 tablet (1 tablet Oral Given 7/11/25 0854)     XR Knee 3 View Left  Result Date: 7/11/2025  Impression: Suprapatellar bursal joint effusion. Electronically Signed: Joaquin Kessler MD  7/11/2025 9:08 AM EDT  Workstation ID: [id number]                                   Wells' Criteria for DVT - MDCalc  Calculated  on Jul 11 2025 5:39 PM  -2 points -> Low risk group for DVT. “Unlikely” according to Wells’ DVT studies.            Medical Decision Making  Chart review:    Radiology interpretation:  X-rays reviewed and interpreted by Burke: Suprapatellar bursal joint effusion.  Further interpretation by radiologist as above    Lab interpretation:  Labs all viewed by me and significant for: Uric acid 11.6, white count 8.66, hemoglobin 13.6, BUN 63.1, creatinine 3.10, sodium 138, potassium 3.1, ALT 18, AST 25.    EKG was considered was not emergently warranted at this time.    Was established and labs and scans were obtained to evaluate for infection, electrolyte abnormalities, anemia, dehydration, gout,, dislocation, effusion, bursitis, this is not an all-inclusive list.  Patient is a 50-year-old male with a history of hypertension, CKD, hyperlipidemia, CHF, A-fib, hyperkalemia, IBD, and anemia who presents to the ER for above complaint.  On initial examination patient is resting comfortably in the bed, nontoxic in appearance with no signs and symptoms of distress.  Physical examination revealed mild swelling and tenderness noted to left medial and anterior knee, no edema or warmth noted to the joint, distal pulses present, neurovascularly intact, cap refill less than 2.  Patient reports the pain started in his left foot and now is in his left knee, states he has had this in the past where his left foot would hurt but then it would go to his right foot.  When asked if he has ever been diagnosed with gout he stated no.  Patient was given a p.o. Norco for pain.  Patient has a history of CKD states he is getting ready to be a dialysis patient.  Patient reports he is on Eliquis but denies any history of blood clots.  Patient denies any long distance travel, posterior calf or knee pain.  Physical exam also revealed heart regular rate and rhythm, lungs clear and equal bilaterally on auscultation, no tenderness to abdomen on palpation  noted.  Patient's Wells criteria for DVT is less than 2 making him low risk for DVT.  Spoke with Ramirez, with PT who agreed to evaluate patient.  She provided patient with some exercises for the knee pain, and wrapped the knee with an Ace wrap for comfort for the gout/bursitis.  Labs were indicative of an elevated uric acid level, and elevated BUN and creatinine level which was within patient's baseline.  Patient also had a potassium of 3.1.  On reexamination patient was resting comfortably in the bed, nontoxic in appearance with no signs and symptoms of distress.  Advised patient to rest, drink plenty of fluids, use Ace wrap for comfort, may use Tylenol for discomfort do not exceed 4000 mg/day of Tylenol.  Advised patient to follow-up with his primary care provider about his elevated uric acid level and worsening kidney function, and to call on Monday to schedule an appointment.  Advised him to follow-up with Ortho, and to call on Monday to schedule an appointment for follow-up.  Advised him to return to the ER for any new or worsening symptoms.  Patient verbalized understanding of all discharge instructions.    Appropriate PPE worn during exam.    i discussed findings with patient who voices understanding of discharge instructions, signs and symptoms requiring return to ED; discharged improved and in stable condition with follow up for re-evaluation.  This document is intended for medical expert use only. Reading of this document by patients and/or patient's family without participating medical staff guidance may result in misinterpretation and unintended morbidity.  Any interpretation of such data is the responsibility of the patient and/or family member responsible for the patient in concert with their primary or specialist providers, not to be left for sources of online searches such as [employer], wutabout or similar queries. Relying on these approaches to knowledge may result in misinterpretation, misguided goals of  care and even death should patients or family members try recommendations outside of the realm of professional medical care in a supervised inpatient environment.         Problems Addressed:  Elevated blood uric acid level: complicated acute illness or injury  Left knee pain, unspecified chronicity: complicated acute illness or injury  Stage 4 chronic kidney disease: complicated acute illness or injury    Amount and/or Complexity of Data Reviewed  Labs: ordered.  Radiology: ordered.    Risk  Prescription drug management.        Final diagnoses:   Left knee pain, unspecified chronicity   Elevated blood uric acid level   Stage 4 chronic kidney disease       ED Disposition  ED Disposition       ED Disposition   Discharge    Condition   Stable    Comment   --               Vicki Santana, APRN  [address] [facility]  [address]  [place] [address]  [phone]    Schedule an appointment as soon as possible for a visit   Call today or Monday to schedule an appointment for follow-up.         Medication List      No changes were made to your prescriptions during this visit.            MaryJ ane Haskins, APRN  07/11/25 8310

## 2025-07-11 NOTE — THERAPY EVALUATION
Patient Name: Scott Gaytan  : 1975    MRN: [id number]                              Today's Date: 2025       Admit Date: 2025    Visit Dx:     ICD-10-CM ICD-9-CM   1. Left knee pain, unspecified chronicity  M25.562 719.46   2. Elevated blood uric acid level  E79.0 790.6     Patient Active Problem List   Diagnosis    History of substance abuse    Tobacco abuse w/ presumed COPD    h/o  Elevated troponin    Hypertension    PAF (paroxysmal atrial fibrillation)    Hepatitis C    h/o Nonischemic nontraumatic myocardial injury    h/o Medication and medical follow-up noncompliance    Hyperlipidemia    Valvular heart disease    CKD (chronic kidney disease) stage 4, GFR 15-29 ml/min    Anemia in chronic kidney disease (CKD)    Arteriovenous fistula    Secondary hyperparathyroidism    Acute heart failure with mildly reduced ejection fraction (HFmrEF, 41-49%)    LBBB (left bundle branch block)    Acute LUQ pain    Anxiety    Healthcare maintenance    Metabolic syndrome X    Grade II diastolic dysfunction    Mild pulmonary hypertension RVSP 35-45 mmHg    Acute diastolic CHF (congestive heart failure)     Past Medical History:   Diagnosis Date    Anemia in chronic kidney disease (CKD) 2025    Arteriovenous fistula 2025    Atrial fibrillation 2024    Rate controlled with Coreg    Anticoagulated with renal dosed Eliquis 2.5 mg twice daily    Without history of ablations or DCCV         Chronic diastolic heart failure     CKD (chronic kidney disease) stage 4, GFR 15-29 ml/min 2025    Followed by Dr Das,       COVID-19 virus detected 2021    Cytokine release syndrome, grade 1 2021    h/o Medication and medical follow-up noncompliance 04/10/2024    Hepatitis C 2025    Hyperkalemia 2022    Hyperlipidemia 2025    Hypertensive heart disease with congestive heart failure and chronic kidney disease 2025    Hypertensive urgency 2022    Methamphetamine  abuse 04/26/2022    Methamphetamine intoxication 06/16/2024    Mild to moderate valvular heart disease 02/06/2025    Mild to moderate mitral valve regurgitation      Secondary hyperparathyroidism 02/11/2025    Tick bite of abdomen 04/26/2022    Tobacco abuse 04/26/2022     Past Surgical History:   Procedure Laterality Date    CARDIAC CATHETERIZATION N/A 04/27/2022    ORIF TIBIA/FIBULA FRACTURES Right      SUBJECTIVE: Patient reports that he has been having L knee pain since this morning, states that the pain started in his left foot, moved to his right foot, back to his left foot, and now pain is located in his L knee. Pain is located medial left knee and is very sensitive to touch. Walking and weight bearing tend to increase symptoms. Patient attempted compression on knee, wrapped it extremely tight and he felt like it made it worse. Denies numbness or tingling.        OBJECTIVE:    AROM: 5-15 degrees of L knee ROM  PROM: 5-20 degrees of L knee ROM  MMT LE: grossly 3/5  SPECIAL TESTING   Anterior drawer: negative   Posterior drawer: negative   Lachmann's: negative   Varus test: positive for pain   Valgus test: negative   Yovani: negative   Bounce home: negative   Patellar apprehension test: negative  PALPATION: tenderness to medial joint line L knee and patellar tendon  GAIT: antalgic  SENSATION: intact      ASSESSMENT:   Pt presents with a PT diagnosis of L knee pain and has pain, edema, and ROM deficits that are limiting his ability to ambulate, transfer safely without pain. The patient demonstrated tenderness along L medial knee joint line and patella and severe ROM deficits due to pain. Patient demonstrated positive varus testing on L. Patient was given compression with ace wrap and educated on ROM exercises, use of ice, and compression wrapping technique. PT recommends patient d/c home after ED d/c.     Goals:   LTG 1: The patient will be independent in HEP in order to decrease pain and improve tolerance to  functional activities.  STATUS: Met    Interventions:   Education: HEP, use of ice, compression wrapping technique      PLAN:  home     Time Calculation:   PT Evaluation Complexity  History, PT Evaluation Complexity: 1-2 personal factors and/or comorbidities  Examination of Body Systems (PT Eval Complexity): 1-2 elements  Clinical Presentation (PT Evaluation Complexity): stable  Clinical Decision Making (PT Evaluation Complexity): low complexity  Overall Complexity (PT Evaluation Complexity): low complexity     PT Charges       Row Name 07/11/25 1142             Time Calculation    Start Time 0955  -TF      Stop Time 1020  -TF      Time Calculation (min) 25 min  -TF      PT Received On 07/11/25  -TF         Time Calculation- PT    Total Timed Code Minutes- PT 0 minute(s)  -TF                User Key  (r) = Recorded By, (t) = Taken By, (c) = Cosigned By      Initials Name Provider Type    TF Lia Banuelos, PT Physical Therapist                  Therapy Charges for Today       Code Description Service Date Service Provider Modifiers Qty    54991668449 HC PT EVAL LOW COMPLEXITY 4 7/11/2025 Lia Banuelos PT GP 1               PT Discharge Summary  Anticipated Discharge Disposition (PT): home    Lia Banuelos PT  7/11/2025

## 2025-07-12 ENCOUNTER — APPOINTMENT (OUTPATIENT)
Dept: GENERAL RADIOLOGY | Facility: HOSPITAL | Age: 50
End: 2025-07-12
Payer: OTHER GOVERNMENT

## 2025-07-12 ENCOUNTER — HOSPITAL ENCOUNTER (OUTPATIENT)
Facility: HOSPITAL | Age: 50
Setting detail: OBSERVATION
Discharge: HOME | End: 2025-07-13
Attending: EMERGENCY MEDICINE | Admitting: EMERGENCY MEDICINE
Payer: OTHER GOVERNMENT

## 2025-07-12 DIAGNOSIS — R07.9 CHEST PAIN, UNSPECIFIED TYPE: ICD-10-CM

## 2025-07-12 DIAGNOSIS — M25.561 ACUTE PAIN OF RIGHT KNEE: Primary | ICD-10-CM

## 2025-07-12 DIAGNOSIS — M10.9 EXACERBATION OF GOUT: ICD-10-CM

## 2025-07-12 LAB
ALBUMIN SERPL-MCNC: 4.1 G/DL (ref 3.5–5.2)
ALBUMIN/GLOB SERPL: 1.2 G/DL
ALP SERPL-CCNC: 43 U/L (ref 39–117)
ALT SERPL W P-5'-P-CCNC: 14 U/L (ref 1–41)
ANION GAP SERPL CALCULATED.3IONS-SCNC: 16.8 MMOL/L (ref 5–15)
APTT PPP: 30.2 SECONDS (ref 22.7–35.4)
AST SERPL-CCNC: 22 U/L (ref 1–40)
BASOPHILS # BLD AUTO: 0.03 10*3/MM3 (ref 0–0.2)
BASOPHILS NFR BLD AUTO: 0.4 % (ref 0–1.5)
BILIRUB SERPL-MCNC: 0.7 MG/DL (ref 0–1.2)
BUN SERPL-MCNC: 64.4 MG/DL (ref 6–20)
BUN/CREAT SERPL: 20.4 (ref 7–25)
CALCIUM SPEC-SCNC: 9.3 MG/DL (ref 8.6–10.5)
CHLORIDE SERPL-SCNC: 92 MMOL/L (ref 98–107)
CK SERPL-CCNC: 60 U/L (ref 20–200)
CO2 SERPL-SCNC: 24.2 MMOL/L (ref 22–29)
CREAT SERPL-MCNC: 3.16 MG/DL (ref 0.76–1.27)
CRP SERPL-MCNC: 7.06 MG/DL (ref 0–0.5)
D DIMER PPP FEU-MCNC: <0.27 MCGFEU/ML (ref 0–0.5)
DEPRECATED RDW RBC AUTO: 46.1 FL (ref 37–54)
EGFRCR SERPLBLD CKD-EPI 2021: 23.3 ML/MIN/1.73
EOSINOPHIL # BLD AUTO: 0.1 10*3/MM3 (ref 0–0.4)
EOSINOPHIL NFR BLD AUTO: 1.3 % (ref 0.3–6.2)
ERYTHROCYTE [DISTWIDTH] IN BLOOD BY AUTOMATED COUNT: 14.4 % (ref 12.3–15.4)
ERYTHROCYTE [SEDIMENTATION RATE] IN BLOOD: 32 MM/HR (ref 0–15)
GEN 5 1HR TROPONIN T REFLEX: 41 NG/L
GLOBULIN UR ELPH-MCNC: 3.4 GM/DL
GLUCOSE SERPL-MCNC: 95 MG/DL (ref 65–99)
HCT VFR BLD AUTO: 44.8 % (ref 37.5–51)
HGB BLD-MCNC: 14.4 G/DL (ref 13–17.7)
IMM GRANULOCYTES # BLD AUTO: 0.01 10*3/MM3 (ref 0–0.05)
IMM GRANULOCYTES NFR BLD AUTO: 0.1 % (ref 0–0.5)
INR PPP: 1.28 (ref 0.9–1.1)
LYMPHOCYTES # BLD AUTO: 1.57 10*3/MM3 (ref 0.7–3.1)
LYMPHOCYTES NFR BLD AUTO: 21 % (ref 19.6–45.3)
MAGNESIUM SERPL-MCNC: 2.5 MG/DL (ref 1.6–2.6)
MCH RBC QN AUTO: 27.8 PG (ref 26.6–33)
MCHC RBC AUTO-ENTMCNC: 32.1 G/DL (ref 31.5–35.7)
MCV RBC AUTO: 86.5 FL (ref 79–97)
MONOCYTES # BLD AUTO: 0.88 10*3/MM3 (ref 0.1–0.9)
MONOCYTES NFR BLD AUTO: 11.8 % (ref 5–12)
NEUTROPHILS NFR BLD AUTO: 4.87 10*3/MM3 (ref 1.7–7)
NEUTROPHILS NFR BLD AUTO: 65.4 % (ref 42.7–76)
NRBC BLD AUTO-RTO: 0 /100 WBC (ref 0–0.2)
NT-PROBNP SERPL-MCNC: ABNORMAL PG/ML (ref 0–450)
PLATELET # BLD AUTO: 241 10*3/MM3 (ref 140–450)
PMV BLD AUTO: 9.9 FL (ref 6–12)
POTASSIUM SERPL-SCNC: 3 MMOL/L (ref 3.5–5.2)
PROCALCITONIN SERPL-MCNC: 0.17 NG/ML (ref 0–0.25)
PROT SERPL-MCNC: 7.5 G/DL (ref 6–8.5)
PROTHROMBIN TIME: 16 SECONDS (ref 11.7–14.2)
RBC # BLD AUTO: 5.18 10*6/MM3 (ref 4.14–5.8)
SODIUM SERPL-SCNC: 133 MMOL/L (ref 136–145)
TROPONIN T % DELTA: -13
TROPONIN T NUMERIC DELTA: -6 NG/L
TROPONIN T SERPL HS-MCNC: 47 NG/L
WBC NRBC COR # BLD AUTO: 7.46 10*3/MM3 (ref 3.4–10.8)

## 2025-07-12 PROCEDURE — 96376 TX/PRO/DX INJ SAME DRUG ADON: CPT

## 2025-07-12 PROCEDURE — 85652 RBC SED RATE AUTOMATED: CPT | Performed by: NURSE PRACTITIONER

## 2025-07-12 PROCEDURE — 99285 EMERGENCY DEPT VISIT HI MDM: CPT

## 2025-07-12 PROCEDURE — 71045 X-RAY EXAM CHEST 1 VIEW: CPT

## 2025-07-12 PROCEDURE — 96374 THER/PROPH/DIAG INJ IV PUSH: CPT

## 2025-07-12 PROCEDURE — 83735 ASSAY OF MAGNESIUM: CPT | Performed by: NURSE PRACTITIONER

## 2025-07-12 PROCEDURE — 86140 C-REACTIVE PROTEIN: CPT | Performed by: NURSE PRACTITIONER

## 2025-07-12 PROCEDURE — 80053 COMPREHEN METABOLIC PANEL: CPT | Performed by: NURSE PRACTITIONER

## 2025-07-12 PROCEDURE — 85025 COMPLETE CBC W/AUTO DIFF WBC: CPT | Performed by: NURSE PRACTITIONER

## 2025-07-12 PROCEDURE — 93005 ELECTROCARDIOGRAM TRACING: CPT | Performed by: NURSE PRACTITIONER

## 2025-07-12 PROCEDURE — 84145 PROCALCITONIN (PCT): CPT | Performed by: NURSE PRACTITIONER

## 2025-07-12 PROCEDURE — G0378 HOSPITAL OBSERVATION PER HR: HCPCS

## 2025-07-12 PROCEDURE — 83880 ASSAY OF NATRIURETIC PEPTIDE: CPT | Performed by: NURSE PRACTITIONER

## 2025-07-12 PROCEDURE — 36415 COLL VENOUS BLD VENIPUNCTURE: CPT

## 2025-07-12 PROCEDURE — 85730 THROMBOPLASTIN TIME PARTIAL: CPT | Performed by: NURSE PRACTITIONER

## 2025-07-12 PROCEDURE — 25010000002 HYDROMORPHONE 1 MG/ML SOLUTION: Performed by: EMERGENCY MEDICINE

## 2025-07-12 PROCEDURE — 25010000002 ONDANSETRON PER 1 MG: Performed by: NURSE PRACTITIONER

## 2025-07-12 PROCEDURE — 25810000003 LACTATED RINGERS SOLUTION

## 2025-07-12 PROCEDURE — 96375 TX/PRO/DX INJ NEW DRUG ADDON: CPT

## 2025-07-12 PROCEDURE — 84484 ASSAY OF TROPONIN QUANT: CPT | Performed by: NURSE PRACTITIONER

## 2025-07-12 PROCEDURE — 85610 PROTHROMBIN TIME: CPT | Performed by: NURSE PRACTITIONER

## 2025-07-12 PROCEDURE — 25010000002 HYDROMORPHONE 1 MG/ML SOLUTION: Performed by: NURSE PRACTITIONER

## 2025-07-12 PROCEDURE — 85379 FIBRIN DEGRADATION QUANT: CPT | Performed by: NURSE PRACTITIONER

## 2025-07-12 PROCEDURE — 82550 ASSAY OF CK (CPK): CPT | Performed by: NURSE PRACTITIONER

## 2025-07-12 PROCEDURE — 25010000002 DROPERIDOL PER 5 MG

## 2025-07-12 RX ORDER — SODIUM CHLORIDE 9 MG/ML
40 INJECTION, SOLUTION INTRAVENOUS AS NEEDED
Status: DISCONTINUED | OUTPATIENT
Start: 2025-07-12 | End: 2025-07-13 | Stop reason: HOSPADM

## 2025-07-12 RX ORDER — CARVEDILOL 6.25 MG/1
25 TABLET ORAL 2 TIMES DAILY
Status: DISCONTINUED | OUTPATIENT
Start: 2025-07-13 | End: 2025-07-13

## 2025-07-12 RX ORDER — SODIUM CHLORIDE 0.9 % (FLUSH) 0.9 %
10 SYRINGE (ML) INJECTION AS NEEDED
Status: DISCONTINUED | OUTPATIENT
Start: 2025-07-12 | End: 2025-07-13 | Stop reason: HOSPADM

## 2025-07-12 RX ORDER — AMLODIPINE BESYLATE 5 MG/1
5 TABLET ORAL
Status: DISCONTINUED | OUTPATIENT
Start: 2025-07-13 | End: 2025-07-13

## 2025-07-12 RX ORDER — POTASSIUM CHLORIDE 1500 MG/1
40 TABLET, EXTENDED RELEASE ORAL 2 TIMES DAILY WITH MEALS
Status: DISCONTINUED | OUTPATIENT
Start: 2025-07-12 | End: 2025-07-13 | Stop reason: SDUPTHER

## 2025-07-12 RX ORDER — SODIUM CHLORIDE 0.9 % (FLUSH) 0.9 %
10 SYRINGE (ML) INJECTION EVERY 12 HOURS SCHEDULED
Status: DISCONTINUED | OUTPATIENT
Start: 2025-07-12 | End: 2025-07-13 | Stop reason: HOSPADM

## 2025-07-12 RX ORDER — BISACODYL 10 MG
10 SUPPOSITORY, RECTAL RECTAL DAILY PRN
Status: DISCONTINUED | OUTPATIENT
Start: 2025-07-12 | End: 2025-07-13 | Stop reason: HOSPADM

## 2025-07-12 RX ORDER — ONDANSETRON 2 MG/ML
4 INJECTION INTRAMUSCULAR; INTRAVENOUS EVERY 6 HOURS PRN
Status: COMPLETED | OUTPATIENT
Start: 2025-07-12 | End: 2025-07-13

## 2025-07-12 RX ORDER — BUSPIRONE HYDROCHLORIDE 5 MG/1
5 TABLET ORAL 3 TIMES DAILY
Status: DISCONTINUED | OUTPATIENT
Start: 2025-07-13 | End: 2025-07-13

## 2025-07-12 RX ORDER — POLYETHYLENE GLYCOL 3350 17 G/17G
17 POWDER, FOR SOLUTION ORAL DAILY PRN
Status: DISCONTINUED | OUTPATIENT
Start: 2025-07-12 | End: 2025-07-13 | Stop reason: HOSPADM

## 2025-07-12 RX ORDER — CALCIUM ACETATE 667 MG/1
667 TABLET ORAL
COMMUNITY

## 2025-07-12 RX ORDER — CARVEDILOL 25 MG/1
25 TABLET ORAL 2 TIMES DAILY WITH MEALS
COMMUNITY

## 2025-07-12 RX ORDER — COLCHICINE 0.6 MG/1
1.2 TABLET ORAL EVERY 12 HOURS
Status: DISCONTINUED | OUTPATIENT
Start: 2025-07-13 | End: 2025-07-13

## 2025-07-12 RX ORDER — DROPERIDOL 2.5 MG/ML
2.5 INJECTION, SOLUTION INTRAMUSCULAR; INTRAVENOUS EVERY 6 HOURS PRN
Status: DISCONTINUED | OUTPATIENT
Start: 2025-07-13 | End: 2025-07-13 | Stop reason: HOSPADM

## 2025-07-12 RX ORDER — AMOXICILLIN 250 MG
2 CAPSULE ORAL 2 TIMES DAILY PRN
Status: DISCONTINUED | OUTPATIENT
Start: 2025-07-12 | End: 2025-07-13 | Stop reason: HOSPADM

## 2025-07-12 RX ORDER — NITROGLYCERIN 0.4 MG/1
0.4 TABLET SUBLINGUAL
Status: DISCONTINUED | OUTPATIENT
Start: 2025-07-12 | End: 2025-07-13 | Stop reason: HOSPADM

## 2025-07-12 RX ORDER — NALOXONE HCL 0.4 MG/ML
0.4 VIAL (ML) INJECTION
Status: DISCONTINUED | OUTPATIENT
Start: 2025-07-12 | End: 2025-07-13 | Stop reason: HOSPADM

## 2025-07-12 RX ORDER — HYDROMORPHONE HYDROCHLORIDE 1 MG/ML
0.5 INJECTION, SOLUTION INTRAMUSCULAR; INTRAVENOUS; SUBCUTANEOUS
Refills: 0 | Status: DISCONTINUED | OUTPATIENT
Start: 2025-07-12 | End: 2025-07-13 | Stop reason: HOSPADM

## 2025-07-12 RX ORDER — BISACODYL 5 MG/1
5 TABLET, DELAYED RELEASE ORAL DAILY PRN
Status: DISCONTINUED | OUTPATIENT
Start: 2025-07-12 | End: 2025-07-13 | Stop reason: HOSPADM

## 2025-07-12 RX ORDER — DROPERIDOL 2.5 MG/ML
2.5 INJECTION, SOLUTION INTRAMUSCULAR; INTRAVENOUS ONCE
Status: COMPLETED | OUTPATIENT
Start: 2025-07-12 | End: 2025-07-12

## 2025-07-12 RX ORDER — ONDANSETRON 2 MG/ML
4 INJECTION INTRAMUSCULAR; INTRAVENOUS ONCE
Status: COMPLETED | OUTPATIENT
Start: 2025-07-12 | End: 2025-07-12

## 2025-07-12 RX ADMIN — CARVEDILOL 25 MG: 6.25 TABLET, FILM COATED ORAL at 23:52

## 2025-07-12 RX ADMIN — SODIUM CHLORIDE, SODIUM LACTATE, POTASSIUM CHLORIDE, CALCIUM CHLORIDE 500 ML: 20; 30; 600; 310 INJECTION, SOLUTION INTRAVENOUS at 21:40

## 2025-07-12 RX ADMIN — DROPERIDOL 2.5 MG: 2.5 INJECTION, SOLUTION INTRAMUSCULAR; INTRAVENOUS at 21:40

## 2025-07-12 RX ADMIN — COLCHICINE 1.2 MG: 0.6 TABLET ORAL at 23:52

## 2025-07-12 RX ADMIN — HYDROMORPHONE HYDROCHLORIDE 1 MG: 1 INJECTION, SOLUTION INTRAMUSCULAR; INTRAVENOUS; SUBCUTANEOUS at 20:19

## 2025-07-12 RX ADMIN — POTASSIUM CHLORIDE 40 MEQ: 1500 TABLET, EXTENDED RELEASE ORAL at 21:40

## 2025-07-12 RX ADMIN — ONDANSETRON 4 MG: 2 INJECTION, SOLUTION INTRAMUSCULAR; INTRAVENOUS at 20:19

## 2025-07-12 RX ADMIN — BUSPIRONE HYDROCHLORIDE 5 MG: 5 TABLET ORAL at 23:52

## 2025-07-12 RX ADMIN — HYDROMORPHONE HYDROCHLORIDE 1 MG: 1 INJECTION, SOLUTION INTRAMUSCULAR; INTRAVENOUS; SUBCUTANEOUS at 23:52

## 2025-07-12 NOTE — ED PROVIDER NOTES
Subjective   History of Present Illness  50 year-old-male presents with 1-week history of gout flare that started in the left foot and now present in the left knee circumferentially extending medially and posteriorly. He reports he is also having precordial pain and dyspnea that began yesterday. Denies fever sweats chills. He is anticoagulated on Eliquis for A-fib but he has not taken it due to taking Ibuprofen.     History provided by:  Patient      Review of Systems   Constitutional:  Negative for chills, diaphoresis and fever.   HENT:  Negative for congestion, ear pain, rhinorrhea, sneezing, sore throat, trouble swallowing and voice change.    Respiratory:  Positive for shortness of breath. Negative for cough, chest tightness, wheezing and stridor.    Cardiovascular:  Positive for chest pain. Negative for palpitations and leg swelling.   Gastrointestinal:  Negative for nausea and vomiting.   Musculoskeletal:  Positive for arthralgias. Negative for gait problem and myalgias.   Skin:  Negative for color change, pallor, rash and wound.   Allergic/Immunologic: Negative for immunocompromised state.   Neurological:  Negative for dizziness, weakness and numbness.   Hematological:  Does not bruise/bleed easily.   Psychiatric/Behavioral:  Negative for confusion.    All other systems reviewed and are negative.      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    h/o Medication and medical follow-up noncompliance 04/10/2024    Hepatitis C 01/03/2025     Hyperkalemia 04/26/2022    Hyperlipidemia 01/21/2025    Hypertensive heart disease with congestive heart failure and chronic kidney disease 02/11/2025    Hypertensive urgency 04/26/2022    Methamphetamine abuse 04/26/2022    Methamphetamine intoxication 06/16/2024    Mild to moderate valvular heart disease 02/06/2025    Mild to moderate mitral valve regurgitation      Secondary hyperparathyroidism 02/11/2025    Tick bite of abdomen 04/26/2022    Tobacco abuse 04/26/2022       Allergies   Allergen Reactions    Methylprednisolone Swelling     Pt reports severe swelling with steroids    Morphine Other (See Comments)     Makes it feel like my blood is boiling in my veins       Past Surgical History:   Procedure Laterality Date    CARDIAC CATHETERIZATION N/A 04/27/2022    ORIF TIBIA/FIBULA FRACTURES Right        Family History   Problem Relation Age of Onset    Diabetes Mother     Arthritis Mother     Hypertension Father     Heart attack Father     Seizures Sister     Heart attack Paternal Grandfather        Social History     Socioeconomic History    Marital status: Single   Tobacco Use    Smoking status: Every Day     Current packs/day: 0.25     Average packs/day: 1 pack/day for 31.5 years (30.7 ttl pk-yrs)     Types: Cigarettes     Start date: 1994     Passive exposure: Never    Smokeless tobacco: Never    Tobacco comments:     Down to 1/2 pack every 3 days, wearing a nicotine patch - 6/24/25   Vaping Use    Vaping status: Never Used   Substance and Sexual Activity    Alcohol use: Not Currently    Drug use: Yes     Types: Methamphetamines, Marijuana, Cocaine(coke)    Sexual activity: Yes           Objective   Physical Exam  Vitals and nursing note reviewed.   Constitutional:       General: He is awake. He is not in acute distress.     Appearance: Normal appearance. He is well-developed and normal weight. He is not ill-appearing, toxic-appearing or diaphoretic.   HENT:      Head: Normocephalic and atraumatic.       Mouth/Throat:      Mouth: Mucous membranes are moist.      Pharynx: Oropharynx is clear.   Eyes:      Extraocular Movements: Extraocular movements intact.      Conjunctiva/sclera: Conjunctivae normal.      Pupils: Pupils are equal, round, and reactive to light.   Cardiovascular:      Rate and Rhythm: Normal rate and regular rhythm.      Pulses: Normal pulses.           Radial pulses are 2+ on the right side and 2+ on the left side.        Dorsalis pedis pulses are 2+ on the right side and 2+ on the left side.        Posterior tibial pulses are 2+ on the right side and 2+ on the left side.      Heart sounds: Normal heart sounds, S1 normal and S2 normal. Heart sounds not distant. No murmur heard.     No friction rub. No gallop.   Pulmonary:      Effort: Pulmonary effort is normal. No respiratory distress.      Breath sounds: Normal breath sounds and air entry. No wheezing or rales.   Chest:      Chest wall: No tenderness.   Abdominal:      General: Bowel sounds are normal. There is no distension.      Palpations: Abdomen is soft. Abdomen is not rigid. There is no mass.      Tenderness: There is no abdominal tenderness. There is no guarding or rebound.      Hernia: No hernia is present.   Musculoskeletal:         General: Tenderness present. No swelling, deformity or signs of injury.      Cervical back: Normal range of motion and neck supple.      Right knee: Normal.      Left knee: Swelling present. No bony tenderness. Decreased range of motion. Tenderness present over the medial joint line and lateral joint line. Normal pulse.        Legs:    Skin:     General: Skin is warm and dry.      Capillary Refill: Capillary refill takes less than 2 seconds.      Coloration: Skin is not pale.      Findings: No bruising, erythema or rash.   Neurological:      Mental Status: He is alert and oriented to person, place, and time.      Sensory: Sensation is intact. No sensory deficit.      Motor: Motor function is intact. No  "abnormal muscle tone.   Psychiatric:         Behavior: Behavior is cooperative.         ECG 12 Lead      Date/Time: 7/12/2025 7:40 PM    Performed by: Carlota Mccloud APRN  Authorized by: Carlota Mccloud APRN  Interpreted by ED physician  Comparison: compared with previous ECG from 5/28/2025  Comparison to previous ECG: Sinus rhythm prolonged CA interval 224 early left bundle branch rate of 66  Rhythm: atrial fibrillation  Ectopy: PVCs  BPM: 77  QRS axis: left  Conduction: left bundle branch block and non-specific intraventricular conduction delay  Clinical impression: abnormal ECG             ED Course  ED Course as of 07/12/25 2230   Sat Jul 12, 2025 2111 Patient states pain is still 10/10 in his left leg at this time; states pain is in knee and goes down into foot and up into the thigh.  Patient had hydromorphone 1 mg at 8:20 PM.  Droperidol 2.5 mg IV ordered for patient now. [RS]      ED Course User Index  [RS] Kal Phillips, PA-C      /98   Pulse 93   Temp 97.7 °F (36.5 °C)   Resp 18   Ht 170.2 cm (67\")   Wt 77.8 kg (171 lb 8.3 oz)   SpO2 93%   BMI 26.86 kg/m²   Labs Reviewed   COMPREHENSIVE METABOLIC PANEL - Abnormal; Notable for the following components:       Result Value    BUN 64.4 (*)     Creatinine 3.16 (*)     Sodium 133 (*)     Potassium 3.0 (*)     Chloride 92 (*)     Anion Gap 16.8 (*)     eGFR 23.3 (*)     All other components within normal limits    Narrative:     GFR Categories in Chronic Kidney Disease (CKD)              GFR Category          GFR (mL/min/1.73)    Interpretation  G1                    90 or greater        Normal or high (1)  G2                    60-89                Mild decrease (1)  G3a                   45-59                Mild to moderate decrease  G3b                   30-44                Moderate to severe decrease  G4                    15-29                Severe decrease  G5                    14 or less           Kidney failure    (1)In the absence " of evidence of kidney disease, neither GFR category G1 or G2 fulfill the criteria for CKD.    eGFR calculation 2021 CKD-EPI creatinine equation, which does not include race as a factor   PROTIME-INR - Abnormal; Notable for the following components:    Protime 16.0 (*)     INR 1.28 (*)     All other components within normal limits   TROPONIN - Abnormal; Notable for the following components:    HS Troponin T 47 (*)     All other components within normal limits    Narrative:     High Sensitive Troponin T Reference Range:  <14.0 ng/L- Negative Female for AMI  <22.0 ng/L- Negative Male for AMI  >=14 - Abnormal Female indicating possible myocardial injury.  >=22 - Abnormal Male indicating possible myocardial injury.   Clinicians would have to utilize clinical acumen, EKG, Troponin, and serial changes to determine if it is an Acute Myocardial Infarction or myocardial injury due to an underlying chronic condition.        BNP (IN-HOUSE) - Abnormal; Notable for the following components:    proBNP 14,860.0 (*)     All other components within normal limits    Narrative:     This assay is used as an aid in the diagnosis of individuals suspected of having heart failure. It can be used as an aid in the diagnosis of acute decompensated heart failure (ADHF) in patients presenting with signs and symptoms of ADHF to the emergency department (ED). In addition, NT-proBNP of <300 pg/mL indicates ADHF is not likely.    Age Range Result Interpretation  NT-proBNP Concentration (pg/mL:      <50             Positive            >450                   Gray                 300-450                    Negative             <300    50-75           Positive            >900                  Gray                300-900                  Negative            <300      >75             Positive            >1800                  Gray                300-1800                  Negative            <300   C-REACTIVE PROTEIN - Abnormal; Notable for the following  "components:    C-Reactive Protein 7.06 (*)     All other components within normal limits   SEDIMENTATION RATE - Abnormal; Notable for the following components:    Sed Rate 32 (*)     All other components within normal limits   HIGH SENSITIVITIY TROPONIN T 1HR - Abnormal; Notable for the following components:    HS Troponin T 41 (*)     All other components within normal limits    Narrative:     High Sensitive Troponin T Reference Range:  <14.0 ng/L- Negative Female for AMI  <22.0 ng/L- Negative Male for AMI  >=14 - Abnormal Female indicating possible myocardial injury.  >=22 - Abnormal Male indicating possible myocardial injury.   Clinicians would have to utilize clinical acumen, EKG, Troponin, and serial changes to determine if it is an Acute Myocardial Infarction or myocardial injury due to an underlying chronic condition.        APTT - Normal   D-DIMER, QUANTITATIVE - Normal    Narrative:     According to the assay 's published package insert, a normal (<0.50 MCGFEU/mL) D-dimer result in conjunction with a non-high clinical probability assessment, excludes deep vein thrombosis (DVT) and pulmonary embolism (PE) with high sensitivity.    D-dimer values increase with age and this can make VTE exclusion of an older population difficult. To address this, the [employer], based on best available evidence and recent guidelines, recommends that clinicians use age-adjusted D-dimer thresholds in patients greater than 50 years of age with: a) a low probability of PE who do not meet all Pulmonary Embolism Rule Out Criteria, or b) in those with intermediate probability of PE.   The formula for an age-adjusted D-dimer cut-off is \"age/100\".  For example, a 60 year old patient would have an age-adjusted cut-off of 0.60 MCGFEU/mL and an 80 year old 0.80 MCGFEU/mL.   PROCALCITONIN - Normal    Narrative:     As a Marker for Sepsis (Non-Neonates):    1. <0.5 ng/mL represents a low risk of severe sepsis " "and/or septic shock.  2. >2 ng/mL represents a high risk of severe sepsis and/or septic shock.    As a Marker for Lower Respiratory Tract Infections that require antibiotic therapy:    PCT on Admission    Antibiotic Therapy       6-12 Hrs later    >0.5                Strongly Recommended  >0.25 - <0.5        Recommended   0.1 - 0.25          Discouraged              Remeasure/reassess PCT  <0.1                Strongly Discouraged     Remeasure/reassess PCT    As 28 day mortality risk marker: \"Change in Procalcitonin Result\" (>80% or <=80%) if Day 0 (or Day 1) and Day 4 values are available. Refer to http://www.[email or web address] [email or web address]-pct-[email or web address].com    Change in PCT <=80%  A decrease of PCT levels below or equal to 80% defines a positive change in PCT test result representing a higher risk for 28-day all-cause mortality of patients diagnosed with severe sepsis for septic shock.    Change in PCT >80%  A decrease of PCT levels of more than 80% defines a negative change in PCT result representing a lower risk for 28-day all-cause mortality of patients diagnosed with severe sepsis or septic shock.      MAGNESIUM - Normal   CK - Normal   CBC WITH AUTO DIFFERENTIAL - Normal   CBC WITH AUTO DIFFERENTIAL   COMPREHENSIVE METABOLIC PANEL   TROPONIN   CBC AND DIFFERENTIAL    Narrative:     The following orders were created for panel order CBC & Differential.  Procedure                               Abnormality         Status                     ---------                               -----------         ------                     CBC Auto Differential[[id number]]        Normal              Final result                 Please view results for these tests on the individual orders.     Medications   sodium chloride 0.9 % flush 10 mL (has no administration in time range)   potassium chloride (KLOR-CON M20) CR tablet 40 mEq (40 mEq Oral Given 7/12/25 2140)   amLODIPine (NORVASC) tablet 5 mg (has no administration in time range)   busPIRone " (BUSPAR) tablet 5 mg (has no administration in time range)   carvedilol (COREG) tablet 25 mg (has no administration in time range)   sodium chloride 0.9 % flush 10 mL (10 mL Intravenous Not Given 7/12/25 2155)   sodium chloride 0.9 % flush 10 mL (has no administration in time range)   sodium chloride 0.9 % infusion 40 mL (has no administration in time range)   nitroglycerin (NITROSTAT) SL tablet 0.4 mg (has no administration in time range)   HYDROmorphone (DILAUDID) injection 1 mg (has no administration in time range)     And   naloxone (NARCAN) injection 0.4 mg (has no administration in time range)   HYDROmorphone (DILAUDID) injection 0.5 mg (has no administration in time range)     And   naloxone (NARCAN) injection 0.4 mg (has no administration in time range)   sennosides-docusate (PERICOLACE) 8.6-50 MG per tablet 2 tablet (has no administration in time range)     And   polyethylene glycol (MIRALAX) packet 17 g (has no administration in time range)     And   bisacodyl (DULCOLAX) EC tablet 5 mg (has no administration in time range)     And   bisacodyl (DULCOLAX) suppository 10 mg (has no administration in time range)   ondansetron (ZOFRAN) injection 4 mg (has no administration in time range)   droperidol (INAPSINE) injection 2.5 mg (has no administration in time range)   colchicine tablet 1.2 mg (has no administration in time range)   HYDROmorphone (DILAUDID) injection 1 mg (1 mg Intravenous Given 7/12/25 2019)   ondansetron (ZOFRAN) injection 4 mg (4 mg Intravenous Given 7/12/25 2019)   droperidol (INAPSINE) injection 2.5 mg (2.5 mg Intravenous Given 7/12/25 2140)   lactated ringers bolus 500 mL (500 mL Intravenous New Bag 7/12/25 2140)     XR Chest 1 View  Result Date: 7/12/2025  Impression: Cardiomegaly with mild pulmonary vascular congestion. Electronically Signed: Aaron Rod MD  7/12/2025 8:48 PM EDT  Workstation ID: [id number]    XR Knee 3 View Left  Result Date: 7/11/2025  Impression: Suprapatellar bursal  "joint effusion. Electronically Signed: Joaquin Kessler MD  7/11/2025 9:08 AM EDT  Workstation ID: [id number]                                                     Medical Decision Making  /98   Pulse 93   Temp 97.7 °F (36.5 °C)   Resp 18   Ht 170.2 cm (67\")   Wt 77.8 kg (171 lb 8.3 oz)   SpO2 93%   BMI 26.86 kg/m²      Chart review: Patient visits from 7/10/2025 and 7/11/2025 reviewed.    Patient is 50-year-old male who presents to the emergency department with complaints of left knee pain radiating up and down his leg, patient also complains of shortness of breath and chest tightness/pain.  See full HPI with primary assessment and exam above.    Patient is placed into ED bed and gown for assessment and IV access is maintained for labs and medication administration.  Primary differentials include but are not limited to ACS, electrolyte dysfunction, blood clot.    Comorbidities:  has a past medical history of Anemia in chronic kidney disease (CKD) (02/11/2025), Arteriovenous fistula (02/11/2025), Atrial fibrillation (11/23/2024), Chronic diastolic heart failure, CKD (chronic kidney disease) stage 4, GFR 15-29 ml/min (02/06/2025), COVID-19 virus detected (12/28/2021), Cytokine release syndrome, grade 1 (12/29/2021), h/o Medication and medical follow-up noncompliance (04/10/2024), Hepatitis C (01/03/2025), Hyperkalemia (04/26/2022), Hyperlipidemia (01/21/2025), Hypertensive heart disease with congestive heart failure and chronic kidney disease (02/11/2025), Hypertensive urgency (04/26/2022), Methamphetamine abuse (04/26/2022), Methamphetamine intoxication (06/16/2024), Mild to moderate valvular heart disease (02/06/2025), Secondary hyperparathyroidism (02/11/2025), Tick bite of abdomen (04/26/2022), and Tobacco abuse (04/26/2022).    Discussion with provider: Dr. Aaron Chung    Radiology interpretation:  Imaging reviewed by ED Attending physician and myself and interpreted by radiologist.  XR Chest 1 View  Result " Date: 7/12/2025  Impression: Cardiomegaly with mild pulmonary vascular congestion. Electronically Signed: Aaron Rod MD  7/12/2025 8:48 PM EDT  Workstation ID: [id number]    XR Knee 3 View Left  Result Date: 7/11/2025  Impression: Suprapatellar bursal joint effusion. Electronically Signed: Joaquin Kessler MD  7/11/2025 9:08 AM EDT  Workstation ID: [id number]    Lab interpretation:  Labs viewed by me significant for elevated CRP, troponin, BNP, PT/INR, and electrolyte dysfunction with acute on chronic kidney injury noted on CMP.  Unremarkable/negative results include D-dimer, PTT, CK, magnesium, and procalcitonin.    EKG interpreted by ED attending physician and myself is A-fib with a PVC and LBBB; rate 77, QTc 519.  EKG compared to previous from 5/28/2025.  EKG at that time was sinus rhythm with prolonged SC LAE and LBBB; rate 66, , QTc 515.    HEART score: 6 points  Moderate score; risk of MACE 12-16.6%    Medications ordered in ED and for observation stay:  sodium chloride 0.9 % flush 10 mL (has no administration in time range)  potassium chloride (KLOR-CON M20) CR tablet 40 mEq (40 mEq Oral Given 7/12/25 2140)  amLODIPine (NORVASC) tablet 5 mg (has no administration in time range)  busPIRone (BUSPAR) tablet 5 mg (has no administration in time range)  carvedilol (COREG) tablet 25 mg (has no administration in time range)  sodium chloride 0.9 % flush 10 mL (10 mL Intravenous Not Given 7/12/25 2155)  sodium chloride 0.9 % flush 10 mL (has no administration in time range)  sodium chloride 0.9 % infusion 40 mL (has no administration in time range)  nitroglycerin (NITROSTAT) SL tablet 0.4 mg (has no administration in time range)  HYDROmorphone (DILAUDID) injection 1 mg (has no administration in time range)    And  naloxone (NARCAN) injection 0.4 mg (has no administration in time range)  HYDROmorphone (DILAUDID) injection 0.5 mg (has no administration in time range)    And  naloxone (NARCAN) injection 0.4 mg (has no  administration in time range)  sennosides-docusate (PERICOLACE) 8.6-50 MG per tablet 2 tablet (has no administration in time range)    And  polyethylene glycol (MIRALAX) packet 17 g (has no administration in time range)    And  bisacodyl (DULCOLAX) EC tablet 5 mg (has no administration in time range)    And  bisacodyl (DULCOLAX) suppository 10 mg (has no administration in time range)  ondansetron (ZOFRAN) injection 4 mg (has no administration in time range)  droperidol (INAPSINE) injection 2.5 mg (has no administration in time range)  HYDROmorphone (DILAUDID) injection 1 mg (1 mg Intravenous Given 7/12/25 2019)  ondansetron (ZOFRAN) injection 4 mg (4 mg Intravenous Given 7/12/25 2019)  droperidol (INAPSINE) injection 2.5 mg (2.5 mg Intravenous Given 7/12/25 2140)  lactated ringers bolus 500 mL (500 mL Intravenous New Bag 7/12/25 2140)    Results reviewed and plan of care discussed with patient during repeat physical exam.  Patient remains alert and oriented, afebrile and nontoxic-appearing with GCS 15.  Patient is informed plan of care is to place him into observation status overnight for pain control and for administration of NSAIDs for exacerbation of gout.  Patient verbalized understanding of and is amenable to this plan of care.  All patient orders for observation stay will be placed prior to patient leaving the emergency department.    Appropriate PPE worn during exam.    I discussed findings with patient who voiced understanding of discharge instructions, signs and symptoms requiring return to ED; discharged improved and in stable condition with follow up for re-evaluation.  This document is intended for medical expert use only. Reading of this document by patients and/or patient's family without participating medical staff guidance may result in misinterpretation and unintended morbidity.  Any interpretation of such data is the responsibility of the patient and/or family member responsible for the patient in  "concert with their primary or specialist providers, not to be left for sources of online searches such as [employer], [employer] or similar queries. Relying on these approaches to knowledge may result in misinterpretation, misguided goals of care and even death should patients or family members try recommendations outside of the realm of professional medical care in a supervised inpatient environment.       Problems Addressed:  Acute pain of right knee: complicated acute illness or injury  Chest pain, unspecified type: complicated acute illness or injury  Exacerbation of gout: complicated acute illness or injury    Amount and/or Complexity of Data Reviewed  Labs: ordered.  Radiology: ordered.  ECG/medicine tests: ordered and independent interpretation performed.    Risk  Prescription drug management.  Decision regarding hospitalization.    Interpreted by radiologist as below:     XR Chest 1 View  Result Date: 7/12/2025  Impression: Cardiomegaly with mild pulmonary vascular congestion. Electronically Signed: Aaron Rod MD  7/12/2025 8:48 PM EDT  Workstation ID: [id number]    XR Knee 3 View Left  Result Date: 7/11/2025  Impression: Suprapatellar bursal joint effusion. Electronically Signed: Joaquin Kessler MD  7/11/2025 9:08 AM EDT  Workstation ID: [id number]        /98   Pulse 93   Temp 97.7 °F (36.5 °C)   Resp 18   Ht 170.2 cm (67\")   Wt 77.8 kg (171 lb 8.3 oz)   SpO2 93%   BMI 26.86 kg/m²      Lab Results (last 24 hours)       Procedure Component Value Units Date/Time    CBC & Differential [[id number]]  (Normal) Collected: 07/12/25 2018    Specimen: Blood from Arm, Right Updated: 07/12/25 2028    Narrative:      The following orders were created for panel order CBC & Differential.  Procedure                               Abnormality         Status                     ---------                               -----------         ------                     CBC Auto Differential[[id number]]        Normal              Final " result                 Please view results for these tests on the individual orders.    Comprehensive Metabolic Panel [[id number]]  (Abnormal) Collected: 07/12/25 2018    Specimen: Blood from Arm, Right Updated: 07/12/25 2055     Glucose 95 mg/dL      BUN 64.4 mg/dL      Creatinine 3.16 mg/dL      Sodium 133 mmol/L      Potassium 3.0 mmol/L      Chloride 92 mmol/L      CO2 24.2 mmol/L      Calcium 9.3 mg/dL      Total Protein 7.5 g/dL      Albumin 4.1 g/dL      ALT (SGPT) 14 U/L      AST (SGOT) 22 U/L      Alkaline Phosphatase 43 U/L      Total Bilirubin 0.7 mg/dL      Globulin 3.4 gm/dL      A/G Ratio 1.2 g/dL      BUN/Creatinine Ratio 20.4     Anion Gap 16.8 mmol/L      eGFR 23.3 mL/min/1.73     Narrative:      GFR Categories in Chronic Kidney Disease (CKD)              GFR Category          GFR (mL/min/1.73)    Interpretation  G1                    90 or greater        Normal or high (1)  G2                    60-89                Mild decrease (1)  G3a                   45-59                Mild to moderate decrease  G3b                   30-44                Moderate to severe decrease  G4                    15-29                Severe decrease  G5                    14 or less           Kidney failure    (1)In the absence of evidence of kidney disease, neither GFR category G1 or G2 fulfill the criteria for CKD.    eGFR calculation 2021 CKD-EPI creatinine equation, which does not include race as a factor    Protime-INR [[id number]]  (Abnormal) Collected: 07/12/25 2018    Specimen: Blood from Arm, Right Updated: 07/12/25 2040     Protime 16.0 Seconds      INR 1.28    aPTT [[id number]]  (Normal) Collected: 07/12/25 2018    Specimen: Blood from Arm, Right Updated: 07/12/25 2040     PTT 30.2 seconds     High Sensitivity Troponin T [[id number]]  (Abnormal) Collected: 07/12/25 2018    Specimen: Blood from Arm, Right Updated: 07/12/25 2055     HS Troponin T 47 ng/L     Narrative:      High Sensitive Troponin T Reference  "Range:  <14.0 ng/L- Negative Female for AMI  <22.0 ng/L- Negative Male for AMI  >=14 - Abnormal Female indicating possible myocardial injury.  >=22 - Abnormal Male indicating possible myocardial injury.   Clinicians would have to utilize clinical acumen, EKG, Troponin, and serial changes to determine if it is an Acute Myocardial Infarction or myocardial injury due to an underlying chronic condition.         D-dimer, Quantitative [[id number]]  (Normal) Collected: 07/12/25 2018    Specimen: Blood from Arm, Right Updated: 07/12/25 2040     D-Dimer, Quantitative <0.27 MCGFEU/mL     Narrative:      According to the assay 's published package insert, a normal (<0.50 MCGFEU/mL) D-dimer result in conjunction with a non-high clinical probability assessment, excludes deep vein thrombosis (DVT) and pulmonary embolism (PE) with high sensitivity.    D-dimer values increase with age and this can make VTE exclusion of an older population difficult. To address this, the [employer], based on best available evidence and recent guidelines, recommends that clinicians use age-adjusted D-dimer thresholds in patients greater than 50 years of age with: a) a low probability of PE who do not meet all Pulmonary Embolism Rule Out Criteria, or b) in those with intermediate probability of PE.   The formula for an age-adjusted D-dimer cut-off is \"age/100\".  For example, a 60 year old patient would have an age-adjusted cut-off of 0.60 MCGFEU/mL and an 80 year old 0.80 MCGFEU/mL.    BNP [[id number]]  (Abnormal) Collected: 07/12/25 2018    Specimen: Blood from Arm, Right Updated: 07/12/25 2055     proBNP 14,860.0 pg/mL     Narrative:      This assay is used as an aid in the diagnosis of individuals suspected of having heart failure. It can be used as an aid in the diagnosis of acute decompensated heart failure (ADHF) in patients presenting with signs and symptoms of ADHF to the emergency department (ED). In addition, " "NT-proBNP of <300 pg/mL indicates ADHF is not likely.    Age Range Result Interpretation  NT-proBNP Concentration (pg/mL:      <50             Positive            >450                   Gray                 300-450                    Negative             <300    50-75           Positive            >900                  Gray                300-900                  Negative            <300      >75             Positive            >1800                  Gray                300-1800                  Negative            <300    Procalcitonin [[id number]]  (Normal) Collected: 07/12/25 2018    Specimen: Blood from Arm, Right Updated: 07/12/25 2055     Procalcitonin 0.17 ng/mL     Narrative:      As a Marker for Sepsis (Non-Neonates):    1. <0.5 ng/mL represents a low risk of severe sepsis and/or septic shock.  2. >2 ng/mL represents a high risk of severe sepsis and/or septic shock.    As a Marker for Lower Respiratory Tract Infections that require antibiotic therapy:    PCT on Admission    Antibiotic Therapy       6-12 Hrs later    >0.5                Strongly Recommended  >0.25 - <0.5        Recommended   0.1 - 0.25          Discouraged              Remeasure/reassess PCT  <0.1                Strongly Discouraged     Remeasure/reassess PCT    As 28 day mortality risk marker: \"Change in Procalcitonin Result\" (>80% or <=80%) if Day 0 (or Day 1) and Day 4 values are available. Refer to [email or web address]    Change in PCT <=80%  A decrease of PCT levels below or equal to 80% defines a positive change in PCT test result representing a higher risk for 28-day all-cause mortality of patients diagnosed with severe sepsis for septic shock.    Change in PCT >80%  A decrease of PCT levels of more than 80% defines a negative change in PCT result representing a lower risk for 28-day all-cause mortality of patients diagnosed with severe sepsis or septic shock.       C-reactive Protein [[id number]]  (Abnormal) Collected: " 07/12/25 2018    Specimen: Blood from Arm, Right Updated: 07/12/25 2055     C-Reactive Protein 7.06 mg/dL     Sedimentation Rate [[id number]]  (Abnormal) Collected: 07/12/25 2018    Specimen: Blood from Arm, Right Updated: 07/12/25 2033     Sed Rate 32 mm/hr     Magnesium [[id number]]  (Normal) Collected: 07/12/25 2018    Specimen: Blood from Arm, Right Updated: 07/12/25 2055     Magnesium 2.5 mg/dL     CK [[id number]]  (Normal) Collected: 07/12/25 2018    Specimen: Blood from Arm, Right Updated: 07/12/25 2055     Creatine Kinase 60 U/L     CBC Auto Differential [[id number]]  (Normal) Collected: 07/12/25 2018    Specimen: Blood from Arm, Right Updated: 07/12/25 2028     WBC 7.46 10*3/mm3      RBC 5.18 10*6/mm3      Hemoglobin 14.4 g/dL      Hematocrit 44.8 %      MCV 86.5 fL      MCH 27.8 pg      MCHC 32.1 g/dL      RDW 14.4 %      RDW-SD 46.1 fl      MPV 9.9 fL      Platelets 241 10*3/mm3      Neutrophil % 65.4 %      Lymphocyte % 21.0 %      Monocyte % 11.8 %      Eosinophil % 1.3 %      Basophil % 0.4 %      Immature Grans % 0.1 %      Neutrophils, Absolute 4.87 10*3/mm3      Lymphocytes, Absolute 1.57 10*3/mm3      Monocytes, Absolute 0.88 10*3/mm3      Eosinophils, Absolute 0.10 10*3/mm3      Basophils, Absolute 0.03 10*3/mm3      Immature Grans, Absolute 0.01 10*3/mm3      nRBC 0.0 /100 WBC     High Sensitivity Troponin T 1Hr [[id number]]  (Abnormal) Collected: 07/12/25 2205    Specimen: Blood from Arm, Right Updated: 07/12/25 2230     HS Troponin T 41 ng/L      Troponin T Numeric Delta -6 ng/L      Troponin T % Delta -13    Narrative:      High Sensitive Troponin T Reference Range:  <14.0 ng/L- Negative Female for AMI  <22.0 ng/L- Negative Male for AMI  >=14 - Abnormal Female indicating possible myocardial injury.  >=22 - Abnormal Male indicating possible myocardial injury.   Clinicians would have to utilize clinical acumen, EKG, Troponin, and serial changes to determine if it is an Acute Myocardial Infarction or  myocardial injury due to an underlying chronic condition.                  Medications   sodium chloride 0.9 % flush 10 mL (has no administration in time range)   potassium chloride (KLOR-CON M20) CR tablet 40 mEq (40 mEq Oral Given 7/12/25 2140)   amLODIPine (NORVASC) tablet 5 mg (has no administration in time range)   busPIRone (BUSPAR) tablet 5 mg (has no administration in time range)   carvedilol (COREG) tablet 25 mg (has no administration in time range)   sodium chloride 0.9 % flush 10 mL (10 mL Intravenous Not Given 7/12/25 2155)   sodium chloride 0.9 % flush 10 mL (has no administration in time range)   sodium chloride 0.9 % infusion 40 mL (has no administration in time range)   nitroglycerin (NITROSTAT) SL tablet 0.4 mg (has no administration in time range)   HYDROmorphone (DILAUDID) injection 1 mg (has no administration in time range)     And   naloxone (NARCAN) injection 0.4 mg (has no administration in time range)   HYDROmorphone (DILAUDID) injection 0.5 mg (has no administration in time range)     And   naloxone (NARCAN) injection 0.4 mg (has no administration in time range)   sennosides-docusate (PERICOLACE) 8.6-50 MG per tablet 2 tablet (has no administration in time range)     And   polyethylene glycol (MIRALAX) packet 17 g (has no administration in time range)     And   bisacodyl (DULCOLAX) EC tablet 5 mg (has no administration in time range)     And   bisacodyl (DULCOLAX) suppository 10 mg (has no administration in time range)   ondansetron (ZOFRAN) injection 4 mg (has no administration in time range)   droperidol (INAPSINE) injection 2.5 mg (has no administration in time range)   colchicine tablet 1.2 mg (has no administration in time range)   HYDROmorphone (DILAUDID) injection 1 mg (1 mg Intravenous Given 7/12/25 2019)   ondansetron (ZOFRAN) injection 4 mg (4 mg Intravenous Given 7/12/25 2019)   droperidol (INAPSINE) injection 2.5 mg (2.5 mg Intravenous Given 7/12/25 2140)   lactated ringers bolus  500 mL (500 mL Intravenous New Bag 7/12/25 1845)        Appropriate PPE worn during patient exam.  Appropriate monitoring initiated. Patient is alert and oriented x3.  No acute distress noted. S1-S2 heart sounds on exam.  Lungs are clear to auscultation. No edema noted to the bilateral lower extremities.   Chest x-ray obtained with the above findings. Tenderness diffuse left knee extending into the posterior lower leg. 2+ DP/PT pulses. Cap refill <2 seconds. Motor function and sensation intact bilaterally. IV established and labs obtained.  Cardiac work-up initiated.     I reviewed chart yesterday, 7/11/2025 patient was seen at this facility for left knee pain with elevated uric acid. My radiology interpretation...Differential Diagnoses, not all-inclusive and does not constitute entirety of all causes: Gout flare, DVT, PE, STEMI.    Disposition/Treatment: Discussed results with patient, verbalized understanding. Discussed reasons to return, patient verbalized understanding. Agreeable with plan of care. Patient was stable upon disposition.    Upon reassessment, patient is flesh tone warm and dry no acute distress noted.  Vital signs are stable. Discussed return precautions, patient verbalized understanding.     Part of this note may be an electronic transcription/translation of spoken language to printed text using the Dragon Dictation System.   Final diagnoses:   Acute pain of right knee   Exacerbation of gout   Chest pain, unspecified type       ED Disposition  ED Disposition       ED Disposition   Decision to Admit    Condition   --    Comment   --               No follow-up provider specified.       Medication List        ASK your doctor about these medications      carvedilol 25 MG tablet  Commonly known as: COREG  Ask about: Which instructions should I use?                 Kal Phillips PA-C  07/12/25 7455

## 2025-07-13 VITALS
RESPIRATION RATE: 13 BRPM | OXYGEN SATURATION: 95 % | HEART RATE: 82 BPM | TEMPERATURE: 97.6 F | SYSTOLIC BLOOD PRESSURE: 125 MMHG | HEIGHT: 67 IN | BODY MASS INDEX: 26.92 KG/M2 | WEIGHT: 171.52 LBS | DIASTOLIC BLOOD PRESSURE: 90 MMHG

## 2025-07-13 LAB
ALBUMIN SERPL-MCNC: 3.6 G/DL (ref 3.5–5.2)
ALBUMIN/GLOB SERPL: 1.2 G/DL
ALP SERPL-CCNC: 38 U/L (ref 39–117)
ALT SERPL W P-5'-P-CCNC: 10 U/L (ref 1–41)
ANION GAP SERPL CALCULATED.3IONS-SCNC: 15.4 MMOL/L (ref 5–15)
AST SERPL-CCNC: 18 U/L (ref 1–40)
BASOPHILS # BLD AUTO: 0.03 10*3/MM3 (ref 0–0.2)
BASOPHILS NFR BLD AUTO: 0.4 % (ref 0–1.5)
BILIRUB SERPL-MCNC: 0.7 MG/DL (ref 0–1.2)
BUN SERPL-MCNC: 60.5 MG/DL (ref 6–20)
BUN/CREAT SERPL: 21.1 (ref 7–25)
CALCIUM SPEC-SCNC: 9 MG/DL (ref 8.6–10.5)
CHLORIDE SERPL-SCNC: 94 MMOL/L (ref 98–107)
CO2 SERPL-SCNC: 22.6 MMOL/L (ref 22–29)
CREAT SERPL-MCNC: 2.87 MG/DL (ref 0.76–1.27)
DEPRECATED RDW RBC AUTO: 46.1 FL (ref 37–54)
EGFRCR SERPLBLD CKD-EPI 2021: 26.2 ML/MIN/1.73
EOSINOPHIL # BLD AUTO: 0.12 10*3/MM3 (ref 0–0.4)
EOSINOPHIL NFR BLD AUTO: 1.6 % (ref 0.3–6.2)
ERYTHROCYTE [DISTWIDTH] IN BLOOD BY AUTOMATED COUNT: 14.4 % (ref 12.3–15.4)
GLOBULIN UR ELPH-MCNC: 3.1 GM/DL
GLUCOSE SERPL-MCNC: 91 MG/DL (ref 65–99)
HCT VFR BLD AUTO: 38.1 % (ref 37.5–51)
HGB BLD-MCNC: 12.4 G/DL (ref 13–17.7)
IMM GRANULOCYTES # BLD AUTO: 0.02 10*3/MM3 (ref 0–0.05)
IMM GRANULOCYTES NFR BLD AUTO: 0.3 % (ref 0–0.5)
LYMPHOCYTES # BLD AUTO: 1.98 10*3/MM3 (ref 0.7–3.1)
LYMPHOCYTES NFR BLD AUTO: 26.8 % (ref 19.6–45.3)
MCH RBC QN AUTO: 28.1 PG (ref 26.6–33)
MCHC RBC AUTO-ENTMCNC: 32.5 G/DL (ref 31.5–35.7)
MCV RBC AUTO: 86.4 FL (ref 79–97)
MONOCYTES # BLD AUTO: 0.92 10*3/MM3 (ref 0.1–0.9)
MONOCYTES NFR BLD AUTO: 12.4 % (ref 5–12)
NEUTROPHILS NFR BLD AUTO: 4.32 10*3/MM3 (ref 1.7–7)
NEUTROPHILS NFR BLD AUTO: 58.5 % (ref 42.7–76)
NRBC BLD AUTO-RTO: 0 /100 WBC (ref 0–0.2)
PLATELET # BLD AUTO: 209 10*3/MM3 (ref 140–450)
PMV BLD AUTO: 9.8 FL (ref 6–12)
POTASSIUM SERPL-SCNC: 3 MMOL/L (ref 3.5–5.2)
PROT SERPL-MCNC: 6.7 G/DL (ref 6–8.5)
RBC # BLD AUTO: 4.41 10*6/MM3 (ref 4.14–5.8)
SODIUM SERPL-SCNC: 132 MMOL/L (ref 136–145)
TROPONIN T SERPL HS-MCNC: 42 NG/L
WBC NRBC COR # BLD AUTO: 7.39 10*3/MM3 (ref 3.4–10.8)

## 2025-07-13 PROCEDURE — 84484 ASSAY OF TROPONIN QUANT: CPT | Performed by: EMERGENCY MEDICINE

## 2025-07-13 PROCEDURE — 96376 TX/PRO/DX INJ SAME DRUG ADON: CPT

## 2025-07-13 PROCEDURE — 85025 COMPLETE CBC W/AUTO DIFF WBC: CPT | Performed by: EMERGENCY MEDICINE

## 2025-07-13 PROCEDURE — 25010000002 ONDANSETRON PER 1 MG: Performed by: EMERGENCY MEDICINE

## 2025-07-13 PROCEDURE — G0378 HOSPITAL OBSERVATION PER HR: HCPCS

## 2025-07-13 PROCEDURE — 25010000002 HYDROMORPHONE 1 MG/ML SOLUTION: Performed by: EMERGENCY MEDICINE

## 2025-07-13 PROCEDURE — 80053 COMPREHEN METABOLIC PANEL: CPT | Performed by: EMERGENCY MEDICINE

## 2025-07-13 RX ORDER — HYDROCODONE BITARTRATE AND ACETAMINOPHEN 10; 325 MG/1; MG/1
1 TABLET ORAL EVERY 6 HOURS PRN
Qty: 12 TABLET | Refills: 0 | Status: SHIPPED | OUTPATIENT
Start: 2025-07-13

## 2025-07-13 RX ORDER — ALBUTEROL SULFATE 0.83 MG/ML
2.5 SOLUTION RESPIRATORY (INHALATION) EVERY 6 HOURS PRN
Status: DISCONTINUED | OUTPATIENT
Start: 2025-07-13 | End: 2025-07-13 | Stop reason: HOSPADM

## 2025-07-13 RX ORDER — BUMETANIDE 1 MG/1
2 TABLET ORAL 2 TIMES DAILY
Status: DISCONTINUED | OUTPATIENT
Start: 2025-07-13 | End: 2025-07-13 | Stop reason: HOSPADM

## 2025-07-13 RX ORDER — CARVEDILOL 25 MG/1
25 TABLET ORAL 2 TIMES DAILY WITH MEALS
Status: DISCONTINUED | OUTPATIENT
Start: 2025-07-13 | End: 2025-07-13 | Stop reason: HOSPADM

## 2025-07-13 RX ORDER — HYDROCODONE BITARTRATE AND ACETAMINOPHEN 10; 325 MG/1; MG/1
1 TABLET ORAL EVERY 6 HOURS PRN
Refills: 0 | Status: DISCONTINUED | OUTPATIENT
Start: 2025-07-13 | End: 2025-07-13 | Stop reason: HOSPADM

## 2025-07-13 RX ORDER — POTASSIUM CHLORIDE 1500 MG/1
40 TABLET, EXTENDED RELEASE ORAL EVERY 4 HOURS
Status: DISCONTINUED | OUTPATIENT
Start: 2025-07-13 | End: 2025-07-13 | Stop reason: HOSPADM

## 2025-07-13 RX ORDER — METOLAZONE 2.5 MG/1
2.5 TABLET ORAL 3 TIMES WEEKLY
Status: DISCONTINUED | OUTPATIENT
Start: 2025-07-14 | End: 2025-07-13

## 2025-07-13 RX ORDER — ROSUVASTATIN CALCIUM 10 MG/1
40 TABLET, COATED ORAL DAILY
Status: DISCONTINUED | OUTPATIENT
Start: 2025-07-13 | End: 2025-07-13 | Stop reason: HOSPADM

## 2025-07-13 RX ORDER — AMLODIPINE BESYLATE 5 MG/1
5 TABLET ORAL DAILY
Status: DISCONTINUED | OUTPATIENT
Start: 2025-07-13 | End: 2025-07-13 | Stop reason: HOSPADM

## 2025-07-13 RX ORDER — HYDRALAZINE HYDROCHLORIDE 25 MG/1
25 TABLET, FILM COATED ORAL 3 TIMES DAILY
Status: DISCONTINUED | OUTPATIENT
Start: 2025-07-13 | End: 2025-07-13 | Stop reason: HOSPADM

## 2025-07-13 RX ORDER — CALCIUM ACETATE 667 MG/1
667 CAPSULE ORAL
Status: DISCONTINUED | OUTPATIENT
Start: 2025-07-13 | End: 2025-07-13 | Stop reason: HOSPADM

## 2025-07-13 RX ORDER — ASPIRIN 81 MG/1
81 TABLET ORAL DAILY
Status: DISCONTINUED | OUTPATIENT
Start: 2025-07-13 | End: 2025-07-13 | Stop reason: HOSPADM

## 2025-07-13 RX ORDER — BUSPIRONE HYDROCHLORIDE 5 MG/1
5 TABLET ORAL 3 TIMES DAILY
Status: DISCONTINUED | OUTPATIENT
Start: 2025-07-13 | End: 2025-07-13 | Stop reason: HOSPADM

## 2025-07-13 RX ADMIN — ROSUVASTATIN CALCIUM 40 MG: 10 TABLET, FILM COATED ORAL at 08:17

## 2025-07-13 RX ADMIN — POTASSIUM CHLORIDE 40 MEQ: 1500 TABLET, EXTENDED RELEASE ORAL at 08:18

## 2025-07-13 RX ADMIN — HYDROMORPHONE HYDROCHLORIDE 1 MG: 1 INJECTION, SOLUTION INTRAMUSCULAR; INTRAVENOUS; SUBCUTANEOUS at 16:23

## 2025-07-13 RX ADMIN — AMLODIPINE BESYLATE 5 MG: 5 TABLET ORAL at 08:17

## 2025-07-13 RX ADMIN — HYDROMORPHONE HYDROCHLORIDE 1 MG: 1 INJECTION, SOLUTION INTRAMUSCULAR; INTRAVENOUS; SUBCUTANEOUS at 08:17

## 2025-07-13 RX ADMIN — HYDRALAZINE HYDROCHLORIDE 25 MG: 25 TABLET ORAL at 16:24

## 2025-07-13 RX ADMIN — HYDRALAZINE HYDROCHLORIDE 25 MG: 25 TABLET ORAL at 08:17

## 2025-07-13 RX ADMIN — ASPIRIN 81 MG: 81 TABLET, COATED ORAL at 08:17

## 2025-07-13 RX ADMIN — APIXABAN 2.5 MG: 2.5 TABLET, FILM COATED ORAL at 08:17

## 2025-07-13 RX ADMIN — BUMETANIDE 2 MG: 1 TABLET ORAL at 08:17

## 2025-07-13 RX ADMIN — HYDROMORPHONE HYDROCHLORIDE 1 MG: 1 INJECTION, SOLUTION INTRAMUSCULAR; INTRAVENOUS; SUBCUTANEOUS at 13:55

## 2025-07-13 RX ADMIN — HYDROMORPHONE HYDROCHLORIDE 1 MG: 1 INJECTION, SOLUTION INTRAMUSCULAR; INTRAVENOUS; SUBCUTANEOUS at 05:23

## 2025-07-13 RX ADMIN — HYDROMORPHONE HYDROCHLORIDE 1 MG: 1 INJECTION, SOLUTION INTRAMUSCULAR; INTRAVENOUS; SUBCUTANEOUS at 11:25

## 2025-07-13 RX ADMIN — POTASSIUM CHLORIDE 40 MEQ: 1500 TABLET, EXTENDED RELEASE ORAL at 05:23

## 2025-07-13 RX ADMIN — CALCIUM ACETATE 667 MG: 667 CAPSULE ORAL at 11:25

## 2025-07-13 RX ADMIN — BUSPIRONE HYDROCHLORIDE 5 MG: 5 TABLET ORAL at 16:24

## 2025-07-13 RX ADMIN — LIDOCAINE HYDROCHLORIDE: 20 SOLUTION ORAL at 16:24

## 2025-07-13 RX ADMIN — CARVEDILOL 25 MG: 25 TABLET, FILM COATED ORAL at 09:27

## 2025-07-13 RX ADMIN — ONDANSETRON 4 MG: 2 INJECTION, SOLUTION INTRAMUSCULAR; INTRAVENOUS at 11:25

## 2025-07-13 RX ADMIN — CALCIUM ACETATE 667 MG: 667 CAPSULE ORAL at 08:23

## 2025-07-13 RX ADMIN — Medication 10 ML: at 13:55

## 2025-07-13 RX ADMIN — BUSPIRONE HYDROCHLORIDE 5 MG: 5 TABLET ORAL at 08:17

## 2025-07-13 RX ADMIN — HYDROMORPHONE HYDROCHLORIDE 1 MG: 1 INJECTION, SOLUTION INTRAMUSCULAR; INTRAVENOUS; SUBCUTANEOUS at 03:17

## 2025-07-13 NOTE — NURSING NOTE
Patients 02 stats has been dropping down to 85-86 range while sleeping, possibly due to pain medication. This nurse offered patient to wear 2L of oxygen and he refused, states that he feels fine, will continue to monitor.

## 2025-07-13 NOTE — CONSULTS
Patient Name: Scott Gaytan  : 1975  MRN: [id number]  Primary Care Physician: Vicki Santana APRN  Date of admission: 2025    Patient Care Team:  Vicki Santana APRN as PCP - General (Emergency Medicine)  Cary Espinosa MD as Consulting Physician (Cardiology)  Lorena Cote RN as Ambulatory  ([facility])        Reason for Consult:       Chronic kidney disease   Subjective   History of Present Illness:   Chief Complaint:   Chief Complaint   Patient presents with    Knee Pain     HISTORY:  Scott Gaytan is a 50 y.o. male with past medical history of hypertension, atrial fibrillation, CHF and chronic kidney disease. He is known to my associate, Dr Das, and has a baseline creatinine of around 3.0. He presented to the ER with complaints of left knee pain for the past several days. He was found to have uric acid of 11.6. He was also found to have a creatinine 3.16, BUN 64, sodium 133 and potassium 3.0. Noteworthy home medications included Bumex and metolazone. No recent contrast exposure NSAID use, no NSAID use. Renal services are requested for chronic kidney disease management.     Review of systems:    ROS was otherwise negative except as mentioned in the [place].       Personal History:     Past Medical History:   Past Medical History:   Diagnosis Date    Anemia in chronic kidney disease (CKD) 2025    Arteriovenous fistula 2025    Atrial fibrillation 2024    Rate controlled with Coreg    Anticoagulated with renal dosed Eliquis 2.5 mg twice daily    Without history of ablations or DCCV         Chronic diastolic heart failure     CKD (chronic kidney disease) stage 4, GFR 15-29 ml/min 2025    Followed by Dr Das,       COVID-19 virus detected 2021    Cytokine release syndrome, grade 1 2021    h/o Medication and medical follow-up noncompliance 04/10/2024    Hepatitis C 2025    Hyperkalemia 2022    Hyperlipidemia  01/21/2025    Hypertensive heart disease with congestive heart failure and chronic kidney disease 02/11/2025    Hypertensive urgency 04/26/2022    Methamphetamine abuse 04/26/2022    Methamphetamine intoxication 06/16/2024    Mild to moderate valvular heart disease 02/06/2025    Mild to moderate mitral valve regurgitation      Secondary hyperparathyroidism 02/11/2025    Tick bite of abdomen 04/26/2022    Tobacco abuse 04/26/2022       Surgical History:      Past Surgical History:   Procedure Laterality Date    CARDIAC CATHETERIZATION N/A 04/27/2022    ORIF TIBIA/FIBULA FRACTURES Right        Family History: family history includes Arthritis in his mother; Diabetes in his mother; Heart attack in his father and paternal grandfather; Hypertension in his father; Seizures in his sister. Otherwise pertinent FHx was reviewed and unremarkable.     Social History:  reports that he has been smoking cigarettes. He started smoking about 31 years ago. He has a 30.7 pack-year smoking history. He has never been exposed to tobacco smoke. He has never used smokeless tobacco. He reports that he does not currently use alcohol. He reports current drug use. Drugs: Methamphetamines, Marijuana, and Cocaine(coke).    Medications:  Prior to Admission medications    Medication Sig Start Date End Date Taking? Authorizing Provider   albuterol sulfate  (90 Base) MCG/ACT inhaler Inhale 2 puffs Every 4 (Four) Hours As Needed for Wheezing or Shortness of Air. 5/21/25  Yes Zamzam Hudson APRN   amLODIPine (NORVASC) 5 MG tablet Take 1 tablet by mouth Daily. 6/24/25  Yes Cary Espinosa MD   apixaban (ELIQUIS) 2.5 MG tablet tablet Take 1 tablet by mouth 2 (Two) Times a Day. 6/24/25  Yes Cary Espinosa MD   aspirin 81 MG EC tablet Take 1 tablet by mouth Daily for 180 days. 5/13/25 11/9/25 Yes Vicki Santana APRN   bumetanide (BUMEX) 2 MG tablet Take 1 tablet by mouth 2 (Two) Times a Day for 90 days. 6/10/25 9/8/25 Yes Max  CHANDLER Alexis   busPIRone (BUSPAR) 5 MG tablet TAKE 1 TABLET BY MOUTH 3 TIMES A DAY FOR 30 DAYS. 6/20/25 7/20/25 Yes Vicki Santana APRN   calcium acetate (PHOSLO) 667 MG tablet Take 1 tablet by mouth 3 (Three) Times a Day With Meals.   Yes Provider, MD Isabel   carvedilol (COREG) 25 MG tablet Take 1 tablet by mouth 2 (Two) Times a Day With Meals.   Yes Provider, MD Isabel   metOLazone (ZAROXOLYN) 2.5 MG tablet Take 1 tablet by mouth 3 (Three) Times a Week. Take until you see your nephrologist on Mondays Wednesdays and Fridays 6/30/25  Yes Kaylah Rodrigues APRN   nitroglycerin (NITROSTAT) 0.4 MG SL tablet Place 1 tablet under the tongue Every 5 (Five) Minutes As Needed for Chest Pain for up to 30 days. Take no more than 3 doses in 15 minutes. 1/3/25 7/12/25 Yes Vicki Santana APRN   potassium chloride ER (K-TAB) 20 MEQ tablet controlled-release ER tablet Take 1 tablet by mouth Daily for 90 days. 5/21/25 8/19/25 Yes Zamzam Hudson APRN   rosuvastatin (CRESTOR) 40 MG tablet TAKE 1 TABLET BY MOUTH EVERY DAY 4/23/25  Yes Vicki Santana APRN   hydrALAZINE (APRESOLINE) 25 MG tablet TAKE 1 TABLET BY MOUTH THREE TIMES A DAY 6/12/25   Vicki Santana APRN     Scheduled Meds:amLODIPine, 5 mg, Oral, Daily  apixaban, 2.5 mg, Oral, BID  aspirin, 81 mg, Oral, Daily  bumetanide, 2 mg, Oral, BID  busPIRone, 5 mg, Oral, TID  calcium acetate, 667 mg, Oral, TID With Meals  carvedilol, 25 mg, Oral, BID With Meals  hydrALAZINE, 25 mg, Oral, TID  potassium chloride ER, 40 mEq, Oral, Q4H  rosuvastatin, 40 mg, Oral, Daily  sodium chloride, 10 mL, Intravenous, Q12H      Continuous Infusions:   PRN Meds:  albuterol    senna-docusate sodium **AND** polyethylene glycol **AND** bisacodyl **AND** bisacodyl    Calcium Replacement - Follow Nurse / BPA Driven Protocol    droperidol    HYDROmorphone **AND** naloxone    HYDROmorphone **AND** naloxone    Magnesium Standard Dose Replacement - Follow Nurse / BPA Driven  Protocol    nitroglycerin    Phosphorus Replacement - Follow Nurse / BPA Driven Protocol    Potassium Replacement - Follow Nurse / BPA Driven Protocol    [COMPLETED] Insert Peripheral IV **AND** sodium chloride    sodium chloride    sodium chloride  Allergies:    Allergies   Allergen Reactions    Methylprednisolone Swelling     Pt reports severe swelling with steroids    Morphine Other (See Comments)     Makes it feel like my blood is boiling in my veins       Objective   Exam:     Vital Signs  Temp:  [97.4 °F (36.3 °C)-98.1 °F (36.7 °C)] 97.6 °F (36.4 °C)  Heart Rate:  [76-99] 82  Resp:  [13-19] 13  BP: (122-170)/() 125/90  SpO2:  [91 %-99 %] 95 %  on   ;   Device (Oxygen Therapy): room air  Body mass index is 26.86 kg/m².  EXAM  General:  50 yr old  male in no acute distress.    Head:      Normocephalic and atraumatic.    Eyes:     Lungs:    Clear bilaterally to auscultation.    Heart:      Regular rate and rhythm, no murmur no gallop  Abd:        Soft, nontender, not distended  Msk:        Pulses:   Pulses normal in all 4 extremities.    Extremities:        No cyanosis or clubbing--L knee/ankle edema.    Neuro:    No focal deficits.   alert oriented x3  Skin:       Intact without lesions or rashes.    Psych:    Alert and cooperative; normal mood and affect; normal attention span       Results Review:  I have personally reviewed most recent Data :  BMP @LABRCNT(creatinine:10)  CBC    Results from last 7 days   Lab Units 07/13/25  0350 07/12/25 2018 07/11/25  0903   WBC 10*3/mm3 7.39 7.46 8.66   HEMOGLOBIN g/dL 12.4* 14.4 13.6   PLATELETS 10*3/mm3 209 241 203     CMP   Results from last 7 days   Lab Units 07/13/25  0350 07/12/25 2018 07/11/25  0903   SODIUM mmol/L 132* 133* 138   POTASSIUM mmol/L 3.0* 3.0* 3.1*   CHLORIDE mmol/L 94* 92* 98   CO2 mmol/L 22.6 24.2 24.4   BUN mg/dL 60.5* 64.4* 63.1*   CREATININE mg/dL 2.87* 3.16* 3.10*   GLUCOSE mg/dL 91 95 150*   ALBUMIN g/dL 3.6 4.1 4.0   BILIRUBIN mg/dL  0.7 0.7 0.5   ALK PHOS U/L 38* 43 43   AST (SGOT) U/L 18 22 25   ALT (SGPT) U/L 10 14 18     ABG      XR Chest 1 View  Result Date: 7/12/2025  Impression: Cardiomegaly with mild pulmonary vascular congestion. Electronically Signed: Aaron Rod MD  7/12/2025 8:48 PM EDT  Workstation ID: [id number]    XR Knee 3 View Left  Result Date: 7/11/2025  Impression: Suprapatellar bursal joint effusion. Electronically Signed: Joaquin Kessler MD  7/11/2025 9:08 AM EDT  Workstation ID: [id number]      Results for orders placed during the hospital encounter of 05/19/25    Adult Transthoracic Echo Complete W/ Cont if Necessary Per Protocol    Interpretation Summary    Left ventricular ejection fraction appears to be 46 - 50%.    Left ventricular diastolic function is consistent with (grade II w/high LAP) pseudonormalization.  Average GLS -13%.    The right ventricular cavity is dilated.    The left atrial cavity is dilated.    Moderate mitral valve regurgitation is present.    Moderate tricuspid valve regurgitation is present.    Estimated right ventricular systolic pressure from tricuspid regurgitation is mildly elevated (35-45 mmHg).        Assessment & Plan   Assessment and Plan:         Exacerbation of gout    ASSESSMENT:  Chronic kidney disease, stage IV, with baseline creatinine roughly 3.0  Hyponatremia, mild, likely thiazide induced  Hyperuricemia, may be due to thiazides vs gout  Left knee pain  Hypertension      PLAN :     Renal function stable/baseline, although overall renal prognosis is poor  No acute need for RRT but he will likely warrant it in the future  Will need close outpatient follow up  Stop Metolazone as that can cause hyperuricemia  May warrant Uloric but can follow up as an outpatient  Pain management as per primary team; avoid NSAIDs  BP stable  Mild hyponatremia acceptable and likely thiazide induced  Will follow but okay from a renal standpoint for discharge with close outpatient follow up with Dr. Das  within 2 weeks      Kg Szymanski MD  [facility]  7/13/2025  12:12 EDT

## 2025-07-13 NOTE — PLAN OF CARE
Problem: Adult Inpatient Plan of Care  Goal: Absence of Hospital-Acquired Illness or Injury  Intervention: Identify and Manage Fall Risk  Recent Flowsheet Documentation  Taken 7/13/2025 0604 by Luh Balderrama RN  Safety Promotion/Fall Prevention:   clutter free environment maintained   assistive device/personal items within reach   lighting adjusted   safety round/check completed  Taken 7/13/2025 0512 by Luh Balderrama RN  Safety Promotion/Fall Prevention:   assistive device/personal items within reach   clutter free environment maintained   lighting adjusted   safety round/check completed  Taken 7/13/2025 0424 by Luh Balderrama RN  Safety Promotion/Fall Prevention:   assistive device/personal items within reach   clutter free environment maintained   lighting adjusted   safety round/check completed  Taken 7/13/2025 0100 by Luh Balderrama RN  Safety Promotion/Fall Prevention:   assistive device/personal items within reach   clutter free environment maintained   safety round/check completed   lighting adjusted  Taken 7/13/2025 0000 by Luh Balderrama RN  Safety Promotion/Fall Prevention:   assistive device/personal items within reach   clutter free environment maintained   lighting adjusted   safety round/check completed  Taken 7/12/2025 2333 by Luh Balderrama RN  Safety Promotion/Fall Prevention:   assistive device/personal items within reach   clutter free environment maintained   lighting adjusted   safety round/check completed  Intervention: Prevent Skin Injury  Recent Flowsheet Documentation  Taken 7/12/2025 2333 by Luh Balderrama RN  Body Position:   position changed independently   position maintained   legs elevated  Intervention: Prevent Infection  Recent Flowsheet Documentation  Taken 7/13/2025 0604 by Luh Balderrama RN  Infection Prevention:   rest/sleep promoted   personal protective equipment utilized   hand hygiene promoted   equipment surfaces  disinfected   cohorting utilized   environmental surveillance performed  Taken 7/13/2025 0512 by Luh Balderrama RN  Infection Prevention:   rest/sleep promoted   personal protective equipment utilized   hand hygiene promoted   equipment surfaces disinfected   environmental surveillance performed   cohorting utilized  Taken 7/13/2025 0424 by Luh Balderrama RN  Infection Prevention:   rest/sleep promoted   personal protective equipment utilized   hand hygiene promoted   equipment surfaces disinfected   environmental surveillance performed   cohorting utilized  Taken 7/13/2025 0100 by Luh Balderrama RN  Infection Prevention:   rest/sleep promoted   personal protective equipment utilized   hand hygiene promoted   equipment surfaces disinfected   environmental surveillance performed   cohorting utilized  Taken 7/13/2025 0000 by Luh Balderrama RN  Infection Prevention:   rest/sleep promoted   personal protective equipment utilized   equipment surfaces disinfected   environmental surveillance performed   cohorting utilized   hand hygiene promoted  Taken 7/12/2025 2333 by Luh Balderrama RN  Infection Prevention:   rest/sleep promoted   personal protective equipment utilized   hand hygiene promoted   environmental surveillance performed   cohorting utilized   equipment surfaces disinfected  Goal: Optimal Comfort and Wellbeing  Intervention: Provide Person-Centered Care  Recent Flowsheet Documentation  Taken 7/12/2025 2333 by Luh Balderrama RN  Trust Relationship/Rapport:   care explained   choices provided   emotional support provided   empathic listening provided   questions answered   questions encouraged   reassurance provided   thoughts/feelings acknowledged  Goal: Readiness for Transition of Care  Intervention: Mutually Develop Transition Plan  Recent Flowsheet Documentation  Taken 7/12/2025 2331 by Luh Balderrama RN  Equipment Currently Used at Home: crutches  Taken  7/12/2025 2330 by Luh Balderrama, RN  Transportation Anticipated: family or friend will provide  Patient/Family Anticipated Services at Transition: none  Patient/Family Anticipates Transition to: home with family     Problem: Pain Acute  Goal: Optimal Pain Control and Function  Intervention: Prevent or Manage Pain  Recent Flowsheet Documentation  Taken 7/12/2025 2186 by Luh Balderrama, RN  Medication Review/Management: medications reviewed   Goal Outcome Evaluation:         Patient admitted for exacerbation of gout, complaining of pain in left knee, medication given see MAR. Will continue to monitor patient.

## 2025-07-13 NOTE — PLAN OF CARE
Problem: Adult Inpatient Plan of Care  Goal: Absence of Hospital-Acquired Illness or Injury  Intervention: Identify and Manage Fall Risk  Recent Flowsheet Documentation  Taken 7/13/2025 0604 by Luh Balderrama RN  Safety Promotion/Fall Prevention:   clutter free environment maintained   assistive device/personal items within reach   lighting adjusted   safety round/check completed  Taken 7/13/2025 0512 by uLh Balderrama RN  Safety Promotion/Fall Prevention:   assistive device/personal items within reach   clutter free environment maintained   lighting adjusted   safety round/check completed  Taken 7/13/2025 0424 by Luh Balderrama RN  Safety Promotion/Fall Prevention:   assistive device/personal items within reach   clutter free environment maintained   lighting adjusted   safety round/check completed  Taken 7/13/2025 0100 by Lhu Balderrama RN  Safety Promotion/Fall Prevention:   assistive device/personal items within reach   clutter free environment maintained   safety round/check completed   lighting adjusted  Taken 7/13/2025 0000 by Luh Balderrama RN  Safety Promotion/Fall Prevention:   assistive device/personal items within reach   clutter free environment maintained   lighting adjusted   safety round/check completed  Taken 7/12/2025 2333 by Luh Balderrama RN  Safety Promotion/Fall Prevention:   assistive device/personal items within reach   clutter free environment maintained   lighting adjusted   safety round/check completed  Intervention: Prevent Skin Injury  Recent Flowsheet Documentation  Taken 7/12/2025 2333 by Luh Balderrama RN  Body Position:   position changed independently   position maintained   legs elevated  Intervention: Prevent Infection  Recent Flowsheet Documentation  Taken 7/13/2025 0604 by Luh Balderrama RN  Infection Prevention:   rest/sleep promoted   personal protective equipment utilized   hand hygiene promoted   equipment surfaces  disinfected   cohorting utilized   environmental surveillance performed  Taken 7/13/2025 0512 by Luh Balderrama RN  Infection Prevention:   rest/sleep promoted   personal protective equipment utilized   hand hygiene promoted   equipment surfaces disinfected   environmental surveillance performed   cohorting utilized  Taken 7/13/2025 0424 by Luh Balderrama RN  Infection Prevention:   rest/sleep promoted   personal protective equipment utilized   hand hygiene promoted   equipment surfaces disinfected   environmental surveillance performed   cohorting utilized  Taken 7/13/2025 0100 by Luh Balderrama RN  Infection Prevention:   rest/sleep promoted   personal protective equipment utilized   hand hygiene promoted   equipment surfaces disinfected   environmental surveillance performed   cohorting utilized  Taken 7/13/2025 0000 by Luh Balderrama RN  Infection Prevention:   rest/sleep promoted   personal protective equipment utilized   equipment surfaces disinfected   environmental surveillance performed   cohorting utilized   hand hygiene promoted  Taken 7/12/2025 2333 by Luh Balderrama RN  Infection Prevention:   rest/sleep promoted   personal protective equipment utilized   hand hygiene promoted   environmental surveillance performed   cohorting utilized   equipment surfaces disinfected  Goal: Optimal Comfort and Wellbeing  Intervention: Provide Person-Centered Care  Recent Flowsheet Documentation  Taken 7/12/2025 2333 by Luh Balderrama RN  Trust Relationship/Rapport:   care explained   choices provided   emotional support provided   empathic listening provided   questions answered   questions encouraged   reassurance provided   thoughts/feelings acknowledged  Goal: Readiness for Transition of Care  Intervention: Mutually Develop Transition Plan  Recent Flowsheet Documentation  Taken 7/12/2025 2331 by Luh Balderrama RN  Equipment Currently Used at Home: crutches  Taken  7/12/2025 2330 by Luh Balderrama, RN  Transportation Anticipated: family or friend will provide  Patient/Family Anticipated Services at Transition: none  Patient/Family Anticipates Transition to: home with family     Problem: Pain Acute  Goal: Optimal Pain Control and Function  Intervention: Prevent or Manage Pain  Recent Flowsheet Documentation  Taken 7/12/2025 2333 by Luh Balderrama, RN  Medication Review/Management: medications reviewed   Goal Outcome Evaluation:

## 2025-07-13 NOTE — DISCHARGE SUMMARY
[facility]    Vicki Santana APRN    CHIEF COMPLAINT:     Left knee pain    HISTORY OF PRESENT ILLNESS:    Obtained from ED provider HPI on 7/12/2025:  50 year-old-male presents with 1-week history of gout flare that started in the left foot and now present in the left knee circumferentially extending medially and posteriorly. He reports he is also having precordial pain and dyspnea that began yesterday. Denies fever sweats chills. He is anticoagulated on Eliquis for A-fib but he has not taken it due to taking Ibuprofen.     07/13/25:  Patient confirms the HPI noted above reporting approximate 1 week history of pain in his lower extremities which initially began in the lateral portion of his left foot and has progressed primarily to his left knee.  He has noted some warmth in the area and has had continued pain despite attempts at outpatient therapy after initial visit to the ED.  No falls or obvious trauma are reported and he denies any fever, chest pain or dyspnea.  Patient cannot take NSAIDs secondary to his CKD and also reports that he has had significant fluid retention with even short courses of corticosteroids in the past which required prolonged admission for diuresis and he would like to avoid this therapy if possible.  Current urine output is reported as normal            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    h/o Medication and medical follow-up noncompliance 04/10/2024    Hepatitis C 01/03/2025     Hyperkalemia 04/26/2022    Hyperlipidemia 01/21/2025    Hypertensive heart disease with congestive heart failure and chronic kidney disease 02/11/2025    Hypertensive urgency 04/26/2022    Methamphetamine abuse 04/26/2022    Methamphetamine intoxication 06/16/2024    Mild to moderate valvular heart disease 02/06/2025    Mild to moderate mitral valve regurgitation      Secondary hyperparathyroidism 02/11/2025    Tick bite of abdomen 04/26/2022    Tobacco abuse 04/26/2022     Past Surgical History:   Procedure Laterality Date    CARDIAC CATHETERIZATION N/A 04/27/2022    ORIF TIBIA/FIBULA FRACTURES Right      Family History   Problem Relation Age of Onset    Diabetes Mother     Arthritis Mother     Hypertension Father     Heart attack Father     Seizures Sister     Heart attack Paternal Grandfather      Social History     Tobacco Use    Smoking status: Every Day     Current packs/day: 0.25     Average packs/day: 1 pack/day for 31.5 years (30.7 ttl pk-yrs)     Types: Cigarettes     Start date: 1994     Passive exposure: Never    Smokeless tobacco: Never    Tobacco comments:     Down to 1/2 pack every 3 days, wearing a nicotine patch - 6/24/25   Vaping Use    Vaping status: Never Used   Substance Use Topics    Alcohol use: Not Currently    Drug use: Yes     Types: Methamphetamines, Marijuana, Cocaine(coke)     Medications Prior to Admission   Medication Sig Dispense Refill Last Dose/Taking    albuterol sulfate  (90 Base) MCG/ACT inhaler Inhale 2 puffs Every 4 (Four) Hours As Needed for Wheezing or Shortness of Air. 18 g 0 Taking As Needed    amLODIPine (NORVASC) 5 MG tablet Take 1 tablet by mouth Daily. 90 tablet 3 7/11/2025 Morning    apixaban (ELIQUIS) 2.5 MG tablet tablet Take 1 tablet by mouth 2 (Two) Times a Day. 180 tablet 3 7/11/2025 Evening    aspirin 81 MG EC tablet Take 1 tablet by mouth Daily for 180 days. 90 tablet 1 7/11/2025 Morning    bumetanide (BUMEX) 2 MG tablet Take 1 tablet by mouth 2 (Two)  Times a Day for 90 days. 180 tablet 0 7/11/2025 Evening    busPIRone (BUSPAR) 5 MG tablet TAKE 1 TABLET BY MOUTH 3 TIMES A DAY FOR 30 DAYS. 90 tablet 0 7/11/2025 Evening    calcium acetate (PHOSLO) 667 MG tablet Take 1 tablet by mouth 3 (Three) Times a Day With Meals.   7/11/2025 Evening    carvedilol (COREG) 25 MG tablet Take 1 tablet by mouth 2 (Two) Times a Day With Meals.   7/11/2025 Evening    metOLazone (ZAROXOLYN) 2.5 MG tablet Take 1 tablet by mouth 3 (Three) Times a Week. Take until you see your nephrologist on Mondays Wednesdays and Fridays 16 tablet 0 7/11/2025    nitroglycerin (NITROSTAT) 0.4 MG SL tablet Place 1 tablet under the tongue Every 5 (Five) Minutes As Needed for Chest Pain for up to 30 days. Take no more than 3 doses in 15 minutes. 30 tablet 0 Taking As Needed    potassium chloride ER (K-TAB) 20 MEQ tablet controlled-release ER tablet Take 1 tablet by mouth Daily for 90 days. 30 tablet 2 7/11/2025    rosuvastatin (CRESTOR) 40 MG tablet TAKE 1 TABLET BY MOUTH EVERY DAY 90 tablet 0 7/11/2025 Evening    hydrALAZINE (APRESOLINE) 25 MG tablet TAKE 1 TABLET BY MOUTH THREE TIMES A DAY 90 tablet 0      Allergies:  Methylprednisolone and Morphine    Immunization History   Administered Date(s) Administered    Hepatitis A 05/24/2018           REVIEW OF SYSTEMS:    Review of Systems   Constitutional: Negative.   HENT: Negative.     Eyes: Negative.    Cardiovascular: Negative.    Respiratory: Negative.     Skin: Negative.    Musculoskeletal:  Positive for joint pain and joint swelling.   Gastrointestinal: Negative.    Genitourinary: Negative.    Neurological: Negative.    Psychiatric/Behavioral: Negative.         Vital Signs  Temp:  [97.4 °F (36.3 °C)-98.1 °F (36.7 °C)] 97.6 °F (36.4 °C)  Heart Rate:  [76-99] 82  Resp:  [13-19] 13  BP: (122-170)/() 125/90          Physical Exam:  Physical Exam  Vitals reviewed.   Constitutional:       General: He is not in acute distress.     Appearance: Normal  appearance. He is normal weight. He is not ill-appearing, toxic-appearing or diaphoretic.   HENT:      Head: Normocephalic.      Right Ear: External ear normal.      Left Ear: External ear normal.      Nose: Nose normal.      Mouth/Throat:      Mouth: Mucous membranes are moist.   Eyes:      Extraocular Movements: Extraocular movements intact.   Cardiovascular:      Rate and Rhythm: Normal rate. Rhythm irregular.      Pulses: Normal pulses.      Heart sounds: Normal heart sounds.   Pulmonary:      Effort: Pulmonary effort is normal.      Breath sounds: Normal breath sounds.   Abdominal:      General: Bowel sounds are normal.      Palpations: Abdomen is soft.      Tenderness: There is no abdominal tenderness.   Musculoskeletal:         General: Tenderness present. Normal range of motion.      Cervical back: Normal range of motion.      Right lower leg: No edema.   Skin:     General: Skin is warm and dry.      Capillary Refill: Capillary refill takes less than 2 seconds.   Neurological:      General: No focal deficit present.      Mental Status: He is alert and oriented to person, place, and time.   Psychiatric:         Mood and Affect: Mood normal.         Behavior: Behavior normal.         Thought Content: Thought content normal.         Judgment: Judgment normal.         Emotional Behavior:   Normal   Debilities:  None  Results Review:    I reviewed the patient's new clinical results.  Lab Results (most recent)       Procedure Component Value Units Date/Time    Comprehensive Metabolic Panel [[id number]]  (Abnormal) Collected: 07/13/25 0350    Specimen: Blood from Hand, Right Updated: 07/13/25 0458     Glucose 91 mg/dL      BUN 60.5 mg/dL      Creatinine 2.87 mg/dL      Sodium 132 mmol/L      Potassium 3.0 mmol/L      Chloride 94 mmol/L      CO2 22.6 mmol/L      Calcium 9.0 mg/dL      Total Protein 6.7 g/dL      Albumin 3.6 g/dL      ALT (SGPT) 10 U/L      AST (SGOT) 18 U/L      Alkaline Phosphatase 38 U/L      Total  Bilirubin 0.7 mg/dL      Globulin 3.1 gm/dL      A/G Ratio 1.2 g/dL      BUN/Creatinine Ratio 21.1     Anion Gap 15.4 mmol/L      eGFR 26.2 mL/min/1.73     Narrative:      GFR Categories in Chronic Kidney Disease (CKD)              GFR Category          GFR (mL/min/1.73)    Interpretation  G1                    90 or greater        Normal or high (1)  G2                    60-89                Mild decrease (1)  G3a                   45-59                Mild to moderate decrease  G3b                   30-44                Moderate to severe decrease  G4                    15-29                Severe decrease  G5                    14 or less           Kidney failure    (1)In the absence of evidence of kidney disease, neither GFR category G1 or G2 fulfill the criteria for CKD.    eGFR calculation 2021 CKD-EPI creatinine equation, which does not include race as a factor    High Sensitivity Troponin T [[id number]]  (Abnormal) Collected: 07/13/25 0350    Specimen: Blood from Hand, Right Updated: 07/13/25 0458     HS Troponin T 42 ng/L     Narrative:      High Sensitive Troponin T Reference Range:  <14.0 ng/L- Negative Female for AMI  <22.0 ng/L- Negative Male for AMI  >=14 - Abnormal Female indicating possible myocardial injury.  >=22 - Abnormal Male indicating possible myocardial injury.   Clinicians would have to utilize clinical acumen, EKG, Troponin, and serial changes to determine if it is an Acute Myocardial Infarction or myocardial injury due to an underlying chronic condition.         CBC Auto Differential [[id number]]  (Abnormal) Collected: 07/13/25 0350    Specimen: Blood from Hand, Right Updated: 07/13/25 0423     WBC 7.39 10*3/mm3      RBC 4.41 10*6/mm3      Hemoglobin 12.4 g/dL      Comment: Result checked          Hematocrit 38.1 %      MCV 86.4 fL      MCH 28.1 pg      MCHC 32.5 g/dL      RDW 14.4 %      RDW-SD 46.1 fl      MPV 9.8 fL      Platelets 209 10*3/mm3      Neutrophil % 58.5 %      Lymphocyte %  26.8 %      Monocyte % 12.4 %      Eosinophil % 1.6 %      Basophil % 0.4 %      Immature Grans % 0.3 %      Neutrophils, Absolute 4.32 10*3/mm3      Lymphocytes, Absolute 1.98 10*3/mm3      Monocytes, Absolute 0.92 10*3/mm3      Eosinophils, Absolute 0.12 10*3/mm3      Basophils, Absolute 0.03 10*3/mm3      Immature Grans, Absolute 0.02 10*3/mm3      nRBC 0.0 /100 WBC     High Sensitivity Troponin T 1Hr [[id number]]  (Abnormal) Collected: 07/12/25 2205    Specimen: Blood from Arm, Right Updated: 07/12/25 2230     HS Troponin T 41 ng/L      Troponin T Numeric Delta -6 ng/L      Troponin T % Delta -13    Narrative:      High Sensitive Troponin T Reference Range:  <14.0 ng/L- Negative Female for AMI  <22.0 ng/L- Negative Male for AMI  >=14 - Abnormal Female indicating possible myocardial injury.  >=22 - Abnormal Male indicating possible myocardial injury.   Clinicians would have to utilize clinical acumen, EKG, Troponin, and serial changes to determine if it is an Acute Myocardial Infarction or myocardial injury due to an underlying chronic condition.         BNP [[id number]]  (Abnormal) Collected: 07/12/25 2018    Specimen: Blood from Arm, Right Updated: 07/12/25 2055     proBNP 14,860.0 pg/mL     Narrative:      This assay is used as an aid in the diagnosis of individuals suspected of having heart failure. It can be used as an aid in the diagnosis of acute decompensated heart failure (ADHF) in patients presenting with signs and symptoms of ADHF to the emergency department (ED). In addition, NT-proBNP of <300 pg/mL indicates ADHF is not likely.    Age Range Result Interpretation  NT-proBNP Concentration (pg/mL:      <50             Positive            >450                   Gray                 300-450                    Negative             <300    50-75           Positive            >900                  Gray                300-900                  Negative            <300      >75             Positive             ">1800                  Rodas                300-1800                  Negative            <300    Procalcitonin [[id number]]  (Normal) Collected: 07/12/25 2018    Specimen: Blood from Arm, Right Updated: 07/12/25 2055     Procalcitonin 0.17 ng/mL     Narrative:      As a Marker for Sepsis (Non-Neonates):    1. <0.5 ng/mL represents a low risk of severe sepsis and/or septic shock.  2. >2 ng/mL represents a high risk of severe sepsis and/or septic shock.    As a Marker for Lower Respiratory Tract Infections that require antibiotic therapy:    PCT on Admission    Antibiotic Therapy       6-12 Hrs later    >0.5                Strongly Recommended  >0.25 - <0.5        Recommended   0.1 - 0.25          Discouraged              Remeasure/reassess PCT  <0.1                Strongly Discouraged     Remeasure/reassess PCT    As 28 day mortality risk marker: \"Change in Procalcitonin Result\" (>80% or <=80%) if Day 0 (or Day 1) and Day 4 values are available. Refer to [email or web address]    Change in PCT <=80%  A decrease of PCT levels below or equal to 80% defines a positive change in PCT test result representing a higher risk for 28-day all-cause mortality of patients diagnosed with severe sepsis for septic shock.    Change in PCT >80%  A decrease of PCT levels of more than 80% defines a negative change in PCT result representing a lower risk for 28-day all-cause mortality of patients diagnosed with severe sepsis or septic shock.       Comprehensive Metabolic Panel [[id number]]  (Abnormal) Collected: 07/12/25 2018    Specimen: Blood from Arm, Right Updated: 07/12/25 2055     Glucose 95 mg/dL      BUN 64.4 mg/dL      Creatinine 3.16 mg/dL      Sodium 133 mmol/L      Potassium 3.0 mmol/L      Chloride 92 mmol/L      CO2 24.2 mmol/L      Calcium 9.3 mg/dL      Total Protein 7.5 g/dL      Albumin 4.1 g/dL      ALT (SGPT) 14 U/L      AST (SGOT) 22 U/L      Alkaline Phosphatase 43 U/L      Total Bilirubin 0.7 mg/dL      " Globulin 3.4 gm/dL      A/G Ratio 1.2 g/dL      BUN/Creatinine Ratio 20.4     Anion Gap 16.8 mmol/L      eGFR 23.3 mL/min/1.73     Narrative:      GFR Categories in Chronic Kidney Disease (CKD)              GFR Category          GFR (mL/min/1.73)    Interpretation  G1                    90 or greater        Normal or high (1)  G2                    60-89                Mild decrease (1)  G3a                   45-59                Mild to moderate decrease  G3b                   30-44                Moderate to severe decrease  G4                    15-29                Severe decrease  G5                    14 or less           Kidney failure    (1)In the absence of evidence of kidney disease, neither GFR category G1 or G2 fulfill the criteria for CKD.    eGFR calculation 2021 CKD-EPI creatinine equation, which does not include race as a factor    High Sensitivity Troponin T [[id number]]  (Abnormal) Collected: 07/12/25 2018    Specimen: Blood from Arm, Right Updated: 07/12/25 2055     HS Troponin T 47 ng/L     Narrative:      High Sensitive Troponin T Reference Range:  <14.0 ng/L- Negative Female for AMI  <22.0 ng/L- Negative Male for AMI  >=14 - Abnormal Female indicating possible myocardial injury.  >=22 - Abnormal Male indicating possible myocardial injury.   Clinicians would have to utilize clinical acumen, EKG, Troponin, and serial changes to determine if it is an Acute Myocardial Infarction or myocardial injury due to an underlying chronic condition.         C-reactive Protein [[id number]]  (Abnormal) Collected: 07/12/25 2018    Specimen: Blood from Arm, Right Updated: 07/12/25 2055     C-Reactive Protein 7.06 mg/dL     Magnesium [[id number]]  (Normal) Collected: 07/12/25 2018    Specimen: Blood from Arm, Right Updated: 07/12/25 2055     Magnesium 2.5 mg/dL     CK [[id number]]  (Normal) Collected: 07/12/25 2018    Specimen: Blood from Arm, Right Updated: 07/12/25 2055     Creatine Kinase 60 U/L     Protime-INR  "[[id number]]  (Abnormal) Collected: 07/12/25 2018    Specimen: Blood from Arm, Right Updated: 07/12/25 2040     Protime 16.0 Seconds      INR 1.28    aPTT [[id number]]  (Normal) Collected: 07/12/25 2018    Specimen: Blood from Arm, Right Updated: 07/12/25 2040     PTT 30.2 seconds     D-dimer, Quantitative [[id number]]  (Normal) Collected: 07/12/25 2018    Specimen: Blood from Arm, Right Updated: 07/12/25 2040     D-Dimer, Quantitative <0.27 MCGFEU/mL     Narrative:      According to the assay 's published package insert, a normal (<0.50 MCGFEU/mL) D-dimer result in conjunction with a non-high clinical probability assessment, excludes deep vein thrombosis (DVT) and pulmonary embolism (PE) with high sensitivity.    D-dimer values increase with age and this can make VTE exclusion of an older population difficult. To address this, the [employer], based on best available evidence and recent guidelines, recommends that clinicians use age-adjusted D-dimer thresholds in patients greater than 50 years of age with: a) a low probability of PE who do not meet all Pulmonary Embolism Rule Out Criteria, or b) in those with intermediate probability of PE.   The formula for an age-adjusted D-dimer cut-off is \"age/100\".  For example, a 60 year old patient would have an age-adjusted cut-off of 0.60 MCGFEU/mL and an 80 year old 0.80 MCGFEU/mL.    Sedimentation Rate [[id number]]  (Abnormal) Collected: 07/12/25 2018    Specimen: Blood from Arm, Right Updated: 07/12/25 2033     Sed Rate 32 mm/hr     CBC & Differential [[id number]]  (Normal) Collected: 07/12/25 2018    Specimen: Blood from Arm, Right Updated: 07/12/25 2028    Narrative:      The following orders were created for panel order CBC & Differential.  Procedure                               Abnormality         Status                     ---------                               -----------         ------                     CBC Auto " Differential[[id number]]        Normal              Final result                 Please view results for these tests on the individual orders.    CBC Auto Differential [[id number]]  (Normal) Collected: 07/12/25 2018    Specimen: Blood from Arm, Right Updated: 07/12/25 2028     WBC 7.46 10*3/mm3      RBC 5.18 10*6/mm3      Hemoglobin 14.4 g/dL      Hematocrit 44.8 %      MCV 86.5 fL      MCH 27.8 pg      MCHC 32.1 g/dL      RDW 14.4 %      RDW-SD 46.1 fl      MPV 9.9 fL      Platelets 241 10*3/mm3      Neutrophil % 65.4 %      Lymphocyte % 21.0 %      Monocyte % 11.8 %      Eosinophil % 1.3 %      Basophil % 0.4 %      Immature Grans % 0.1 %      Neutrophils, Absolute 4.87 10*3/mm3      Lymphocytes, Absolute 1.57 10*3/mm3      Monocytes, Absolute 0.88 10*3/mm3      Eosinophils, Absolute 0.10 10*3/mm3      Basophils, Absolute 0.03 10*3/mm3      Immature Grans, Absolute 0.01 10*3/mm3      nRBC 0.0 /100 WBC             Imaging Results (Most Recent)       Procedure Component Value Units Date/Time    XR Chest 1 View [[id number]] Collected: 07/12/25 2048     Updated: 07/12/25 2050    Narrative:      XR CHEST 1 VW    Date of Exam: 7/12/2025 8:09 PM EDT    Indication: chest pain    Comparison: Chest radiograph 5/19/2025    Findings:  The heart is enlarged. There is mild pulmonary vascular prominence. There is no pneumothorax. There are no focal consolidations to indicate pneumonia.      Impression:      Impression:  Cardiomegaly with mild pulmonary vascular congestion.          Electronically Signed: Aaron Rod MD    7/12/2025 8:48 PM EDT    Workstation ID: [id number]          reviewed    ECG/EMG Results (most recent)       Procedure Component Value Units Date/Time    ECG 12 Lead Chest Pain [[id number]] Collected: 07/12/25 1940     Updated: 07/12/25 1941     QT Interval 447 ms      QTC Interval 519 ms     Narrative:      HEART RATE=77  bpm  RR Hdrbtvoz=997  ms  ME Interval=  ms  P Horizontal Axis=  deg  P Front Axis=  deg  QRSD  Aetapgsx=656  ms  QT Shheivmi=083  ms  UOqY=041  ms  QRS Axis=-43  deg  T Wave Axis=130  deg  - ABNORMAL ECG -  Atrial fibrillation  Ventricular premature complex  Left bundle branch block  ST elevation secondary to IVCD  Date and Time of Study:2025-07-12 19:40:03    Telemetry Scan [[id number]] Resulted: 07/12/25 1950     Updated: 07/12/25 2019    Telemetry Scan [[id number]] Resulted: 07/12/25 2315     Updated: 07/12/25 2335    Telemetry Scan [[id number]] Resulted: 07/12/25 2315     Updated: 07/12/25 2335    Telemetry Scan [[id number]] Resulted: 07/13/25 0414     Updated: 07/13/25 0437    Telemetry Scan [[id number]] Resulted: 07/13/25 0723     Updated: 07/13/25 0919    ECG 12 Lead [[id number]]  (Abnormal) Resulted: 07/13/25 1005     Updated: 07/13/25 1005    Telemetry Scan [[id number]] Resulted: 07/13/25 1115     Updated: 07/13/25 1136          reviewed        Results for orders placed during the hospital encounter of 05/19/25    Adult Transthoracic Echo Complete W/ Cont if Necessary Per Protocol    Interpretation Summary    Left ventricular ejection fraction appears to be 46 - 50%.    Left ventricular diastolic function is consistent with (grade II w/high LAP) pseudonormalization.  Average GLS -13%.    The right ventricular cavity is dilated.    The left atrial cavity is dilated.    Moderate mitral valve regurgitation is present.    Moderate tricuspid valve regurgitation is present.    Estimated right ventricular systolic pressure from tricuspid regurgitation is mildly elevated (35-45 mmHg).      Microbiology Results (last 10 days)       ** No results found for the last 240 hours. **            Assessment & Plan     Exacerbation of gout       Left lower extremity pain and swelling  - Uric acid on 7/11: 11.6, procalcitonin: 0.17  - CK: 60  - D-dimer: Less than 0.27  - WBCs: 7.46  - CRP: 7.06, sed rate: 32  - In the ED patient was given colchicine however due to interaction with Coreg and CKD this will be held for now  -  Dilaudid started in the ED, will attempt to de-escalate to hydrocodone, may need orthopedic surgery evaluation for possible joint aspiration or intra-articular steroid administration    CKD (Stage 4)  Lab Results   Component Value Date    CREATININE 2.87 (H) 07/13/2025    BUN 60.5 (H) 07/13/2025    BCR 21.1 07/13/2025    EGFR 26.2 (L) 07/13/2025   -Potassium: 3.0, magnesium: 2.5  - Continue diuretics and phos binder for now  -Nephrology consulted  -Avoid nephrotoxic medication IV dye unless urgently needed  -Monitor BMP and I's and O's while admitted    History of heart failure with mildly reduced ejection fraction and atrial fibrillation  - Troponin: 47, 41, 42 (troponins 49, 44 in May 2025  - proBNP: 14,860.0 (proBNP: 24,295.0 on 5/19/2025  - EKG showed A-fib at 77 with left bundle branch block and no obvious acute changes with a prolonged QTc of 519 ms  - Chest x-ray showed cardiomegaly with mild pulmonary vascular congestion which appears similar to previous studies  - Continue Eliquis, aspirin, Bumex, metolazone, Coreg, statin    Hypertension  - Moderate controlled   BP Readings from Last 1 Encounters:   07/13/25 125/90   - Continue diuretics, hydralazine, Coreg and amlodipine  - Monitor while admitted    Hyperlipidemia  - Statin    Depression/anxiety  - BuSpar    I discussed the patients findings and my recommendations with patient and nursing staff.     Discharge Diagnosis:      Exacerbation of gout      Hospital Course  Patient is a 50 y.o. male presented with persistent left lower extremity pain with recent diagnosis of gout in HPI noted above.  CK was ordered and found to be 60 with WBCs of 7.46, CRP of 7.06 and sed rate of 32.  Uric acid on 7/11 was found to be 11.6 and procalcitonin was within normal limits at 0.17.  Patient's renal function remained elevated though generally at his baseline initially showing creatinine of 3.16 with a GFR of 20.4.  Troponins were elevated at 47, 41, 42 with proBNP of  14,860.0 and signs of mild pulmonary vascular congestion on chest x-ray.  Patient denies any chest pain or dyspnea and these findings appear similar to results from the past several months.  He was given colchicine previously.  This was discussed with pharmacist who note given his recent administration as well as current Coreg and kidney function further treatment would not be recommended.  He reports significant untoward effects related to systemic glucocorticoids as well so this therapy was deferred.  Given his renal function NSAID therapy was also contraindicated.  He was continued on Dilaudid and hydrocodone throughout his admission.  He has been able to mobilize with the assistance of previously ordered crutches while admitted.  Nephrology was consulted who evaluated patient noting continued poor renal prognosis though appearing stable and recommending stopping metolazone as possible cause of hyperuricemia and consideration for Uloric on an outpatient basis.  He has to follow-up with primary nephrologist within 2 weeks.  At this time patient is felt to be in good condition for discharge with close follow-up with his PCP as well as nephrology on an outpatient basis.  His full testing/results and plan were discussed with patient along with concerning/alarm symptoms for which to call 911/return to the ED.  All questions were answered and he verbalizes his understanding and agreement.    Past Medical History:     Past Medical History:   Diagnosis Date    Anemia in chronic kidney disease (CKD) 02/11/2025    Arteriovenous fistula 02/11/2025    Atrial fibrillation 11/23/2024    Rate controlled with Coreg    Anticoagulated with renal dosed Eliquis 2.5 mg twice daily    Without history of ablations or DCCV         Chronic diastolic heart failure     CKD (chronic kidney disease) stage 4, GFR 15-29 ml/min 02/06/2025    Followed by Dr Das,       COVID-19 virus detected 12/28/2021    Cytokine release syndrome, grade 1  12/29/2021    h/o Medication and medical follow-up noncompliance 04/10/2024    Hepatitis C 01/03/2025    Hyperkalemia 04/26/2022    Hyperlipidemia 01/21/2025    Hypertensive heart disease with congestive heart failure and chronic kidney disease 02/11/2025    Hypertensive urgency 04/26/2022    Methamphetamine abuse 04/26/2022    Methamphetamine intoxication 06/16/2024    Mild to moderate valvular heart disease 02/06/2025    Mild to moderate mitral valve regurgitation      Secondary hyperparathyroidism 02/11/2025    Tick bite of abdomen 04/26/2022    Tobacco abuse 04/26/2022       Past Surgical History:     Past Surgical History:   Procedure Laterality Date    CARDIAC CATHETERIZATION N/A 04/27/2022    ORIF TIBIA/FIBULA FRACTURES Right        Social History:   Social History     Socioeconomic History    Marital status: Single   Tobacco Use    Smoking status: Every Day     Current packs/day: 0.25     Average packs/day: 1 pack/day for 31.5 years (30.7 ttl pk-yrs)     Types: Cigarettes     Start date: 1994     Passive exposure: Never    Smokeless tobacco: Never    Tobacco comments:     Down to 1/2 pack every 3 days, wearing a nicotine patch - 6/24/25   Vaping Use    Vaping status: Never Used   Substance and Sexual Activity    Alcohol use: Not Currently    Drug use: Yes     Types: Methamphetamines, Marijuana, Cocaine(coke)    Sexual activity: Yes       Procedures Performed         Consults:   Consults       Date and Time Order Name Status Description    7/13/2025  8:19 AM Inpatient Nephrology Consult Completed             Condition on Discharge:     Stable    Discharge Disposition  Home or Self Care    Discharge Medications     Discharge Medications        New Medications        Instructions Start Date   HYDROcodone-acetaminophen  MG per tablet  Commonly known as: NORCO   1 tablet, Oral, Every 6 Hours PRN             Continue These Medications        Instructions Start Date   amLODIPine 5 MG tablet  Commonly known  as: NORVASC   5 mg, Oral, Daily      apixaban 2.5 MG tablet tablet  Commonly known as: ELIQUIS   2.5 mg, Oral, 2 Times Daily      aspirin 81 MG EC tablet   81 mg, Oral, Daily      bumetanide 2 MG tablet  Commonly known as: BUMEX   2 mg, Oral, 2 Times Daily      busPIRone 5 MG tablet  Commonly known as: BUSPAR   5 mg, Oral, 3 Times Daily      calcium acetate 667 MG tablet  Commonly known as: PHOSLO   667 mg, 3 Times Daily With Meals      carvedilol 25 MG tablet  Commonly known as: COREG   25 mg, 2 Times Daily With Meals      hydrALAZINE 25 MG tablet  Commonly known as: APRESOLINE   25 mg, Oral, 3 Times Daily      nitroglycerin 0.4 MG SL tablet  Commonly known as: NITROSTAT   0.4 mg, Sublingual, Every 5 Minutes PRN, Take no more than 3 doses in 15 minutes.      potassium chloride ER 20 MEQ tablet controlled-release ER tablet  Commonly known as: K-TAB   20 mEq, Oral, Daily      rosuvastatin 40 MG tablet  Commonly known as: CRESTOR   40 mg, Oral, Daily      Ventolin  (90 Base) MCG/ACT inhaler  Generic drug: albuterol sulfate HFA   2 puffs, Inhalation, Every 4 Hours PRN             Stop These Medications      metOLazone 2.5 MG tablet  Commonly known as: ZAROXOLYN              Discharge Diet:     Activity at Discharge:   Activity Instructions       Driving Restrictions      Type of Restriction: Driving    Driving Restrictions: No Driving While Taking Narcotics            Follow-up Appointments  Future Appointments   Date Time Provider Department Center   7/16/2025  1:00 PM Vicki Santana APRN [facility]   7/22/2025  1:45 PM Cary Espinosa MD [facility]   7/31/2025  9:45 AM Mor Murillo MD [facility] None   8/28/2025  8:30 AM LAB [facility]   9/10/2025  3:45 PM Vicki Santana APRN [facility]   12/30/2025  8:45 AM Cary Espinosa MD [facility]     Additional Instructions for the Follow-ups that You Need to Schedule       Discharge Follow-up  with PCP   As directed       Currently Documented PCP:    Vicki aSntana APRN    PCP Phone Number:    [phone]     Follow Up Details: 5 to 7 days        Discharge Follow-up with Specified Provider: Nephrology; 2 Weeks   As directed      To: Nephrology   Follow Up: 2 Weeks                Test Results Pending at Discharge  Pending Results       Procedure [Order ID] Specimen - Date/Time    Potassium [[id number]]     Specimen: Blood              Risk for Readmission (LACE) Score: 9 (7/13/2025  6:00 AM)      Greater than 30 minutes spent in discharge activities for this patient    Signature:Electronically signed by Dinh Cox PA-C, 07/13/25, 3:18 PM EDT.

## 2025-07-14 ENCOUNTER — TRANSITIONAL CARE MANAGEMENT TELEPHONE ENCOUNTER (OUTPATIENT)
Dept: CALL CENTER | Facility: HOSPITAL | Age: 50
End: 2025-07-14
Payer: OTHER GOVERNMENT

## 2025-07-14 ENCOUNTER — READMISSION MANAGEMENT (OUTPATIENT)
Dept: CALL CENTER | Facility: HOSPITAL | Age: 50
End: 2025-07-14
Payer: OTHER GOVERNMENT

## 2025-07-14 LAB
QT INTERVAL: 447 MS
QTC INTERVAL: 519 MS

## 2025-07-14 NOTE — OUTREACH NOTE
Prep Survey      Flowsheet Row Responses   [facility] patient discharged from? [facility]   Is LACE score < 7 ? No   Eligibility [facility]   Date of Admission 07/12/25   Date of Discharge 07/13/25   Discharge Disposition Home or Self Care   Discharge diagnosis Exacerbation of gout   Does the patient have one of the following disease processes/diagnoses(primary or secondary)? Other   Does the patient have Home health ordered? No   Is there a DME ordered? No   Prep survey completed? Yes            Erica RENO - Registered Nurse

## 2025-07-14 NOTE — OUTREACH NOTE
Call Center TCM Note      Flowsheet Row Responses   [facility] [place] patient discharged from? [facility]   Does the patient have one of the following disease processes/diagnoses(primary or secondary)? Other   TCM attempt successful? Yes   Call start time 1401   Call end time 1404   Discharge diagnosis Exacerbation of gout   Person spoke with today (if not patient) and relationship sister   Meds reviewed with patient/caregiver? Yes   Is the patient having any side effects they believe may be caused by any medication additions or changes? No   Does the patient have all medications ordered at discharge? Yes   Is the patient taking all medications as directed (includes completed medication regime)? Yes   Comments Cardiology 7/22/25,  Pulmonary 7/31/25,  Nephrology 7/25/25   Does the patient have an appointment with their PCP within 7-14 days of discharge? Yes  [7/16/2025  1:00 PM]   Psychosocial issues? No   Did the patient receive a copy of their discharge instructions? Yes   Nursing interventions Reviewed instructions with patient   What is the patient's perception of their health status since discharge? Improving   Is the patient/caregiver able to teach back signs and symptoms related to disease process for when to call PCP? Yes   Is the patient/caregiver able to teach back signs and symptoms related to disease process for when to call 911? Yes   Is the patient/caregiver able to teach back the hierarchy of who to call/visit for symptoms/problems? PCP, Specialist, Home health nurse, Urgent Care, ED, 911 Yes   TCM call completed? Yes   Wrap up additional comments Pt states he is doing better, and now is able to walk on right knee but still painful. Reviewed AVS/meds with pt. Pt verified PCP, Nephrology, Cardiology, Pulmonary fu appts   Call end time 1404   Would this patient benefit from a Referral to Amb Social Work? No   Is the patient interested in additional calls from an ambulatory ? No            Lyric RENO  - Registered Nurse    7/14/2025, 14:05 EDT

## 2025-07-14 NOTE — CASE MANAGEMENT/SOCIAL WORK
Case Management Discharge Note      Final Note: Routine home                      Transportation Services  Transportation: Private Transportation  Private: Car    Final Discharge Disposition Code: 01 - home or self-care

## 2025-07-16 ENCOUNTER — OFFICE VISIT (OUTPATIENT)
Dept: FAMILY MEDICINE CLINIC | Facility: CLINIC | Age: 50
End: 2025-07-16
Payer: OTHER GOVERNMENT

## 2025-07-16 ENCOUNTER — LAB (OUTPATIENT)
Dept: FAMILY MEDICINE CLINIC | Facility: CLINIC | Age: 50
End: 2025-07-16
Payer: OTHER GOVERNMENT

## 2025-07-16 VITALS
HEART RATE: 58 BPM | DIASTOLIC BLOOD PRESSURE: 77 MMHG | OXYGEN SATURATION: 96 % | RESPIRATION RATE: 16 BRPM | HEIGHT: 65 IN | WEIGHT: 171.7 LBS | BODY MASS INDEX: 28.61 KG/M2 | TEMPERATURE: 96.8 F | SYSTOLIC BLOOD PRESSURE: 118 MMHG

## 2025-07-16 DIAGNOSIS — Z00.00 HEALTHCARE MAINTENANCE: ICD-10-CM

## 2025-07-16 DIAGNOSIS — M10.362 GOUT OF LEFT KNEE DUE TO RENAL IMPAIRMENT, UNSPECIFIED CHRONICITY: ICD-10-CM

## 2025-07-16 DIAGNOSIS — R79.89 ELEVATED BRAIN NATRIURETIC PEPTIDE (BNP) LEVEL: ICD-10-CM

## 2025-07-16 DIAGNOSIS — I50.31 ACUTE DIASTOLIC CHF (CONGESTIVE HEART FAILURE): Primary | ICD-10-CM

## 2025-07-16 LAB
BACTERIA UR QL AUTO: NORMAL /HPF
BILIRUB UR QL STRIP: NEGATIVE
CLARITY UR: CLEAR
COLOR UR: YELLOW
GLUCOSE UR STRIP-MCNC: NEGATIVE MG/DL
HGB UR QL STRIP.AUTO: NEGATIVE
HOLD SPECIMEN: NORMAL
HYALINE CASTS UR QL AUTO: NORMAL /LPF
KETONES UR QL STRIP: NEGATIVE
LEUKOCYTE ESTERASE UR QL STRIP.AUTO: NEGATIVE
NITRITE UR QL STRIP: NEGATIVE
PH UR STRIP.AUTO: 6.5 [PH] (ref 5–8)
PROT UR QL STRIP: ABNORMAL
RBC # UR STRIP: NORMAL /HPF
REF LAB TEST METHOD: NORMAL
SP GR UR STRIP: 1.01 (ref 1–1.03)
SQUAMOUS #/AREA URNS HPF: NORMAL /HPF
UROBILINOGEN UR QL STRIP: ABNORMAL
WBC # UR STRIP: NORMAL /HPF

## 2025-07-16 PROCEDURE — 81001 URINALYSIS AUTO W/SCOPE: CPT | Performed by: NURSE PRACTITIONER

## 2025-07-16 PROCEDURE — 80061 LIPID PANEL: CPT | Performed by: NURSE PRACTITIONER

## 2025-07-16 PROCEDURE — 85025 COMPLETE CBC W/AUTO DIFF WBC: CPT | Performed by: NURSE PRACTITIONER

## 2025-07-16 PROCEDURE — 36415 COLL VENOUS BLD VENIPUNCTURE: CPT

## 2025-07-16 PROCEDURE — 83036 HEMOGLOBIN GLYCOSYLATED A1C: CPT | Performed by: NURSE PRACTITIONER

## 2025-07-16 PROCEDURE — 84550 ASSAY OF BLOOD/URIC ACID: CPT | Performed by: NURSE PRACTITIONER

## 2025-07-16 PROCEDURE — 80053 COMPREHEN METABOLIC PANEL: CPT | Performed by: NURSE PRACTITIONER

## 2025-07-16 PROCEDURE — 83880 ASSAY OF NATRIURETIC PEPTIDE: CPT | Performed by: NURSE PRACTITIONER

## 2025-07-16 RX ORDER — POTASSIUM CHLORIDE 1500 MG/1
20 TABLET, EXTENDED RELEASE ORAL DAILY
Qty: 90 TABLET | Refills: 0 | Status: SHIPPED | OUTPATIENT
Start: 2025-07-16 | End: 2025-10-14

## 2025-07-16 RX ORDER — ALLOPURINOL 100 MG/1
50 TABLET ORAL EVERY OTHER DAY
Qty: 8 TABLET | Refills: 0 | Status: SHIPPED | OUTPATIENT
Start: 2025-07-16 | End: 2025-08-15

## 2025-07-16 NOTE — PROGRESS NOTES
Chief Complaint  Hospital Follow Up Visit    Subjective        Scott Gaytan presents to [facility]  History of Present Illness  Scott, 51 y/o male patient presents to  for 3 month f/u  History of Present Illness  This is a male with a history of gout, hypertension, and chronic kidney disease presenting with symptoms of gout.    The patient reports a recent episode of gout that began approximately three months ago. The pain initially started in his foot, then progressed to his knee, and has now shifted to his right heel. He describes the pain as severe, swelling, and sore, with the pain more pronounced in the back than the front. The onset of pain was sudden, and he experienced difficulty moving his leg and foot, which was positioned to the left and could not be lifted. He visited the ER due to the severity of the pain and was prescribed hydrocodone 10 mg every 6 hours, with a total of 12 doses. Despite this treatment, he found it difficult to manage the pain. He was previously diagnosed with gout and advised to apply ice and perform exercises, but these measures were ineffective. During his hospital stay, he was administered Dilaudid 1 mg every two hours, but found it unhelpful in managing his pain. An x-ray was performed, and blood tests ruled out a blood clot. He has been taking tart cherry supplements daily, which he believes are helping to manage his symptoms.    The patient also reports being diagnosed with a hernia, which is not currently causing significant discomfort unless he overexerts himself.    Regarding his medication management, his nephrologist has recommended starting Entresto, but he has not yet done so due to concerns about potential kidney damage. He was advised to discontinue amlodipine and potassium, and to start losartan and Entresto. He is currently taking carvedilol, a diuretic, Cialis, Eliquis 2.5 mg, and aspirin. He has not needed to use nitroglycerin.  "He is requesting a refill of his potassium supplement.    PAST SURGICAL HISTORY:  The patient reports a history of a leg fracture with pins and rods placement.    Objective   Vital Signs:  /77 (BP Location: Right arm, Patient Position: Sitting, Cuff Size: Adult)   Pulse 58   Temp 96.8 °F (36 °C) (Infrared)   Resp 16   Ht 165.1 cm (65\")   Wt 77.9 kg (171 lb 11.2 oz)   SpO2 96%   BMI 28.57 kg/m²   Estimated body mass index is 28.57 kg/m² as calculated from the following:    Height as of this encounter: 165.1 cm (65\").    Weight as of this encounter: 77.9 kg (171 lb 11.2 oz).    Physical Exam   Result Review :  The following data was reviewed by: CHANDLER Omalley on 07/16/2025:  Common labs          7/11/2025    09:03 7/12/2025    20:18 7/13/2025    03:50   Common Labs   Glucose 150  95  91    BUN 63.1  64.4  60.5    Creatinine 3.10  3.16  2.87    Sodium 138  133  132    Potassium 3.1  3.0  3.0    Chloride 98  92  94    Calcium 9.1  9.3  9.0    Albumin 4.0  4.1  3.6    Total Bilirubin 0.5  0.7  0.7    Alkaline Phosphatase 43  43  38    AST (SGOT) 25  22  18    ALT (SGPT) 18  14  10    WBC 8.66  7.46  7.39    Hemoglobin 13.6  14.4  12.4    Hematocrit 42.4  44.8  38.1    Platelets 203  241  209    Uric Acid 11.6        C Reactive Protein          7/12/2025    20:18   Common Labs   C-Reactive Protein 7.06      Uric Acid          7/11/2025    09:03   Common Labs   Uric Acid 11.6      Data reviewed : Radiologic studies 7/16/25 XR KNEE 3 VW LEFT     Date of Exam: 7/11/2025 9:01 AM EDT     Indication: R knee pain     Comparison: None available.     Findings:  Alignment seems reasonable. There is a suprapatellar bursal joint effusion. An acute osseous abnormality not definitely identified.     IMPRESSION:  Impression:  Suprapatellar bursal joint effusion.           Electronically Signed: Joaquin Kessler MD    7/11/2025 9:08 AM EDT    Workstation ID: [id number]         Assessment and Plan   Diagnoses and all orders " for this visit:    1. Acute diastolic CHF (congestive heart failure) (Primary)  -     empagliflozin (Jardiance) 10 MG tablet tablet; Take 1 tablet by mouth Daily.  Dispense: 90 tablet; Refill: 1    2. Gout of left knee due to renal impairment, unspecified chronicity  -     Uric acid; Future  -     allopurinol (Zyloprim) 100 MG tablet; Take 0.5 tablets by mouth Every Other Day for 30 days.  Dispense: 8 tablet; Refill: 0  -     BNP  -     Comprehensive metabolic panel; Future  -     Sedimentation rate, automated; Future  -     C-reactive protein; Future    3. Elevated brain natriuretic peptide (BNP) level  -     BNP  -     Comprehensive metabolic panel; Future    Other orders  -     potassium chloride ER (K-TAB) 20 MEQ tablet controlled-release ER tablet; Take 1 tablet by mouth Daily for 90 days.  Dispense: 90 tablet; Refill: 0        Assessment & Plan  Initial Assessment:  Reports ongoing pain and swelling in knee, initially started in foot and moved to knee. Seen in ER, given hydrocodone 10 mg every 6 hours for pain management. Renal impairment prevents use of standard gout medications like prednisone. Currently taking tart cherry supplements, which seem to help.    Differential Diagnosis:  - Gout: Pain and swelling in knee, initially started in foot. Hydrocodone 10 mg every 6 hours for pain. Tart cherry supplements. Allopurinol 50 mg every other day prescribed.  - Hernia: Identified during previous hospital visit. CT scan of abdomen and pelvis on 06/05/2025 revealed no acute abnormalities. Seek immediate medical attention if painful, protrudes, or changes color.  - Hypertension: Blood pressure 118/77 mmHg. Currently taking amlodipine. Advised to discontinue amlodipine and potassium supplements. Start losartan and Entresto as per kidney doctor's recommendations.    ED Course:  - Hydrocodone 10 mg every 6 hours for pain management.  - Allopurinol 50 mg every other day prescribed.  - BNP and CMP conducted today.  - CT  scan of abdomen and pelvis on 06/05/2025 revealed no acute abnormalities.    Final Assessment:  Pain and swelling in knee, initially started in foot. Hydrocodone 10 mg every 6 hours for pain management. Renal impairment prevents use of standard gout medications. Allopurinol 50 mg every other day prescribed. BNP and CMP conducted. CT scan of abdomen and pelvis on 06/05/2025 revealed no acute abnormalities.    Clinical Impression:  - Gout  - Hernia  - Hypertension    Disposition:  - Follow-Up: Follow up with kidney doctor. Appointment with Dr. Royal on 07/22/2025. Call and make appointment with Dr. Chong. Follow up in September 2025 or sooner if necessary.    Patient Education: Seek immediate medical attention if hernia becomes painful, protrudes, or changes color. Continue current medications as prescribed. Refill potassium supplements.       I spent 30 minutes caring for Scott on this date of service. This time includes time spent by me in the following activities:preparing for the visit, reviewing tests, obtaining and/or reviewing a separately obtained history, performing a medically appropriate examination and/or evaluation , counseling and educating the patient/family/caregiver, ordering medications, tests, or procedures, referring and communicating with other health care professionals , documenting information in the medical record, independently interpreting results and communicating that information with the patient/family/caregiver, and care coordination  Follow Up   No follow-ups on file.  Patient was given instructions and counseling regarding his condition or for health maintenance advice. Please see specific information pulled into the AVS if appropriate.         Patient or patient representative verbalized consent for the use of Ambient Listening during the visit with  CHANDLER Omalley for chart documentation. 7/16/2025  13:43 EDT

## 2025-07-17 LAB
ALBUMIN SERPL-MCNC: 3.7 G/DL (ref 3.5–5.2)
ALBUMIN/GLOB SERPL: 1 G/DL
ALP SERPL-CCNC: 43 U/L (ref 39–117)
ALT SERPL W P-5'-P-CCNC: 18 U/L (ref 1–41)
ANION GAP SERPL CALCULATED.3IONS-SCNC: 12.9 MMOL/L (ref 5–15)
AST SERPL-CCNC: 26 U/L (ref 1–40)
BASOPHILS # BLD AUTO: 0.03 10*3/MM3 (ref 0–0.2)
BASOPHILS NFR BLD AUTO: 0.5 % (ref 0–1.5)
BILIRUB SERPL-MCNC: 0.3 MG/DL (ref 0–1.2)
BUN SERPL-MCNC: 53 MG/DL (ref 6–20)
BUN/CREAT SERPL: 17.7 (ref 7–25)
CALCIUM SPEC-SCNC: 9.2 MG/DL (ref 8.6–10.5)
CHLORIDE SERPL-SCNC: 98 MMOL/L (ref 98–107)
CHOLEST SERPL-MCNC: 136 MG/DL (ref 0–200)
CO2 SERPL-SCNC: 29.1 MMOL/L (ref 22–29)
CREAT SERPL-MCNC: 3 MG/DL (ref 0.76–1.27)
DEPRECATED RDW RBC AUTO: 43.9 FL (ref 37–54)
EGFRCR SERPLBLD CKD-EPI 2021: 24.8 ML/MIN/1.73
EOSINOPHIL # BLD AUTO: 0.32 10*3/MM3 (ref 0–0.4)
EOSINOPHIL NFR BLD AUTO: 5 % (ref 0.3–6.2)
ERYTHROCYTE [DISTWIDTH] IN BLOOD BY AUTOMATED COUNT: 13.7 % (ref 12.3–15.4)
GLOBULIN UR ELPH-MCNC: 3.8 GM/DL
GLUCOSE SERPL-MCNC: 114 MG/DL (ref 65–99)
HBA1C MFR BLD: 6 % (ref 4.8–5.6)
HCT VFR BLD AUTO: 42.5 % (ref 37.5–51)
HDLC SERPL-MCNC: 46 MG/DL (ref 40–60)
HGB BLD-MCNC: 13.9 G/DL (ref 13–17.7)
IMM GRANULOCYTES # BLD AUTO: 0.02 10*3/MM3 (ref 0–0.05)
IMM GRANULOCYTES NFR BLD AUTO: 0.3 % (ref 0–0.5)
LDLC SERPL CALC-MCNC: 76 MG/DL (ref 0–100)
LDLC/HDLC SERPL: 1.66 {RATIO}
LYMPHOCYTES # BLD AUTO: 1.38 10*3/MM3 (ref 0.7–3.1)
LYMPHOCYTES NFR BLD AUTO: 21.4 % (ref 19.6–45.3)
MCH RBC QN AUTO: 29.3 PG (ref 26.6–33)
MCHC RBC AUTO-ENTMCNC: 32.7 G/DL (ref 31.5–35.7)
MCV RBC AUTO: 89.5 FL (ref 79–97)
MONOCYTES # BLD AUTO: 0.62 10*3/MM3 (ref 0.1–0.9)
MONOCYTES NFR BLD AUTO: 9.6 % (ref 5–12)
NEUTROPHILS NFR BLD AUTO: 4.09 10*3/MM3 (ref 1.7–7)
NEUTROPHILS NFR BLD AUTO: 63.2 % (ref 42.7–76)
NRBC BLD AUTO-RTO: 0 /100 WBC (ref 0–0.2)
NT-PROBNP SERPL-MCNC: ABNORMAL PG/ML (ref 0–450)
PLATELET # BLD AUTO: 240 10*3/MM3 (ref 140–450)
PMV BLD AUTO: 10.1 FL (ref 6–12)
POTASSIUM SERPL-SCNC: 3.1 MMOL/L (ref 3.5–5.2)
PROT SERPL-MCNC: 7.5 G/DL (ref 6–8.5)
RBC # BLD AUTO: 4.75 10*6/MM3 (ref 4.14–5.8)
SODIUM SERPL-SCNC: 140 MMOL/L (ref 136–145)
TRIGL SERPL-MCNC: 68 MG/DL (ref 0–150)
URATE SERPL-MCNC: 13.4 MG/DL (ref 3.4–7)
VLDLC SERPL-MCNC: 14 MG/DL (ref 5–40)
WBC NRBC COR # BLD AUTO: 6.46 10*3/MM3 (ref 3.4–10.8)

## 2025-07-21 DIAGNOSIS — I1A.0 RESISTANT HYPERTENSION: ICD-10-CM

## 2025-07-21 DIAGNOSIS — E78.5 HYPERLIPIDEMIA, UNSPECIFIED HYPERLIPIDEMIA TYPE: ICD-10-CM

## 2025-07-21 DIAGNOSIS — F41.9 ANXIETY: ICD-10-CM

## 2025-07-23 RX ORDER — ROSUVASTATIN CALCIUM 40 MG/1
40 TABLET, COATED ORAL DAILY
Qty: 90 TABLET | Refills: 0 | Status: SHIPPED | OUTPATIENT
Start: 2025-07-23

## 2025-07-23 RX ORDER — BUSPIRONE HYDROCHLORIDE 5 MG/1
5 TABLET ORAL 3 TIMES DAILY
Qty: 90 TABLET | Refills: 0 | Status: SHIPPED | OUTPATIENT
Start: 2025-07-23 | End: 2025-08-22

## 2025-07-23 RX ORDER — HYDRALAZINE HYDROCHLORIDE 25 MG/1
25 TABLET, FILM COATED ORAL 3 TIMES DAILY
Qty: 90 TABLET | Refills: 0 | Status: SHIPPED | OUTPATIENT
Start: 2025-07-23

## 2025-07-28 ENCOUNTER — HOSPITAL ENCOUNTER (EMERGENCY)
Facility: HOSPITAL | Age: 50
Discharge: HOME | End: 2025-07-28
Attending: EMERGENCY MEDICINE | Admitting: EMERGENCY MEDICINE
Payer: OTHER GOVERNMENT

## 2025-07-28 VITALS
OXYGEN SATURATION: 96 % | HEART RATE: 83 BPM | SYSTOLIC BLOOD PRESSURE: 135 MMHG | WEIGHT: 171.08 LBS | HEIGHT: 68 IN | DIASTOLIC BLOOD PRESSURE: 91 MMHG | BODY MASS INDEX: 25.93 KG/M2 | TEMPERATURE: 97.5 F | RESPIRATION RATE: 20 BRPM

## 2025-07-28 DIAGNOSIS — I48.91 ATRIAL FIBRILLATION, UNSPECIFIED TYPE: ICD-10-CM

## 2025-07-28 DIAGNOSIS — M25.562 ACUTE PAIN OF BOTH KNEES: Primary | ICD-10-CM

## 2025-07-28 DIAGNOSIS — M25.561 ACUTE PAIN OF BOTH KNEES: Primary | ICD-10-CM

## 2025-07-28 LAB
ALBUMIN SERPL-MCNC: 3.8 G/DL (ref 3.5–5.2)
ALBUMIN/GLOB SERPL: 1.2 G/DL
ALP SERPL-CCNC: 50 U/L (ref 39–117)
ALT SERPL W P-5'-P-CCNC: 13 U/L (ref 1–41)
ANION GAP SERPL CALCULATED.3IONS-SCNC: 15.3 MMOL/L (ref 5–15)
AST SERPL-CCNC: 21 U/L (ref 1–40)
BASOPHILS # BLD AUTO: 0.03 10*3/MM3 (ref 0–0.2)
BASOPHILS NFR BLD AUTO: 0.4 % (ref 0–1.5)
BILIRUB SERPL-MCNC: 0.6 MG/DL (ref 0–1.2)
BUN SERPL-MCNC: 63.1 MG/DL (ref 6–20)
BUN/CREAT SERPL: 20.8 (ref 7–25)
CALCIUM SPEC-SCNC: 8.7 MG/DL (ref 8.6–10.5)
CHLORIDE SERPL-SCNC: 103 MMOL/L (ref 98–107)
CO2 SERPL-SCNC: 19.7 MMOL/L (ref 22–29)
CREAT SERPL-MCNC: 3.03 MG/DL (ref 0.76–1.27)
DEPRECATED RDW RBC AUTO: 44.5 FL (ref 37–54)
EGFRCR SERPLBLD CKD-EPI 2021: 24.5 ML/MIN/1.73
EOSINOPHIL # BLD AUTO: 0.19 10*3/MM3 (ref 0–0.4)
EOSINOPHIL NFR BLD AUTO: 2.5 % (ref 0.3–6.2)
ERYTHROCYTE [DISTWIDTH] IN BLOOD BY AUTOMATED COUNT: 14.6 % (ref 12.3–15.4)
GEN 5 1HR TROPONIN T REFLEX: 29 NG/L
GLOBULIN UR ELPH-MCNC: 3.2 GM/DL
GLUCOSE SERPL-MCNC: 98 MG/DL (ref 65–99)
HCT VFR BLD AUTO: 39.9 % (ref 37.5–51)
HGB BLD-MCNC: 13.2 G/DL (ref 13–17.7)
HOLD SPECIMEN: NORMAL
HOLD SPECIMEN: NORMAL
IMM GRANULOCYTES # BLD AUTO: 0.02 10*3/MM3 (ref 0–0.05)
IMM GRANULOCYTES NFR BLD AUTO: 0.3 % (ref 0–0.5)
LYMPHOCYTES # BLD AUTO: 1.4 10*3/MM3 (ref 0.7–3.1)
LYMPHOCYTES NFR BLD AUTO: 18.1 % (ref 19.6–45.3)
MCH RBC QN AUTO: 27.7 PG (ref 26.6–33)
MCHC RBC AUTO-ENTMCNC: 33.1 G/DL (ref 31.5–35.7)
MCV RBC AUTO: 83.6 FL (ref 79–97)
MONOCYTES # BLD AUTO: 0.81 10*3/MM3 (ref 0.1–0.9)
MONOCYTES NFR BLD AUTO: 10.5 % (ref 5–12)
NEUTROPHILS NFR BLD AUTO: 5.29 10*3/MM3 (ref 1.7–7)
NEUTROPHILS NFR BLD AUTO: 68.2 % (ref 42.7–76)
NRBC BLD AUTO-RTO: 0 /100 WBC (ref 0–0.2)
NT-PROBNP SERPL-MCNC: ABNORMAL PG/ML (ref 0–450)
PLATELET # BLD AUTO: 195 10*3/MM3 (ref 140–450)
PMV BLD AUTO: 10.2 FL (ref 6–12)
POTASSIUM SERPL-SCNC: 3.9 MMOL/L (ref 3.5–5.2)
PROT SERPL-MCNC: 7 G/DL (ref 6–8.5)
RBC # BLD AUTO: 4.77 10*6/MM3 (ref 4.14–5.8)
SODIUM SERPL-SCNC: 138 MMOL/L (ref 136–145)
TROPONIN T % DELTA: 0
TROPONIN T NUMERIC DELTA: 0 NG/L
TROPONIN T SERPL HS-MCNC: 29 NG/L
WBC NRBC COR # BLD AUTO: 7.74 10*3/MM3 (ref 3.4–10.8)
WHOLE BLOOD HOLD COAG: NORMAL
WHOLE BLOOD HOLD SPECIMEN: NORMAL

## 2025-07-28 PROCEDURE — 99283 EMERGENCY DEPT VISIT LOW MDM: CPT

## 2025-07-28 PROCEDURE — 36415 COLL VENOUS BLD VENIPUNCTURE: CPT

## 2025-07-28 PROCEDURE — 85025 COMPLETE CBC W/AUTO DIFF WBC: CPT | Performed by: EMERGENCY MEDICINE

## 2025-07-28 PROCEDURE — 93005 ELECTROCARDIOGRAM TRACING: CPT | Performed by: EMERGENCY MEDICINE

## 2025-07-28 PROCEDURE — 84484 ASSAY OF TROPONIN QUANT: CPT | Performed by: EMERGENCY MEDICINE

## 2025-07-28 PROCEDURE — 80053 COMPREHEN METABOLIC PANEL: CPT | Performed by: EMERGENCY MEDICINE

## 2025-07-28 PROCEDURE — 83880 ASSAY OF NATRIURETIC PEPTIDE: CPT | Performed by: EMERGENCY MEDICINE

## 2025-07-28 PROCEDURE — 93005 ELECTROCARDIOGRAM TRACING: CPT

## 2025-07-28 RX ORDER — SODIUM CHLORIDE 0.9 % (FLUSH) 0.9 %
10 SYRINGE (ML) INJECTION AS NEEDED
Status: DISCONTINUED | OUTPATIENT
Start: 2025-07-28 | End: 2025-07-28 | Stop reason: HOSPADM

## 2025-07-28 RX ORDER — HYDROCODONE BITARTRATE AND ACETAMINOPHEN 5; 325 MG/1; MG/1
1 TABLET ORAL EVERY 6 HOURS PRN
Qty: 12 TABLET | Refills: 0 | Status: SHIPPED | OUTPATIENT
Start: 2025-07-28

## 2025-07-28 NOTE — ED PROVIDER NOTES
Subjective   History of Present Illness  Patient is a 50-year-old male complaining of bilateral knee and ankle pain.  He states this is chronic however is much worse recently.  Nuys recent injury numbness ting weakness or other complaint.  He also states that he feels like he is in A-fib again.  He states he is on Eliquis for A-fib however.  He is not on rate control medication per the patient.  He denies chest pain shortness of breath dizziness or other complaint      Review of Systems    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    h/o Medication and medical follow-up noncompliance 04/10/2024    Hepatitis C 01/03/2025    Hyperkalemia 04/26/2022    Hyperlipidemia 01/21/2025    Hypertensive heart disease with congestive heart failure and chronic kidney disease 02/11/2025    Hypertensive urgency 04/26/2022    Methamphetamine abuse 04/26/2022    Methamphetamine intoxication 06/16/2024    Mild to moderate valvular heart disease 02/06/2025    Mild to moderate mitral valve regurgitation      Secondary hyperparathyroidism 02/11/2025    Tick bite of abdomen 04/26/2022    Tobacco abuse 04/26/2022       Allergies   Allergen Reactions    Methylprednisolone Swelling     Pt reports severe swelling with steroids    Morphine Other (See Comments)     Makes it feel like my blood is boiling in my veins       Past Surgical History:   Procedure Laterality Date    CARDIAC CATHETERIZATION N/A 04/27/2022    ORIF TIBIA/FIBULA FRACTURES Right        Family History    Problem Relation Age of Onset    Diabetes Mother     Arthritis Mother     Hypertension Father     Heart attack Father     Seizures Sister     Heart attack Paternal Grandfather        Social History     Socioeconomic History    Marital status: Single   Tobacco Use    Smoking status: Every Day     Current packs/day: 0.25     Average packs/day: 1 pack/day for 31.6 years (30.7 ttl pk-yrs)     Types: Cigarettes     Start date: 1994     Passive exposure: Never    Smokeless tobacco: Never    Tobacco comments:     Down to 1/2 pack every 3 days, wearing a nicotine patch - 6/24/25   Vaping Use    Vaping status: Never Used   Substance and Sexual Activity    Alcohol use: Not Currently    Drug use: Yes     Types: Methamphetamines, Marijuana, Cocaine(coke)    Sexual activity: Yes           Objective   Physical Exam  Neck has no adenopathy JVD or bruits.  Lungs are clear.  Heart has an irregular rhythm without murmur rub or gallop.  Chest is nontender.  Abdomen is soft nontender.  Extremity exam shows no cyanosis or edema.  He does have bilateral knee and ankle pain on palpation.  There is no palpable joint effusion.  Procedures     My EKG interpretation was atrial fibrillation at a rate of 82 with no acute ST change      ED Course      Results for orders placed or performed during the hospital encounter of 07/28/25   ECG 12 Lead Dyspnea    Collection Time: 07/28/25  4:44 PM   Result Value Ref Range    QT Interval 456 ms    QTC Interval 534 ms   Comprehensive Metabolic Panel    Collection Time: 07/28/25  4:56 PM    Specimen: Blood   Result Value Ref Range    Glucose 98 65 - 99 mg/dL    BUN 63.1 (H) 6.0 - 20.0 mg/dL    Creatinine 3.03 (H) 0.76 - 1.27 mg/dL    Sodium 138 136 - 145 mmol/L    Potassium 3.9 3.5 - 5.2 mmol/L    Chloride 103 98 - 107 mmol/L    CO2 19.7 (L) 22.0 - 29.0 mmol/L    Calcium 8.7 8.6 - 10.5 mg/dL    Total Protein 7.0 6.0 - 8.5 g/dL    Albumin 3.8 3.5 - 5.2 g/dL    ALT (SGPT) 13 1 - 41 U/L    AST (SGOT) 21 1 -  40 U/L    Alkaline Phosphatase 50 39 - 117 U/L    Total Bilirubin 0.6 0.0 - 1.2 mg/dL    Globulin 3.2 gm/dL    A/G Ratio 1.2 g/dL    BUN/Creatinine Ratio 20.8 7.0 - 25.0    Anion Gap 15.3 (H) 5.0 - 15.0 mmol/L    eGFR 24.5 (L) >60.0 mL/min/1.73   BNP    Collection Time: 07/28/25  4:56 PM    Specimen: Blood   Result Value Ref Range    proBNP 15,129.0 (H) 0.0 - 450.0 pg/mL   High Sensitivity Troponin T    Collection Time: 07/28/25  4:56 PM    Specimen: Blood   Result Value Ref Range    HS Troponin T 29 (H) <22 ng/L   CBC Auto Differential    Collection Time: 07/28/25  4:56 PM    Specimen: Blood   Result Value Ref Range    WBC 7.74 3.40 - 10.80 10*3/mm3    RBC 4.77 4.14 - 5.80 10*6/mm3    Hemoglobin 13.2 13.0 - 17.7 g/dL    Hematocrit 39.9 37.5 - 51.0 %    MCV 83.6 79.0 - 97.0 fL    MCH 27.7 26.6 - 33.0 pg    MCHC 33.1 31.5 - 35.7 g/dL    RDW 14.6 12.3 - 15.4 %    RDW-SD 44.5 37.0 - 54.0 fl    MPV 10.2 6.0 - 12.0 fL    Platelets 195 140 - 450 10*3/mm3    Neutrophil % 68.2 42.7 - 76.0 %    Lymphocyte % 18.1 (L) 19.6 - 45.3 %    Monocyte % 10.5 5.0 - 12.0 %    Eosinophil % 2.5 0.3 - 6.2 %    Basophil % 0.4 0.0 - 1.5 %    Immature Grans % 0.3 0.0 - 0.5 %    Neutrophils, Absolute 5.29 1.70 - 7.00 10*3/mm3    Lymphocytes, Absolute 1.40 0.70 - 3.10 10*3/mm3    Monocytes, Absolute 0.81 0.10 - 0.90 10*3/mm3    Eosinophils, Absolute 0.19 0.00 - 0.40 10*3/mm3    Basophils, Absolute 0.03 0.00 - 0.20 10*3/mm3    Immature Grans, Absolute 0.02 0.00 - 0.05 10*3/mm3    nRBC 0.0 0.0 - 0.2 /100 WBC   Green Top (Gel)    Collection Time: 07/28/25  4:56 PM   Result Value Ref Range    Extra Tube Hold for add-ons.    Lavender Top    Collection Time: 07/28/25  4:56 PM   Result Value Ref Range    Extra Tube hold for add-on    Gold Top - SST    Collection Time: 07/28/25  4:56 PM   Result Value Ref Range    Extra Tube Hold for add-ons.    Light Blue Top    Collection Time: 07/28/25  4:56 PM   Result Value Ref Range    Extra Tube Hold for  add-ons.    High Sensitivity Troponin T 1Hr    Collection Time: 07/28/25  5:50 PM    Specimen: Blood   Result Value Ref Range    HS Troponin T 29 (H) <22 ng/L    Troponin T Numeric Delta 0 ng/L    Troponin T % Delta 0 Abnormal if >/= 20%     No radiology results for the last day                                                     Medical Decision Making  BMP shows BUN of 63 creatinine 3.0 which is the patient's baseline when I reviewed his old chart.  There is no electrolyte.  LFTs are normal with no evidence hepatitis.  BNP is greater than 15,000 again which is baseline when I reviewed his chart as this is his general range over the past 1 year.  CBC shows no leukocytosis no left shift and no anemia.  Patient has no evidence of acute coronary syndrome based on EKG and troponin.  Patient will be discharged.  Will be placed on Ultram for his pain.  Will follow with his primary physician and cardiologist for recheck.    Amount and/or Complexity of Data Reviewed  Labs: ordered. Decision-making details documented in ED Course.  ECG/medicine tests: ordered and independent interpretation performed.    Risk  Prescription drug management.        Final diagnoses:   Acute pain of both knees   Atrial fibrillation, unspecified type       ED Disposition  ED Disposition       ED Disposition   Discharge    Condition   Stable    Comment   --               No follow-up provider specified.       Medication List        Changed      * HYDROcodone-acetaminophen  MG per tablet  Commonly known as: NORCO  Take 1 tablet by mouth Every 6 (Six) Hours As Needed for Moderate Pain.  What changed: Another medication with the same name was added. Make sure you understand how and when to take each.     * HYDROcodone-acetaminophen 5-325 MG per tablet  Commonly known as: NORCO  Take 1 tablet by mouth Every 6 (Six) Hours As Needed for Moderate Pain.  What changed: You were already taking a medication with the same name, and this prescription was  added. Make sure you understand how and when to take each.           * This list has 2 medication(s) that are the same as other medications prescribed for you. Read the directions carefully, and ask your doctor or other care provider to review them with you.                   Where to Get Your Medications        These medications were sent to [facility]/pharmacy #[id number] - [place], [place] - [address] - [phone]  - [phone]  [address] [place] [address]      Phone: [phone]   HYDROcodone-acetaminophen 5-325 MG per tablet            Aaron Chung MD  07/28/25 8357

## 2025-07-29 LAB
QT INTERVAL: 456 MS
QTC INTERVAL: 534 MS

## 2025-07-30 ENCOUNTER — READMISSION MANAGEMENT (OUTPATIENT)
Dept: CALL CENTER | Facility: HOSPITAL | Age: 50
End: 2025-07-30
Payer: OTHER GOVERNMENT

## 2025-07-30 NOTE — OUTREACH NOTE
Medical Week 2 Survey      Flowsheet Row Responses   [facility] [place] facility patient discharged from? [facility]   Does the patient have one of the following disease processes/diagnoses(primary or secondary)? Other   Week 2 attempt successful? No   Unsuccessful attempts Attempt 1   Revoke Other  [Non working number]            RONAK BAUER - Registered Nurse

## 2025-07-31 ENCOUNTER — HOSPITAL ENCOUNTER (EMERGENCY)
Facility: HOSPITAL | Age: 50
Discharge: HOME | End: 2025-07-31
Payer: OTHER GOVERNMENT

## 2025-07-31 VITALS
HEIGHT: 68 IN | DIASTOLIC BLOOD PRESSURE: 100 MMHG | RESPIRATION RATE: 16 BRPM | WEIGHT: 171.74 LBS | TEMPERATURE: 97.7 F | OXYGEN SATURATION: 99 % | BODY MASS INDEX: 26.03 KG/M2 | HEART RATE: 81 BPM | SYSTOLIC BLOOD PRESSURE: 148 MMHG

## 2025-07-31 DIAGNOSIS — M25.462 PAIN AND SWELLING OF LEFT KNEE: Primary | ICD-10-CM

## 2025-07-31 DIAGNOSIS — M25.562 PAIN AND SWELLING OF LEFT KNEE: Primary | ICD-10-CM

## 2025-07-31 DIAGNOSIS — M10.9 EXACERBATION OF GOUT: ICD-10-CM

## 2025-07-31 PROCEDURE — 25010000002 HYDROMORPHONE 1 MG/ML SOLUTION: Performed by: NURSE PRACTITIONER

## 2025-07-31 PROCEDURE — 63710000001 ONDANSETRON ODT 4 MG TABLET DISPERSIBLE: Performed by: NURSE PRACTITIONER

## 2025-07-31 PROCEDURE — 99283 EMERGENCY DEPT VISIT LOW MDM: CPT

## 2025-07-31 PROCEDURE — 96372 THER/PROPH/DIAG INJ SC/IM: CPT

## 2025-07-31 RX ORDER — ONDANSETRON 4 MG/1
4 TABLET, ORALLY DISINTEGRATING ORAL ONCE
Status: COMPLETED | OUTPATIENT
Start: 2025-07-31 | End: 2025-07-31

## 2025-07-31 RX ORDER — ONDANSETRON 4 MG/1
4 TABLET, ORALLY DISINTEGRATING ORAL 4 TIMES DAILY PRN
Qty: 10 TABLET | Refills: 0 | Status: SHIPPED | OUTPATIENT
Start: 2025-07-31

## 2025-07-31 RX ORDER — HYDROCODONE BITARTRATE AND ACETAMINOPHEN 5; 325 MG/1; MG/1
1 TABLET ORAL 4 TIMES DAILY PRN
Qty: 15 TABLET | Refills: 0 | Status: SHIPPED | OUTPATIENT
Start: 2025-07-31

## 2025-07-31 RX ADMIN — HYDROMORPHONE HYDROCHLORIDE 1 MG: 1 INJECTION, SOLUTION INTRAMUSCULAR; INTRAVENOUS; SUBCUTANEOUS at 22:52

## 2025-07-31 RX ADMIN — ONDANSETRON 4 MG: 4 TABLET, ORALLY DISINTEGRATING ORAL at 22:52

## 2025-08-01 NOTE — ED PROVIDER NOTES
Subjective   History of Present Illness  Patient is a 50-year-old male who comes in with left knee pain states he has a history of gout and was recently admitted for-knee pain and cardiac problems and chart review shows that he was unable to use colchicine due to the decreased kidney function and he was unable to use steroids due to allergy he comes in today wanting pain control denies any new injury or fall      Review of Systems   Musculoskeletal:  Positive for joint swelling.        Left knee pain       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    h/o Medication and medical follow-up noncompliance 04/10/2024    Hepatitis C 01/03/2025    Hyperkalemia 04/26/2022    Hyperlipidemia 01/21/2025    Hypertensive heart disease with congestive heart failure and chronic kidney disease 02/11/2025    Hypertensive urgency 04/26/2022    Methamphetamine abuse 04/26/2022    Methamphetamine intoxication 06/16/2024    Mild to moderate valvular heart disease 02/06/2025    Mild to moderate mitral valve regurgitation      Secondary hyperparathyroidism 02/11/2025    Tick bite of abdomen 04/26/2022    Tobacco abuse 04/26/2022       Allergies   Allergen Reactions    Methylprednisolone Swelling     Pt reports severe swelling with steroids    Morphine Other (See Comments)     Makes it feel like my blood is boiling in my veins       Past Surgical History:   Procedure Laterality Date    CARDIAC CATHETERIZATION N/A 04/27/2022    ORIF TIBIA/FIBULA FRACTURES Right         Family History   Problem Relation Age of Onset    Diabetes Mother     Arthritis Mother     Hypertension Father     Heart attack Father     Seizures Sister     Heart attack Paternal Grandfather        Social History     Socioeconomic History    Marital status: Single   Tobacco Use    Smoking status: Every Day     Current packs/day: 0.25     Average packs/day: 1 pack/day for 31.6 years (30.7 ttl pk-yrs)     Types: Cigarettes     Start date: 1994     Passive exposure: Never    Smokeless tobacco: Never    Tobacco comments:     Down to 1/2 pack every 3 days, wearing a nicotine patch - 6/24/25   Vaping Use    Vaping status: Never Used   Substance and Sexual Activity    Alcohol use: Not Currently    Drug use: Yes     Types: Methamphetamines, Marijuana, Cocaine(coke)    Sexual activity: Yes           Objective   Physical Exam  Vitals reviewed.   Constitutional:       Appearance: He is well-developed.   HENT:      Head: Normocephalic and atraumatic.   Eyes:      Conjunctiva/sclera: Conjunctivae normal.   Cardiovascular:      Rate and Rhythm: Normal rate and regular rhythm.      Heart sounds: Normal heart sounds.   Pulmonary:      Effort: Pulmonary effort is normal.      Breath sounds: Normal breath sounds.   Musculoskeletal:      Cervical back: Normal range of motion and neck supple.      Right hip: Normal.      Left hip: Normal.      Right knee: Normal.      Left knee: Swelling present. No erythema. Decreased range of motion. Tenderness present.      Comments: Extremities have good distal pulse good cap refill distally neurovascularly intact   Skin:     General: Skin is warm and dry.      Capillary Refill: Capillary refill takes less than 2 seconds.   Neurological:      General: No focal deficit present.      Mental Status: He is alert and oriented to person, place, and time.      GCS: GCS eye subscore is 4. GCS verbal subscore is 5. GCS motor subscore is 6.         Procedures           ED Course  ED Course as of  07/31/25 2302   Thu Jul 31, 2025   2247 Inspect system was found to be down at this time [KW]      ED Course User Index  [KW] Arminda Rojas APRN                                                  Medical Decision Making  Discharge summary from 7/28/2025  Hospital Course  Patient is a 50 y.o. male presented with persistent left lower extremity pain with recent diagnosis of gout in HPI noted above.  CK was ordered and found to be 60 with WBCs of 7.46, CRP of 7.06 and sed rate of 32.  Uric acid on 7/11 was found to be 11.6 and procalcitonin was within normal limits at 0.17.  Patient's renal function remained elevated though generally at his baseline initially showing creatinine of 3.16 with a GFR of 20.4.  Troponins were elevated at 47, 41, 42 with proBNP of 14,860.0 and signs of mild pulmonary vascular congestion on chest x-ray.  Patient denies any chest pain or dyspnea and these findings appear similar to results from the past several months.  He was given colchicine previously.  This was discussed with pharmacist who note given his recent administration as well as current Coreg and kidney function further treatment would not be recommended.  He reports significant untoward effects related to systemic glucocorticoids as well so this therapy was deferred.  Given his renal function NSAID therapy was also contraindicated.  He was continued on Dilaudid and hydrocodone throughout his admission.  He has been able to mobilize with the assistance of previously ordered crutches while admitted.  Nephrology was consulted who evaluated patient noting continued poor renal prognosis though appearing stable and recommending stopping metolazone as possible cause of hyperuricemia and consideration for Uloric on an outpatient basis.  He has to follow-up with primary nephrologist within 2 weeks.  At this time patient is felt to be in good condition for discharge with close follow-up with his PCP as well as nephrology on an  outpatient basis.  His full testing/results and plan were discussed with patient along with concerning/alarm symptoms for which to call 911/return to the ED.  All questions were answered and he verbalizes his understanding and agreement.      Patient has above exam and left knee is grossly swollen and tender the patient is not appropriate for colchicine and/or steroids which she has an allergy for chart review showed that the patient was given some Dilaudid and Norco for pain the last time he was here I did give the patient some more Norco this time I also advised him that he would not be getting any more Norco from the emergency room he would need to see orthopedics for further pain management    Problems Addressed:  Exacerbation of gout: complicated acute illness or injury  Pain and swelling of left knee: complicated acute illness or injury    Amount and/or Complexity of Data Reviewed  ECG/medicine tests: ordered and independent interpretation performed. Decision-making details documented in ED Course.    Risk  Prescription drug management.        Final diagnoses:   Pain and swelling of left knee   Exacerbation of gout       ED Disposition  ED Disposition       ED Disposition   Discharge    Condition   Stable    Comment   --               No follow-up provider specified.       Medication List        New Prescriptions      naloxone 4 MG/0.1ML nasal spray  Commonly known as: NARCAN  Call 911. Don't prime. [place] in 1 nostril for overdose. Repeat in 2-3 minutes in other nostril if no or minimal breathing/responsiveness.     ondansetron ODT 4 MG disintegrating tablet  Commonly known as: ZOFRAN-ODT  Place 1 tablet under the tongue 4 (Four) Times a Day As Needed for Nausea or Vomiting.            Changed      * HYDROcodone-acetaminophen 5-325 MG per tablet  Commonly known as: NORCO  Take 1 tablet by mouth Every 6 (Six) Hours As Needed for Moderate Pain.  What changed:   Another medication with the same name was added.  Make sure you understand how and when to take each.  Another medication with the same name was removed. Continue taking this medication, and follow the directions you see here.     * HYDROcodone-acetaminophen 5-325 MG per tablet  Commonly known as: NORCO  Take 1 tablet by mouth 4 (Four) Times a Day As Needed for Moderate Pain.  What changed: You were already taking a medication with the same name, and this prescription was added. Make sure you understand how and when to take each.  Replaces: HYDROcodone-acetaminophen  MG per tablet           * This list has 2 medication(s) that are the same as other medications prescribed for you. Read the directions carefully, and ask your doctor or other care provider to review them with you.                Stop      HYDROcodone-acetaminophen  MG per tablet  Commonly known as: NORCO  Replaced by: HYDROcodone-acetaminophen 5-325 MG per tablet  You also have another medication with the same name that you need to continue taking as instructed.               Where to Get Your Medications        These medications were sent to [facility]/pharmacy #[id number] - [place], [place] - [address] [facility] [address] [phone]  - [phone]   [address] [facility] [place] [address]      Phone: [phone]   HYDROcodone-acetaminophen 5-325 MG per tablet  naloxone 4 MG/0.1ML nasal spray  ondansetron ODT 4 MG disintegrating tablet            Arimnda Rojas, APRN  07/31/25 [id number]

## 2025-08-11 ENCOUNTER — PATIENT OUTREACH (OUTPATIENT)
Dept: CASE MANAGEMENT | Facility: OTHER | Age: 50
End: 2025-08-11
Payer: OTHER GOVERNMENT

## 2025-08-28 ENCOUNTER — LAB (OUTPATIENT)
Dept: FAMILY MEDICINE CLINIC | Facility: CLINIC | Age: 50
End: 2025-08-28
Payer: OTHER GOVERNMENT

## 2025-08-28 DIAGNOSIS — M10.362 GOUT OF LEFT KNEE DUE TO RENAL IMPAIRMENT, UNSPECIFIED CHRONICITY: ICD-10-CM

## 2025-08-28 LAB
CRP SERPL-MCNC: 1.3 MG/DL (ref 0–0.5)
ERYTHROCYTE [SEDIMENTATION RATE] IN BLOOD: 30 MM/HR (ref 0–15)

## 2025-08-28 PROCEDURE — 86140 C-REACTIVE PROTEIN: CPT | Performed by: NURSE PRACTITIONER

## 2025-08-28 PROCEDURE — 36415 COLL VENOUS BLD VENIPUNCTURE: CPT

## 2025-08-28 PROCEDURE — 85652 RBC SED RATE AUTOMATED: CPT | Performed by: NURSE PRACTITIONER

## (undated) DEVICE — ANGIO-SEAL VIP VASCULAR CLOSURE DEVICE: Brand: ANGIO-SEAL

## (undated) DEVICE — GW PTFE EMERALD HEPCOAT FC J TIP STD .035 3MM 150CM

## (undated) DEVICE — CATH MPAC STP 6F: Brand: SUPER TORQUE

## (undated) DEVICE — PK TRY HEART CATH 50

## (undated) DEVICE — PINNACLE INTRODUCER SHEATH: Brand: PINNACLE

## (undated) DEVICE — ST ACC MICROPUNCTURE STFF/CANN PLAT/TP 4F 21G 40CM